# Patient Record
Sex: FEMALE | Race: WHITE | NOT HISPANIC OR LATINO | Employment: FULL TIME | URBAN - METROPOLITAN AREA
[De-identification: names, ages, dates, MRNs, and addresses within clinical notes are randomized per-mention and may not be internally consistent; named-entity substitution may affect disease eponyms.]

---

## 2017-05-10 ENCOUNTER — CONVERSION ENCOUNTER (OUTPATIENT)
Dept: MAMMOGRAPHY | Facility: CLINIC | Age: 47
End: 2017-05-10

## 2017-06-13 ENCOUNTER — CONVERSION ENCOUNTER (OUTPATIENT)
Dept: MAMMOGRAPHY | Facility: CLINIC | Age: 47
End: 2017-06-13

## 2018-08-19 ENCOUNTER — OFFICE VISIT (OUTPATIENT)
Dept: URGENT CARE | Age: 48
End: 2018-08-19
Payer: COMMERCIAL

## 2018-08-19 VITALS
RESPIRATION RATE: 16 BRPM | HEIGHT: 66 IN | SYSTOLIC BLOOD PRESSURE: 139 MMHG | HEART RATE: 100 BPM | DIASTOLIC BLOOD PRESSURE: 79 MMHG | BODY MASS INDEX: 27.97 KG/M2 | WEIGHT: 174 LBS | TEMPERATURE: 97.8 F | OXYGEN SATURATION: 96 %

## 2018-08-19 DIAGNOSIS — R35.0 URINARY FREQUENCY: ICD-10-CM

## 2018-08-19 DIAGNOSIS — R19.7 DIARRHEA, UNSPECIFIED TYPE: Primary | ICD-10-CM

## 2018-08-19 LAB
SL AMB  POCT GLUCOSE, UA: NORMAL
SL AMB LEUKOCYTE ESTERASE,UA: NORMAL
SL AMB POCT BILIRUBIN,UA: NORMAL
SL AMB POCT BLOOD,UA: NORMAL
SL AMB POCT CLARITY,UA: CLEAR
SL AMB POCT COLOR,UA: YELLOW
SL AMB POCT KETONES,UA: NORMAL
SL AMB POCT NITRITE,UA: NORMAL
SL AMB POCT PH,UA: NORMAL
SL AMB POCT SPECIFIC GRAVITY,UA: 1
SL AMB POCT URINE PROTEIN: NORMAL
SL AMB POCT UROBILINOGEN: 0.2

## 2018-08-19 PROCEDURE — 99213 OFFICE O/P EST LOW 20 MIN: CPT | Performed by: NURSE PRACTITIONER

## 2018-08-19 PROCEDURE — 81002 URINALYSIS NONAUTO W/O SCOPE: CPT | Performed by: NURSE PRACTITIONER

## 2018-08-19 PROCEDURE — S9088 SERVICES PROVIDED IN URGENT: HCPCS | Performed by: NURSE PRACTITIONER

## 2018-08-19 PROCEDURE — 87086 URINE CULTURE/COLONY COUNT: CPT | Performed by: NURSE PRACTITIONER

## 2018-08-19 RX ORDER — DEXTROAMPHETAMINE SACCHARATE, AMPHETAMINE ASPARTATE MONOHYDRATE, DEXTROAMPHETAMINE SULFATE AND AMPHETAMINE SULFATE 3.75; 3.75; 3.75; 3.75 MG/1; MG/1; MG/1; MG/1
15 CAPSULE, EXTENDED RELEASE ORAL EVERY MORNING
Status: ON HOLD | COMMUNITY
End: 2019-09-13 | Stop reason: SDUPTHER

## 2018-08-19 RX ORDER — QUETIAPINE FUMARATE 400 MG/1
400 TABLET, FILM COATED ORAL
Status: ON HOLD | COMMUNITY
End: 2019-09-13 | Stop reason: SDUPTHER

## 2018-08-19 NOTE — PROGRESS NOTES
[facility]        NAME: Rozann Schwab is a 56 y o  female  : 1970    MRN: [id number]  DATE: 2018  TIME: 10:46 AM    Assessment and Plan   Diarrhea, unspecified type [R19 7]  1  Diarrhea, unspecified type     2  Urinary frequency  POCT urine dip    Urine culture         Patient Instructions     Increase fluids  Continue to monitor  Establish with PCP and GYN  Follow up with PCP in 3-5 days  Proceed to  ER if symptoms worsen  Chief Complaint     Chief Complaint   Patient presents with    Diarrhea     also Uti symptoms; History of Present Illness       45-year-old female presenting today with c/o intermittent diarrhea / constipation x 6 months with occasional lower abdominal pain / cramping  No f/c  She has changed her diet towards more healthy and more fiber  She reports not drinking a lot of water  She also reports increased urinary frequency, but no other urinary symptoms  She is perimenopausal and states she just got her menses during this office visit  Last menses was approximately 3 months ago  Last gyn appt approximately one year ago  She is looking for a new PCP  Diarrhea          Review of Systems   Review of Systems   Constitutional: Negative  HENT: Negative  Eyes: Negative  Respiratory: Negative  Cardiovascular: Negative  Gastrointestinal: Positive for diarrhea  Genitourinary: Positive for frequency  Negative for decreased urine volume, difficulty urinating, dysuria, flank pain, hematuria and urgency  Musculoskeletal: Negative            Current Medications       Current Outpatient Prescriptions:     amphetamine-dextroamphetamine (ADDERALL XR) 15 MG 24 hr capsule, Take 15 mg by mouth every morning, Disp: , Rfl:     QUEtiapine (SEROquel) 400 MG tablet, Take 400 mg by mouth daily at bedtime, Disp: , Rfl:     Current Allergies     Allergies as of 2018 - Reviewed 2018   Allergen Reaction Noted    Lithium  2016    Sulfa antibiotics Other (See Comments) 02/02/2016            The following portions of the patient's history were reviewed and updated as appropriate: allergies, current medications, past family history, past medical history, past social history, past surgical history and problem list      Past Medical History:   Diagnosis Date    Bipolar 1 disorder, manic, moderate 2/3/2016    Psychiatric disorder        No past surgical history on file  No family history on file  Medications have been verified  Objective   /79   Pulse 100   Temp 97 8 °F (36 6 °C)   Resp 16   Ht 5' 6" (1 676 m)   Wt 78 9 kg (174 lb)   SpO2 96%   BMI 28 08 kg/m²        Physical Exam     Physical Exam   Constitutional: She is oriented to person, place, and time  She appears well-developed and well-nourished  Eyes: Conjunctivae are normal    Cardiovascular: Normal rate, regular rhythm and normal heart sounds  Pulmonary/Chest: Effort normal and breath sounds normal    Abdominal: Soft  Bowel sounds are normal  She exhibits no distension and no mass  There is no tenderness  There is no rebound and no guarding  Neurological: She is alert and oriented to person, place, and time  Skin: Skin is warm and dry  Psychiatric: She has a normal mood and affect  Her behavior is normal  Judgment and thought content normal    Nursing note and vitals reviewed

## 2018-08-19 NOTE — PATIENT INSTRUCTIONS
Increase fluids  Continue to monitor  Establish with PCP and GYN  Irritable Bowel Syndrome   AMBULATORY CARE:   Irritable bowel syndrome (IBS)  is a condition that prevents food from moving through your intestines normally  The food may move through too slowly or too quickly  This causes bloating, increased gas, constipation, or diarrhea  Signs and symptoms of IBS may come and go  Symptoms can occur a few times a week to once a month  IBS can go away for years and suddenly return  Your symptoms may worsen after you eat a big meal or if you do not eat enough healthy foods  Common symptoms include the following:   · Abdominal pain that disappears after you have a bowel movement    · Abdominal cramps that are worse after you eat    · Gas    · Bloated abdomen    · Diarrhea, constipation, or both     · Feeling like you need to have a bowel movement after you just had one    · Mucus in your bowel movement    · Feeling that you have not completely emptied your bowels after a bowel movement  Seek care immediately if:   · You have severe abdominal pain  · Your bowel movements are dark or have blood in them  Contact your healthcare provider if:   · You have a fever  · You have pain in your rectum  · Your abdominal pain does not go away, even after treatment  · You have questions or concerns about your condition or care  Treatment for IBS  may include medicine to decrease diarrhea or soften your bowel movements  You may also need medicine to treat constipation or decrease abdominal pain and muscle spasms  Manage your symptoms:   · Eat a variety of healthy foods  Healthy foods include fruits, vegetables, whole-grain breads, low-fat dairy products, beans, lean meats, and fish  You may need to avoid certain foods to decrease your symptoms  · Drink liquids as directed  Ask how much liquid to drink each day and which liquids are best for you   For most people, good liquids to drink are water, juice, and milk     · Exercise regularly  Ask about the best exercise plan for you  Exercise can decrease your blood pressure and improve your health  · Manage stress  Stress may slow healing and cause illness  Learn new ways to relax, such as deep breathing  · Keep a record  of everything you eat and drink, and your symptoms, for 3 weeks  Bring this record with you to your follow-up visits  Follow up with your healthcare provider as directed:  Write down your questions so you remember to ask them during your visits  © 2017 [employer] [employer] Information is for End User's use only and may not be sold, redistributed or otherwise used for commercial purposes  All illustrations and images included in CareNotes® are the copyrighted property of A D A M , Inc  or Braulio Salvador  The above information is an  only  It is not intended as medical advice for individual conditions or treatments  Talk to your doctor, nurse or pharmacist before following any medical regimen to see if it is safe and effective for you

## 2018-08-20 LAB — BACTERIA UR CULT: NORMAL

## 2018-08-28 ENCOUNTER — APPOINTMENT (OUTPATIENT)
Dept: LAB | Facility: HOSPITAL | Age: 48
End: 2018-08-28
Payer: COMMERCIAL

## 2018-08-28 ENCOUNTER — TRANSCRIBE ORDERS (OUTPATIENT)
Dept: ADMINISTRATIVE | Facility: HOSPITAL | Age: 48
End: 2018-08-28

## 2018-08-28 DIAGNOSIS — Z00.8 HEALTH EXAMINATION IN POPULATION SURVEY: ICD-10-CM

## 2018-08-28 DIAGNOSIS — Z00.8 HEALTH EXAMINATION IN POPULATION SURVEY: Primary | ICD-10-CM

## 2018-08-28 LAB
CHOLEST SERPL-MCNC: 167 MG/DL
EST. AVERAGE GLUCOSE BLD GHB EST-MCNC: 134 MG/DL
HBA1C MFR BLD: 6.3 % (ref 4.2–6.3)
HDLC SERPL-MCNC: 42 MG/DL (ref 40–59)
LDLC SERPL CALC-MCNC: 77 MG/DL
NONHDLC SERPL-MCNC: 125 MG/DL
TRIGL SERPL-MCNC: 242 MG/DL

## 2018-08-28 PROCEDURE — 36415 COLL VENOUS BLD VENIPUNCTURE: CPT | Performed by: PREVENTIVE MEDICINE

## 2018-08-28 PROCEDURE — 83036 HEMOGLOBIN GLYCOSYLATED A1C: CPT | Performed by: PREVENTIVE MEDICINE

## 2018-08-28 PROCEDURE — 80061 LIPID PANEL: CPT

## 2019-08-13 ENCOUNTER — TRANSCRIBE ORDERS (OUTPATIENT)
Dept: LAB | Facility: CLINIC | Age: 49
End: 2019-08-13

## 2019-08-13 ENCOUNTER — APPOINTMENT (OUTPATIENT)
Dept: LAB | Facility: CLINIC | Age: 49
End: 2019-08-13
Payer: COMMERCIAL

## 2019-08-13 DIAGNOSIS — R19.4 CHANGE IN BOWEL HABITS: ICD-10-CM

## 2019-08-13 DIAGNOSIS — R19.4 CHANGE IN BOWEL HABITS: Primary | ICD-10-CM

## 2019-08-13 DIAGNOSIS — Z79.899 ENCOUNTER FOR LONG-TERM (CURRENT) USE OF OTHER MEDICATIONS: ICD-10-CM

## 2019-08-13 DIAGNOSIS — F31.9 BIPOLAR AFFECTIVE DISORDER, REMISSION STATUS UNSPECIFIED (HCC): ICD-10-CM

## 2019-08-13 LAB
25(OH)D3 SERPL-MCNC: 16.9 NG/ML (ref 30–100)
ALBUMIN SERPL BCP-MCNC: 4.2 G/DL (ref 3.5–5)
ALP SERPL-CCNC: 125 U/L (ref 46–116)
ALT SERPL W P-5'-P-CCNC: 113 U/L (ref 12–78)
ANION GAP SERPL CALCULATED.3IONS-SCNC: 8 MMOL/L (ref 4–13)
AST SERPL W P-5'-P-CCNC: 78 U/L (ref 5–45)
BASOPHILS # BLD AUTO: 0.04 THOUSANDS/ΜL (ref 0–0.1)
BASOPHILS NFR BLD AUTO: 1 % (ref 0–1)
BILIRUB SERPL-MCNC: 0.32 MG/DL (ref 0.2–1)
BUN SERPL-MCNC: 15 MG/DL (ref 5–25)
CALCIUM SERPL-MCNC: 9.6 MG/DL (ref 8.3–10.1)
CHLORIDE SERPL-SCNC: 105 MMOL/L (ref 100–108)
CO2 SERPL-SCNC: 28 MMOL/L (ref 21–32)
CREAT SERPL-MCNC: 0.78 MG/DL (ref 0.6–1.3)
CRP SERPL QL: 3.1 MG/L
EOSINOPHIL # BLD AUTO: 0.46 THOUSAND/ΜL (ref 0–0.61)
EOSINOPHIL NFR BLD AUTO: 7 % (ref 0–6)
ERYTHROCYTE [DISTWIDTH] IN BLOOD BY AUTOMATED COUNT: 13.2 % (ref 11.6–15.1)
EST. AVERAGE GLUCOSE BLD GHB EST-MCNC: 117 MG/DL
FERRITIN SERPL-MCNC: 20 NG/ML (ref 8–388)
GFR SERPL CREATININE-BSD FRML MDRD: 91 ML/MIN/1.73SQ M
GLUCOSE SERPL-MCNC: 100 MG/DL (ref 65–140)
HBA1C MFR BLD: 5.7 % (ref 4.2–6.3)
HCT VFR BLD AUTO: 38.8 % (ref 34.8–46.1)
HCYS SERPL-SCNC: 6.9 UMOL/L (ref 3.7–11.2)
HGB BLD-MCNC: 12.6 G/DL (ref 11.5–15.4)
IGA SERPL-MCNC: 182 MG/DL (ref 70–400)
IMM GRANULOCYTES # BLD AUTO: 0.02 THOUSAND/UL (ref 0–0.2)
IMM GRANULOCYTES NFR BLD AUTO: 0 % (ref 0–2)
LYMPHOCYTES # BLD AUTO: 1.53 THOUSANDS/ΜL (ref 0.6–4.47)
LYMPHOCYTES NFR BLD AUTO: 24 % (ref 14–44)
MCH RBC QN AUTO: 26.9 PG (ref 26.8–34.3)
MCHC RBC AUTO-ENTMCNC: 32.5 G/DL (ref 31.4–37.4)
MCV RBC AUTO: 83 FL (ref 82–98)
MONOCYTES # BLD AUTO: 0.39 THOUSAND/ΜL (ref 0.17–1.22)
MONOCYTES NFR BLD AUTO: 6 % (ref 4–12)
NEUTROPHILS # BLD AUTO: 3.85 THOUSANDS/ΜL (ref 1.85–7.62)
NEUTS SEG NFR BLD AUTO: 62 % (ref 43–75)
NRBC BLD AUTO-RTO: 0 /100 WBCS
PLATELET # BLD AUTO: 280 THOUSANDS/UL (ref 149–390)
PMV BLD AUTO: 9.6 FL (ref 8.9–12.7)
POTASSIUM SERPL-SCNC: 4.1 MMOL/L (ref 3.5–5.3)
PROT SERPL-MCNC: 7.7 G/DL (ref 6.4–8.2)
RBC # BLD AUTO: 4.69 MILLION/UL (ref 3.81–5.12)
SODIUM SERPL-SCNC: 141 MMOL/L (ref 136–145)
T3FREE SERPL-MCNC: 2.27 PG/ML (ref 2.3–4.2)
T4 FREE SERPL-MCNC: 0.93 NG/DL (ref 0.76–1.46)
TSH SERPL DL<=0.05 MIU/L-ACNC: 2.39 UIU/ML (ref 0.36–3.74)
WBC # BLD AUTO: 6.29 THOUSAND/UL (ref 4.31–10.16)

## 2019-08-13 PROCEDURE — 80053 COMPREHEN METABOLIC PANEL: CPT

## 2019-08-13 PROCEDURE — 84443 ASSAY THYROID STIM HORMONE: CPT

## 2019-08-13 PROCEDURE — 36415 COLL VENOUS BLD VENIPUNCTURE: CPT

## 2019-08-13 PROCEDURE — 86592 SYPHILIS TEST NON-TREP QUAL: CPT

## 2019-08-13 PROCEDURE — 84403 ASSAY OF TOTAL TESTOSTERONE: CPT

## 2019-08-13 PROCEDURE — 83516 IMMUNOASSAY NONANTIBODY: CPT

## 2019-08-13 PROCEDURE — 86376 MICROSOMAL ANTIBODY EACH: CPT

## 2019-08-13 PROCEDURE — 82784 ASSAY IGA/IGD/IGG/IGM EACH: CPT

## 2019-08-13 PROCEDURE — 82306 VITAMIN D 25 HYDROXY: CPT

## 2019-08-13 PROCEDURE — 86140 C-REACTIVE PROTEIN: CPT

## 2019-08-13 PROCEDURE — 85025 COMPLETE CBC W/AUTO DIFF WBC: CPT

## 2019-08-13 PROCEDURE — 82728 ASSAY OF FERRITIN: CPT

## 2019-08-13 PROCEDURE — 84402 ASSAY OF FREE TESTOSTERONE: CPT

## 2019-08-13 PROCEDURE — 87389 HIV-1 AG W/HIV-1&-2 AB AG IA: CPT

## 2019-08-13 PROCEDURE — 84439 ASSAY OF FREE THYROXINE: CPT

## 2019-08-13 PROCEDURE — 80074 ACUTE HEPATITIS PANEL: CPT

## 2019-08-13 PROCEDURE — 86800 THYROGLOBULIN ANTIBODY: CPT

## 2019-08-13 PROCEDURE — 80307 DRUG TEST PRSMV CHEM ANLYZR: CPT

## 2019-08-13 PROCEDURE — 83036 HEMOGLOBIN GLYCOSYLATED A1C: CPT

## 2019-08-13 PROCEDURE — 83090 ASSAY OF HOMOCYSTEINE: CPT

## 2019-08-13 PROCEDURE — 84481 FREE ASSAY (FT-3): CPT

## 2019-08-14 LAB
HAV IGM SER QL: NORMAL
HBV CORE IGM SER QL: NORMAL
HBV SURFACE AG SER QL: NORMAL
HCV AB SER QL: NORMAL
HIV 1+2 AB+HIV1 P24 AG SERPL QL IA: NORMAL
THYROGLOB AB SERPL-ACNC: <1 IU/ML (ref 0–0.9)
THYROPEROXIDASE AB SERPL-ACNC: 24 IU/ML (ref 0–34)

## 2019-08-15 LAB
RPR SER QL: NORMAL
TESTOST FREE SERPL-MCNC: 1.2 PG/ML (ref 0–4.2)
TESTOST SERPL-MCNC: 5 NG/DL (ref 8–48)
TTG IGA SER-ACNC: <2 U/ML (ref 0–3)
TTG IGG SER-ACNC: <2 U/ML (ref 0–5)

## 2019-08-16 LAB
AMPHETAMINES UR QL SCN: POSITIVE
BARBITURATES UR QL SCN: NEGATIVE NG/ML
BENZODIAZ UR QL: NEGATIVE NG/ML
BZE UR QL: NEGATIVE NG/ML
CANNABINOIDS UR QL SCN: NEGATIVE NG/ML
METHADONE UR QL SCN: NEGATIVE NG/ML
OPIATES UR QL: NEGATIVE NG/ML
PCP UR QL: NEGATIVE NG/ML
PROPOXYPH UR QL SCN: NEGATIVE NG/ML

## 2019-08-19 ENCOUNTER — TRANSCRIBE ORDERS (OUTPATIENT)
Dept: ADMINISTRATIVE | Facility: HOSPITAL | Age: 49
End: 2019-08-19

## 2019-08-19 DIAGNOSIS — R94.5 NONSPECIFIC ABNORMAL RESULTS OF LIVER FUNCTION STUDY: Primary | ICD-10-CM

## 2019-09-13 ENCOUNTER — HOSPITAL ENCOUNTER (INPATIENT)
Facility: HOSPITAL | Age: 49
LOS: 28 days | Discharge: HOME | DRG: 885 | End: 2019-10-11
Attending: PSYCHIATRY & NEUROLOGY | Admitting: PSYCHIATRY & NEUROLOGY
Payer: COMMERCIAL

## 2019-09-13 DIAGNOSIS — F29 PSYCHOSIS, UNSPECIFIED PSYCHOSIS TYPE (HCC): Primary | ICD-10-CM

## 2019-09-13 DIAGNOSIS — T18.9XXA FOREIGN BODY, SWALLOWED: ICD-10-CM

## 2019-09-13 DIAGNOSIS — F31.12 BIPOLAR 1 DISORDER, MANIC, MODERATE (HCC): ICD-10-CM

## 2019-09-13 DIAGNOSIS — K59.00 CONSTIPATION: ICD-10-CM

## 2019-09-13 PROBLEM — F31.2 BIPOLAR I DISORDER, MOST RECENT EPISODE MANIC, SEVERE WITH PSYCHOTIC FEATURES (HCC): Status: ACTIVE | Noted: 2019-09-13

## 2019-09-13 PROCEDURE — 99253 IP/OBS CNSLTJ NEW/EST LOW 45: CPT | Performed by: PHYSICIAN ASSISTANT

## 2019-09-13 PROCEDURE — 99221 1ST HOSP IP/OBS SF/LOW 40: CPT | Performed by: PSYCHIATRY & NEUROLOGY

## 2019-09-13 RX ORDER — DIVALPROEX SODIUM 500 MG/1
500 TABLET, DELAYED RELEASE ORAL 2 TIMES DAILY
Status: DISCONTINUED | OUTPATIENT
Start: 2019-09-13 | End: 2019-09-14

## 2019-09-13 RX ORDER — QUETIAPINE FUMARATE 200 MG/1
200 TABLET, FILM COATED ORAL
Status: DISCONTINUED | OUTPATIENT
Start: 2019-09-13 | End: 2019-09-15

## 2019-09-13 RX ORDER — QUETIAPINE FUMARATE 200 MG/1
200 TABLET, FILM COATED ORAL
Status: DISCONTINUED | OUTPATIENT
Start: 2019-09-13 | End: 2019-09-17

## 2019-09-13 RX ORDER — ACETAMINOPHEN 325 MG/1
650 TABLET ORAL EVERY 6 HOURS PRN
Status: DISCONTINUED | OUTPATIENT
Start: 2019-09-13 | End: 2019-09-17

## 2019-09-13 RX ORDER — LORAZEPAM 1 MG/1
1 TABLET ORAL EVERY 4 HOURS PRN
Status: DISCONTINUED | OUTPATIENT
Start: 2019-09-13 | End: 2019-09-16

## 2019-09-13 RX ORDER — OLANZAPINE 10 MG/1
5 INJECTION, POWDER, LYOPHILIZED, FOR SOLUTION INTRAMUSCULAR
Status: DISCONTINUED | OUTPATIENT
Start: 2019-09-13 | End: 2019-09-17

## 2019-09-13 RX ORDER — TRAZODONE HYDROCHLORIDE 50 MG/1
50 TABLET ORAL
Status: DISCONTINUED | OUTPATIENT
Start: 2019-09-13 | End: 2019-10-11 | Stop reason: HOSPADM

## 2019-09-13 RX ORDER — QUETIAPINE FUMARATE 200 MG/1
400 TABLET, FILM COATED ORAL
Status: DISCONTINUED | OUTPATIENT
Start: 2019-09-13 | End: 2019-09-13

## 2019-09-13 RX ORDER — OLANZAPINE 5 MG/1
5 TABLET ORAL
Status: DISCONTINUED | OUTPATIENT
Start: 2019-09-13 | End: 2019-09-17

## 2019-09-13 RX ORDER — BENZTROPINE MESYLATE 1 MG/ML
1 INJECTION INTRAMUSCULAR; INTRAVENOUS EVERY 6 HOURS PRN
Status: DISCONTINUED | OUTPATIENT
Start: 2019-09-13 | End: 2019-10-11 | Stop reason: HOSPADM

## 2019-09-13 RX ORDER — DEXTROAMPHETAMINE SACCHARATE, AMPHETAMINE ASPARTATE, DEXTROAMPHETAMINE SULFATE AND AMPHETAMINE SULFATE 2.5; 2.5; 2.5; 2.5 MG/1; MG/1; MG/1; MG/1
10 TABLET ORAL
Status: DISCONTINUED | OUTPATIENT
Start: 2019-09-13 | End: 2019-09-14

## 2019-09-13 RX ORDER — QUETIAPINE FUMARATE 200 MG/1
200 TABLET, FILM COATED ORAL
Status: DISCONTINUED | OUTPATIENT
Start: 2019-09-13 | End: 2019-09-13

## 2019-09-13 RX ORDER — BENZTROPINE MESYLATE 1 MG/1
1 TABLET ORAL EVERY 6 HOURS PRN
Status: DISCONTINUED | OUTPATIENT
Start: 2019-09-13 | End: 2019-10-11 | Stop reason: HOSPADM

## 2019-09-13 RX ORDER — QUETIAPINE FUMARATE 200 MG/1
TABLET, FILM COATED ORAL
COMMUNITY
Start: 2019-08-10 | End: 2019-10-11 | Stop reason: HOSPADM

## 2019-09-13 RX ORDER — MAGNESIUM HYDROXIDE/ALUMINUM HYDROXICE/SIMETHICONE 120; 1200; 1200 MG/30ML; MG/30ML; MG/30ML
30 SUSPENSION ORAL EVERY 4 HOURS PRN
Status: DISCONTINUED | OUTPATIENT
Start: 2019-09-13 | End: 2019-10-11 | Stop reason: HOSPADM

## 2019-09-13 RX ORDER — DEXTROAMPHETAMINE SACCHARATE, AMPHETAMINE ASPARTATE, DEXTROAMPHETAMINE SULFATE AND AMPHETAMINE SULFATE 2.5; 2.5; 2.5; 2.5 MG/1; MG/1; MG/1; MG/1
TABLET ORAL
Refills: 0 | COMMUNITY
Start: 2019-08-03 | End: 2019-10-11 | Stop reason: HOSPADM

## 2019-09-13 RX ORDER — DEXTROAMPHETAMINE SACCHARATE, AMPHETAMINE ASPARTATE MONOHYDRATE, DEXTROAMPHETAMINE SULFATE AND AMPHETAMINE SULFATE 2.5; 2.5; 2.5; 2.5 MG/1; MG/1; MG/1; MG/1
CAPSULE, EXTENDED RELEASE ORAL
COMMUNITY
End: 2019-10-11 | Stop reason: HOSPADM

## 2019-09-13 RX ORDER — LORAZEPAM 2 MG/ML
2 INJECTION INTRAMUSCULAR EVERY 4 HOURS PRN
Status: DISCONTINUED | OUTPATIENT
Start: 2019-09-13 | End: 2019-09-16

## 2019-09-13 RX ADMIN — QUETIAPINE FUMARATE 200 MG: 200 TABLET ORAL at 20:21

## 2019-09-13 RX ADMIN — DIVALPROEX SODIUM 500 MG: 500 TABLET, DELAYED RELEASE ORAL at 14:00

## 2019-09-13 RX ADMIN — DIVALPROEX SODIUM 500 MG: 500 TABLET, DELAYED RELEASE ORAL at 20:20

## 2019-09-13 RX ADMIN — LORAZEPAM 1 MG: 1 TABLET ORAL at 17:52

## 2019-09-13 RX ADMIN — OLANZAPINE 5 MG: 5 TABLET, FILM COATED ORAL at 17:52

## 2019-09-13 RX ADMIN — LORAZEPAM 1 MG: 1 TABLET ORAL at 04:22

## 2019-09-13 NOTE — ED NOTES
Insurance Authorization for admission:   Phone call placed to [employer]   Phone number:[phone]     Spoke to [facility]       2 days approved  Level of care: AIP  Review on 9/13  Authorization # [id number]  EVS (Eligibility Verification System) called - [phone]  Automated system indicates: **    Insurance Authorization for Transportation:    Phone call placed to **  Phone number **  Spoke to **     Authorization #: **

## 2019-09-13 NOTE — CMS CERTIFICATION NOTE
TREATMENT PLAN REVIEW - [address] 53 y o  1970 female MRN: [id number]    [facility] [address] [facility] Room / Bed: [place] [address]/[place] [address] Encounter: [id number]          Admit Date/Time:  9/13/2019  1:20 AM    Treatment Team: Attending Provider: Sravanthi Keenan; Patient Care Technician: May Villasenor; Registered Nurse: Socorro Fish, RN; Care Manager: Margarette Khalil, ELI; : Constance Beltre; Medications RN: Anais Guardado, ELI; Occupational Therapy Assistant: DELFINO Joe;  Registered Nurse: Fausto Navarro RN; Resident: Adrienne Earl MD; Patient Care Assistant: Rosa Ruiz    Diagnosis:   Bipolar I disorder, most recent episode manic, severe with psychotic features    Patient Strengths/Assets: average or above intelligence, capable of independent living, good past treatment response, good physical health, good support system    Patient Barriers/Limitations: patient is on an involuntary commitment    Short Term Goals: decrease in psychotic symptoms, sleep improvement, mood stabilization    Long Term Goals: stabilization of mood, resolution of psychotic symptoms, adequate sleep, appropriate interaction with peers    Progress Towards Goals: starting psychiatric medications as prescribed, still has psychotic symptoms    Recommended Treatment: medication management, patient medication education, group therapy, milieu therapy, continued Behavioral Health psychiatric evaluation/assessment process    Treatment Frequency: daily medication monitoring, group and milieu therapy daily, monitoring through interdisciplinary rounds, monitoring through weekly patient care conferences    Expected Discharge Date:  > 2 midnights    Discharge Plan: discharge to home    Treatment Plan Created/Updated By: Adrienne Earl MD

## 2019-09-13 NOTE — PLAN OF CARE
Problem: SELF HARM/SUICIDALITY  Goal: Will have no self-injury during hospital stay  Description  INTERVENTIONS:  - Q 15 MINUTES: Routine safety checks  - Q WAKING SHIFT & PRN: Assess risk to determine if routine checks are adequate to maintain patient safety  - Encourage patient to participate actively in care by formulating a plan to combat response to suicidal ideation, identify supports and resources  Outcome: Progressing     Problem: PSYCHOSIS  Goal: Will report no hallucinations or delusions  Description  Interventions:  - Administer medication as  ordered  - Every waking shifts and PRN assess for the presence of hallucinations and or delusions  - Assist with reality testing to support increasing orientation  - Assess if patient's hallucinations or delusions are encouraging self-harm or harm to others and intervene as appropriate  Outcome: Progressing     Problem: INVOLUNTARY ADMIT  Goal: Will cooperate with staff recommendations and doctor's orders and will demonstrate appropriate behavior  Description  INTERVENTIONS:  - Treat underlying conditions and offer medication as ordered  - Educate regarding involuntary admission procedures and rules  - Utilize positive consistent limit setting strategies to support patient and staff safety  Outcome: Progressing     Problem: Alteration in Thoughts and Perception  Goal: Treatment Goal: Gain control of psychotic behaviors/thinking, reduce/eliminate presenting symptoms and demonstrate improved reality functioning upon discharge  Outcome: Progressing  Goal: Refrain from acting on delusional thinking/internal stimuli  Description  Interventions:  - Monitor patient closely, per order   - Utilize least restrictive measures   - Set reasonable limits, give positive feedback for acceptable   - Administer medications as ordered and monitor of potential side effects  Outcome: Progressing  Goal: Recognize dysfunctional thoughts, communicate reality-based thoughts at the time of discharge  Description  Interventions:  - Provide medication and psycho-education to assist patient in compliance and developing insight into his/her illness   Outcome: Progressing  Goal: Complete daily ADLs, including personal hygiene independently, as able  Description  Interventions:  - Observe, teach, and assist patient with ADLS  - Monitor and promote a balance of rest/activity, with adequate nutrition and elimination   Outcome: Progressing     Problem: Ineffective Coping  Goal: Participates in unit activities  Description  Interventions:  - Provide therapeutic environment   - Provide required programming   - Redirect inappropriate behaviors   Outcome: Progressing

## 2019-09-13 NOTE — PROGRESS NOTES
Pt appears disoriented, reports high level of anxiety  Reports klonopin as needed helps this  During conversation, pt RIS, talking to self, states "oh, that may be the voice of my dead father " Continues RIS  Administered ativan and zyprexa po for symptoms  Pt continues talking to self, stating, "you should meet Elba Steven, he has his own recording studio  They need to keep the music business and psychiatry business separate, these people are slaves, working for nothing " Pt reassured, appreciative  Pt wandering unit, medication only partially effective, continued RIS and reported hearing voices

## 2019-09-13 NOTE — PROGRESS NOTES
89 with a hx  Of Bipolar Disorder, admitted from [facility] for erratic behavior & brought to ED by police, after throwing patio furniture from a 5th floor, after a disagreement with her BF  Pt is cooperative with admission, as had Seroquel  200 mg po in ED, prior to transfer here  Pt  is a [employer] employee & has been here in the past for similar s/s's of psychosis  Pt admits that she has been medication non-compliant for 3-4 days or at least " taken sporadically " There is no overt delusions present, but admits that at times, she has paranoia  Denies current SI/HI, A/V Hallucinations, rating her depression at a 3-4 & anxiety at a 5  She agrees to a safety contract  Her speech is rapid & rambling & she has no recall of events leading up to admission  Thinks that she's here, simply due to paperwork being "messed up " Has allergies to Lithium & Sulfa  Denies any medical problems except for "thyroid nodules," which she reports is "being watched "  Requested & received a sandwich & juice & was able to sleep within an hour after admission assessment, without benefit of extra medication  Will continue to monitor

## 2019-09-13 NOTE — PROGRESS NOTES
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

## 2019-09-13 NOTE — PROGRESS NOTES
Pt removed mattress from bed frame  Pt found in bedroom with a peer staring at bed frame  Pt informed that peers can not be in her bedroom  Pt stated that her bed was vibrating and there are worms and water in the bed frame  Pt stated that she can get 20 marines in here to check this out  Pt told writer that only the marines may touch her bed and to let others know that [facility] staff are not allowed to touch her bed

## 2019-09-13 NOTE — PLAN OF CARE
Problem: PSYCHOSIS  Goal: Will report no hallucinations or delusions  Description  Interventions:  - Administer medication as  ordered  - Every waking shifts and PRN assess for the presence of hallucinations and or delusions  - Assist with reality testing to support increasing orientation  - Assess if patient's hallucinations or delusions are encouraging self-harm or harm to others and intervene as appropriate  Outcome: Progressing     Problem: INVOLUNTARY ADMIT  Goal: Will cooperate with staff recommendations and doctor's orders and will demonstrate appropriate behavior  Description  INTERVENTIONS:  - Treat underlying conditions and offer medication as ordered  - Educate regarding involuntary admission procedures and rules  - Utilize positive consistent limit setting strategies to support patient and staff safety  Outcome: Progressing  Note:   Currently denies A/V hallucinations  No overt delusions expressed   Cooperative with admission assessment, though tangential

## 2019-09-13 NOTE — CONSULTS
Consultation - Dylon Edward 53 y o  female MRN: [id number]    Unit/Bed#: [facility] 252-01 Encounter: [id number]      Assessment/Plan     Assessment:  Bipolar disorder currently Manic    Plan:  Medically cleared for inpatient psychiatric evaluation and treatment      History of Present Illness   HPI: Dylon Edward is a 53y o  year old female who presents with acute xochitl with history of bipolar disorder  She reports history of parathyroid nodules, gallstones, and diverticulosis however there are no currently active issues  She denies recent illness, open wounds, or pain  Inpatient consult for Medical Clearance for [facility] patient  Consult performed by: Luis Alfredo Moreira PA-C  Consult ordered by: Haja Reeves MD          Review of Systems   Constitutional: Negative for activity change, chills, fatigue and fever  HENT: Negative for congestion, drooling, ear pain, postnasal drip, rhinorrhea, sinus pressure, sinus pain, sneezing and sore throat  Eyes: Negative for redness, itching and visual disturbance  Respiratory: Negative for cough, chest tightness, shortness of breath and wheezing  Cardiovascular: Negative for chest pain and palpitations  Gastrointestinal: Negative for abdominal distention, abdominal pain, constipation, diarrhea, nausea and vomiting  Genitourinary: Negative for dysuria, flank pain, frequency, hematuria and urgency  Neurological: Negative for dizziness, syncope and headaches  Psychiatric/Behavioral: Positive for agitation and confusion  Negative for behavioral problems, decreased concentration, dysphoric mood, hallucinations, sleep disturbance and suicidal ideas  The patient is nervous/anxious and is hyperactive  Historical Information   Past Medical History:   Diagnosis Date    Bipolar 1 disorder, manic, moderate ([facility] ) 2/3/2016    Psychiatric disorder     Psychiatric illness     Psychosis ([facility] )     Suicide attempt ([facility] )      History reviewed   No pertinent surgical history  Social History   Social History     Substance and Sexual Activity   Alcohol Use Yes    Frequency: 2-3 times a week    Drinks per session: 5 or 6    Binge frequency: Less than monthly    Comment: [facility] [place] 6-9 drinks per week     Social History     Substance and Sexual Activity   Drug Use Yes    Types: Marijuana    Comment: "once in a while"     Social History     Tobacco Use   Smoking Status Never Smoker   Smokeless Tobacco Never Used     Family History: non-contributory    Meds/Allergies   PTA meds:   Prior to Admission Medications   Prescriptions Last Dose Informant Patient Reported? Taking? QUEtiapine (SEROquel) 200 mg tablet   Yes Yes   amphetamine-dextroamphetamine (ADDERALL XR, 10MG,) 10 MG 24 hr capsule   Yes Yes   amphetamine-dextroamphetamine (ADDERALL) 10 mg tablet   Yes Yes   Sig: TAKE 1 CAPSULE EACH AFTERNOON      Facility-Administered Medications: None     Allergies   Allergen Reactions    Lithium     Sulfa Antibiotics Other (See Comments)     unknown       Objective   Vitals: Blood pressure 125/58, pulse 92, temperature 97 6 °F (36 4 °C), temperature source Tympanic, resp  rate 16, height 5' 6" (1 676 m), weight 75 9 kg (167 lb 6 4 oz), SpO2 98 %, unknown if currently breastfeeding  No intake or output data in the 24 hours ending 09/13/19 1409  Invasive Devices     None                 Physical Exam   Constitutional: She is oriented to person, place, and time  She appears well-developed and well-nourished  No distress  HENT:   Head: Normocephalic and atraumatic  Eyes: Pupils are equal, round, and reactive to light  EOM are normal    Neck: Normal range of motion  Neck supple  Cardiovascular: Normal rate, regular rhythm and normal heart sounds  Exam reveals no gallop and no friction rub  No murmur heard  Pulmonary/Chest: Effort normal  She has no wheezes  She has no rales  Abdominal: Soft  Bowel sounds are normal  She exhibits no mass  There is no tenderness   There is no guarding  Musculoskeletal: Normal range of motion  Neurological: She is alert and oriented to person, place, and time  Skin: Skin is warm and dry  Psychiatric: Her mood appears anxious  Her speech is rapid and/or pressured and tangential  She is hyperactive  She is inattentive  Nursing note and vitals reviewed  Lab Results: I have personally reviewed pertinent reports  Imaging Studies: I have personally reviewed pertinent reports

## 2019-09-13 NOTE — PROGRESS NOTES
New Patient  Status: Pt s a 89 from [facility]  She was brought in by police for throwing furniture off of her 5th floor balcony  Pt has been non-compliant with medications  She did contact her mom in [place] to notify her of the admission  Pt reported occasional etho & thc use  Pt thinks she is here due to paperwork being messed up     Medication: to be reviewed  D/C: TBD     09/13/19 0733   Team Meeting   Meeting Type Daily Rounds   Team Members Present   Team Members Present Physician;Nurse;;Occupational Therapist   Physician Team Member Dr Tristian Pérez / Dr Aguilar Herron / Bettye Gallego Team Member Christine Escalante / Farshad Ascencio Management Team Member [phone] / Sravanthi Gonzalez   OT Team Member Luci   Patient/Family Present   Patient Present No   Patient's Family Present No

## 2019-09-13 NOTE — H&P
Psychiatric Evaluation - [address] [place] [address] 53 y o  female MRN: [id number]  Unit/Bed#: U 252-01 Encounter: [id number]    Assessment/Plan   Active Problems:    Bipolar I disorder, most recent episode manic, severe with psychotic features ([facility] )    Plan:   -Check admission labs  -Collaborate with family for baseline assessment and disposition planning   -Continue patient current outpatient medical regimen:          Seroquel 200 mg qhs (with 200mg qhs prn if unable to sleep)          Adderall 10 mg bid          Depakote 500mg bid (check level in 3 days)    Treatment options and alternatives were reviewed with the patient, who concurs with the above plan  Risks, benefits, and possible side effects of medications were explained to the patient, and she verbalizes understanding       -----------------------------------    Chief Complaint: "I've been under a lot of stress"    History of Present Illness     Radha Bennett is a 53 y o  female with a PMH of Bipolar I Disorder who presents on an involuntarily basis for signs of acute psychosis and xochitl  [facility] after being brought in by the police due to erratic behavior (throwing furniture from the 5th floor) following a fight/rejection with "a radha she's seeing from work " The patient is currently a nurse at [employer]  She was previously admitted here (02/02/16 - 02/23/16)  in 2016 for similar complaints  Since then, she has been following with Dr Brie Hong (psychiatrist) and doing well  Dr Brie Hong reports that the patient has been very high-functioning and stable prior to this incident  On chart review, the patient admitted that she has been "taking her medications sporadically" for the past 3-4 days  On initial assessment, the patient is very disorganized with pressured speech and tangential thought processes with loose associations   She has decreased focus/concentration due to active auditory hallucinations, to which she yells at "stop " The patient denies that the voices are commanding her to hurt herself or others  She denies any suicidal ideations/intent/plan  On chart review, the patient had noted paranoia with statements such as, "I'm being watched" and  "The bed is vibrating and there's worms and water in the bed frame, I can get 20 marines here to check it out "     The patient reports that she is , lives alone in her home, and has great support from her daughter Anatoliy Abraham) and her boyfriend Norma Rebolledo)  She reports a great rapport with her co-workers, and was visited by some of them since she has been in the hospital  The patient admits to recent stressors at work  She reports that she was recently switched to day shift after being on nights for a very long time and admits she is struggling to keep up  She states she is unable to complete the required amount of work in 40 hours, and so, ends up working 60 hour weeks  She reports some guilt regarding her divorce and also work; she states she got a raise and because she works by the hour, makes more money that her boss who is on salary  She states that her boss is a great/hard-working lady, and has a daughter in college, so she "feels really bad about that " She admits that she got upset because she asked the radha she is seeing from work to  her, and he said no  The patient is consenting for safety on the unit  Medical Review Of Systems:  Complete review of systems is negative except as noted above      Psychiatric Review Of Systems:  Problems with sleep: yes, decreased  Appetite changes: yes, decreased  Weight changes: no  Low energy/anergy: yes  Low interest/pleasure/anhedonia: no  Somatic symptoms: no  Anxiety/panic: no  Courtney: yes  Guilt/hopeless: yes  Self injurious behavior/risky behavior: no    Historical Information     Psychiatric History:   Psychiatric medication trial: Lithium (Allergic), Seroquel, Depakote, Lamictal, Prolixin, Adderall  Inpatient hospitalizations: [place] ([facility] on 02/02/16 - 02/23/16  Suicide attempts: OD on Seroquel 2 weeks prior to admission in 2016  Violent behavior: recent and past agitation  Outpatient treatment: Dr Lacy Redding and his NP Janak Duncan    Substance Abuse History:  Social History     Tobacco Use    Smoking status: Never Smoker    Smokeless tobacco: Never Used   Substance Use Topics    Alcohol use: Yes     Frequency: 2-3 times a week     Drinks per session: 5 or 6     Binge frequency: Less than monthly     Comment: States 6-9 drinks per week    Drug use: Yes     Types: Marijuana     Comment: "once in a while"      I have assessed this patient for substance use within the past 12 months  I spent time with Deanne Syed in counseling and education on risk of substance abuse  I assessed motivation and encouraged her for treatment as appropriate  Family Psychiatric History:   Brother - Bipolar disorder    Social History:  Education: college graduate  Learning Disabilities: denies  Marital history:   Living arrangement: lives in home alone  Occupational History: Psychiatric Unit Nurse at [employer]: gets along well with co-workers and good relationship with children    Other Pertinent History: None      Traumatic History:   Abuse: denies  Other Traumatic Events: denies    Past Medical History:   Past Medical History:   Diagnosis Date    Bipolar 1 disorder, manic, moderate ([facility] ) 2/3/2016    Psychiatric disorder     Psychiatric illness     Psychosis ([facility] )     Suicide attempt ([facility] )         -----------------------------------  Objective    Temp:  [97 1 °F (36 2 °C)-98 2 °F (36 8 °C)] 98 2 °F (36 8 °C)  HR:  [] 86  Resp:  [16] 16  BP: (119-139)/(57-60) 139/60    Mental Status Evaluation:  Appearance:  alert, casually dressed, appears stated age and appropriate grooming and hygiene   Behavior:  cooperative, pleasant, sitting comfortably in chair, good eye contact, no abnormal movements and normal gait and balance   Speech:  increased rate, pressured, normal volume and incoherent   Mood:  anxious   Affect:  mood-congruent and anxious   Thought Process:  disorganized, illogical, incoherent, tangential, loose associations, increased rate of thoughts, flight of ideas   Thought Content: paranoid ideation   Perceptual disturbances: auditory hallucinations (pt denies voices commanding her to hurt herself or others) and no visual hallucinations   Risk Potential: No active or passive suicidal or homicidal ideation, Low potential for aggression   Cognition: disoriented, memory impaired due to manic/psychotic episode, appears to be of average intelligence, impaired abstract reasoning and attention span appeared shorter than expected for age   Insight:  Limited   Judgment: Limited     Meds/Allergies   Allergies   Allergen Reactions    Lithium     Sulfa Antibiotics Other (See Comments)     unknown     all current active meds have been reviewed    Behavioral Health Medications: all current active meds have been reviewed  Changes as above  Laboratory results:  I have personally reviewed all pertinent laboratory/tests results  No results found for this or any previous visit (from the past 48 hour(s))           -----------------------------------    Risks / Benefits of Treatment:     Risks, benefits, and possible side effects of medications explained to patient  The patient verbalizes understanding and agreement for treatment  Counseling / Coordination of Care:     Patient's presentation on admission and proposed treatment plan were discussed with the treatment team   Diagnosis, medication changes and treatment plan were reviewed with the patient  Recent stressors were discussed with the patient  Events leading to admission were reviewed with the patient  Importance of medication and treatment compliance was reviewed with the patient    WE discussed with patient plan for alcohol detoxification protocol and gradual taper of medications to prevent withdrawal symptoms  Inpatient Psychiatric Certification:     Certification: Based upon physical, mental and social evaluations, I certify that inpatient psychiatric services are medically necessary for this patient for a duration of > 2 midnights for the treatment of Bipolar I disorder, most recent episode manic, severe with psychotic features  This note has been constructed using a voice recognition system  There may be translation, syntax, or grammatical errors  If you have any questions, please contact the dictating provider

## 2019-09-13 NOTE — CONSULTS
Telepsychiatry Telephone Admission Note    I was consulted by phone to review the case of this 54yo woman who was medically cleared and admitted under [facility] for psychosis and xochitl in context of medication nonadherence  PMH significant for thyroid nodules  +reported cannabis and alcohol use, no reported serious withdrawal reactions  Allergy to lithium, sulfa  AVSS, reassuring admission labs  UDS+THC  BAL nil  Pregnancy test negative  Plan:   --Admit to psychiatry under involuntary status  --Safety precautions  --Routine admission orders placed  --Hold psychiatric medications; inpatient team to decide on standing psychopharmacology pending reevaluation

## 2019-09-13 NOTE — PROGRESS NOTES
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

## 2019-09-13 NOTE — CASE MANAGEMENT
Pt met with CM to review & sign ROIs for [facility] Bucky(daughter) & [facility] Misael(outpatient)  PT is a 52 y/o female who was not able to say how or why she was on the hospital   Pt said that she is , but "practically single since it has been 15 years"  Pt has one daughter, who is 24 y/o  Pt said that her father is "reincarnated" & her mom is supportive but very busy & use to run a hospital   Pt said that she lives alone  Pt said that her manager Zeeshan Chery visited & Arleen Macario was taking care of her two cats  Pt said that she has a sister Mimi Beasley, a brother, another rsister Cathy Branch who is the oldest & Shruthi Score a half sister  Pt said that Cathy Branch raised the siblings because her mom was busy  When asked about a family history of mental illness, Pt said that she thinks her dad had Asperger's and/or narcissistic personality, & depression  Pt was responding to internal stimuli & very distracted  At one time she excused herself & said that listing the names of her family members started a whole of arguing "over here"(Pt motioning to the right of her)  Pt asked CM if she heard the arguing & CM said no  Pt expressed she was concerned that she was the only one that could hear it but later said that it was a good sign she was able to admit she was hearing voices  Pt continued to respond & mentioned that Manish Teixeira said something  CM asked if that as one of the voices she identified & Pt laughed & said no it wasn't that serious  Pt talked about her good friend, Anjana Woo, who was diagnosed with cancer about a year ago  She said that he has stage IV sarcoma of his leg  Pt said they hang out & watch movies & he is a good radha  Pt said that she sees Dr Herminia Sadler at [facility]  Pt denied having a PCP, stating that her insurance changed  Pt reported increase in ETOH use lately, since finding out about Vipul's cancer  Pt said that she has 1-2 beers, 2-3 times per week    Pt reported that she smokes [place] cigarettes, socially, when she is drinking  Pt reported that she has her associates degree from [employer]  Pt said she didn't want to talk about work & that they knew she was on the hospital & CM did not need to call them  Pt reported being tired & did not wish to talk more

## 2019-09-13 NOTE — PROGRESS NOTES
Did awaken approx  0415 to void & observed nude in her room  Somewhat more disorganized when awakened & somewhat more difficult to redirect  Wanting to call family at just after 0400 & becoming agitated when told she'd have to wait until AM  Ativan 1 mg  Po prn given at 0430, with mild effect obtained  Has not resumed sleep as of yet, but not as restless presently  Insisted on placing contacts in eyes bilaterally  Resting at present in bed  Will continue to monitor

## 2019-09-13 NOTE — TREATMENT PLAN
Nursing will meet with you at least 2x's/day & PRN to assess current thoughts & presence of SI/HI or A/V hallucinations  We will teach you about your illness & medications & assist you in the development of alternate coping skills

## 2019-09-14 PROCEDURE — 99231 SBSQ HOSP IP/OBS SF/LOW 25: CPT | Performed by: PSYCHIATRY & NEUROLOGY

## 2019-09-14 RX ORDER — DIVALPROEX SODIUM 500 MG/1
500 TABLET, EXTENDED RELEASE ORAL
Status: DISCONTINUED | OUTPATIENT
Start: 2019-09-14 | End: 2019-09-18

## 2019-09-14 RX ADMIN — QUETIAPINE FUMARATE 200 MG: 200 TABLET ORAL at 21:41

## 2019-09-14 RX ADMIN — DIVALPROEX SODIUM 500 MG: 500 TABLET, EXTENDED RELEASE ORAL at 21:41

## 2019-09-14 RX ADMIN — QUETIAPINE FUMARATE 200 MG: 200 TABLET ORAL at 23:01

## 2019-09-14 RX ADMIN — LORAZEPAM 1 MG: 1 TABLET ORAL at 12:42

## 2019-09-14 RX ADMIN — QUETIAPINE FUMARATE 200 MG: 200 TABLET ORAL at 01:50

## 2019-09-14 NOTE — PROGRESS NOTES
Daily rounds  Reasons for admission reviewed  Pt a 302  Manic, disorganized  Talking about calling the [address] [place] [address] [employer] to look at her bed yesterday  Improved sleep last night  Per shift report, pt agitated at night time, pushing on fire doors and required security presence

## 2019-09-14 NOTE — PROGRESS NOTES
Med note: Pt requested medication for anxiety at 1242  PRN ativan-1mg provided Mel Dempsey anxiety - 25)  Pt reports decreased anxiety  Medication effective for use

## 2019-09-14 NOTE — PROGRESS NOTES
Progress Note - Maxime Johnson 53 y o  female MRN: @MRN   Unit/Bed#: [facility] Standard 252-01 Encounter: [id number]    Per nursing report and sign out, patient is 37, fight with s/o and psychotic and disorganized  Disrobing  Refused adderall  Did sleep  Agitation prior  Urbano Griffithoy reports today that she had multiple life stresssor all at once (daughter graduating college, moving to [place] with her BF working with [employer]    Not sure how much was all in her head)  She slept better last night, depakote really worsk well  She feels she neds medications, but may in future decrease depakote  Working on creating a homeless/safe shelter  Still having AH of people's voices she recognizes  And this makes it "hard for me to pray"  Mind is flooded  Given her daughters phone number, as she does not have her phone    Michelle Earlenatasha stopped me later in hallway, stated she wanted to reduce depakote  HS only, and no dose increase to compensate for AM d/c  She feels "In a cloud" on too much, and only wants 500mg       Energy: lower today  Sleep: improved  Appetite: normal  Medication side effects: No   ROS: no complaints    Mental Status Evaluation:    Appearance:  age appropriate   Behavior:  pleasant, cooperative, with good eye contact   Speech:  Normal volume, regular rate and rhythm, some staggered speech   Mood:  dysphoric, anxious   Affect:  mood congruent, constricted       Thought Process:  goal directed, logical, disorganized   Associations: intact associations   Thought Content:  negative thinking and cognitive distortions, paranoid ideation   Perceptual Disturbances: auditory hallucinations   Risk Potential: Suicidal ideation - None  Homicidal ideation - None  Potential for aggression - No   Sensorium:  A&Ox3   Cognition:  grossly intact   Consciousness:  Alert & Awake   Memory:  recent and remote memory grossly intact   Attention: attention span and concentration appear shorter than expected for age           Insight: good   Judgment: good   Muscle Strength  Muscle Tone normal  normal   Gait/Station: normal gait/station with good balance   Motor Activity: no abnormal movements       Vital signs in last 24 hours:    Temp:  [97 6 °F (36 4 °C)-99 °F (37 2 °C)] 97 6 °F (36 4 °C)  HR:  [81-97] 97  Resp:  [16-18] 18  BP: (113-139)/(57-61) 113/61    Laboratory results:  I have personally reviewed all pertinent laboratory/tests results  Assessment/Plan   Active Problems:    Bipolar I disorder, most recent episode manic, severe with psychotic features ([facility] [address] )      Maria Fernanda Bentley is slowly improving    Recommended Treatment:     Planned medication and treatment changes: All current active medications have been reviewed  Encourage group therapy, milieu therapy and occupational therapy  [address] checks as unit standard unless ordered or noted otherwise      Current Facility-Administered Medications:  acetaminophen 650 mg Oral Q6H PRN Lashae Ramos MD   aluminum-magnesium hydroxide-simethicone 30 mL Oral Q4H PRN Lashae Ramos MD   amphetamine-dextroamphetamine 10 mg Oral BID (AM & Afternoon) Jeffery Moeller MD   benztropine 1 mg Intramuscular Q6H PRN Lashae Ramos MD   benztropine 1 mg Oral Q6H PRN Lashae Ramos MD   divalproex sodium 500 mg Oral BID Jeffery Moeller MD   LORazepam 2 mg Intramuscular Q4H PRN Lashae Ramos MD   LORazepam 1 mg Oral Q4H PRN Lashae Ramos MD   magnesium hydroxide 30 mL Oral Daily PRN Lashae Ramos MD   OLANZapine 5 mg Intramuscular Q3H PRN Lashae Ramos MD   OLANZapine 5 mg Oral Q3H PRN Lashae Ramos MD   QUEtiapine 200 mg Oral HS Jeffery Moeller MD   QUEtiapine 200 mg Oral HS PRN Jeffery Moeller MD   traZODone 50 mg Oral HS PRN Lashae Ramos MD       1) Stop Adderall (collaborative discussion, she notes it had been getting increased for concentration issues, she agrees it should be D/C'd for now)  2) Reduce depakote to ER 500mg HS as noted in HPI   Continue treatment otherwise  3) Continue to support patient and engage them in the programs available as feasible and appropriate  Continue case management support and therapy  Continue discharge planning  Risks / Benefits of Treatment:    Risks, benefits, and possible side effects of medications explained to patient and patient verbalizes understanding and agreement for treatment  Counseling / Coordination of Care:    Patient's progress reviewed with nursing staff  Medication changes reviewed with nursing staff  Medications, treatment progress and treatment plan reviewed with patient        Socorro Guerrero III, DO  9/14/2019  8:24 AM

## 2019-09-14 NOTE — PROGRESS NOTES
Pt   Given  Serequel  200 mg  Po  at 0150  Additional  Medication effective  Pt   Appears  Asleep  At present

## 2019-09-14 NOTE — PROGRESS NOTES
Patient out of her room pacing the halls rambling about a baby and dinner table not being set up  This writer assess patient for auditory and visual hallucinations  Patient denies visual however appears to be responding to auditory  Patient was given PRN seroquel @0150  She went back to her room and was laying in bed for 10 minutes then she was back out of her room walking the halls saying there is a fire drill  She is forcefully pushing on the fire doors "why isn't the alarm going off  What the fuck is going on " She continued to curse as she walked the halls making statements about being locked up in a different room  "The chairs need to be moved out of the way we need to clear the halls "  This writer and staff (security present) redirected her back to her room  She voiced "and don't shine that light in here every 15 minutes "  She closed her door and this writer heard her get into bed and state "no it's not ok, it's time to go to bed "  This writer stood by her room allowing her to fall asleep  Her door was opened and she was observed laying in bed appearing to be asleep  Will continue to monitor and support

## 2019-09-14 NOTE — PROGRESS NOTES
Pt appeared drowsy this morning during conversation  Pt reported she slept well but said she had dreams that were tormenting her  Pt visible in milieu throughout morning  Attending groups  Has not been observed in bed throughout morning  Pt stated "I can be louie sometimes" when discussing symptoms/mood  Pt denies SI/HI  Denies paranoia/hallucinations  Dr Christian Matias made aware that pt refused adderall and depakote this AM  Pt said she does not want AM dose of depakote because it will make her tired  Pt back and forth with whether she will take scheduled depakote in evening  Initially said she would take 6 PM dose, then said she would rather have at bedtime, then decided she doesn't feel she should take depakote at all and will speak with Dr Christian Matias about this  Pt feels that seroquel is helping her

## 2019-09-14 NOTE — PROGRESS NOTES
At 1752, pt received Ativan 1 mg PO for moderate anxiety and Zyprexa 5 mg PO for severe agitation  PRN briefly, partially effective  At 2020, pt continues to respond to internal stimuli, states she is being tortured, states she is engaged in spiritual warfare, appears distraught  Pt received scheduled Depakote and HS Seroquel 200 mg PO, appears calmer later in the shift and appears to be sleeping at 2245  At 2205, pt turned on the alarm in her bathroom and was found in bed stating that there was something going on with the fire alarm and we need to check it  Pt was reassured and appeared to fall asleep a few seconds later

## 2019-09-14 NOTE — PROGRESS NOTES
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

## 2019-09-14 NOTE — PROGRESS NOTES
Pt disorganized and tangential, but pleasant/polite  Visible in the milieu, social with select peers  Attends and participates in groups  Refused all medications today, but was given ativan PRN for anxiety  States she will take HS dose of seroquel, but unsure if she will take depakote  Denies SI/HI/AVH

## 2019-09-15 RX ORDER — QUETIAPINE FUMARATE 300 MG/1
300 TABLET, FILM COATED ORAL
Status: DISCONTINUED | OUTPATIENT
Start: 2019-09-15 | End: 2019-09-16

## 2019-09-15 RX ADMIN — LORAZEPAM 1 MG: 1 TABLET ORAL at 17:43

## 2019-09-15 RX ADMIN — LORAZEPAM 1 MG: 1 TABLET ORAL at 05:59

## 2019-09-15 RX ADMIN — OLANZAPINE 5 MG: 5 TABLET, FILM COATED ORAL at 19:42

## 2019-09-15 RX ADMIN — QUETIAPINE FUMARATE 300 MG: 300 TABLET ORAL at 21:56

## 2019-09-15 RX ADMIN — DIVALPROEX SODIUM 500 MG: 500 TABLET, EXTENDED RELEASE ORAL at 21:56

## 2019-09-15 NOTE — PROGRESS NOTES
Had Seroquel 200 mg po prn/sleep at 2301  Slept at intervals throughout the night-needed reassurance at times, & c/o "loud noise" in her room, though denies voices  Redirectable  Awakened at approx 0500 & had ativan 1 mg po prn with good effect at 0559  Was able to sit in dayroom & watch TV for a while, as encouraged

## 2019-09-15 NOTE — PROGRESS NOTES
Pt appears drowsy this morning  Denies any difficulty sleeping last night despite report from previous shift  Pt reports having some racing thoughts last night  Pt said the thoughts were about her daughter and other stressors  C/o dry mouth this morning  Pt denies SI/HI  Pt reports feeling foggy and said she does not remember saying there were recording devices in her room  Pt did not make any delusional statements to writer  No paranoia  Pt calm at this time  Denies any questions/concerns presently

## 2019-09-15 NOTE — PROGRESS NOTES
Daily rounds  Per shift report, pt was compliant with depakote and seroquel last night  Pt slept about 2 1/2 hours broken up  Pt wandering into other patient rooms, disrobing, making paranoid statements, disorganized  Received prn seroquel overnight

## 2019-09-15 NOTE — PROGRESS NOTES
Pt pleasant and calm, scant in conversation  Disorganized thought process  Appears drowsy but states that she does not feel tired  Denies SI/HI/AVH  No delusions or delusional statements  Pt is visible in the milieu and social with peers  Attended and participated in [employer] and Life Skills groups

## 2019-09-15 NOTE — PROGRESS NOTES
At [place] RAISSA Miranda and KEVINT alerted by a peer that Pt had disrobed and was in another peer's room  Writer and MHT escorted Pt back to her room and had her re-dress  Pt was delusional stating that the building was going to blow up  Pt assured that she was safe and the building was in no danger  Pt provided PRN ativan at 1745

## 2019-09-15 NOTE — PROGRESS NOTES
Pt came out of room naked with sheet wrapped around her, disorganized,confused, mumbling ,medicated with Seroquel and assisted to bed

## 2019-09-15 NOTE — PROGRESS NOTES
Progress Note - Maxime Johnson 53 y o  female MRN: @MRN   Unit/Bed#: [facility] 252-01 Encounter: [id number]    Per nursing report and sign out, patient was parnoid, wandering last night, disrobing  Up last night with racing thoughts, devices in bed  Denies this AM    Jay Vazquez reports today that she woke last night ~2am, AH, talking out loud to self  Very disorganized, AH "I am not sure if it is voices or if I am no talking to God"    Some sadness, does have anxiousness  "My family played a joke on me to say my father is alive, but he  2 years ago"    [de-identified] about how she feels maybe she needs to be off her medication to figure this out  I had a lavinia and compassionate dialogue with her  She was then agreeable to my recommendations      Energy: ok, 6/10  Sleep: insomnia  Appetite: normal  Medication side effects: No   ROS: no complaints    Mental Status Evaluation:    Appearance:  dressed casually   Behavior:  Pleasant, but had difficult time getting her to agree to medications due to impaired insight at the moment   Speech:  Normal volume, regular rate and rhythm   Mood:  depressed and anxious   Affect:  dysphoric, constricted       Thought Process:  disorganized, illogical   Associations: intact associations   Thought Content:  negative thinking and cognitive distortions, paranoid ideation   Perceptual Disturbances: auditory hallucinations   Risk Potential: Suicidal ideation - None  Homicidal ideation - None  Potential for aggression - No   Sensorium:  oriented to person and place   Cognition:  grossly intact   Consciousness:  Alert & Awake   Memory:  recent and remote memory grossly intact   Attention: attention span and concentration appear shorter than expected for age           Insight:  impaired due to psychosis   Judgment: impaired due to psychosis   Muscle Strength  Muscle Tone normal  normal   Gait/Station: normal gait/station with good balance   Motor Activity: no abnormal movements Vital signs in last 24 hours:    Temp:  [97 °F (36 1 °C)-97 6 °F (36 4 °C)] 97 °F (36 1 °C)  HR:  [91-97] 91  Resp:  [16-18] 16  BP: (111-113)/(61-65) 111/65    Laboratory results:  I have personally reviewed all pertinent laboratory/tests results  Assessment/Plan   Active Problems:    Bipolar I disorder, most recent episode manic, severe with psychotic features ([address] )      Jose Wiseman is worse today I feel  She was adamant about not increasing depakote, was trying not to be on any medication  I advised we need to increase seroquel, and she was finally agreeable  Recommended Treatment:     Planned medication and treatment changes: All current active medications have been reviewed  Encourage group therapy, milieu therapy and occupational therapy  [facility] checks as unit standard unless ordered or noted otherwise      Current Facility-Administered Medications:  acetaminophen 650 mg Oral Q6H PRN Tammy Ortiz, MD   aluminum-magnesium hydroxide-simethicone 30 mL Oral Q4H PRN Tammy Ortiz, MD   benztropine 1 mg Intramuscular Q6H PRN Tammy Ortiz, MD   benztropine 1 mg Oral Q6H PRN Lubnaene Fam, MD   divalproex sodium 500 mg Oral HS Jarod Meiers III, DO   LORazepam 2 mg Intramuscular Q4H PRN Tammy Ortiz, MD   LORazepam 1 mg Oral Q4H PRN Tammy Ortiz, MD   magnesium hydroxide 30 mL Oral Daily PRN Tammy Ortiz, MD   OLANZapine 5 mg Intramuscular Q3H PRN Tammy Ortiz, MD   OLANZapine 5 mg Oral Q3H PRN Tammy Ortiz, MD   QUEtiapine 200 mg Oral HS Jef Richards MD   QUEtiapine 200 mg Oral HS PRN Jef Richards MD   traZODone 50 mg Oral HS PRN Tammy Ortiz MD       1) increase seroquel to 300mg HS, continue otherwise  2) Continue to support patient and engage them in the programs available as feasible and appropriate  Continue case management support and therapy  Continue discharge planning  3) labs pending tomorrow  4) Consider starting Vit D      Risks / Benefits of Treatment:    Risks, benefits, and possible side effects of medications explained to patient and patient verbalizes understanding and agreement for treatment  Counseling / Coordination of Care:    Medication changes reviewed with nursing staff  Medications, treatment progress and treatment plan reviewed with patient        Diana Carpenter III, DO  9/15/2019  8:02 AM

## 2019-09-15 NOTE — PROGRESS NOTES
Pt slept for short intervals throughout the night  Would wake confused and needing reorientation to where she was  Pt states she believes she is having company over and needing to wait for them on the lower level of the building  Reminded pt she was in the hospital in which pt states "Oh yea, that's right! Go take a nap, you're just dreaming this all up  Go to sleep!" pt then returned to room and slept for about one hour  Woke back up and approached a staff member stating "Do you hear voices? No? Ok good " Then returned to room  Mostly pleasant overnight however slept overall around 3 hours of interrupted sleep

## 2019-09-16 PROCEDURE — 99232 SBSQ HOSP IP/OBS MODERATE 35: CPT | Performed by: PSYCHIATRY & NEUROLOGY

## 2019-09-16 RX ORDER — QUETIAPINE FUMARATE 200 MG/1
400 TABLET, FILM COATED ORAL
Status: DISCONTINUED | OUTPATIENT
Start: 2019-09-16 | End: 2019-09-17

## 2019-09-16 RX ORDER — LORAZEPAM 1 MG/1
1 TABLET ORAL EVERY 8 HOURS PRN
Status: DISCONTINUED | OUTPATIENT
Start: 2019-09-16 | End: 2019-10-11 | Stop reason: HOSPADM

## 2019-09-16 RX ORDER — LORAZEPAM 2 MG/ML
2 INJECTION INTRAMUSCULAR EVERY 8 HOURS PRN
Status: DISCONTINUED | OUTPATIENT
Start: 2019-09-16 | End: 2019-10-11 | Stop reason: HOSPADM

## 2019-09-16 RX ORDER — HYDROXYZINE 50 MG/1
50 TABLET, FILM COATED ORAL EVERY 8 HOURS PRN
Status: DISCONTINUED | OUTPATIENT
Start: 2019-09-16 | End: 2019-09-16

## 2019-09-16 RX ORDER — HYDROXYZINE 50 MG/1
50 TABLET, FILM COATED ORAL EVERY 8 HOURS PRN
Status: DISCONTINUED | OUTPATIENT
Start: 2019-09-16 | End: 2019-10-11 | Stop reason: HOSPADM

## 2019-09-16 RX ADMIN — DIVALPROEX SODIUM 500 MG: 500 TABLET, EXTENDED RELEASE ORAL at 22:05

## 2019-09-16 RX ADMIN — MAGNESIUM HYDROXIDE 30 ML: 400 SUSPENSION ORAL at 09:39

## 2019-09-16 RX ADMIN — OLANZAPINE 5 MG: 5 TABLET, FILM COATED ORAL at 04:48

## 2019-09-16 RX ADMIN — QUETIAPINE FUMARATE 200 MG: 200 TABLET ORAL at 19:00

## 2019-09-16 RX ADMIN — QUETIAPINE FUMARATE 400 MG: 200 TABLET ORAL at 22:05

## 2019-09-16 RX ADMIN — HYDROXYZINE HYDROCHLORIDE 50 MG: 50 TABLET, FILM COATED ORAL at 19:00

## 2019-09-16 RX ADMIN — LORAZEPAM 1 MG: 1 TABLET ORAL at 16:15

## 2019-09-16 NOTE — PROGRESS NOTES
Pt sleeping at times throughout the night however does wake frequently  Pt walking into other peers rooms to try and use their bathrooms  Was redirected back to own room/bathroom several times overnight  Pt also attempting to walk pérez with gown only half on  Encouraged pt to change into own clothing which she was agreeable to  Remains calm and pleasant with staff and cooperative at this time

## 2019-09-16 NOTE — PROGRESS NOTES
Pt received Ativan 1 mg PO at 1743 for moderate anxiety, Crocker score = 24, PRN minimally effective  Pt was found naked in another pt's room and needed to be redirected to her own room to get dressed  Pt appeared panicked, gathered up a pile of belongings in her arms and went to leave her room, pt stated that the building was going to blow up and there was no time to get dressed, pt was reassured and repeatedly encouraged to get dressed and eventually did so  Pt was offered PRN Zyprexa and declined at this time but took PRN Ativan with encouragement, pt stated she would try to calm herself down  Pt briefly appeared calmer after an hour but at 1935 walked to the day room naked and needed to be redirected back to her room to get dressed again  Pt was anxious, agitated, and tearful at this time  Pt received Zyprexa 5 mg PO at 1942 for severe agitation  Pt remained in her room for most of the rest of the shift, was heard conversing alone in her room responding to internal stimuli at 2030  Pt appeared calmer while receiving scheduled medications at 2156, reports anxiety regarding an internal investigation and was reassured, reports that she heard that the danger is clear outside at this time

## 2019-09-16 NOTE — PROGRESS NOTES
Status: Pt is labile & disrobing & running down the halls  Pt slept 2 hours overnight  Pt refused AM labs    Medication: Seroquel increased on Sun / PRN Ativan on Sat & x2 on Sunday, PRN Zyprexa on Sun & this morning at [address]   D/C: file 303 today     09/16/19 0743   Team Meeting   Meeting Type Daily Rounds   Team Members Present   Team Members Present Physician;Nurse;;Occupational Therapist   Physician Team Member Dr Kamilla Khan / Dr Machelle Spicer / Anatoly Doranr Team Member Faye Milton / Natacha Jerome Management Team Member Ian Samuel / Kade Brown   OT Team Member Luci   Patient/Family Present   Patient Present No   Patient's Family Present No

## 2019-09-16 NOTE — CASE MANAGEMENT
CM met with Pt to review rights of 303 petition which was being filed & provide her a copy of the rights  CM contact [facility] @ [phone] & spoke with Ayleen Holley who granted a continuance for the hearing to be held later than the 302 expiration time of 0648  CM contacted [facility] scheduling line @ [phone] to review & above & request 303 hearing for tomorrow  CM contacted Pt's daughter, Rolanda Sandoval, @ [phone] & she said that she was texting with Pt on Tuesday, to make plans for dinner on Sunday  She said that she seemed fine, however, used a different term of endearment for her, which seemed a little odd  She said that on Wednesday, Pt had contacted her to tell her not to stay late at work, because she had been there too long, & that was the last she had heard from her  Pt's boyfriend, Ana Arias, said that Pt had seemed fine  He had asked her to do dinner on Tuesday, but she had said she was too tired, but he had seen her the week before & didn't think anything more about it  OLIVE said that Pt had mentioned Ana Arias having cancer, & Urszula confirmed that was correct  He is on home hospice at this time & Pt had been helping care for him, which has been a stressor  Urszula asked if a test was done to see if Pt had taken benadryl, as she had reported to someone earlier in the week that she had, & there was concern it could have caused an interaction with her medications  OLIVE agreed to follow-up with the treatment team regarding this  CM reviewed a 303 hearing was scheduled for tomorrow & agreed to call & provide an update regarding the outcome  Urszula said that she lives in [address] [place] [address] to visit with Pt on Saturday

## 2019-09-16 NOTE — PLAN OF CARE
Problem: PSYCHOSIS  Goal: Will report no hallucinations or delusions  Description  Interventions:  - Administer medication as  ordered  - Every waking shifts and PRN assess for the presence of hallucinations and or delusions  - Assist with reality testing to support increasing orientation  - Assess if patient's hallucinations or delusions are encouraging self-harm or harm to others and intervene as appropriate  Outcome: Progressing     Problem: Alteration in Thoughts and Perception  Goal: Treatment Goal: Gain control of psychotic behaviors/thinking, reduce/eliminate presenting symptoms and demonstrate improved reality functioning upon discharge  Outcome: Progressing     Problem: Alteration in Thoughts and Perception  Goal: Refrain from acting on delusional thinking/internal stimuli  Description  Interventions:  - Monitor patient closely, per order   - Utilize least restrictive measures   - Set reasonable limits, give positive feedback for acceptable   - Administer medications as ordered and monitor of potential side effects  Outcome: Progressing

## 2019-09-16 NOTE — PROGRESS NOTES
Pt   Given  Zyprexa  5 mg  Po   For  Agitation/sleep  At 0448  Med Non-effective  Pt  Remains  OOB  Walking  The halls

## 2019-09-16 NOTE — PROGRESS NOTES
Pt in dayroom with peers, reading the paper  Agreeable to obtain lab work however once MHT began to attempt pt became agitated stating "I don't need blood work! I only take seroquel and that doesn't need levels!" This writer attempted to explain what lab work was ordered and why in which pt states "No! That's a lie! I don't take these medications you are claiming I take and I refuse all blood work! Tell the Doctor that I'm an ass if you want, I don't care but I refuse!" pt then walked out of room and worked on deep breathing to help calm self  Pt currently calm and pleasant with staff  Observed responding to internal stimuli stating "Its just all the background noise and people trying to talk to me " pt denies SI/HI  No agitation noted at this time

## 2019-09-16 NOTE — PROGRESS NOTES
PT was observed taking off the wall sings for the laundry room  She was walking naked in hallways and her room  PT was redirected PT appeared confused, and several times was redirected to dress herself

## 2019-09-16 NOTE — TREATMENT PLAN
TREATMENT PLAN REVIEW - [address] 53 y o  1970 female MRN: [id number]    [address] [place] [address] Room / Bed: [facility] 252/[facility] 405-46 Encounter: [id number]        Admit Date/Time:  9/13/2019  1:20 AM    Treatment Team: Attending Provider: Zoraida Byers; Patient Care Technician: Wali Pinto; Consulting Physician: Ney Abdalla MD; : Tucker Pugh; Resident: Lynne Ward MD; Patient Care Assistant: Jannet Bravo; Patient Care Technician: Anam Madsen; Care Manager: Maurilio Forbes, ELI; Registered Nurse: Bobbi Meier RN; Patient Care Technician: Drew Montes De Oca;  Occupational Therapy Assistant: Katheryn Owens, [address] [place] [address]; Patient Care Assistant: Barth Cooks    Diagnosis: Active Problems:    Bipolar I disorder, most recent episode manic, severe with psychotic features [facility])    Patient Strengths/Assets: cooperative, good past treatment response, motivation for treatment/growth, patient is on a voluntary commitment      Patient Barriers/Limitations: limited insight    Short Term Goals: decrease in depressive symptoms, decrease in anxiety symptoms, decrease in suicidal thoughts    Long Term Goals: improvement in depression, improvement in anxiety, stabilization of mood, resolution of psychotic symptoms, acceptance of need for psychiatric medications, acceptance of need for psychiatric treatment, acceptance of need for psychiatric follow up after discharge    Progress Towards Goals: starting psychiatric medications as prescribed    Recommended Treatment: medication management, patient medication education, group therapy, milieu therapy, continued Behavioral Health psychiatric evaluation/assessment process     Treatment Frequency: daily medication monitoring, group and milieu therapy daily, monitoring through interdisciplinary rounds, monitoring through weekly patient care conferences    Expected Discharge Date: 6 days - 9/22/2019    Discharge Plan: referral for outpatient medication management with a psychiatrist, referral for outpatient psychotherapy    Treatment Plan Created/Updated By: Dinana Gillette

## 2019-09-16 NOTE — PROGRESS NOTES
Pt appeared more calm and went to dayroom to watch television  Shortly after heard yelling from pt and was pulling at fire extinguisher door  Staff redirected pt and then pt went to sign on wall and attempted to pull this off the wall  This writer approached pt in which pt states "We've been friends a long time so dont start with me! My  is downstairs and you wont let him in!" Pt pacing halls and yelling  With a lot of encouragement pt agreeable to go to room and speak with this writer  Pt states she enjoys singing and asked for lyrics to songs she sings in [employer]  This was provided to pt and pt began singing quietly in room  Pt reports she is actively hearing voices of "a lot of people making insults to me and my  " pt offered sound machine which she was accepting of  Currently in room with sound machine  Disheveled and wearing short around waist  Refusing to wear pants "This is the new way of dressing " pt reports she needs to be dressed appropriately before leaving room

## 2019-09-16 NOTE — PROGRESS NOTES
Remains disorganized, frequent episodes of disrobing, needs frequent redirection, and supervision,, continues  To to talk to self

## 2019-09-16 NOTE — PROGRESS NOTES
Progress Note - [address] [facility] 53 y o  female MRN: [id number]  Unit/Bed#: [facility] 252-01 Encounter: [id number]    Assessment/Plan   Active Problems:    Bipolar I disorder, most recent episode manic, severe with psychotic features ([address] [address] )    Recommended Treatment:   · Continue with group therapy, milieu therapy and occupational therapy  · Continue current medical regimen, except:             -Increase Seroquel to 400mg qHS (discussed with patien'ts out-pt psychiatrist, Dr Judi Cruz)  · Get Depakote level tomorrow (may consider increasing Depakote tomm; should be 10 mg/kg to be therpeutic)  · Pending 303    Risks, benefits and possible side effects of Medications: Risks, benefits, and possible side effects of medications have been explained to the patient, who verbalizes understanding  Progress Toward Goals: slow improvement    ------------------------------------------------------------    Subjective: Mica Parson has been compliant with medications without acute side effects  She reports poor sleep and admits  "I was very disoriented; I woke up screaming because I wasn't sure where I was and I thought Erin needed help, so I ran naked to her, then when I realized I was in the hospital, I got scared and ran in the opposite direction " She states her mood today is " very happy" because "she just learned that a lot of her family will be moving closer" and "just figured out she's going to  [place], if he agrees " The patient is very disorganized and paranoid; she asks "Do you think I'm going to die soon?" and states "I think I'm here under an experiment " She continues to have decreased concentration/focus due to auditory hallucinations, she states the voices "gives her recommendations for the future, but can sometimes be scary " She denies that they command her to hurt herself or others  The patient denies any homicidal or suicidal ideations, intent or plan    Patient is consenting for safety on the unit     Psychiatric Review of Systems:  Behavior over the last 24 hours: unchanged  Sleep: frequent awakenings  Appetite: good  Medication side effects: none  ROS: Complete review of systems is negative except as noted above      Vital signs in last 24 hours:  Temp:  [97 1 °F (36 2 °C)-97 5 °F (36 4 °C)] 97 1 °F (36 2 °C)  HR:  [86-94] 86  Resp:  [18] 18  BP: (128-149)/(61-65) 128/61    Mental Status Evaluation:  Appearance:  casually dressed, appears stated age, smiling and but looks worried at time, trying to focus, but attention is elsewhere   Behavior:  pleasant, sitting comfortably in chair, fair eye contact, no abnormal movements and normal gait and balance   Speech:  spontaneous, clear, normal rate, normal volume and coherent   Mood:  "very happy"   Affect:  mood-congruent and anxious   Thought Process:  disorganized, illogical, tangential, loose associations   Thought Content: delusions (about marriage to [place]), paranoid ideation   Perceptual disturbances: auditory hallucinations (persisting) and no visual hallucinations   Risk Potential: No active or passive suicidal or homicidal ideation, Low potential for aggression   Cognition: oriented to person, place, time, and situation, memory grossly intact, appears to be of average intelligence, impaired abstract reasoning and poor concentration due to auditory hallucinations   Insight:  Poor   Judgment: Poor       Current Medications:    Current Facility-Administered Medications:  acetaminophen 650 mg Oral Q6H PRN Michael Goodman MD   aluminum-magnesium hydroxide-simethicone 30 mL Oral Q4H PRN Michael Goodman MD   benztropine 1 mg Intramuscular Q6H PRN Michael Goodman MD   benztropine 1 mg Oral Q6H PRN Michael Goodman MD   divalproex sodium 500 mg Oral HS Hamp Khan III, DO   LORazepam 2 mg Intramuscular Q4H PRN Michael Goodman MD   LORazepam 1 mg Oral Q4H PRN Michael Goodman MD   magnesium hydroxide 30 mL Oral Daily PRN Michael Goodman MD   OLANZapine 5 mg Intramuscular Q3H PRN Tammy Ortiz MD   OLANZapine 5 mg Oral Q3H PRN Tammy Ortiz MD   QUEtiapine 200 mg Oral HS PRN Jef Richards MD   QUEtiapine 400 mg Oral HS Lynne Ward MD   traZODone 50 mg Oral HS PRN Tammy Ortiz MD       Behavioral Health Medications: all current active meds have been reviewed  Changes as above  Laboratory results:  I have personally reviewed all pertinent laboratory/tests results  No results found for this or any previous visit (from the past 48 hour(s))  This note has been constructed using a voice recognition system  There may be translation, syntax, or grammatical errors  If you have any questions, please contact the dictating provider

## 2019-09-16 NOTE — PROGRESS NOTES
Pt needing frequent redirection due to disrobing and walking into peers rooms  Did disrobe and walk into dayroom as well  Pt initially refused PRN medications however after speaking with staff for some time was agreeable  Pt walking with this writer after receiving medications  States "I'm not having such a great day today, huh?" pt initially was laughing however then appears sad stating "I dont know why I am doing this " Pt does report feeling very tired  Encouraged to try and rest  Pt agreeable however did need reminder of where her room was  Returned to room and took a nap for about an hour  Pt woke and still appeared tired however more appropriate at this time

## 2019-09-17 RX ORDER — OLANZAPINE 10 MG/1
10 INJECTION, POWDER, LYOPHILIZED, FOR SOLUTION INTRAMUSCULAR EVERY 8 HOURS PRN
Status: DISCONTINUED | OUTPATIENT
Start: 2019-09-17 | End: 2019-10-11 | Stop reason: HOSPADM

## 2019-09-17 RX ORDER — HALOPERIDOL 5 MG
5 TABLET ORAL EVERY 6 HOURS PRN
Status: DISCONTINUED | OUTPATIENT
Start: 2019-09-17 | End: 2019-10-11 | Stop reason: HOSPADM

## 2019-09-17 RX ORDER — ACETAMINOPHEN 325 MG/1
325 TABLET ORAL EVERY 6 HOURS PRN
Status: DISCONTINUED | OUTPATIENT
Start: 2019-09-17 | End: 2019-10-11 | Stop reason: HOSPADM

## 2019-09-17 RX ORDER — HALOPERIDOL 5 MG/ML
5 INJECTION INTRAMUSCULAR EVERY 6 HOURS PRN
Status: DISCONTINUED | OUTPATIENT
Start: 2019-09-17 | End: 2019-10-11 | Stop reason: HOSPADM

## 2019-09-17 RX ORDER — IBUPROFEN 600 MG/1
600 TABLET ORAL EVERY 6 HOURS PRN
Status: DISCONTINUED | OUTPATIENT
Start: 2019-09-17 | End: 2019-10-11 | Stop reason: HOSPADM

## 2019-09-17 RX ORDER — RISPERIDONE 1 MG/1
1 TABLET, ORALLY DISINTEGRATING ORAL
Status: DISCONTINUED | OUTPATIENT
Start: 2019-09-17 | End: 2019-10-11 | Stop reason: HOSPADM

## 2019-09-17 RX ORDER — OLANZAPINE 10 MG/1
10 TABLET ORAL EVERY 8 HOURS PRN
Status: DISCONTINUED | OUTPATIENT
Start: 2019-09-17 | End: 2019-10-11 | Stop reason: HOSPADM

## 2019-09-17 RX ORDER — ACETAMINOPHEN 325 MG/1
650 TABLET ORAL EVERY 6 HOURS PRN
Status: DISCONTINUED | OUTPATIENT
Start: 2019-09-17 | End: 2019-10-11 | Stop reason: HOSPADM

## 2019-09-17 RX ADMIN — HALOPERIDOL 5 MG: 5 TABLET ORAL at 19:09

## 2019-09-17 RX ADMIN — LORAZEPAM 1 MG: 1 TABLET ORAL at 19:09

## 2019-09-17 RX ADMIN — OLANZAPINE 5 MG: 10 INJECTION, POWDER, LYOPHILIZED, FOR SOLUTION INTRAMUSCULAR at 14:07

## 2019-09-17 RX ADMIN — DIVALPROEX SODIUM 500 MG: 500 TABLET, EXTENDED RELEASE ORAL at 21:00

## 2019-09-17 RX ADMIN — QUETIAPINE FUMARATE 500 MG: 200 TABLET ORAL at 21:00

## 2019-09-17 NOTE — PROGRESS NOTES
Pt remains disorganized and paranoid  Poor boundaries with male peers at times, requiring redirection  Disinterested in speaking with writer today  Attempted to pull cover off of fire alarm and was redirected by RN

## 2019-09-17 NOTE — PROGRESS NOTES
Pharmacy Medication Education Group     Patient came to group and initially sat still and quiet  When asked her name patient would not respond  Patient all of a sudden became extremely distraught and asked if ingesting a silver ring could cause death  Pt  Was reassured that a regular silver ring would pass through bowels or if needed be scanned and found and that patient would not die  Patient continued to be agitated and pleaded for this writer "can you look it up? Can you look up if I will die from eating the silver ring" pt  Was redirected and left group  Patient then came back into group room and sat on top of chair (on the rim) in a dangerous position and after verbal de-escalation had no response required two technicians to place patient down into chair  Patient then started to loudly make comments that did not make sense saying "they are keeping me here because of the silver ring because they do not have anyone else to fu*k "     Patient was behaviorally and emotionally INAPPROPRIATE for this 60 minute period of group       Daniela Cristobal, PharmD, BCPP, Clinical Pharmacist - Psychiatry

## 2019-09-17 NOTE — PROGRESS NOTES
Pt remains disorganized, wandering into peer's rooms, running in hallway, snuggling with peer during pharmacy education group  PA notified of pt's continued behaviors  Pt placed on continual observation while awake

## 2019-09-17 NOTE — PROGRESS NOTES
Progress Note - 707 [facility] 53 y o  female MRN: [id number]  Unit/Bed#: Mike Brewer 252-01 Encounter: [id number]    Assessment/Plan   Active Problems:    Bipolar I disorder, most recent episode manic, severe with psychotic features ([facility] [address] )    Recommended Treatment:   Continue with group therapy, milieu therapy and occupational therapy  Continue current medical regimen except; increase Seroquel to 500mg qHS  Valproic Acid level 73 9, continue to monitor  Risks, benefits and possible side effects of Medications: Risks, benefits, and possible side effects of medications have been explained to the patient, who verbalizes understanding  Progress Toward Goals: slow improvement    ------------------------------------------------------------    Subjective: Sarah Colby has been compliant with medications without acute side effects  It is difficult to get a subjective update on the patient as she is very disorganized and paranoid  She does admit to bazaar thoughts and reports "the bed is a recording device" and proceeds to remove the sheets and mattress from the bed stating "look there's a microphone and tape recorder and worms " She reports continued auditory hallucinations and states "they tell me to do this and do that, some good and some bad " She states she had poor sleep last night and feels "more depressed today because the man of her dreams is outside to pick her up but she's stuck in here "  Patient is consenting for safety on the unit  Psychiatric Review of Systems:  Behavior over the last 24 hours: unchanged  Sleep: frequent awakenings  Appetite: good  Medication side effects: none  ROS: Complete review of systems is negative except as noted above      Vital signs in last 24 hours:  Temp:  [97 7 °F (36 5 °C)] 97 7 °F (36 5 °C)  HR:  [100-106] 100  Resp:  [16] 16  BP: (114-119)/(54-57) 114/54    Mental Status Evaluation:  Appearance:  alert, appears stated age and disheveled   Behavior: cooperative, sitting comfortably in chair, fair eye contact, no abnormal movements and normal gait and balance   Speech:  clear, normal rate, normal volume and coherent   Mood:  "distressed"   Affect:  mood-congruent and anxious   Thought Process:  disorganized, illogical, perseverative, loose associations   Thought Content: paranoid ideation, ruminating thoughts   Perceptual disturbances: auditory hallucinations (constant) and no visual hallucinations   Risk Potential: No active or passive suicidal or homicidal ideation, Low potential for aggression   Cognition: disoriented, appears to be of average intelligence, impaired abstract reasoning and attention span appeared shorter than expected for age   Insight:  Poor   Judgment: Poor       Current Medications:    Current Facility-Administered Medications:  acetaminophen 650 mg Oral Q6H PRN Radha Ryan MD   aluminum-magnesium hydroxide-simethicone 30 mL Oral Q4H PRN Radha Ryan MD   benztropine 1 mg Intramuscular Q6H PRN Radha Ryan MD   benztropine 1 mg Oral Q6H PRN Radha Ryan MD   divalproex sodium 500 mg Oral HS Caleb Flood III, DO   hydrOXYzine HCL 50 mg Oral Q8H PRN [facility]   LORazepam 2 mg Intramuscular Q8H PRN [facility]   LORazepam 1 mg Oral Q8H PRN [facility]   magnesium hydroxide 30 mL Oral Daily PRN Radha Ryan MD   OLANZapine 5 mg Intramuscular Q3H PRN Radha Ryan MD   OLANZapine 5 mg Oral Q3H PRN Radha Ryan MD   QUEtiapine 200 mg Oral HS PRN Zuleyma Mcnair MD   QUEtiapine 400 mg Oral HS Edmond Birmingham MD   traZODone 50 mg Oral HS PRN Radha Ryan MD       Behavioral Health Medications: all current active meds have been reviewed  Changes as above  Laboratory results:  I have personally reviewed all pertinent laboratory/tests results  No results found for this or any previous visit (from the past 48 hour(s))  This note has been constructed using a voice recognition system   There may be translation, syntax, or grammatical errors  If you have any questions, please contact the dictating provider

## 2019-09-17 NOTE — PROGRESS NOTES
Pt remains on continual observation  At start of shift, pt disrobing in bedroom  Very disorganized and labile  Pt told writer "I have to pick who I want to fuck to get out of here " Pt then laughing hysterically  Shortly after, pt running in hallway  When asked where she was headed, pt responded "Bette Mcnulty fucked me yesterday " Pt sexually preoccupied, poor boundaries, requires constant redirection at this time

## 2019-09-17 NOTE — PROGRESS NOTES
Pt very restless, frequently coming out of her room overnight  Disorganized but easy to redirect  Did attempt to enter different room    Appeared to have slept 3-3 5 hours total

## 2019-09-17 NOTE — PROGRESS NOTES
Pt appeared distraught and RIS, confused, wandering and talking to self  Ativan and zpyrexa po offered, pt declined zyprexa, concerned about side effect of weight gain  Accepted ativan po, only partially effective for symptoms  Pt appeared confused, wandering, making bizarre movements with peer, tearful  Observed having conversation to self in room  Offered atarax and seroquel prn, appeared effective in calming pt, resting in room

## 2019-09-17 NOTE — PROGRESS NOTES
Pt required redirect from entering other patient rooms  Pt concerned for peer being anxious and instructing staff to offer him PRNs  Pt disrobed x2 and needed redirect back to room  Tearful and disorganized at HS  Accepted scheduled Depakote and Seroquel  Assisted to bed and provided new linens  Pt was observed up and walking in pérez again  No complaints

## 2019-09-17 NOTE — PROGRESS NOTES
Med note:  Pt agitated/irritable and pulled fire alarm  PRN Zyprexa-5mg administered in the left deltoid at 1407  Will continue to monitor

## 2019-09-17 NOTE — PROGRESS NOTES
Pt pulled fire alarm at entrance to unit  Pt very disorganized in her explanation as to why she did this  Pt demanding to leave the unit at this time  Pt stated that she needs a new job, she is a slave to this unit, and will come back to work tomorrow  Pt irritable and told staff she is not negotiating  Pt stated she is not walking away from door  Eventually pt was able to return to room to talk with staff  Pt paranoia, asking if staff plan to kill her  Pt began disrobing in bedroom  Wandering aimlessly around room  Pt given zyprexa IM for agitation/paranoia  Pt currently laying in bedroom  Physician made aware

## 2019-09-17 NOTE — PROGRESS NOTES
Treatment team mtg:  Discussed diagnosis and medications  Goal for mood stabilization, improved sleep and anxiety    MD spoke with OP psychiatrist   303 hearing 9/17/19

## 2019-09-17 NOTE — CASE MANAGEMENT
OLIVE spoke with Constance Valenzuela of [facility] [address], [place]  who reported that even though it was a [address], [facility] was not able to provide a continuance and/or extension of the 302 from 6:48 AM to 10:00 AM for the hearing  The attending physician & resident met with Pt who agreed to continue treatment & a 201 was signed  OLIVE reviewed this policy with Jose Ramon Amaya of [address] [place] [address] of [facility] [address]  CM contacted Pt's daughter, Regulo Moon, @ [phone] to provide an update regarding Pt's commitment status  OLIVE inquired about Ronny Dardiallo, as Pt often talks about him & seems to identify one of the voices she hears as him  Urszula confirmed he was an ex-boyfriend & not necessarily a nice radha; she said she personally does not like him  OLIVE reviewed titration of Pt's medications & Urszula reports she still plans to visit on Saturday

## 2019-09-17 NOTE — PROGRESS NOTES
Pt in hallway loudly pulling at lock to fire extinguisher on wall, attempting to break off of wall  Staff intervened and pt gave disorganized response about having an emergency, said she swallowed a ring, then began talking about her dad and was unclear as to when she swallowed a ring  Unable to explain how a fire extinguisher would help with this and walked away from [facility]  Physician made aware

## 2019-09-17 NOTE — PROGRESS NOTES
Pt cooperative with 1:1 monitoring, became increasingly anxious and reports hearing voices  Accepted haldol and ativan po  Pt lingering by unit door and attempted to run out behind staff  Pt stated, "That was my only chance! How about we burn this place down, no one will ever know it existed  It isn't fair, everyone saw everything on the news, they just brought me here and are experimenting on me  I know someone and he can take me with him and then bring me back to show he's honest  Plus, he can operate on me and take all the poison out " Pt states she feels less frightened by AV due to having 1:1 staff around her  Focused on male peers  Compliant with scheduled medications

## 2019-09-17 NOTE — PROGRESS NOTES
Pt sitting on floor at start of shift  Attempted to speak with pt and pt asked writer to leave her alone  Pt refused AM labs

## 2019-09-18 LAB
ALBUMIN SERPL BCP-MCNC: 3.8 G/DL (ref 3.5–5)
ALP SERPL-CCNC: 74 U/L (ref 46–116)
ALT SERPL W P-5'-P-CCNC: 19 U/L (ref 12–78)
ANION GAP SERPL CALCULATED.3IONS-SCNC: 12 MMOL/L (ref 4–13)
AST SERPL W P-5'-P-CCNC: 23 U/L (ref 5–45)
BILIRUB SERPL-MCNC: 0.4 MG/DL (ref 0.2–1)
BUN SERPL-MCNC: 11 MG/DL (ref 5–25)
CALCIUM SERPL-MCNC: 9.1 MG/DL (ref 8.3–10.1)
CHLORIDE SERPL-SCNC: 105 MMOL/L (ref 100–108)
CO2 SERPL-SCNC: 24 MMOL/L (ref 21–32)
CREAT SERPL-MCNC: 0.66 MG/DL (ref 0.6–1.3)
GFR SERPL CREATININE-BSD FRML MDRD: 106 ML/MIN/1.73SQ M
GLUCOSE P FAST SERPL-MCNC: 95 MG/DL (ref 65–99)
GLUCOSE SERPL-MCNC: 95 MG/DL (ref 65–140)
POTASSIUM SERPL-SCNC: 4.3 MMOL/L (ref 3.5–5.3)
PROT SERPL-MCNC: 7.4 G/DL (ref 6.4–8.2)
SODIUM SERPL-SCNC: 141 MMOL/L (ref 136–145)
VALPROATE SERPL-MCNC: 42 UG/ML (ref 50–100)

## 2019-09-18 PROCEDURE — 80053 COMPREHEN METABOLIC PANEL: CPT | Performed by: PSYCHIATRY & NEUROLOGY

## 2019-09-18 PROCEDURE — 80164 ASSAY DIPROPYLACETIC ACD TOT: CPT | Performed by: PSYCHIATRY & NEUROLOGY

## 2019-09-18 RX ORDER — QUETIAPINE FUMARATE 300 MG/1
600 TABLET, FILM COATED ORAL
Status: DISCONTINUED | OUTPATIENT
Start: 2019-09-18 | End: 2019-09-19

## 2019-09-18 RX ADMIN — QUETIAPINE FUMARATE 600 MG: 300 TABLET ORAL at 21:28

## 2019-09-18 RX ADMIN — DIVALPROEX SODIUM 750 MG: 500 TABLET, FILM COATED, EXTENDED RELEASE ORAL at 17:13

## 2019-09-18 NOTE — PROGRESS NOTES
Status: Pt is a 201 now  Yesterday she was saying she needed an ambulance, that she swallowed her ring  In the afternoon she wanted d/c & pulled the fire alarm  Pt was disrobing  PRNs used, on 1:1 while awake  Hugo Reddy asleep for a little bit; slept maybe a total of 3 5 hours  Appears to be almost sleep walking  She did shower this morning, & continues to refuse labs     Medication: Seroquel will continued to be increased / PRN Haldol, Ativan, Zyprexa IM,   D/C: TBD     09/18/19 0731   Team Meeting   Meeting Type Daily Rounds   Team Members Present   Team Members Present Physician;Nurse;;Occupational Therapist   Physician Team Member Dr Karson Hilton / Dr Courtney Pires / Jose L Levine Team Member [facility] Management Team Member Mikal Shetty / Nisha Campbell   OT Team Member Luci   Patient/Family Present   Patient Present No   Patient's Family Present No

## 2019-09-18 NOTE — PROGRESS NOTES
Patient up throughout the night walking out of her room into the pérez walking at times stating she needs an ambulance she swallowed a ring and a golf ball  She was redirected back to her room each time where she laid down in bed and within minutes was audibly sleeping  She received approximately 3 5 hours of sleep throughout the night  She currently is sleeping  Will continue to monitor

## 2019-09-18 NOTE — PROGRESS NOTES
Progress Note - 707 [facility] 53 y o  female MRN: [id number]  Unit/Bed#: Keyshawn Ansari 252-01 Encounter: [id number]    Assessment/Plan   Active Problems:    Bipolar I disorder, most recent episode manic, severe with psychotic features ([facility] [address] )    Recommended Treatment:   Continue with group therapy, milieu therapy and occupational therapy  Increase Seroquel to 600 mg at bedtime  Increase Depakote to 750 mg after dinner (not at bedtime)    Risks, benefits and possible side effects of Medications: Risks, benefits, and possible side effects of medications have been explained to the patient, who verbalizes understanding  Progress Toward Goals: slow improvement    ------------------------------------------------------------    Subjective: Aicha Prescott has been compliant with medications without acute side effects  She was very agitated/confused yesterday evening; she pulled the fire alarm and was disrobing  She was placed on a 1:1, which continues today  She continues to reports poor sleep with frequent awakenings due to various delusions  She states "I felt that my daughter took my out of here last night " She reports good appetite, but continues to report decreased energy and concentration  She continues to report auditory hallucinations of "tormenting voices " She still denies that the voices are commanding her to hurt herself or others  The patient continues to report paranoia about her room being bugged with microphones  She is sexually preoccupied and continues to have intrusive thoughts about sexual encounters between her and various co-workers and "finding her true love " The patient denies any suicidal or homicidal ideations/intent/plan  Patient is consenting for safety on the unit      Psychiatric Review of Systems:  Behavior over the last 24 hours: unchanged  Sleep: frequent awakenings  Appetite: good  Medication side effects: none  ROS: Complete review of systems is negative except as noted above     Vital signs in last 24 hours:  Temp:  [96 6 °F (35 9 °C)-97 °F (36 1 °C)] 96 6 °F (35 9 °C)  HR:  [91-92] 92  Resp:  [17] 17  BP: (122-132)/(62-68) 122/62    Mental Status Evaluation:  Appearance:  alert, casually dressed, appears stated age and disheveled   Behavior:  cooperative, pleasant, laying in bed, fair eye contact, suspicious and normal gait and balance   Speech:  normal rate, normal volume and coherent   Mood:  "worried"   Affect:  mood-congruent and pre-occupied, distressed   Thought Process:  disorganized, perseverative, tangential, loose associations   Thought Content: delusions of daughter taking her from the unit, paranoid ideation, obsessive thoughts, intrusive thoughts   Perceptual disturbances: auditory hallucinations (denies commands to hurt herself or others) and no visual hallucinations   Risk Potential: No active or passive suicidal or homicidal ideation   Cognition: disoriented, appears to be of average intelligence, impaired abstract reasoning and attention span appeared shorter than expected for age   Insight:  Poor   Judgment: Poor       Current Medications:    Current Facility-Administered Medications:  acetaminophen 325 mg Oral Q6H PRN Theo Ivy, PA-C   acetaminophen 650 mg Oral Q6H PRN Theo Ivy, CARA   aluminum-magnesium hydroxide-simethicone 30 mL Oral Q4H PRN Bhaskar Lew MD   benztropine 1 mg Intramuscular Q6H PRN Bhaskar Lew MD   benztropine 1 mg Oral Q6H PRN Bhaskar Lew MD   divalproex sodium 750 mg Oral After Dinner Ignacio Forrester   haloperidol 5 mg Oral Q6H PRN Theo Ivy, PA-C   haloperidol lactate 5 mg Intramuscular Q6H PRN Theo Ivy, PA-RAISSA   hydrOXYzine HCL 50 mg Oral Q8H PRN Ignacio Forrester   ibuprofen 600 mg Oral Q6H PRN Theo Ivy, PA-C   LORazepam 2 mg Intramuscular Q8H PRN Ignacio Forrester   LORazepam 1 mg Oral Q8H PRN Ignacio Forrester   magnesium hydroxide 30 mL Oral Daily PRN Bhaskar Lew MD   OLANZapine 10 mg Intramuscular Q8H PRN Lucinda Kline PA-C   OLANZapine 10 mg Oral Q8H PRN Lucinda Kline PA-C   QUEtiapine 600 mg Oral HS Claire Boo MD   risperiDONE 1 mg Oral Q3H PRN Lucinda Kline PA-C   traZODone 50 mg Oral HS PRN Lee Rocha MD       Behavioral Health Medications: all current active meds have been reviewed  Changes as above  Laboratory results:  I have personally reviewed all pertinent laboratory/tests results  Recent Results (from the past 48 hour(s))   Comprehensive metabolic panel    Collection Time: 09/18/19  8:05 AM   Result Value Ref Range    Sodium 141 136 - 145 mmol/L    Potassium 4 3 3 5 - 5 3 mmol/L    Chloride 105 100 - 108 mmol/L    CO2 24 21 - 32 mmol/L    ANION GAP 12 4 - 13 mmol/L    BUN 11 5 - 25 mg/dL    Creatinine 0 66 0 60 - 1 30 mg/dL    Glucose 95 65 - 140 mg/dL    Glucose, Fasting 95 65 - 99 mg/dL    Calcium 9 1 8 3 - 10 1 mg/dL    AST 23 5 - 45 U/L    ALT 19 12 - 78 U/L    Alkaline Phosphatase 74 46 - 116 U/L    Total Protein 7 4 6 4 - 8 2 g/dL    Albumin 3 8 3 5 - 5 0 g/dL    Total Bilirubin 0 40 0 20 - 1 00 mg/dL    eGFR 106 ml/min/1 73sq m   Valproic acid level, total    Collection Time: 09/18/19  8:05 AM   Result Value Ref Range    Valproic Acid, Total 42 (L) 50 - 100 ug/mL        This note has been constructed using a voice recognition system  There may be translation, syntax, or grammatical errors  If you have any questions, please contact the dictating provider

## 2019-09-18 NOTE — PROGRESS NOTES
Pt remains on 1:1 while awake  Reports swallowing a golf ball last night during group  States she can feel in it her LUQ  No mass felt upon palpation  Pt is delayed ins response and disorganized in content  Wouldn't answer if having SI/HI either time writer asked but would answer that she is feeling better than when she first came in  Said she would want to take a nap then get up and walked down the hallway

## 2019-09-19 LAB
BASOPHILS # BLD AUTO: 0.01 THOUSANDS/ΜL (ref 0–0.1)
BASOPHILS NFR BLD AUTO: 0 % (ref 0–1)
EOSINOPHIL # BLD AUTO: 0.16 THOUSAND/ΜL (ref 0–0.61)
EOSINOPHIL NFR BLD AUTO: 3 % (ref 0–6)
ERYTHROCYTE [DISTWIDTH] IN BLOOD BY AUTOMATED COUNT: 12.9 % (ref 11.6–15.1)
HCT VFR BLD AUTO: 37.3 % (ref 34.8–46.1)
HGB BLD-MCNC: 11.7 G/DL (ref 11.5–15.4)
IMM GRANULOCYTES # BLD AUTO: 0.01 THOUSAND/UL (ref 0–0.2)
IMM GRANULOCYTES NFR BLD AUTO: 0 % (ref 0–2)
LYMPHOCYTES # BLD AUTO: 1.38 THOUSANDS/ΜL (ref 0.6–4.47)
LYMPHOCYTES NFR BLD AUTO: 27 % (ref 14–44)
MCH RBC QN AUTO: 26.5 PG (ref 26.8–34.3)
MCHC RBC AUTO-ENTMCNC: 31.4 G/DL (ref 31.4–37.4)
MCV RBC AUTO: 84 FL (ref 82–98)
MONOCYTES # BLD AUTO: 0.31 THOUSAND/ΜL (ref 0.17–1.22)
MONOCYTES NFR BLD AUTO: 6 % (ref 4–12)
NEUTROPHILS # BLD AUTO: 3.22 THOUSANDS/ΜL (ref 1.85–7.62)
NEUTS SEG NFR BLD AUTO: 64 % (ref 43–75)
NRBC BLD AUTO-RTO: 0 /100 WBCS
PLATELET # BLD AUTO: 199 THOUSANDS/UL (ref 149–390)
PMV BLD AUTO: 9.4 FL (ref 8.9–12.7)
RBC # BLD AUTO: 4.42 MILLION/UL (ref 3.81–5.12)
WBC # BLD AUTO: 5.09 THOUSAND/UL (ref 4.31–10.16)

## 2019-09-19 PROCEDURE — 85025 COMPLETE CBC W/AUTO DIFF WBC: CPT | Performed by: PSYCHIATRY & NEUROLOGY

## 2019-09-19 RX ADMIN — LORAZEPAM 1 MG: 1 TABLET ORAL at 19:22

## 2019-09-19 RX ADMIN — DIVALPROEX SODIUM 750 MG: 500 TABLET, FILM COATED, EXTENDED RELEASE ORAL at 17:17

## 2019-09-19 RX ADMIN — QUETIAPINE FUMARATE 700 MG: 300 TABLET ORAL at 21:27

## 2019-09-19 RX ADMIN — LORAZEPAM 1 MG: 1 TABLET ORAL at 09:35

## 2019-09-19 NOTE — PROGRESS NOTES
Progress Note - 707 [facility] 53 y o  female MRN: [id number]  Unit/Bed#: Diaz Mitchell 252-01 Encounter: [id number]    Assessment/Plan   Active Problems:    Bipolar I disorder, most recent episode manic, severe with psychotic features ([facility] [address] )    Recommended Treatment:   Continue with group therapy, milieu therapy and occupational therapy  Continue current medical regimen, except --> Increase Seroquel to 700 mg qhs  Risks, benefits and possible side effects of Medications: Risks, benefits, and possible side effects of medications have been explained to the patient, who verbalizes understanding  Progress Toward Goals: slow improvement    ------------------------------------------------------------    Subjective: Miguel Anaya has been compliant with medications without acute side effects  She continues to be on a 1:1 and remains confused, disorganized and internally preoccupied with paranoid delusions  She reports poor sleep with frequent awakenings, with decreased energy and concentration  She continues to report auditory hallucinations; today she states they are telling her about "the man of her dreams " The patient denies any suicidal or homicidal ideations/intent/plan  Patient is consenting for safety on the unit  Psychiatric Review of Systems:  Behavior over the last 24 hours: unchanged  Sleep: insomnia and frequent awakenings  Appetite: good  Medication side effects: none  ROS: Complete review of systems is negative except as noted above      Vital signs in last 24 hours:  Temp:  [96 9 °F (36 1 °C)-97 5 °F (36 4 °C)] 96 9 °F (36 1 °C)  HR:  [88-97] 97  Resp:  [16-20] 20  BP: (119-125)/(59-86) 119/59    Mental Status Evaluation:  Appearance:  alert, casually dressed and appears stated age   Behavior:  cooperative, sitting comfortably in chair, good eye contact, restless and fidgety and normal gait and balance   Speech:  spontaneous, clear, normal rate and normal volume   Mood:  "worried"   Affect: mood-congruent and anxious   Thought Process:  disorganized, illogical, incoherent, perseverative, loose associations   Thought Content: delusions about "man of her dreams being in the unit", paranoid ideation, obsessive thoughts, intrusive thoughts   Perceptual disturbances: auditory hallucinations (persisting) and no visual hallucinations   Risk Potential: No active or passive suicidal or homicidal ideation   Cognition: disoriented, appears to be of average intelligence, cognitive deficit, impaired abstract reasoning and attention span appeared shorter than expected for age   Insight:  Poor   Judgment: Poor       Current Medications:    Current Facility-Administered Medications:  acetaminophen 325 mg Oral Q6H PRN [facility], PA-C   acetaminophen 650 mg Oral Q6H PRN [facility], PA-C   aluminum-magnesium hydroxide-simethicone 30 mL Oral Q4H PRN Kathryn Hazel MD   benztropine 1 mg Intramuscular Q6H PRN Kathryn Hazel MD   benztropine 1 mg Oral Q6H PRN Kathryn Hazel MD   divalproex sodium 750 mg Oral After Dinner [facility]   haloperidol 5 mg Oral Q6H PRN [facility], PA-C   haloperidol lactate 5 mg Intramuscular Q6H PRN [facility], PA-C   hydrOXYzine HCL 50 mg Oral Q8H PRN [facility]   ibuprofen 600 mg Oral Q6H PRN [facility], PA-C   LORazepam 2 mg Intramuscular Q8H PRN [facility]   LORazepam 1 mg Oral Q8H PRN [facility]   magnesium hydroxide 30 mL Oral Daily PRN Kathryn Hazel MD   OLANZapine 10 mg Intramuscular Q8H PRN [facility], PA-C   OLANZapine 10 mg Oral Q8H PRN [facility], PA-C   QUEtiapine 700 mg Oral HS [facility]   risperiDONE 1 mg Oral Q3H PRN [facility], PA-C   traZODone 50 mg Oral HS PRN Kathryn Hazel MD       Behavioral Health Medications: all current active meds have been reviewed  Changes as above  Laboratory results:  I have personally reviewed all pertinent laboratory/tests results    Recent Results (from the past 50 hour(s))   Comprehensive metabolic panel    Collection Time: 09/18/19  8:05 AM   Result Value Ref Range    Sodium 141 136 - 145 mmol/L    Potassium 4 3 3 5 - 5 3 mmol/L    Chloride 105 100 - 108 mmol/L    CO2 24 21 - 32 mmol/L    ANION GAP 12 4 - 13 mmol/L    BUN 11 5 - 25 mg/dL    Creatinine 0 66 0 60 - 1 30 mg/dL    Glucose 95 65 - 140 mg/dL    Glucose, Fasting 95 65 - 99 mg/dL    Calcium 9 1 8 3 - 10 1 mg/dL    AST 23 5 - 45 U/L    ALT 19 12 - 78 U/L    Alkaline Phosphatase 74 46 - 116 U/L    Total Protein 7 4 6 4 - 8 2 g/dL    Albumin 3 8 3 5 - 5 0 g/dL    Total Bilirubin 0 40 0 20 - 1 00 mg/dL    eGFR 106 ml/min/1 73sq m   Valproic acid level, total    Collection Time: 09/18/19  8:05 AM   Result Value Ref Range    Valproic Acid, Total 42 (L) 50 - 100 ug/mL   CBC and differential    Collection Time: 09/19/19  9:46 AM   Result Value Ref Range    WBC 5 09 4 31 - 10 16 Thousand/uL    RBC 4 42 3 81 - 5 12 Million/uL    Hemoglobin 11 7 11 5 - 15 4 g/dL    Hematocrit 37 3 34 8 - 46 1 %    MCV 84 82 - 98 fL    MCH 26 5 (L) 26 8 - 34 3 pg    MCHC 31 4 31 4 - 37 4 g/dL    RDW 12 9 11 6 - 15 1 %    MPV 9 4 8 9 - 12 7 fL    Platelets 494 877 - 707 Thousands/uL    nRBC 0 /100 WBCs    Neutrophils Relative 64 43 - 75 %    Immat GRANS % 0 0 - 2 %    Lymphocytes Relative 27 14 - 44 %    Monocytes Relative 6 4 - 12 %    Eosinophils Relative 3 0 - 6 %    Basophils Relative 0 0 - 1 %    Neutrophils Absolute 3 22 1 85 - 7 62 Thousands/µL    Immature Grans Absolute 0 01 0 00 - 0 20 Thousand/uL    Lymphocytes Absolute 1 38 0 60 - 4 47 Thousands/µL    Monocytes Absolute 0 31 0 17 - 1 22 Thousand/µL    Eosinophils Absolute 0 16 0 00 - 0 61 Thousand/µL    Basophils Absolute 0 01 0 00 - 0 10 Thousands/µL        This note has been constructed using a voice recognition system  There may be translation, syntax, or grammatical errors  If you have any questions, please contact the dictating provider

## 2019-09-19 NOTE — CASE MANAGEMENT
CM met with Pt who was observed RIS  CM again asked Pt to sign an DENNIS for her mom, however, she declined  She did ask that CM write down the phone number, which CM provided to her  CM contacted Pt's daughter, Donna Fagan, @ [phone] & left a message to provide an update on Pt  OLIVE also reviewed that it had been suggested by nursing staff that Donna Fagan call on Saturday to see how Pt is doing, before coming to visit  CM also reviewed that Pt's mother, Debi Breen, had called requesting to know how Pt is, however, Pt will not sign an DENNIS for CM to be able to call her back  CM provided the nurses station number for her to call on Saturday

## 2019-09-19 NOTE — PROGRESS NOTES
Pt wandering unit, confused, tearful, states she is looking for two husbands  Unable to redirect  Pt accepted ativan for symptoms  Wandering in and out of dayroom, intrusive towards male peer, redirected for attempting to put arm around the peer  RIS during group,  holding hands over ears and talking to self  Holding up middle fingers, but not directed towards anyone in room  Labile, angry, then tearful  Not oriented to situation  Monitored closely by staff

## 2019-09-19 NOTE — CASE MANAGEMENT
CM received a voicemail from Pt's mom, Jenynfer Negrete, who said that she wanted an update on how Pt was doing  CM met with Pt to inquire if she would want to sign an DENNIS, but she declined  CM suggested they call her mom together, since it was reported that Pt had called her mom upon admission  Pt agreed, however, after CM dialed the number, Pt grabbed the  & slammed it down, stating, "I'm not telling her this"  Pt left the office & went to her room  She declined to speak to CM further at the time

## 2019-09-19 NOTE — PROGRESS NOTES
Male peer informed writer that someone was in his room  Pt found standing in his bathroom with light off and door closed  Said she was trying to find out if he wanted to be with her  Continues to focus on this person asking if she can kiss him on the head just once  Needs frequent visualization and redirection

## 2019-09-19 NOTE — PROGRESS NOTES
Pt overall slept for about 2 5-3 hours tonight  Needing redirection/reorientation frequently tonight  Running down halls and attempting to walk into other peers rooms  Pt calling this Drea Anger and believing she is currently working on this unit  Pt states she believes her brother is picking her up overnight and frequently asking if he is here yet  Loud on the unit and needing verbal de escalation  Mostly pleasant with staff  Remains on 1:1 while awake

## 2019-09-19 NOTE — PROGRESS NOTES
Pt frequently walking that halls  Appears calm with irritably during conversation  Says she is doing "good " Denies NAZIA  Has delusions of her father sending men to  her but she can spot them right away and says no to marrying them  Says her father never approves of the people she wants to   Pt ended conversation to attend group

## 2019-09-19 NOTE — PROGRESS NOTES
Pt showered and exited shower wrapped in a towel  Needed assistance/guidance with getting dressed but appeared to have a clearer thought process with decreased delusions  Does not appear to be RIS  Pleasant and cooperative

## 2019-09-19 NOTE — PROGRESS NOTES
Pt attempted to hug a male peer and needed redirection for this  Pt then ran down pérez and attempted to enter males room however 1:1 staff redirected pt away from room  Pt laughing  States she feels "great even thought I havent slept much " Laughing and stating she is "Kanika with the good hair " Observed responding to uinternal stimuli which pt states are of her Mother and daughter having conversations with her

## 2019-09-20 RX ADMIN — DIVALPROEX SODIUM 750 MG: 500 TABLET, FILM COATED, EXTENDED RELEASE ORAL at 17:37

## 2019-09-20 RX ADMIN — HALOPERIDOL 5 MG: 5 TABLET ORAL at 09:57

## 2019-09-20 RX ADMIN — HYDROXYZINE HYDROCHLORIDE 50 MG: 50 TABLET, FILM COATED ORAL at 13:02

## 2019-09-20 RX ADMIN — QUETIAPINE FUMARATE 800 MG: 200 TABLET ORAL at 21:21

## 2019-09-20 RX ADMIN — RISPERIDONE 1 MG: 1 TABLET, ORALLY DISINTEGRATING ORAL at 12:51

## 2019-09-20 RX ADMIN — BENZTROPINE MESYLATE 1 MG: 1 TABLET ORAL at 09:57

## 2019-09-20 NOTE — PROGRESS NOTES
09/20/19 0737   Team Meeting   Meeting Type Daily Rounds   Team Members Present   Team Members Present Physician;Nurse;;Occupational Therapist   Physician Team Member Dr Shaw Butler Team Member NUPUR [facility] Management Team Member Latasha Gibbons   OT Team Member Kathryn   Patient/Family Present   Patient Present No   Patient's Family Present No     Very labile  Sexually preoccupied  Found in a pt's bathroom in the dark  She was wanting to propose  Pt is very focused on several peers  Pt may need to be put on 1-1  No dc date at this time  Pt's Seroquel will be increased to maximum dose at 800 mg tonight  Pt does not have any medications during the day  Pt is not on mouth checks for fear that she is not taking her medication

## 2019-09-20 NOTE — PROGRESS NOTES
Pt is disorganized, tangential in conversation  Pt remains on 1:1 observation while awake  Pt is cooperative and no inappropriate behaviors thus far in the shift  Pt is meal and medication compliant  Denies SI/HI but is actively RIS

## 2019-09-20 NOTE — PROGRESS NOTES
Able to talk about her job at [employer], and her interest in starting a nurses union   Remains scattered in thinking less intrusive, still needs monitoring and supervision
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
## 2019-09-20 NOTE — PROGRESS NOTES
Pt overall slept until around 0430  Since pt has been awake however she has needed frequent redirection due to attempting to walk into male peers room multiple times  Pt remains focused on select male peer currently and attempting to speak to him several times even though peer has asked her not to at this time  Pt responding to internal stimuli  States she is speaking with Liam Resendiz about her daughter  Declined PRN medications  Tearful at times

## 2019-09-20 NOTE — PLAN OF CARE
Problem: SELF HARM/SUICIDALITY  Goal: Will have no self-injury during hospital stay  Description  INTERVENTIONS:  - Q 15 MINUTES: Routine safety checks  - Q WAKING SHIFT & PRN: Assess risk to determine if routine checks are adequate to maintain patient safety  - Encourage patient to participate actively in care by formulating a plan to combat response to suicidal ideation, identify supports and resources  Outcome: Progressing     Problem: Alteration in Thoughts and Perception  Goal: Complete daily ADLs, including personal hygiene independently, as able  Description  Interventions:  - Observe, teach, and assist patient with ADLS  - Monitor and promote a balance of rest/activity, with adequate nutrition and elimination   Outcome: Progressing     Problem: Ineffective Coping  Goal: Participates in unit activities  Description  Interventions:  - Provide therapeutic environment   - Provide required programming   - Redirect inappropriate behaviors   Outcome: Progressing     Problem: PSYCHOSIS  Goal: Will report no hallucinations or delusions  Description  Interventions:  - Administer medication as  ordered  - Every waking shifts and PRN assess for the presence of hallucinations and or delusions  - Assist with reality testing to support increasing orientation  - Assess if patient's hallucinations or delusions are encouraging self-harm or harm to others and intervene as appropriate  Outcome: Not Progressing     Problem: INVOLUNTARY ADMIT  Goal: Will cooperate with staff recommendations and doctor's orders and will demonstrate appropriate behavior  Description  INTERVENTIONS:  - Treat underlying conditions and offer medication as ordered  - Educate regarding involuntary admission procedures and rules  - Utilize positive consistent limit setting strategies to support patient and staff safety  Outcome: Not Progressing     Problem: Alteration in Thoughts and Perception  Goal: Treatment Goal: Gain control of psychotic behaviors/thinking, reduce/eliminate presenting symptoms and demonstrate improved reality functioning upon discharge  Outcome: Not Progressing  Goal: Refrain from acting on delusional thinking/internal stimuli  Description  Interventions:  - Monitor patient closely, per order   - Utilize least restrictive measures   - Set reasonable limits, give positive feedback for acceptable   - Administer medications as ordered and monitor of potential side effects  Outcome: Not Progressing  Goal: Recognize dysfunctional thoughts, communicate reality-based thoughts at the time of discharge  Description  Interventions:  - Provide medication and psycho-education to assist patient in compliance and developing insight into his/her illness   Outcome: Not Progressing

## 2019-09-20 NOTE — PROGRESS NOTES
Pt remains disorganized, actively responding to internali stimuli  Pt is on 1:1 close observation while awake  Required PRN Risperdal d/t increased agitation, attempting to touch other patients  Unsure if medication was fully absorbed as pt proceeded to rinse mouth in sink  Pt then given PRN Atarax d/t restless  Repeatedly laying down and getting back OOB  Mild effectiveness noted  Pt appears to have slept for a few minutes and is back OOB  Currently sitting in day room

## 2019-09-20 NOTE — PROGRESS NOTES
Medication note: pt place back on 1:1 observation for cont'd psychosis and inappropriate behavior on unit  Pt given prn haldol and cogentin at 0957, pt hallucinating and talking to people that are not present, tangential, paranoid  Pt reluctant to take prn but does admit thoughts are racing and disorganized, took prn with encouragement  Moderately effective

## 2019-09-21 PROCEDURE — 99231 SBSQ HOSP IP/OBS SF/LOW 25: CPT | Performed by: PSYCHIATRY & NEUROLOGY

## 2019-09-21 RX ADMIN — QUETIAPINE FUMARATE 800 MG: 200 TABLET ORAL at 21:11

## 2019-09-21 RX ADMIN — HYDROXYZINE HYDROCHLORIDE 50 MG: 50 TABLET, FILM COATED ORAL at 22:05

## 2019-09-21 RX ADMIN — DIVALPROEX SODIUM 750 MG: 500 TABLET, FILM COATED, EXTENDED RELEASE ORAL at 17:21

## 2019-09-21 NOTE — PROGRESS NOTES
Pt is observed wandering unit, continues on 1:1 observation  Pt is disorganized, tangential in speech  Pt is actively RIS, stating things such as "No he can't come in"  Pt reluctant to take depakote stating it made her had scary dreams  Pt was compliant with encouragement

## 2019-09-21 NOTE — PROGRESS NOTES
Daily Rounds:    Pt continues on 1:1 while awake  Disorganized and tangential   Improved sleep overnight  Seroquel increased last night  BP low this am, improved on recheck  Prefer use of antipsychotics over benzos

## 2019-09-21 NOTE — PLAN OF CARE
Problem: SELF HARM/SUICIDALITY  Goal: Will have no self-injury during hospital stay  Description  INTERVENTIONS:  - Q 15 MINUTES: Routine safety checks  - Q WAKING SHIFT & PRN: Assess risk to determine if routine checks are adequate to maintain patient safety  - Encourage patient to participate actively in care by formulating a plan to combat response to suicidal ideation, identify supports and resources  Outcome: Progressing     Problem: PSYCHOSIS  Goal: Will report no hallucinations or delusions  Description  Interventions:  - Administer medication as  ordered  - Every waking shifts and PRN assess for the presence of hallucinations and or delusions  - Assist with reality testing to support increasing orientation  - Assess if patient's hallucinations or delusions are encouraging self-harm or harm to others and intervene as appropriate  Outcome: Progressing     Problem: INVOLUNTARY ADMIT  Goal: Will cooperate with staff recommendations and doctor's orders and will demonstrate appropriate behavior  Description  INTERVENTIONS:  - Treat underlying conditions and offer medication as ordered  - Educate regarding involuntary admission procedures and rules  - Utilize positive consistent limit setting strategies to support patient and staff safety  Outcome: Progressing     Problem: Alteration in Thoughts and Perception  Goal: Treatment Goal: Gain control of psychotic behaviors/thinking, reduce/eliminate presenting symptoms and demonstrate improved reality functioning upon discharge  Outcome: Progressing  Goal: Refrain from acting on delusional thinking/internal stimuli  Description  Interventions:  - Monitor patient closely, per order   - Utilize least restrictive measures   - Set reasonable limits, give positive feedback for acceptable   - Administer medications as ordered and monitor of potential side effects  Outcome: Progressing  Goal: Recognize dysfunctional thoughts, communicate reality-based thoughts at the time of discharge  Description  Interventions:  - Provide medication and psycho-education to assist patient in compliance and developing insight into his/her illness   Outcome: Progressing  Goal: Complete daily ADLs, including personal hygiene independently, as able  Description  Interventions:  - Observe, teach, and assist patient with ADLS  - Monitor and promote a balance of rest/activity, with adequate nutrition and elimination   Outcome: Progressing

## 2019-09-21 NOTE — PROGRESS NOTES
Belongings brought in by daughter during visit    Belongings at bedside:  Bra  2 shirts  2 pants  Sweater  Lotion  Deodorant  Glasses case  Toothpaste  Slippers  Brush  4 socks  4 underwear    Locker:  Pants  hoodie  Sandals  6 underwear  Wallet: cell phone, [place] license, $[phone], 9 cards

## 2019-09-21 NOTE — PROGRESS NOTES
Pt remains on 1:1 observation while awake  Staff reports good sleep overnight  Pt wandering unit this morning  Tangential and disorganized during interaction
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
## 2019-09-22 RX ADMIN — QUETIAPINE FUMARATE 800 MG: 200 TABLET ORAL at 20:48

## 2019-09-22 RX ADMIN — DIVALPROEX SODIUM 750 MG: 500 TABLET, FILM COATED, EXTENDED RELEASE ORAL at 17:20

## 2019-09-22 RX ADMIN — MAGNESIUM HYDROXIDE 30 ML: 400 SUSPENSION ORAL at 17:22

## 2019-09-22 NOTE — PROGRESS NOTES
Pt continues with 1:1 observation while awake  Pt refused multiple attempts by staff to obtain labs this morning  Pt states her labs are in Epic and we don't need to re-draw  Pt acknowledges taking depakote, states "Yes but I don't need it"  Disorganized and tangential in conversation

## 2019-09-22 NOTE — PROGRESS NOTES
Progress Note - [facility] 51 y o  female MRN: [id number]  Unit/Bed#: Yajaira Escobar 252-01 Encounter: [id number]    The patient was seen for continuing care and reviewed with treatment team     Mental Status Evaluation:  Appearance:  Adequate hygiene and grooming   Behavior:  Superficial   Mood:  anxious   Affect: appropriate   Speech: Normal rate and Normal volume   Thought Process: Tangential   Thought Content:  Delusions of persecution and Obsessions, focused on one male peer being her partner   Perceptual Disturbances: Internally preoccupied   Risk Potential: No suicidal or homicidal ideation   Attention/Concentration attention span appeared shorter than expected for age   Orientation:   AAOx3   Gait/Station: normal gait/station   Motor Activity: No abnormal movement noted     Progress Toward Goals:  Called a peer sexy yesterday and needed redirection, per RN report she slept but declined her labs this AM "it is too soon to check them"  Had visit with daughter and her boyfriend and she believes he was a former patient of hers that she told to protect her daughter  Wants to talk to her nurse manager with Dr Yung Marroquin tomorrow about leaving  Assessment/Plan    Bipolar 1 disorder, manic, moderate (HCC)     Recommended Treatment: Continue with pharmacotherapy, group therapy, milieu therapy and occupational therapy    The patient will be maintained on the following medications:    Current Facility-Administered Medications:  acetaminophen 325 mg Oral Q6H PRN Lan Payan PA-C   acetaminophen 650 mg Oral Q6H PRN Lan Payan PA-C   aluminum-magnesium hydroxide-simethicone 30 mL Oral Q4H PRN Azam Francis MD   benztropine 1 mg Intramuscular Q6H PRN Azam Francis MD   benztropine 1 mg Oral Q6H PRN Azam Francis MD   divalproex sodium 750 mg Oral After Dinner Ignacio Forrester   haloperidol 5 mg Oral Q6H PRN Lan Payan PA-C   haloperidol lactate 5 mg Intramuscular Q6H PRN Lan Payan PA-C   hydrOXYzine HCL 50 mg Oral Q8H PRN Ignacio Forrester   ibuprofen 600 mg Oral Q6H PRN [facility] [place], PA-RAISSA   LORazepam 2 mg Intramuscular Q8H PRN Ignacio Forrester   LORazepam 1 mg Oral Q8H PRN Ignacio Forrester   magnesium hydroxide 30 mL Oral Daily PRN Georgette Stone MD   OLANZapine 10 mg Intramuscular Q8H PRN [facility] [place], [facility]   OLANZapine 10 mg Oral Q8H PRN [facility] [place], CARA   QUEtiapine 800 mg Oral HS Ernestina Roe MD   risperiDONE 1 mg Oral Q3H PRN [facility] [place], PA-[facility]   traZODone 50 mg Oral HS PRN Georgette Stone MD       Risks, benefits and possible side effects of Medications:   Risks, benefits, and possible side effects of medications explained to patient and patient verbalizes understanding

## 2019-09-22 NOTE — PROGRESS NOTES
Pt continues on 1:1 observation while awake  Requires redirection as pt is touching peers and standing too close to others  Pt is currently refusing evening dose of Depakote  Pt states she doesn't need it  Then agreeable to take it "after the sun goes down"  When reminded of labs needed in the morning, pt states "Just look in Epic" and walks away

## 2019-09-22 NOTE — PROGRESS NOTES
Pt remains a 1:1 observation while awake  Pt has been asleep since prior shift  Appears to have slept without difficulty, no wakefulness

## 2019-09-22 NOTE — PROGRESS NOTES
Daily Rounds:    Pt received Atarax last evening  Slept well overnight  Refusing labs this morning with multiple attempts  Continues on 1:1 while awake    Disorganized, rambling, tangential

## 2019-09-22 NOTE — PROGRESS NOTES
Pt  Requested   And  Received  Atarax 50 mg  Po  For anxiety/sleep  At 2205  Med  Effective  Pt  In  Bed  And sleeping  At  Present

## 2019-09-23 ENCOUNTER — APPOINTMENT (INPATIENT)
Dept: RADIOLOGY | Facility: HOSPITAL | Age: 49
DRG: 885 | End: 2019-09-23
Payer: COMMERCIAL

## 2019-09-23 LAB
ALBUMIN SERPL BCP-MCNC: 3.5 G/DL (ref 3.5–5)
ALP SERPL-CCNC: 77 U/L (ref 46–116)
ALT SERPL W P-5'-P-CCNC: 18 U/L (ref 12–78)
ANION GAP SERPL CALCULATED.3IONS-SCNC: 10 MMOL/L (ref 4–13)
AST SERPL W P-5'-P-CCNC: 16 U/L (ref 5–45)
BASOPHILS # BLD AUTO: 0.03 THOUSANDS/ΜL (ref 0–0.1)
BASOPHILS NFR BLD AUTO: 1 % (ref 0–1)
BILIRUB SERPL-MCNC: 0.3 MG/DL (ref 0.2–1)
BUN SERPL-MCNC: 11 MG/DL (ref 5–25)
CALCIUM SERPL-MCNC: 8.7 MG/DL (ref 8.3–10.1)
CHLORIDE SERPL-SCNC: 102 MMOL/L (ref 100–108)
CO2 SERPL-SCNC: 24 MMOL/L (ref 21–32)
CREAT SERPL-MCNC: 0.8 MG/DL (ref 0.6–1.3)
EOSINOPHIL # BLD AUTO: 0.21 THOUSAND/ΜL (ref 0–0.61)
EOSINOPHIL NFR BLD AUTO: 4 % (ref 0–6)
ERYTHROCYTE [DISTWIDTH] IN BLOOD BY AUTOMATED COUNT: 13 % (ref 11.6–15.1)
GFR SERPL CREATININE-BSD FRML MDRD: 87 ML/MIN/1.73SQ M
GLUCOSE P FAST SERPL-MCNC: 184 MG/DL (ref 65–99)
GLUCOSE SERPL-MCNC: 184 MG/DL (ref 65–140)
HCT VFR BLD AUTO: 37.4 % (ref 34.8–46.1)
HGB BLD-MCNC: 11.7 G/DL (ref 11.5–15.4)
IMM GRANULOCYTES # BLD AUTO: 0.02 THOUSAND/UL (ref 0–0.2)
IMM GRANULOCYTES NFR BLD AUTO: 0 % (ref 0–2)
LYMPHOCYTES # BLD AUTO: 1.26 THOUSANDS/ΜL (ref 0.6–4.47)
LYMPHOCYTES NFR BLD AUTO: 24 % (ref 14–44)
MCH RBC QN AUTO: 26.7 PG (ref 26.8–34.3)
MCHC RBC AUTO-ENTMCNC: 31.3 G/DL (ref 31.4–37.4)
MCV RBC AUTO: 85 FL (ref 82–98)
MONOCYTES # BLD AUTO: 0.33 THOUSAND/ΜL (ref 0.17–1.22)
MONOCYTES NFR BLD AUTO: 6 % (ref 4–12)
NEUTROPHILS # BLD AUTO: 3.43 THOUSANDS/ΜL (ref 1.85–7.62)
NEUTS SEG NFR BLD AUTO: 65 % (ref 43–75)
NRBC BLD AUTO-RTO: 0 /100 WBCS
PLATELET # BLD AUTO: 172 THOUSANDS/UL (ref 149–390)
PMV BLD AUTO: 9.6 FL (ref 8.9–12.7)
POTASSIUM SERPL-SCNC: 4.2 MMOL/L (ref 3.5–5.3)
PROT SERPL-MCNC: 7.2 G/DL (ref 6.4–8.2)
RBC # BLD AUTO: 4.39 MILLION/UL (ref 3.81–5.12)
SODIUM SERPL-SCNC: 136 MMOL/L (ref 136–145)
VALPROATE SERPL-MCNC: 80 UG/ML (ref 50–100)
WBC # BLD AUTO: 5.28 THOUSAND/UL (ref 4.31–10.16)

## 2019-09-23 PROCEDURE — 74018 RADEX ABDOMEN 1 VIEW: CPT

## 2019-09-23 PROCEDURE — 85025 COMPLETE CBC W/AUTO DIFF WBC: CPT | Performed by: PSYCHIATRY & NEUROLOGY

## 2019-09-23 PROCEDURE — 80053 COMPREHEN METABOLIC PANEL: CPT | Performed by: PSYCHIATRY & NEUROLOGY

## 2019-09-23 PROCEDURE — 80164 ASSAY DIPROPYLACETIC ACD TOT: CPT | Performed by: PSYCHIATRY & NEUROLOGY

## 2019-09-23 PROCEDURE — 99252 IP/OBS CONSLTJ NEW/EST SF 35: CPT | Performed by: INTERNAL MEDICINE

## 2019-09-23 RX ORDER — POLYETHYLENE GLYCOL 3350 17 G/17G
17 POWDER, FOR SOLUTION ORAL ONCE
Status: COMPLETED | OUTPATIENT
Start: 2019-09-23 | End: 2019-09-23

## 2019-09-23 RX ADMIN — MAGNESIUM HYDROXIDE 30 ML: 400 SUSPENSION ORAL at 09:01

## 2019-09-23 RX ADMIN — QUETIAPINE FUMARATE 800 MG: 200 TABLET ORAL at 21:31

## 2019-09-23 RX ADMIN — OLANZAPINE 10 MG: 10 INJECTION, POWDER, LYOPHILIZED, FOR SOLUTION INTRAMUSCULAR at 04:00

## 2019-09-23 RX ADMIN — POLYETHYLENE GLYCOL 3350 17 G: 17 POWDER, FOR SOLUTION ORAL at 18:11

## 2019-09-23 RX ADMIN — DIVALPROEX SODIUM 750 MG: 500 TABLET, FILM COATED, EXTENDED RELEASE ORAL at 18:08

## 2019-09-23 NOTE — PROGRESS NOTES
Patient awake shutting her door closed  Rounding MHT opened her door and reminded patient that her door must remain opened  The patient is concerned feeling as though she had swallowed a ring and she cannot get it out  She is requesting an x-ray  This writer offered her haldol and cogentin  Patient refused stating she did not need it  Patient is currently a one to one while awake  Will continue to monitor and support

## 2019-09-23 NOTE — CONSULTS
Consultation - GI   Gaviota Hodgson 51 y o  female MRN: [id number]  Unit/Bed#: Yajaira Esc[facility] 252-01 Encounter: [id number]    Consults  ASSESSMENT  1  Foreign body ingestion-no intention for self injury ring swallowed about 6 days ago located in the right colon-should have no trouble passing through the GI tract  2  Bipolar disorder  3  Abnormal liver function studies history of normal at this point  4  Mild elevation of blood sugar    PLAN  1  NO INTERVENTION FOR FOREIGN BODY  SHOULD PASS ON ITS OWN  COULD GIVE A DOSE OF MIRALAX TO HELP FACILITATE I DO NOT NECESSARILY BELIEVE SHE NEEDS AN X-RAY FOR FOLLOW-UP BUT COULD CONSIDER IN ABOUT 1 WEEK  2  AS PER BEHAVIORAL HEALTH TEAM  3  FOLLOW UP WITH PRIMARY CARE DOCTOR         No chief complaint on file  CHIEF COMPLAINT ASKED TO SEE PATIENT ABOUT A SWALLOWED OBJECT  SHE SWALLOWED A RING 6 DAYS AGO SO THAT PATIENTS WHERE SHE WAS WORKING WOULD NOT NOTE SHE WAS   Physician Requesting Consult: Elvira SPRING Gaviota Hodgson is a 51y o  year old female who presents with findings of ingested foreign body on abdominal x-ray  The patient purposely swallowed her ring about 6 days ago  She did this so 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 not 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 was   Patient has some vague abdominal discomfort at times  She was not trying to hurt herself  She has never swallowed foreign objects in the past   An abdominal film demonstrates the findings of a ring in the area of the ascending colon  Rest to further evaluate    Patient is not not had a bowel movement today    Historical Information   Past Medical History:   Diagnosis Date    Bipolar 1 disorder, manic, moderate ([facility] [address] ) 2/3/2016    Psychiatric disorder     Psychiatric illness     Psychosis ([facility] [address] )     Suicide attempt [facility])      Past Surgical History:   Procedure Laterality Date    TONSILLECTOMY       Social History   Social History     Substance and Sexual Activity   Alcohol Use Yes    Frequency: 2-3 times a week    Drinks per session: 5 or 6    Binge frequency: Less than monthly    Comment: States 6-9 drinks per week     Social History     Substance and Sexual Activity   Drug Use Yes    Types: Marijuana    Comment: "once in a while"     Social History     Tobacco Use   Smoking Status Never Smoker   Smokeless Tobacco Never Used     Family History   Problem Relation Age of Onset    Depression Father     Lung cancer Father        Meds/Allergies   Current Facility-Administered Medications   Medication Dose Route Frequency    acetaminophen (TYLENOL) tablet 325 mg  325 mg Oral Q6H PRN    acetaminophen (TYLENOL) tablet 650 mg  650 mg Oral Q6H PRN    aluminum-magnesium hydroxide-simethicone (MYLANTA) 200-200-20 mg/5 mL oral suspension 30 mL  30 mL Oral Q4H PRN    benztropine (COGENTIN) injection 1 mg  1 mg Intramuscular Q6H PRN    benztropine (COGENTIN) tablet 1 mg  1 mg Oral Q6H PRN    divalproex sodium (DEPAKOTE ER) 24 hr tablet 750 mg  750 mg Oral After Dinner    haloperidol (HALDOL) tablet 5 mg  5 mg Oral Q6H PRN    haloperidol lactate (HALDOL) injection 5 mg  5 mg Intramuscular Q6H PRN    hydrOXYzine HCL (ATARAX) tablet 50 mg  50 mg Oral Q8H PRN    ibuprofen (MOTRIN) tablet 600 mg  600 mg Oral Q6H PRN    LORazepam (ATIVAN) 2 mg/mL injection 2 mg  2 mg Intramuscular Q8H PRN    LORazepam (ATIVAN) tablet 1 mg  1 mg Oral Q8H PRN    magnesium hydroxide (MILK OF MAGNESIA) 400 mg/5 mL oral suspension 30 mL  30 mL Oral Daily PRN    OLANZapine (ZyPREXA) IM injection 10 mg  10 mg Intramuscular Q8H PRN    OLANZapine (ZyPREXA) tablet 10 mg  10 mg Oral Q8H PRN    QUEtiapine (SEROquel) tablet 800 mg  800 mg Oral HS    risperiDONE (RisperDAL M-TABS) dispersible tablet 1 mg  1 mg Oral Q3H PRN    traZODone (DESYREL) tablet 50 mg  50 mg Oral HS PRN     Medications Prior to Admission   Medication    amphetamine-dextroamphetamine (ADDERALL XR, 10MG,) 10 MG 24 hr capsule    amphetamine-dextroamphetamine (ADDERALL) 10 mg tablet    QUEtiapine (SEROquel) 200 mg tablet       Allergies   Allergen Reactions    Lithium     Sulfa Antibiotics Other (See Comments)     unknown       PHYSICALEXAM  Blood pressure 113/56, pulse 97, temperature (!) 97 2 °F (36 2 °C), temperature source Tympanic, resp  rate 12, height 5' 6" (1 676 m), weight 77 4 kg (170 lb 10 2 oz), SpO2 97 %, unknown if currently breastfeeding  Body mass index is 27 54 kg/m²  General Appearance: NAD, cooperative, alert  Eyes: Anicteric, conjunctiva pink  ENT:  Normocephalic, atraumatic, normal mucosa  Neck:  Supple, symmetrical, trachea midline  Resp:  Clear to auscultation bilaterally; no rales, rhonchi or wheezing; respirations unlabored   CV:  S1 S2, Regular rate and rhythm; no murmur, rub, or gallop  GI:  Soft, non-tender, non-distended; normal bowel sounds; no masses, no organomegaly   Rectal: Deferred  Musculoskeletal: No cyanosis, clubbing or edema  Normal ROM  Skin:  No jaundice, rashes, or lesions   Heme/Lymph: No palpable cervical lymphadenopathy  Psych: Normal affect, good eye contact  Neuro: No gross deficits, AAOx3    Lab Results   Component Value Date    CALCIUM 8 7 09/23/2019    K 4 2 09/23/2019    CO2 24 09/23/2019     09/23/2019    BUN 11 09/23/2019    CREATININE 0 80 09/23/2019     Lab Results   Component Value Date    WBC 5 28 09/23/2019    HGB 11 7 09/23/2019    HCT 37 4 09/23/2019    MCV 85 09/23/2019     09/23/2019     Lab Results   Component Value Date    ALT 18 09/23/2019    AST 16 09/23/2019    ALKPHOS 77 09/23/2019     No results found for: AMYLASE  No results found for: LIPASE  Lab Results   Component Value Date    IRON 50 02/16/2016    TIBC 326 02/16/2016    FERRITIN 20 08/13/2019     No results found for: INR    Imaging Studies: I have personally reviewed pertinent reports  EKG, Pathology, and Other Studies: I have personally reviewed pertinent reports        REVIEW OF SYSTEMS:    CONSTITUTIONAL: Denies any fever, chills, rigors, and weight loss  HEENT: No earache or tinnitus  Denies hearing loss or visual disturbances  CARDIOVASCULAR: No chest pain or palpitations  RESPIRATORY: Denies any cough, hemoptysis, shortness of breath or dyspnea on exertion  GASTROINTESTINAL: As noted in the History of Present Illness  GENITOURINARY: No problems with urination  Denies any hematuria or dysuria  NEUROLOGIC: No dizziness or vertigo, denies headaches  MUSCULOSKELETAL: Denies any muscle or joint pain  SKIN:  Positive for rash  ENDOCRINE: Denies excessive thirst  Denies intolerance to heat or cold  PSYCHOSOCIAL: Denies depression or anxiety  Denies any recent memory loss

## 2019-09-23 NOTE — NUTRITION
09/23/19 0850   Assessment   Timepoint Initial   Labs   List Completed Labs   (9/23/19 Glucose 184 9/18/19 Glucose 95   8/13/19 HgbAIC 5 7 meds: MOM, Depakote, Seroquel, Haldol)   Feeding Route   PO Independent   Adequacy of Intake   Nutrition Modality PO  (Regular)   Intake Meals %  (Brk: Cream of Wheat, Banana and Hard Boiled Egg)   Estimated Calorie Intake %   Estimated Protein Intake  %   Estimated Fluid Intake %   Estimated calorie intake compared to estimated need Pt likely meeting her estimated needs  Nutrition Prognosis   Nutrition Concerns   (per chart review: bipolar, manic  1:1)   Nutrition Considerations   (diet ed: encouraged higher protein intake and limiting simple sugars )   PES Statement   Problem Clinical   Biochemical (2) Impaired nutrient utilization NC-2 1  (Glucose )   Related to Other (comment)  (? previous DM diet hx)   As evidenced by: Abnormal lab (specify)  (elevated Glucose level)   Patient Nutrition Goals   Goal glucose in range   Goal Status initiated   Timeframe to complete goal by d/c   Recommendations/Interventions   Summary Pt a difficult histortian as she hops between topics  Reports Ensure has only sugar in but denies she is getting or drinking it on floors  Reports that it hard fro her to chew fresh fruit, offered pt applesauce and pt reports that is how the staff poison her  Discused with RN, recommend daily BG levels and new HGBAIC levels  IF repeat levels are high can consider DM diet restrictions  Malnutrition/BMI Present No   Interventions Diet: continued as ordered   Nutrition Recommendations Continue diet as ordered; Other (specify)  (Repeat BG levels at lunch and dinner, check HGBAIC level   IF repeat labs high can add DM diet restrictions and monitor need for DM meds as appropriate)   Nutrition Complexity Risk   Nutrition complexity level Moderate risk   Nutrition review: 09/26/19  (HGBAIC and BG levels  )   Follow up date 09/30/19

## 2019-09-23 NOTE — PROGRESS NOTES
Pt remains on continual observation  Awake at start of shift  Pt rambling and disorganized in conversation  Pt made aware of scheduled labs  Pt stated "I'm not doing that  I told you that yesterday " When asked a reason for refusal, pt stated "because I'm constipated " Pt complains of nightmares overnight and difficulty sleeping  Appears drowsy this morning  Pt rambling about things she did over the weekend  Pt stated that she cleaned the kitchen and watched some football  When discussing medications, pt stated "I never want to take haldol and cogentin " Reviewed that these are not her scheduled medications  Pt requested a list of her medications and this was given to her  Pt dismissive of writer at that point

## 2019-09-23 NOTE — PROGRESS NOTES
Status: Pt remains focused on her ring; staff has not been able to locate  Pt is on a 1:1 while awake  Last evening she tried to run into room 253(male room) topless  Pt was yelling at staff, demanding an x-ray  Pt kissed Kvng April & Yanick's hands & needed redirection  CM inquired if family had visited & it was unclear if she came or not  Medication: Pt refused lab for depakote level check this morning  Attending will speak with her regarding need for labs    D/C: TBD     09/23/19 2578   Team Meeting   Meeting Type Daily Rounds   Team Members Present   Team Members Present Physician;;Nurse;Occupational Therapist   Physician Team Member Dr Cesilia Zavala / Katheryn Avalos Team Member NUPUR [facility] Team Member Melvi Velez / Jorge Culp   OT Team Member Luci   Patient/Family Present   Patient Present No   Patient's Family Present No

## 2019-09-23 NOTE — PROGRESS NOTES
Pt visible in milieu  Social with peers  Pt happy that she is no longer on continual observation  Pt spoke about swallowing ring last week and asking about results of xray  Pt c/o constipation  Denies any other GI complaints  Pt said she will drink prune juice at dinner  Pt said she used to wear the ring at work so that men would not flirt with her  Pt said here in the hospital she kept switching the ring back and forth between her hands and then it was just easier to swallow it  Pt then began talking about dating a co-worker  Pt denies SI/HI  Observed talking to self in hallway  Pt has shirt tied around her waist  Pt disorganized but somewhat improved this evening

## 2019-09-23 NOTE — PROGRESS NOTES
Pt has remained with poor sleep overnight  Pt had been asleep and then woke and Pt attempted to walk into Male room across [facility]  Staff intervened, 1:1 observation remains while awake  Pt offered PRN antipsychotic medications, Pt requested IM Zyprexa  Pt verbally refusing previously  Offered po Haldol/Cogentin and again verbalized desire to have IM Zyprexa  Med RN prepared injections and administered to pt  Pt is labile, now making jokes with writer, but remains delusional, "Are we going on strike?" Verbalized thinking pt's daughter was falling off a building  1:1 staff is present  Will continue to monitor  0510 - Pt has been asleep for 45min since receiving IM Zyprexa 10mg at 0400  Medication effective for increased agitation

## 2019-09-23 NOTE — NUTRITION
09/23/19 0850   Assessment   Timepoint Initial   Labs   List Completed Labs   (9/23/19 Glucose 184 9/18/19 Glucose 95   8/13/19 HgbAIC 5 7 meds: MOM, Depakote, Seroquel, Haldol)   Feeding Route   PO Independent   Adequacy of Intake   Nutrition Modality PO  (Regular)   Intake Meals %  (Brk: Cream of Wheat, Banana and Hard Boiled Egg)   Estimated Calorie Intake %   Estimated Protein Intake  %   Estimated Fluid Intake %   Estimated calorie intake compared to estimated need Pt likely meeting her estimated needs  Nutrition Prognosis   Nutrition Concerns   (per chart review: bipolar, manic  1:1)   Nutrition Considerations   (diet ed: encouraged higher protein intake and limiting simple sugars )   PES Statement   Problem Clinical   Biochemical (2) Impaired nutrient utilization NC-2 1  (Glucose )   Related to Other (comment)  (? previous DM diet hx)   As evidenced by: Abnormal lab (specify)  (elevated Glucose level)   Patient Nutrition Goals   Goal glucose in range   Goal Status initiated   Timeframe to complete goal by d/c   Recommendations/Interventions   Summary Pt a poor historian as she hops between topics  Reports she has been on metformin before, stopped 1 year ago because levels were normal  Admits had appt with [facility] and PCP but never rescheduled  Reports Ensure has only sugar in but denies she is getting or drinking it on floors  Reports that it hard for her to chew fresh fruit, but could with a knife to peel skin but she can't have that here  I offered pt applesauce and pt reports that is how the staff poison her  Offered cut up fruit and additional protein portion if pt desires  Discused with RN, recommend daily BG levels and new HGBAIC levels  IF repeat levels are high can consider DM diet restrictions  Malnutrition/BMI Present No   Interventions Diet: continued as ordered   Nutrition Recommendations Continue diet as ordered; Other (specify)  (Repeat BG levels at lunch and dinner, check HGBAIC level   IF repeat labs high can add DM diet restrictions and monitor need for DM meds as appropriate)   Nutrition Complexity Risk   Nutrition complexity level Moderate risk   Nutrition review: 09/26/19  (HGBAIC and BG levels  )   Follow up date 09/30/19

## 2019-09-23 NOTE — PROGRESS NOTES
Pt remains on 1:1 observation while awake  Pt was still awake at start of shift, did fall asleep for approximately 1 hr and woke, staff readily available for 1:1 placement  Pt up to use bathroom, attempted to induce vomiting, remains delusional, focused on a ring being swallowed  Pt redirected by MHT, back to bed and appears to fall asleep but for short intervals  Pt becomes increasingly irritable, swearing at staff  Pt has disrobed and refuses to put clothing on, does cover with sheet while in bed  When offered PRN meds pt  Asking, "are they to make me vomit  ?" This writer did check computer records since admission and checked belongings in locker, pt does not have a ring documented from time of Admission and no jewelry in locker  Pt is requesting to have x-rays completed at this time  Will pass along to physician in morning, pt irritable but accepting of this

## 2019-09-23 NOTE — PROGRESS NOTES
Progress Note - 707 [facility] 51 y o  female MRN: [id number]  Unit/Bed#: -01 Encounter: [id number]    Assessment/Plan   Active Problems:    Bipolar I disorder, most recent episode manic, severe with psychotic features ([facility] [address] )      Subjective:    Beto Onofre is a 53 y o  female with a PMH of Bipolar I Disorder who presents on 36 with signs of acute psychosis and xochitl  Patient seen at bedside  Still mildly disorganized but improvement noted  Rambling and disorganized per nurse this morning  Decreased pressured speech and less tangential  Noticeable improvement in mood symptoms but still expressing delusional thinking  Notes indicate pt continues to experience poor sleep and was seen wondering into a male pts room last night topless  Pt states this was because "I wanted to take a shower " Indicates her appetite is normal  Says that she dreamt "her daughter was jumping off the [place] " Says she received Zyprexa last night which was helpful for the nightmares and anxiety, reports no side effects  Denies auditory hallucinations this morning and is noticeably less paranoid  Denies SI/HI, intent, or plan  Pt was made aware of labs and initially was resistant with nursing staff, was agreeable to having them drawn when asked again by medical team  She is currently on 750 mg of Depakote  Told medical team that she "swallowed a silver ring" which she indicates is now giving her nausea and pain in her stomach  Also indicates that she "would like a DNA test" but was informed that this was not something that could be done while on the unit   Pt voiced understanding        Current Medications:    Current Facility-Administered Medications:  acetaminophen 325 mg Oral Q6H PRN Dinah Zuniga PA-C   acetaminophen 650 mg Oral Q6H PRN Dinah Zuniga PA-C   aluminum-magnesium hydroxide-simethicone 30 mL Oral Q4H PRN Xiomara Peraza MD   benztropine 1 mg Intramuscular Q6H PRN Xiomara Peraza MD benztropine 1 mg Oral Q6H PRN Ludwig Damian MD   divalproex sodium 750 mg Oral After Dinner Ignaciorogerio Forrester   haloperidol 5 mg Oral Q6H PRN Nicolas Ruiz, CARA   haloperidol lactate 5 mg Intramuscular Q6H PRN Nicolas Ruiz, CARA   hydrOXYzine HCL 50 mg Oral Q8H PRN Ignacio Forrester   ibuprofen 600 mg Oral Q6H PRN Nicolas Ruiz PA-C   LORazepam 2 mg Intramuscular Q8H PRN Ignacio Forrester   LORazepam 1 mg Oral Q8H PRN Ignacio Forrester   magnesium hydroxide 30 mL Oral Daily PRN Ludwig Damian MD   OLANZapine 10 mg Intramuscular Q8H PRN Nicolas Ruiz, CARA   OLANZapine 10 mg Oral Q8H PRN Nicolas Ruiz, CARA   QUEtiapine 800 mg Oral HS Ashley Juárez MD   risperiDONE 1 mg Oral Q3H PRN Nicolas Ruiz, CARA   traZODone 50 mg Oral HS PRN Ludwig Damian MD       Behavioral Health Medications: all current active meds have been reviewed  Vital signs in last 24 hours:  Temp:  [96 8 °F (36 °C)-98 5 °F (36 9 °C)] 96 8 °F (36 °C)  HR:  [] 91  Resp:  [16-17] 17  BP: (120-139)/() 120/59    Laboratory results:  I have personally reviewed all pertinent laboratory/tests results      Psychiatric Review of Systems:  Behavior over the last 24 hours:  Improving slowly  Sleep: insomnia  Appetite: normal  Medication side effects: No  ROS: nausea, constipation and stomach pain    Mental Status Evaluation:  Appearance:  older than stated age and overweight   Behavior:  psychomotor agitation   Speech:  rapid but redirectable   Mood:  anxious   Affect:  constricted, labile, mood-congruent and redirectable   Language naming objects and repeating phrases   Thought Process:  disorganized, perserverative and tangential   Thought Content:  possible delusions of swallowing ring   Perceptual Disturbances: None expressed at this time   Risk Potential: Denies SI/HI   Sensorium:  person, place, time/date and situation   Cognition:  grossly intact   Consciousness:  alert and awake    Attention: attention span appeared shorter than expected for age   Insight:  limited   Judgment: limited   Intellect    Gait/Station: Did not assess   Motor Activity: no abnormal movements     Memory: Short and long term memory: grossly intact     Progress Toward Goals: Understands need for blood work and is agreeable to having it collected to progress with medication management and future discharge planning    Recommended Treatment:   1) Obtain blood work to determine Depakote level and adjust medications accordingly if appropriate  2) Abdominal Xray ordered to r/o foreign body ingestion    Continue with group therapy, milieu therapy and occupational therapy  Continue following current medications:   Current Facility-Administered Medications:  acetaminophen 325 mg Oral Q6H PRN Cesario Caballero PA-C   acetaminophen 650 mg Oral Q6H PRN Cesario Caballero PA-C   aluminum-magnesium hydroxide-simethicone 30 mL Oral Q4H PRN Dick Parikh MD   benztropine 1 mg Intramuscular Q6H PRN Dick Parikh MD   benztropine 1 mg Oral Q6H PRN Dick Parikh MD   divalproex sodium 750 mg Oral After Dinner Ignaciorogerio Forrester   haloperidol 5 mg Oral Q6H PRN Cesario Caballero PA-C   haloperidol lactate 5 mg Intramuscular Q6H PRN Cesario Caballero PA-C   hydrOXYzine HCL 50 mg Oral Q8H PRN [facility]   ibuprofen 600 mg Oral Q6H PRN Cesario Caballero PA-C   LORazepam 2 mg Intramuscular Q8H PRN [facility]   LORazepam 1 mg Oral Q8H PRN [facility]   magnesium hydroxide 30 mL Oral Daily PRN Dick Parikh MD   OLANZapine 10 mg Intramuscular Q8H PRN Cesario Caballero PA-C   OLANZapine 10 mg Oral Q8H PRN Cesario Caballero PA-C   QUEtiapine 800 mg Oral HS Chasity Lara MD   risperiDONE 1 mg Oral Q3H PRN Cesario Caballero PA-C   traZODone 50 mg Oral HS PRN Dick Parikh MD       Risks, benefits and possible side effects of Medications:   Risks, benefits, and possible side effects of medications explained to patient and patient verbalizes understanding  This note has been constructed using a voice recognition system  There may be translation, syntax,  or grammatical errors  If you have any questions, please contact the dictating provider

## 2019-09-24 RX ORDER — OLANZAPINE 5 MG/1
5 TABLET, ORALLY DISINTEGRATING ORAL DAILY
Status: DISCONTINUED | OUTPATIENT
Start: 2019-09-24 | End: 2019-09-27

## 2019-09-24 RX ADMIN — OLANZAPINE 5 MG: 5 TABLET, ORALLY DISINTEGRATING ORAL at 12:34

## 2019-09-24 RX ADMIN — QUETIAPINE FUMARATE 800 MG: 200 TABLET ORAL at 21:03

## 2019-09-24 RX ADMIN — DIVALPROEX SODIUM 750 MG: 500 TABLET, FILM COATED, EXTENDED RELEASE ORAL at 18:38

## 2019-09-24 RX ADMIN — LORAZEPAM 1 MG: 1 TABLET ORAL at 20:37

## 2019-09-24 NOTE — PLAN OF CARE
Problem: SELF HARM/SUICIDALITY  Goal: Will have no self-injury during hospital stay  Description  INTERVENTIONS:  - Q 15 MINUTES: Routine safety checks  - Q WAKING SHIFT & PRN: Assess risk to determine if routine checks are adequate to maintain patient safety  - Encourage patient to participate actively in care by formulating a plan to combat response to suicidal ideation, identify supports and resources  Outcome: Progressing     Problem: PSYCHOSIS  Goal: Will report no hallucinations or delusions  Description  Interventions:  - Administer medication as  ordered  - Every waking shifts and PRN assess for the presence of hallucinations and or delusions  - Assist with reality testing to support increasing orientation  - Assess if patient's hallucinations or delusions are encouraging self-harm or harm to others and intervene as appropriate  Outcome: Progressing     Problem: Alteration in Thoughts and Perception  Goal: Treatment Goal: Gain control of psychotic behaviors/thinking, reduce/eliminate presenting symptoms and demonstrate improved reality functioning upon discharge  Outcome: Progressing

## 2019-09-24 NOTE — CASE MANAGEMENT
CM met with Pt who was in her room  Pt had all of her clothing on her bed & folded  Pt talking about the racist people & warfare going on  Pt reported feeling somewhat better & said that she slept overnight  Pt asking if CM knew Merrill Henderson, as CM was tall & should know him  Pt stated she does not know how long she will be here for, but knows that it will be a period of time  CM asked Pt about the voices she had initially reported she was hearing  Pt said that they were becoming more like thoughts than actually voices, which she thought was a good sign  CM asked if Pt had called her mother, & she said not yet, but that she should; she declined to sign an DENNIS & declined CM's assistance with calling  CM contacted Pt's daughter, Asim Kilpatrick, @ [phone], but the call went straight to voicemail  CM left a message requesting a return call to provide & update

## 2019-09-24 NOTE — PROGRESS NOTES
Pt sleeping well overnight however woke up during an overhead page on the unit  Pt ran out of room completely undressed and attempted to go across p[address]rez towards male peers room  Pt was redirected and returned to room to put clothes on  Pt currently remains awake  Sitting in dayroom currently  Did need some redirection due to sitting close to male peer, redirectable at this time
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
## 2019-09-24 NOTE — CASE MANAGEMENT
OLIVE received a return call from Pt's daughter, Diamond Pal, who said that Pt just seemed very confused when she visited her over the weekend  OLIVE reviewed that Pt had signed an DENNIS for her significant other, Yue Nettles, however, had not provided a phone number  Urszula provided number [phone]  She said that Yue Nettles had mentioned he visited Pt & CM asked if he had provided any feedback  She said that he is extra positive about the situation  CM asked if Pt is able to return to her apartment & Diamond Pal said that when she spoke with the management company, they had not said that she couldn't  Urszula expressed concern as to how she will pay for Pt's rent, as she doesn't have access to her accounts  CM reviewed that perhaps as Pt starts to improve they would be able to talk to her about paying her bills & assist her with contacting her bank and/or landlord  OLIVE asked if Pt had paid her September rent & Diamond Iglesiashortencia said that she thought Pt had, however, a friend who was in the apartment said she thought she saw a bill that she hadn't paid it  OLIVE reviewed results of an x-ray done yesterday that showed Pt had swallowed a ring she was wearing at admission  Urszula said that Pt had not mentioned this to her at all when she has spoken with her  CM asked if Pt's sister was helping her & she said that she wasn't & she was beginning to struggle in handling everything for Pt  She said that she did start the STD paperwork for Pt & was hopeful it would start in a few days  Usrzula said that Pt got a bill for her ambulance ride to the hospital, it was $600 & she needs to submit it to [facility]  OLIVE provided the insurance company phone number & address from the back of Pt's insurance card  CM agreed to provide on-going updates

## 2019-09-24 NOTE — PROGRESS NOTES
Status: Pt is off the 1:1   X-ray showed she did swallow her ring; it should pass & they recommended a follow-up scan only if needed  Pt's boyfriend visited, & she did very well during the visit  She ran out of her room naked this morning, but was re-directable    Still needs to be watched especially with male peers  Medication: no changes / PRN Zyprexa  D/C: TBD - maybe next week, slow improvement     09/24/19 1691   Team Meeting   Meeting Type Daily Rounds   Team Members Present   Team Members Present Physician;Nurse;;Occupational Therapist   Physician Team Member Dr Jett Long / Dr iY Guthrie / Opal Cole Team Member NUPUR [facility] Management Team Member Per Douglas / Sarai Gambino   OT Team Member Luci   Patient/Family Present   Patient Present No   Patient's Family Present No
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
## 2019-09-25 RX ADMIN — OLANZAPINE 5 MG: 5 TABLET, ORALLY DISINTEGRATING ORAL at 09:49

## 2019-09-25 RX ADMIN — LORAZEPAM 1 MG: 1 TABLET ORAL at 19:15

## 2019-09-25 RX ADMIN — HALOPERIDOL 5 MG: 5 TABLET ORAL at 17:34

## 2019-09-25 RX ADMIN — MAGNESIUM HYDROXIDE 30 ML: 400 SUSPENSION ORAL at 17:34

## 2019-09-25 RX ADMIN — QUETIAPINE FUMARATE 800 MG: 200 TABLET ORAL at 21:32

## 2019-09-25 RX ADMIN — DIVALPROEX SODIUM 750 MG: 500 TABLET, FILM COATED, EXTENDED RELEASE ORAL at 17:04
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
## 2019-09-25 NOTE — PROGRESS NOTES
Pt   Given  Ativan  `1 mg  Po  For severe  Anxiety  At 2037  Scale  26  Med  Effective  Pt    asleep
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
## 2019-09-25 NOTE — PROGRESS NOTES
Pt continues on 1:1 for impulsive behavior  Pt said that she had a clear head at the time  At times, she hears voices and sees things  RIS earlier in room  Pleasant at this time  Continue to assess  Denied SI, HI and AVH

## 2019-09-25 NOTE — PROGRESS NOTES
Medication note: pt very resistant to taking am zyprexa  Did then agree to take, but went directly to her room and RN followed, observing pt spitting pill into sink  "I really don't want to take it"  Dr Forrester aware, met with pt shortly after, and pt then did agree, med given as ordered and pt complied

## 2019-09-25 NOTE — PROGRESS NOTES
Patient woke up several times throughout the night stepping out into the hallway with no pants on  When asked to put them on she told charge nurse to 'leave me the fuck alone" as she tried pushing the door closed, into the charge nurse  Patient remains 1:1 while awake  Will continue to monitor and support
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
## 2019-09-25 NOTE — PROGRESS NOTES
Progress Note - [address] [facility] 51 y o  female MRN: [id number]  Unit/Bed#: [facility] 252-01 Encounter: [id number]    Assessment/Plan   Active Problems:    Bipolar I disorder, most recent episode manic, severe with psychotic features ([address] )    Subjective: Patient is compliant with medications but today she refused to take olanzapine and spit the morning dose  I saw patient with nursing staff  Patient is disorganized and tangential during evaluation  Patient is focused on only taking Seroquel 200 mg and Depakote 500 mg  Patient educated extensively that her manic and psychotic symptoms are not stable and no dosage adjustment will be needed  Patient also educated that olanzapine is added for faster improvement in manic symptom and once her xochitl is stable will consider dose reduction or discontinuation of olanzapine  Patient reports understanding  She was placed back on one-to-one for pulling a fire alarm and attempting to disrobe again yesterday  I had a phone discussion with Dr Lilliam De La Rosa:  He agreed with the planning and we discussed the option of considering Thorazine in place of Zyprexa in the evening time if patient is in agreement  According to Dr Lilliam De La Rosa patient takes long time in terms of months for improvement      Current Medications:    Current Facility-Administered Medications:  acetaminophen 325 mg Oral Q6H PRN Cesario Caballero PA-C   acetaminophen 650 mg Oral Q6H PRN Cesario Caballero PA-C   aluminum-magnesium hydroxide-simethicone 30 mL Oral Q4H PRN Dick Parikh MD   benztropine 1 mg Intramuscular Q6H PRN Dick Parikh MD   benztropine 1 mg Oral Q6H PRN Dick Parikh MD   divalproex sodium 750 mg Oral After Dinner Ignacio Forrester   haloperidol 5 mg Oral Q6H PRN Cesario Caballero PA-C   haloperidol lactate 5 mg Intramuscular Q6H PRN Cesario Caballero PA-C   hydrOXYzine HCL 50 mg Oral Q8H PRN Ignacio Forrester   ibuprofen 600 mg Oral Q6H PRN Cesario Caballero PA-C   LORazepam 2 mg Intramuscular Q8H PRN Ignacio Forrester   LORazepam 1 mg Oral Q8H PRN Ignacio Forrester   magnesium hydroxide 30 mL Oral Daily PRN Ashley Teixeira MD   OLANZapine 5 mg Oral Daily Ignacio Forrester   OLANZapine 10 mg Intramuscular Q8H PRN Anurag Alanis PA-C   OLANZapine 10 mg Oral Q8H PRN Anurag Alanis PA-C   QUEtiapine 800 mg Oral HS Brian Ragland MD   risperiDONE 1 mg Oral Q3H PRN Anurag Alanis PA-C   traZODone 50 mg Oral HS PRN Ashley Teixeira MD       Behavioral Health Medications: all current active meds have been reviewed  Vital signs in last 24 hours:  Temp:  [97 1 °F (36 2 °C)-97 5 °F (36 4 °C)] 97 1 °F (36 2 °C)  HR:  [90-99] 99  Resp:  [16] 16  BP: (118-135)/(59-70) 118/70    Laboratory results:    I have personally reviewed all pertinent laboratory/tests results    Labs in last 72 hours:   Recent Labs     09/23/19  0942   WBC 5 28   RBC 4 39   HGB 11 7   HCT 37 4      RDW 13 0   NEUTROABS 3 43   SODIUM 136   K 4 2      CO2 24   BUN 11   CREATININE 0 80   GLUC 184*   GLUF 184*   CALCIUM 8 7   AST 16   ALT 18   ALKPHOS 77   TP 7 2   ALB 3 5   TBILI 0 30   VALPROICTOT 80     Admission Labs:   Admission on 09/13/2019   Component Date Value    WBC 09/19/2019 5 09     RBC 09/19/2019 4 42     Hemoglobin 09/19/2019 11 7     Hematocrit 09/19/2019 37 3     MCV 09/19/2019 84     MCH 09/19/2019 26 5*    MCHC 09/19/2019 31 4     RDW 09/19/2019 12 9     MPV 09/19/2019 9 4     Platelets 89/39/5716 199     nRBC 09/19/2019 0     Neutrophils Relative 09/19/2019 64     Immat GRANS % 09/19/2019 0     Lymphocytes Relative 09/19/2019 27     Monocytes Relative 09/19/2019 6     Eosinophils Relative 09/19/2019 3     Basophils Relative 09/19/2019 0     Neutrophils Absolute 09/19/2019 3 22     Immature Grans Absolute 09/19/2019 0 01     Lymphocytes Absolute 09/19/2019 1 38     Monocytes Absolute 09/19/2019 0 31     Eosinophils Absolute 09/19/2019 0 16     Basophils Absolute 09/19/2019 0 01     Sodium 09/18/2019 141     Potassium 09/18/2019 4 3     Chloride 09/18/2019 105     CO2 09/18/2019 24     ANION GAP 09/18/2019 12     BUN 09/18/2019 11     Creatinine 09/18/2019 0 66     Glucose 09/18/2019 95     Glucose, Fasting 09/18/2019 95     Calcium 09/18/2019 9 1     AST 09/18/2019 23     ALT 09/18/2019 19     Alkaline Phosphatase 09/18/2019 74     Total Protein 09/18/2019 7 4     Albumin 09/18/2019 3 8     Total Bilirubin 09/18/2019 0 40     eGFR 09/18/2019 106     Valproic Acid, Total 09/18/2019 42*    WBC 09/23/2019 5 28     RBC 09/23/2019 4 39     Hemoglobin 09/23/2019 11 7     Hematocrit 09/23/2019 37 4     MCV 09/23/2019 85     MCH 09/23/2019 26 7*    MCHC 09/23/2019 31 3*    RDW 09/23/2019 13 0     MPV 09/23/2019 9 6     Platelets 93/06/4653 172     nRBC 09/23/2019 0     Neutrophils Relative 09/23/2019 65     Immat GRANS % 09/23/2019 0     Lymphocytes Relative 09/23/2019 24     Monocytes Relative 09/23/2019 6     Eosinophils Relative 09/23/2019 4     Basophils Relative 09/23/2019 1     Neutrophils Absolute 09/23/2019 3 43     Immature Grans Absolute 09/23/2019 0 02     Lymphocytes Absolute 09/23/2019 1 26     Monocytes Absolute 09/23/2019 0 33     Eosinophils Absolute 09/23/2019 0 21     Basophils Absolute 09/23/2019 0 03     Sodium 09/23/2019 136     Potassium 09/23/2019 4 2     Chloride 09/23/2019 102     CO2 09/23/2019 24     ANION GAP 09/23/2019 10     BUN 09/23/2019 11     Creatinine 09/23/2019 0 80     Glucose 09/23/2019 184*    Glucose, Fasting 09/23/2019 184*    Calcium 09/23/2019 8 7     AST 09/23/2019 16     ALT 09/23/2019 18     Alkaline Phosphatase 09/23/2019 77     Total Protein 09/23/2019 7 2     Albumin 09/23/2019 3 5     Total Bilirubin 09/23/2019 0 30     eGFR 09/23/2019 87     Valproic Acid, Total 09/23/2019 80        Psychiatric Review of Systems:  Behavior over the last 24 hours:  unchanged  Sleep: normal  Appetite: normal  Medication side effects: No  ROS: no complaints    Mental Status Evaluation:  Appearance:  casually dressed   Behavior:  guarded   Speech:  loud   Mood:  anxious   Affect:  increased in range   Language rapid   Thought Process:  circumstantial and disorganized   Thought Content:  delusions  grandiose and persecutory   Perceptual Disturbances: internally preoccupied   Risk Potential: Suicidal Ideations without plan, Homicidal Ideations none and Potential for Aggression No   Sensorium:  person and place   Cognition:  grossly intact   Consciousness:  awake    Attention: attention span appeared shorter than expected for age   Insight:  limited   Judgment: limited   Intellect limited   Gait/Station: normal gait/station   Motor Activity: no abnormal movements     Memory: Short and long term memory  fair     Progress Toward Goals: slow progress    Recommended Treatment:   Consider switching olanzapine to thorazine after dinner for psychosis management  Continue with group therapy, milieu therapy and occupational therapy      Continue following current medications:   Current Facility-Administered Medications:  acetaminophen 325 mg Oral Q6H PRN Cesario Caballero PA-C   acetaminophen 650 mg Oral Q6H PRN Cesario Caballero PA-C   aluminum-magnesium hydroxide-simethicone 30 mL Oral Q4H PRN Dick Parikh MD   benztropine 1 mg Intramuscular Q6H PRN Dick Parikh MD   benztropine 1 mg Oral Q6H PRN Dick Parikh MD   divalproex sodium 750 mg Oral After Dinner Igancio Forrester   haloperidol 5 mg Oral Q6H PRN Cesario Caballero PA-C   haloperidol lactate 5 mg Intramuscular Q6H PRN Cesario Caballero PA-C   hydrOXYzine HCL 50 mg Oral Q8H PRN Ignacio Forrester   ibuprofen 600 mg Oral Q6H PRN Cesario Caballero PA-C   LORazepam 2 mg Intramuscular Q8H PRN Ignacio Forrester   LORazepam 1 mg Oral Q8H PRN Ignacio Forrester   magnesium hydroxide 30 mL Oral Daily PRN Dick Parikh MD   OLANZapine 5 mg Oral Daily [facility] OLANZapine 10 mg Intramuscular Q8H PRN Ruthanna Linker, PA-C   OLANZapine 10 mg Oral Q8H PRN Ruthanna Linker, PA-C   QUEtiapine 800 mg Oral HS Mere Celis MD   risperiDONE 1 mg Oral Q3H PRN Ruthanna Linker, PA-C   traZODone 50 mg Oral HS PRN Vickey Coffey MD       Risks, benefits and possible side effects of Medications:   Risks, benefits, and possible side effects of medications explained to patient and patient verbalizes understanding  Risks of medications in pregnancy explained if female patient  Patient verbalizes understanding and agrees to notify her doctor if she becomes pregnant  This note has been constructed using a voice recognition system  There may be translation, syntax,  or grammatical errors  If you have any questions, please contact the dictating provider

## 2019-09-25 NOTE — PROGRESS NOTES
Pt pulled fire alarm at 2030  Pt escorted to her room  Writer called [employer] to get order for continual observation at 2043  Awaiting call back from [employer] at this time

## 2019-09-26 PROCEDURE — 99231 SBSQ HOSP IP/OBS SF/LOW 25: CPT | Performed by: PSYCHIATRY & NEUROLOGY

## 2019-09-26 RX ADMIN — QUETIAPINE FUMARATE 800 MG: 200 TABLET ORAL at 21:45

## 2019-09-26 RX ADMIN — OLANZAPINE 5 MG: 5 TABLET, ORALLY DISINTEGRATING ORAL at 09:47

## 2019-09-26 RX ADMIN — DIVALPROEX SODIUM 750 MG: 500 TABLET, FILM COATED, EXTENDED RELEASE ORAL at 17:33

## 2019-09-26 NOTE — PROGRESS NOTES
Progress Note - [address] [facility] 51 y o  female MRN: [id number]  Unit/Bed#: [facility] 252-01 Encounter: [id number]    Assessment/Plan   Active Problems:    Bipolar I disorder, most recent episode manic, severe with psychotic features ([facility] [address] )      Subjective:  Patient was seen at bedside  She is still talking about spirits entering and leaving her room via the doors and windows at night  States that people take her children away and make her work    States that people walked in to the behavioral unit at [address] [place] [address] with guns    When asked why she was not previously taking her Zyprexa she says none of these patients in here seem sick to me  We are not at a [employer]    Further reports that she is going to earn her PhD in Psychiatry by learning from a person she states is her brother and friend but also her cousin    Expresses that she is still having auditory and visual hallucinations but that the auditory hallucinations improved when she uses her ear plugs  Patient received Ativan 1 mg last night with reported good effect at 9:00 p m  Was given Haldol 5 mg for agitation  When patient attended groups  Nursing notes indicate that she was paranoid with staff and talking to herself  Patient was cheeking and spitting Zyprexa into the sink yesterday but appears to of taking her Zyprexa this morning  Dr Amee Smith had recommended she be switched to Thorazine  Patient did not want to remove her contact lenses but staff was able to accomplish this today  Indicates that she is having dry mouth and hoarseness  Patient slept well last night and indicates she is also eating well  She is still fixated on patient across the room from her and this interviewer has significant concerns about her interactions with this individual   Will keep 1:1 at this time as there does not appear to be any improvement in her condition at this time      Current Medications:    Current Facility-Administered Medications:  acetaminophen 325 mg Oral Q6H PRN Margarita Meza, PA-C   acetaminophen 650 mg Oral Q6H PRN Margarita Meza, CARA   aluminum-magnesium hydroxide-simethicone 30 mL Oral Q4H PRN Michael Goodman MD   benztropine 1 mg Intramuscular Q6H PRN Michael Goodman MD   benztropine 1 mg Oral Q6H PRN Michael Goodman MD   divalproex sodium 750 mg Oral After Dinner [facility]   haloperidol 5 mg Oral Q6H PRN Margarita Meza, PA-RAISSA   haloperidol lactate 5 mg Intramuscular Q6H PRN Margarita Meza, PA-C   hydrOXYzine HCL 50 mg Oral Q8H PRN [facility]   ibuprofen 600 mg Oral Q6H PRN Margarita Meza PA-C   LORazepam 2 mg Intramuscular Q8H PRN [facility]   LORazepam 1 mg Oral Q8H PRN [facility]   magnesium hydroxide 30 mL Oral Daily PRN Michael Goodman MD   OLANZapine 5 mg Oral Daily [facility]   OLANZapine 10 mg Intramuscular Q8H PRN Margarita Meza, PA-C   OLANZapine 10 mg Oral Q8H PRN Margarita Meza, PA-C   QUEtiapine 800 mg Oral HS Argenis Linares MD   risperiDONE 1 mg Oral Q3H PRN Margarita Meza, CARA   traZODone 50 mg Oral HS PRN Michael Goodman MD       Behavioral Health Medications: all current active meds have been reviewed  Vital signs in last 24 hours:  Temp:  [97 2 °F (36 2 °C)] 97 2 °F (36 2 °C)  HR:  [98] 98  Resp:  [17] 17  BP: (128)/(92) 128/92    Laboratory results:  I have personally reviewed all pertinent laboratory/tests results  Psychiatric Review of Systems:  Behavior over the last 24 hours:  unchanged  Sleep:  Improved  Appetite: normal  Medication side effects:  Possibly  ROS: Hoarseness and dry mouth    Mental Status Evaluation:  Appearance:  disheveled, older than stated age and overweight   Behavior:  guarded and psychomotor agitation   Speech:  Rapid but redirectable   Mood:  I do not need to be here     Affect:  inappropriate, labile, mood-incongruent and redirectable   Language naming objects and repeating phrases   Thought Process:  disorganized, illogical, perserverative, tangential and Derailment   Thought Content:  delusions  grandiose and persecutory   Perceptual Disturbances: Auditory hallucinations without commands and Visual hallucinations   Risk Potential: No suicidal or homicidal ideations at this time  But is responding to internal stimuli  Sensorium:  person, place, time/date and situation   Cognition:  impaired due to Courtney   Consciousness:  alert and awake    Attention: attention span and concentration were age appropriate   Insight:  limited   Judgment: limited   Intellect    Gait/Station: normal gait/station   Motor Activity: no abnormal movements     Memory: Short and long term memory:  Grossly intact     Progress Toward Goals:  Patient does not seem to be improving at this time and still requires observation at this time due to concerns of her wanting to pull fire alarm and concerns for another patient's well-being, privacy, and therapeutic environment  Dr Puneet Mayberry at [facility] recommended starting patient on Thorazine  Patient seems to adamantly not want to take Zyprexa but did take her dose this morning  Will discuss further psychiatric medication adjustment tomorrow if she does not take medications again  Recommended Treatment:     Continue with group therapy, milieu therapy and occupational therapy      Continue following current medications:   Current Facility-Administered Medications:  acetaminophen 325 mg Oral Q6H PRN Brianna Mcdermott PA-C   acetaminophen 650 mg Oral Q6H PRN Brianna Mcdermott PA-C   aluminum-magnesium hydroxide-simethicone 30 mL Oral Q4H PRN Thalia Browne MD   benztropine 1 mg Intramuscular Q6H PRN Thalia Browne MD   benztropine 1 mg Oral Q6H PRN Thalia Browne MD   divalproex sodium 750 mg Oral After Dinner Ignacio Forrester   haloperidol 5 mg Oral Q6H PRN Brianna Mcdermott PA-C   haloperidol lactate 5 mg Intramuscular Q6H PRN Brianna Mcdermott PA-C   hydrOXYzine HCL 50 mg Oral Q8H PRN Deepa Kemp ibuprofen 600 mg Oral Q6H PRN Cesario Caballero PA-C   LORazepam 2 mg Intramuscular Q8H PRN Ignacio Forrester   LORazepam 1 mg Oral Q8H PRN Ignacio Forrester   magnesium hydroxide 30 mL Oral Daily PRN Dick Parikh MD   OLANZapine 5 mg Oral Daily Ignacio Forrester   OLANZapine 10 mg Intramuscular Q8H PRN Cesario Caballero PA-C   OLANZapine 10 mg Oral Q8H PRN Cesario Caballero PA-C   QUEtiapine 800 mg Oral HS Chasity Lara MD   risperiDONE 1 mg Oral Q3H PRN Cesario Caballero PA-C   traZODone 50 mg Oral HS PRN Dick Parikh MD       Risks, benefits and possible side effects of Medications:   Risks, benefits, and possible side effects of medications explained to patient and patient verbalizes understanding  This note has been constructed using a voice recognition system  There may be translation, syntax,  or grammatical errors  If you have any questions, please contact the dictating provider

## 2019-09-26 NOTE — PROGRESS NOTES
Pt on one to one observation  Showered this am   Denies SI and hallucinations  Reported feeling confused and having racing thoughts  Stated pulling fire alarm previous evening because a male patient was upset with staff  Discussed with patient the importance to speak with staff if upset and not to pull fire alarm  Pt also expressed she was not happy with her daughter planning to get

## 2019-09-26 NOTE — PROGRESS NOTES
Status: Pt is on a 1:1 while awake  Pt is paranoid, that staff is talking about her  Pt frequently talking to herself  She tried to cheek Zyperexa yesterday morning; she spit it in the sink  Pt slept overnight & is still currently sleeping; please do not wake her  Very irritable when asked to take her contacts out; she was putting her glasses on & saying she couldn't see bc she still had her contacts in     Medication: PRN haldol, ativan,   D/C: TBD     09/26/19 9895   Team Meeting   Meeting Type Daily Rounds   Team Members Present   Team Members Present Physician;Nurse;;Occupational Therapist   Physician Team Member Aleyda Madrigal   Nursing Team Member NUPUR [facility] Management Team Member Jerome Orellana / Lulu Holt   OT Team Member Luci   Patient/Family Present   Patient Present No   Patient's Family Present No

## 2019-09-26 NOTE — PROGRESS NOTES
Pt maintained on continual observation for safety  Pt restless and wandering  Pt labile, irritable at times, then observed laughing with peers  Pt talking to self frequently  Pt admits she is having AH of her daughter talking to her  Pt disorganized and rambling  Increased irritable earlier today but less irritable as day went on  Pt attended groups throughout day with disorganized participation  Pt restless and frequently left groups  In and out of patient lounge this evening  Playing games with peers at times

## 2019-09-26 NOTE — PROGRESS NOTES
Was given Haldol 5 mg po prn moderate agitation (Agitated  Behavior Scale =30) along with MOM 30 ml's as states, "The ring is still there/constipation  Mild effect only within the hr  By 7680, was throwing items in her room; cursing & blasting radio  Difficult to redirect  Ativan 1 mg po prn Severe anxiety (Crocker=20) given at at 1915, with good effect, as evidenced by ability to attend group, without being intrusive & displays calmer affect  Will continue to monitor

## 2019-09-26 NOTE — PROGRESS NOTES
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

## 2019-09-27 PROCEDURE — 99231 SBSQ HOSP IP/OBS SF/LOW 25: CPT | Performed by: PSYCHIATRY & NEUROLOGY

## 2019-09-27 RX ORDER — OLANZAPINE 5 MG/1
5 TABLET, ORALLY DISINTEGRATING ORAL
Status: DISCONTINUED | OUTPATIENT
Start: 2019-09-27 | End: 2019-09-28

## 2019-09-27 RX ADMIN — HYDROXYZINE HYDROCHLORIDE 50 MG: 50 TABLET, FILM COATED ORAL at 16:33

## 2019-09-27 RX ADMIN — OLANZAPINE 5 MG: 5 TABLET, ORALLY DISINTEGRATING ORAL at 17:32

## 2019-09-27 RX ADMIN — DIVALPROEX SODIUM 750 MG: 500 TABLET, FILM COATED, EXTENDED RELEASE ORAL at 17:32

## 2019-09-27 RX ADMIN — MAGNESIUM HYDROXIDE 30 ML: 400 SUSPENSION ORAL at 17:34

## 2019-09-27 RX ADMIN — OLANZAPINE 5 MG: 5 TABLET, ORALLY DISINTEGRATING ORAL at 09:31

## 2019-09-27 RX ADMIN — QUETIAPINE FUMARATE 800 MG: 200 TABLET ORAL at 21:02

## 2019-09-27 NOTE — PROGRESS NOTES
Progress Note - [facility] 51 y o  female MRN: [id number]  Unit/Bed#: U 252-01 Encounter: [id number]    Assessment/Plan   Active Problems:    Bipolar I disorder, most recent episode manic, severe with psychotic features ([address] )      Subjective:  Patient seen at bedside  Patient noticeably less disorganized, labile, and tangental this morning  Currently denying auditory and visual hallucinations  Still expressing some delusional thinking of study in cults but far fewer delusions endorsed  Notes since yesterday indicate that the patient was disorganized last night, mumbling, and irritable at times  Patient seems to be waxing and waning in terms of psychosis  Seems to be very linear in her thought process during this interview with gained insight into her condition  She is now agreeable to being on Zyprexa as she was not supportive of the idea of switching to Thorazine  States that she believes her condition has improved since taking the Zyprexa  She is now off her 1:1 after voicing understanding about allowing other patients on the unit to maintain her privacy and not pulling the fire alarm  States that she is eating and sleeping well with only minor interruptions  She has not been experiencing any side effects to her medication other than mild drowsiness  Indicates that she is still having some racing thoughts but believes her xochitl is improving  She does not appear to be responding to internal stimuli at this time  When asked about the spirits she was dealing with last night she states that this might have just been my imagination        Current Medications:    Current Facility-Administered Medications:  acetaminophen 325 mg Oral Q6H PRN Dinah CARA Zuniga   acetaminophen 650 mg Oral Q6H PRN Dinah CARA Zuniga   aluminum-magnesium hydroxide-simethicone 30 mL Oral Q4H PRN Xiomara Peraza MD   benztropine 1 mg Intramuscular Q6H PRN Xiomara Peraza MD   benztropine 1 mg Oral Q6H PRN Jonathan MD Mary   divalproex sodium 750 mg Oral After Dinner [facility]   haloperidol 5 mg Oral Q6H PRN Chano Block, PA-C   haloperidol lactate 5 mg Intramuscular Q6H PRN Chano Block, PA-C   hydrOXYzine HCL 50 mg Oral Q8H PRN [facility]   ibuprofen 600 mg Oral Q6H PRN Chano Block, PA-C   LORazepam 2 mg Intramuscular Q8H PRN [facility]   LORazepam 1 mg Oral Q8H PRN [facility]   magnesium hydroxide 30 mL Oral Daily PRN Jonathan MD Mary   OLANZapine 5 mg Oral After Dinner [facility]   OLANZapine 10 mg Intramuscular Q8H PRN Chano Block, PA-C   OLANZapine 10 mg Oral Q8H PRN Chano Block, PA-C   QUEtiapine 800 mg Oral HS Mireya Houston MD   risperiDONE 1 mg Oral Q3H PRN Chano Block, PA-RAISSA   traZODone 50 mg Oral HS PRN Jonathan MD Mary       Behavioral Health Medications: all current active meds have been reviewed  Vital signs in last 24 hours:  Temp:  [97 9 °F (36 6 °C)-98 5 °F (36 9 °C)] 97 9 °F (36 6 °C)  HR:  [] 92  Resp:  [17] 17  BP: (106-125)/(78-84) 106/84    Laboratory results:  I have personally reviewed all pertinent laboratory/tests results  Psychiatric Review of Systems:  Behavior over the last 24 hours:  improved  Sleep:  Improved  Appetite: normal  Medication side effects: Yes, mild drowsiness  ROS: no complaints    Mental Status Evaluation:  Appearance:  disheveled, older than stated age and overweight   Behavior:  psychomotor agitation and Cooperative   Speech:  normal pitch and normal volume   Mood:  Better     Affect:  mood-congruent and redirectable   Language naming objects and repeating phrases   Thought Process:  disorganized and illogical   Thought Content:  delusions  grandiose   Perceptual Disturbances: Waxing and waning AVH   Risk Potential: No suicidal or homicidal ideations at this time   Sensorium:  person, place, time/date and situation   Cognition:  impaired due to Courtney  Though improved today  Consciousness:  alert and awake    Attention: attention span and concentration were age appropriate   Insight:  fair   Judgment: fair   Intellect    Gait/Station: normal gait/station   Motor Activity: no abnormal movements     Memory: Short and long term memory:  Grossly intact     Progress Toward Goals:  Patient appears to be making significant improvements  Her xochitl seems to be appreciably reduced today along with her psychosis which appear to be correlated  She has a greater number of coherent and linear thoughts then disorganized and tangential thoughts  She is agreeable to medication treatments at this time and endorses greater insight into her condition  Her symptoms have been waxing and waning with slow improvement  Participating groups and appears to be social while on unit  Will continue to monitor with plan to discharge next week as appropriate  Recommended Treatment:     Continue with group therapy, milieu therapy and occupational therapy      Continue following current medications:   Current Facility-Administered Medications:  acetaminophen 325 mg Oral Q6H PRN Margarita Meza PA-C   acetaminophen 650 mg Oral Q6H PRN Margarita Meza PA-C   aluminum-magnesium hydroxide-simethicone 30 mL Oral Q4H PRN Michael Goodman MD   benztropine 1 mg Intramuscular Q6H PRN Michael Goodman MD   benztropine 1 mg Oral Q6H PRN Michael Goodman MD   divalproex sodium 750 mg Oral After Dinner Ignacio Forrester   haloperidol 5 mg Oral Q6H PRN Margarita Meza PA-C   haloperidol lactate 5 mg Intramuscular Q6H PRN Margarita Meza PA-C   hydrOXYzine HCL 50 mg Oral Q8H PRN Ignacio Forrester   ibuprofen 600 mg Oral Q6H PRN Margarita Meza PA-C   LORazepam 2 mg Intramuscular Q8H PRN Ignacio Forrester   LORazepam 1 mg Oral Q8H PRN Ignacio Forrester   magnesium hydroxide 30 mL Oral Daily PRN Michael Goodman MD   OLANZapine 5 mg Oral After Dinner Ignacio Forrester   OLANZapine 10 mg Intramuscular Q8H PRN Margarita Meza PA-C OLANZapine 10 mg Oral Q8H PRN Rosi Staley PAGabbyC   QUEtiapine 800 mg Oral HS Funmi Cosby MD   risperiDONE 1 mg Oral Q3H PRN Rosi Staley PA-C   traZODone 50 mg Oral HS PRN Vesna Murray MD       Risks, benefits and possible side effects of Medications:   Risks, benefits, and possible side effects of medications explained to patient and patient verbalizes understanding  This note has been constructed using a voice recognition system  There may be translation, syntax,  or grammatical errors  If you have any questions, please contact the dictating provider

## 2019-09-27 NOTE — PROGRESS NOTES
Pt remains on continual observation  Pt in patient lounge at start of shift  Disheveled appearance  Mumbled speech  Pt states she is ready to be off 1:1 observation  Discussed behavior expectations  Pt stated that she will not touch peers and will follow the rules  Spoke with pt about seriousness of pulling fire alarm  Pt also states she will not do this  Pt does not appear to be RIS this AM  Less disorganized in conversation at this time

## 2019-09-27 NOTE — PROGRESS NOTES
Pt less tangential during conversation  Reports feeling anxious and is trying to use coping skills  Pt requested and received PRN Atarax  Currently attending dinner  Pt is social with peers, still disorganized but improved  Will continue to monitor and support

## 2019-09-27 NOTE — PROGRESS NOTES
Status: Pt remains on a 1:1 while awake  Pleasant in evening  Pt reporting A/H of her daughter, & RIS  Pt slept through the evening, up around 4:30 AM, but was able to be redirected back to sleep  She was able to verbalize this morning what she needs to do to get off the 1:1  Will d/c 1:1 today    Medication: increased Zyprexa today vs  Thorazine(recommended by Dr Lilliam De La Rosa)  D/C: TBD     09/27/19 0729   Team Meeting   Meeting Type Daily Rounds   Team Members Present   Team Members Present Physician;Nurse;;Occupational Therapist   Physician Team Member Dr Bel Garza / Dr Juanjo Estes / Dayanna Healy Team Member NUPUR [facility] Management Team Member Maia Miller / Amado Harley   OT Team Member Luci   Patient/Family Present   Patient Present No   Patient's Family Present No

## 2019-09-27 NOTE — PLAN OF CARE
Problem: SELF HARM/SUICIDALITY  Goal: Will have no self-injury during hospital stay  Description  INTERVENTIONS:  - Q 15 MINUTES: Routine safety checks  - Q WAKING SHIFT & PRN: Assess risk to determine if routine checks are adequate to maintain patient safety  - Encourage patient to participate actively in care by formulating a plan to combat response to suicidal ideation, identify supports and resources  Outcome: Progressing     Problem: PSYCHOSIS  Goal: Will report no hallucinations or delusions  Description  Interventions:  - Administer medication as  ordered  - Every waking shifts and PRN assess for the presence of hallucinations and or delusions  - Assist with reality testing to support increasing orientation  - Assess if patient's hallucinations or delusions are encouraging self-harm or harm to others and intervene as appropriate  Outcome: Progressing     Problem: INVOLUNTARY ADMIT  Goal: Will cooperate with staff recommendations and doctor's orders and will demonstrate appropriate behavior  Description  INTERVENTIONS:  - Treat underlying conditions and offer medication as ordered  - Educate regarding involuntary admission procedures and rules  - Utilize positive consistent limit setting strategies to support patient and staff safety  Outcome: Progressing     Problem: Alteration in Thoughts and Perception  Goal: Treatment Goal: Gain control of psychotic behaviors/thinking, reduce/eliminate presenting symptoms and demonstrate improved reality functioning upon discharge  Outcome: Progressing  Goal: Refrain from acting on delusional thinking/internal stimuli  Description  Interventions:  - Monitor patient closely, per order   - Utilize least restrictive measures   - Set reasonable limits, give positive feedback for acceptable   - Administer medications as ordered and monitor of potential side effects  Outcome: Progressing  Goal: Recognize dysfunctional thoughts, communicate reality-based thoughts at the time of discharge  Description  Interventions:  - Provide medication and psycho-education to assist patient in compliance and developing insight into his/her illness   Outcome: Progressing  Goal: Complete daily ADLs, including personal hygiene independently, as able  Description  Interventions:  - Observe, teach, and assist patient with ADLS  - Monitor and promote a balance of rest/activity, with adequate nutrition and elimination   Outcome: Progressing     Problem: Ineffective Coping  Goal: Participates in unit activities  Description  Interventions:  - Provide therapeutic environment   - Provide required programming   - Redirect inappropriate behaviors   Outcome: Progressing     Problem: Nutrition/Hydration-ADULT  Goal: Nutrient/Hydration intake appropriate for improving, restoring or maintaining nutritional needs  Description  Monitor and assess patient's nutrition/hydration status for malnutrition  Collaborate with interdisciplinary team and initiate plan and interventions as ordered  Monitor patient's weight and dietary intake as ordered or per policy  Utilize nutrition screening tool and intervene as necessary  Determine patient's food preferences and provide high-protein, high-caloric foods as appropriate       INTERVENTIONS:  - Monitor oral intake, urinary output, labs, and treatment plans  - Assess nutrition and hydration status and recommend course of action  - Evaluate amount of meals eaten  - Assist patient with eating if necessary   - Allow adequate time for meals  - Recommend/ encourage appropriate diets, oral nutritional supplements, and vitamin/mineral supplements  - Order, calculate, and assess calorie counts as needed  - Recommend, monitor, and adjust tube feedings and TPN/PPN based on assessed needs  - Assess need for intravenous fluids  - Provide specific nutrition/hydration education as appropriate  - Include patient/family/caregiver in decisions related to nutrition  Outcome: Progressing

## 2019-09-27 NOTE — CASE MANAGEMENT
Pt approached CM requesting to make 3-way-calls with her  CM agreed & Pt asked to call her mom first   Pt initially tried to dial the phone, but the number she was dialing was incorrect, & she still was not able to dial correctly with CM reading the number to her  Pt was finally able to get through to her mom & asked how she was doing  Pt informing her mom she is still locked up & that she is concerned about Eulogio  Pt then stating she is hearing Urszula(her daughter) yelling help  Pt asking if her mom thought her father was really dead, or if he was here kidnapping [place]  Pt's mom assured her that her dad was in [address] was fine  Pt continued to say she was hearing [place] yell & she tld her mom goodbye & left the room to go call Urszula from the patient phones

## 2019-09-27 NOTE — PROGRESS NOTES
Patient slept throughout the night waking up early this morning needing 1:1  She was able to return to sleep within ten minutes  Will continue to monitor  Patient awake observed coming out of her room wearing only a T-shirt    She was redirected back to her room and is currently with staff 1:1

## 2019-09-28 RX ORDER — OLANZAPINE 10 MG/1
10 TABLET, ORALLY DISINTEGRATING ORAL
Status: DISCONTINUED | OUTPATIENT
Start: 2019-09-28 | End: 2019-09-29

## 2019-09-28 RX ADMIN — DIVALPROEX SODIUM 750 MG: 500 TABLET, FILM COATED, EXTENDED RELEASE ORAL at 17:36

## 2019-09-28 RX ADMIN — QUETIAPINE FUMARATE 800 MG: 200 TABLET ORAL at 21:15

## 2019-09-28 RX ADMIN — MAGNESIUM HYDROXIDE 30 ML: 400 SUSPENSION ORAL at 13:02

## 2019-09-28 RX ADMIN — OLANZAPINE 10 MG: 10 TABLET, ORALLY DISINTEGRATING ORAL at 17:37

## 2019-09-28 NOTE — PROGRESS NOTES
Pt pleasant and polite this AM  Reports sleeping well last night  Pt says she is feeling better today  Pt less disorganized  Pt denies any concerns at this time

## 2019-09-28 NOTE — PROGRESS NOTES
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

## 2019-09-28 NOTE — PROGRESS NOTES
Patient was observed at the beginning of night shift going into other patients room and laying in the empty bed  She was redirected back to her room  She continued to go in and out of her room walking the pérez  She did sleep from midnight until 0515 then went into the dayroom for short periods of time only to return to her room  She came out at one point and asked if a fire alarm had gone off and was concerned that we were not evacuating  This writer reoriented her to where she was and she responded appropriately  Will continue to monitor and give support

## 2019-09-28 NOTE — PROGRESS NOTES
Med note: Pt requested and received PRN milk-of-mag for constipation  Pt will notify staff of medication's effectiveness

## 2019-09-28 NOTE — PROGRESS NOTES
Daily rounds  Continual observation d/c'd during the day yesterday  Pt did well during the day/evening  Less disorganized  Pt received prn for anxiety in the evening  Per shift report, pt wandering at night, wandered into male peer's bedroom  Plan to increase zyprexa today

## 2019-09-28 NOTE — PROGRESS NOTES
Progress Note - [facility] 51 y o  female MRN: [id number]  Unit/Bed#: [facility] 252-01 Encounter: [id number]    Assessment/Plan   Active Problems:    Bipolar I disorder, most recent episode manic, severe with psychotic features ([address] )    Subjective:  Patient is compliant with medication with no acute side effects  She is compliant with addition of Zyprexa with no acute side effects reported  Patient is not showing any behavior of aggression or agitation but is showing slow improvement in disorganized thought process  Patient talked in length about her grandiose delusions of being with of patient on the unit  She also talked about her plan of making a shelter for people in [facility] and changing [facility]  Patient with poor insight regarding the statements and smiled when pointed  Limit setting was done for entire evaluation in terms of not acting on these impulses and how this can impact her functioning  Patient is showing slow improvement in insight and is currently consenting for safety on the unit      Current Medications:    Current Facility-Administered Medications:  acetaminophen 325 mg Oral Q6H PRN Brandin Levo, PA-C   acetaminophen 650 mg Oral Q6H PRN Brandin Levo, PA-C   aluminum-magnesium hydroxide-simethicone 30 mL Oral Q4H PRN Radha Ryan MD   benztropine 1 mg Intramuscular Q6H PRN Radha Ryan MD   benztropine 1 mg Oral Q6H PRN Radha Ryan MD   divalproex sodium 750 mg Oral After Dinner Ignacio Forrester   haloperidol 5 mg Oral Q6H PRN Brandin Levo, PA-C   haloperidol lactate 5 mg Intramuscular Q6H PRN Brandin Levo, PA-C   hydrOXYzine HCL 50 mg Oral Q8H PRN Ignacio Forrester   ibuprofen 600 mg Oral Q6H PRN Brandin Levo, PA-C   LORazepam 2 mg Intramuscular Q8H PRN Ignacio Forrester   LORazepam 1 mg Oral Q8H PRN Ignacio Forrester   magnesium hydroxide 30 mL Oral Daily PRN Radha Ryan MD   OLANZapine 10 mg Oral After Oddis Malady Ignacio Forrester   OLANZapine 10 mg Intramuscular Q8H PRN Margarita Meza PA-C   OLANZapine 10 mg Oral Q8H PRN Margarita Meza PA-C   QUEtiapine 800 mg Oral HS Argenis Linares MD   risperiDONE 1 mg Oral Q3H PRN Margarita Meza PA-C   traZODone 50 mg Oral HS PRN Michael Goodman MD       Behavioral Health Medications: all current active meds have been reviewed  Vital signs in last 24 hours:  Temp:  [96 1 °F (35 6 °C)-98 °F (36 7 °C)] 96 1 °F (35 6 °C)  HR:  [92-96] 92  Resp:  [16-20] 20  BP: (109-115)/(44-91) 115/91    Laboratory results:    I have personally reviewed all pertinent laboratory/tests results  Labs in last 72 hours: No results for input(s): WBC, RBC, HGB, HCT, PLT, RDW, NEUTROABS, SODIUM, K, CL, CO2, BUN, CREATININE, GLUCOSE, GLUC, GLUF, CALCIUM, AST, ALT, ALKPHOS, TP, ALB, TBILI, CHOLESTEROL, HDL, TRIG, LDLCALC, VALPROICTOT, CARBAMAZEPIN, LITHIUM, AMMONIA, [id number], FREET4, T3FREE, PREGTESTUR, PREGSERUM, HCG, HCGQUANT, RPR in the last 72 hours      Invalid input(s):  RBC  Admission Labs:   Admission on 09/13/2019   Component Date Value    WBC 09/19/2019 5 09     RBC 09/19/2019 4 42     Hemoglobin 09/19/2019 11 7     Hematocrit 09/19/2019 37 3     MCV 09/19/2019 84     MCH 09/19/2019 26 5*    MCHC 09/19/2019 31 4     RDW 09/19/2019 12 9     MPV 09/19/2019 9 4     Platelets 75/02/5729 199     nRBC 09/19/2019 0     Neutrophils Relative 09/19/2019 64     Immat GRANS % 09/19/2019 0     Lymphocytes Relative 09/19/2019 27     Monocytes Relative 09/19/2019 6     Eosinophils Relative 09/19/2019 3     Basophils Relative 09/19/2019 0     Neutrophils Absolute 09/19/2019 3 22     Immature Grans Absolute 09/19/2019 0 01     Lymphocytes Absolute 09/19/2019 1 38     Monocytes Absolute 09/19/2019 0 31     Eosinophils Absolute 09/19/2019 0 16     Basophils Absolute 09/19/2019 0 01     Sodium 09/18/2019 141     Potassium 09/18/2019 4 3     Chloride 09/18/2019 105     CO2 09/18/2019 24     ANION GAP 09/18/2019 12     BUN 09/18/2019 11     Creatinine 09/18/2019 0 66     Glucose 09/18/2019 95     Glucose, Fasting 09/18/2019 95     Calcium 09/18/2019 9 1     AST 09/18/2019 23     ALT 09/18/2019 19     Alkaline Phosphatase 09/18/2019 74     Total Protein 09/18/2019 7 4     Albumin 09/18/2019 3 8     Total Bilirubin 09/18/2019 0 40     eGFR 09/18/2019 106     Valproic Acid, Total 09/18/2019 42*    WBC 09/23/2019 5 28     RBC 09/23/2019 4 39     Hemoglobin 09/23/2019 11 7     Hematocrit 09/23/2019 37 4     MCV 09/23/2019 85     MCH 09/23/2019 26 7*    MCHC 09/23/2019 31 3*    RDW 09/23/2019 13 0     MPV 09/23/2019 9 6     Platelets 97/93/9783 172     nRBC 09/23/2019 0     Neutrophils Relative 09/23/2019 65     Immat GRANS % 09/23/2019 0     Lymphocytes Relative 09/23/2019 24     Monocytes Relative 09/23/2019 6     Eosinophils Relative 09/23/2019 4     Basophils Relative 09/23/2019 1     Neutrophils Absolute 09/23/2019 3 43     Immature Grans Absolute 09/23/2019 0 02     Lymphocytes Absolute 09/23/2019 1 26     Monocytes Absolute 09/23/2019 0 33     Eosinophils Absolute 09/23/2019 0 21     Basophils Absolute 09/23/2019 0 03     Sodium 09/23/2019 136     Potassium 09/23/2019 4 2     Chloride 09/23/2019 102     CO2 09/23/2019 24     ANION GAP 09/23/2019 10     BUN 09/23/2019 11     Creatinine 09/23/2019 0 80     Glucose 09/23/2019 184*    Glucose, Fasting 09/23/2019 184*    Calcium 09/23/2019 8 7     AST 09/23/2019 16     ALT 09/23/2019 18     Alkaline Phosphatase 09/23/2019 77     Total Protein 09/23/2019 7 2     Albumin 09/23/2019 3 5     Total Bilirubin 09/23/2019 0 30     eGFR 09/23/2019 87     Valproic Acid, Total 09/23/2019 80        Psychiatric Review of Systems:  Behavior over the last 24 hours:  Slow improvement  Sleep: normal  Appetite: normal  Medication side effects: No  ROS: no complaints    Mental Status Evaluation:  Appearance: casually dressed   Behavior:  guarded   Speech:  loud   Mood:  anxious   Affect:  labile   Language naming objects   Thought Process:  circumstantial and disorganized   Thought Content:  delusions  grandiose and persecutory   Perceptual Disturbances: internally proeccupied   Risk Potential: Suicidal Ideations without plan, Homicidal Ideations none and Potential for Aggression No   Sensorium:  person, place and time/date   Cognition:  grossly intact   Consciousness:  awake    Attention: attention span appeared shorter than expected for age   Insight:  limited   Judgment: limited   Intellect fair   Gait/Station: normal gait/station   Motor Activity: no abnormal movements     Memory: Short and long term memory  fair     Progress Toward Goals: slow progress    Recommended Treatment:   Increase side is to 10 mg daily after dinner for psychosis and mood stabilization  Continue Depakote  mg daily after dinner and Seroquel 800 mg at HS for psychosis and mood stabilization  Continue with group therapy, milieu therapy and occupational therapy      Continue following current medications:   Current Facility-Administered Medications:  acetaminophen 325 mg Oral Q6H PRN [place], CARA   acetaminophen 650 mg Oral Q6H PRN [place], CARA   aluminum-magnesium hydroxide-simethicone 30 mL Oral Q4H PRN Melinda Palafox MD   benztropine 1 mg Intramuscular Q6H PRN Melinda Palafox MD   benztropine 1 mg Oral Q6H PRN Melinda Palafox MD   divalproex sodium 750 mg Oral After Dinner Ignacio Forrester   haloperidol 5 mg Oral Q6H PRN [place], CARA   haloperidol lactate 5 mg Intramuscular Q6H PRN Edi Ny, CARA   hydrOXYzine HCL 50 mg Oral Q8H PRN Ignacio Forrester   ibuprofen 600 mg Oral Q6H PRN Edi Ny, CARA   LORazepam 2 mg Intramuscular Q8H PRN Ignacio Forrester   LORazepam 1 mg Oral Q8H PRN Ignacio Forrester   magnesium hydroxide 30 mL Oral Daily PRN Melinda Palafox MD   OLANZapine 10 mg Oral After Liliana Yadav Ignacio Forrester   OLANZapine 10 mg Intramuscular Q8H PRN Lisa Porter PA-C   OLANZapine 10 mg Oral Q8H PRN Lisa Porter PA-C   QUEtiapine 800 mg Oral HS Eliazar Fishman MD   risperiDONE 1 mg Oral Q3H PRN Lisa Porter PA-C   traZODone 50 mg Oral HS PRN Josette Pallas, MD       Risks, benefits and possible side effects of Medications:   Risks, benefits, and possible side effects of medications explained to patient and patient verbalizes understanding  Risks of medications in pregnancy explained if female patient  Patient verbalizes understanding and agrees to notify her doctor if she becomes pregnant  This note has been constructed using a voice recognition system  There may be translation, syntax,  or grammatical errors  If you have any questions, please contact the dictating provider

## 2019-09-28 NOTE — PROGRESS NOTES
Continual observation while awake (entered a male peer's room, disrobed and made inappropriate sexual comment)  Pt pleasant, disorganized and tangential   Visible in the milieu throughout the day, social with peers/staff  Attends and participates in groups  Denies SI/HI/AVH  Medication compliant

## 2019-09-29 RX ADMIN — QUETIAPINE FUMARATE 800 MG: 200 TABLET ORAL at 21:08

## 2019-09-29 RX ADMIN — DIVALPROEX SODIUM 750 MG: 500 TABLET, FILM COATED, EXTENDED RELEASE ORAL at 18:48

## 2019-09-29 RX ADMIN — OLANZAPINE 15 MG: 10 TABLET, ORALLY DISINTEGRATING ORAL at 18:49

## 2019-09-29 NOTE — PROGRESS NOTES
Progress Note - [facility] 51 y o  female MRN: [id number]  Unit/Bed#: U 252-01 Encounter: [id number]    Assessment/Plan   Active Problems:    Bipolar I disorder, most recent episode manic, severe with psychotic features ([facility] [address] )    Subjective:  Patient is compliant with medication but is showing persistence of disorganized behavior in terms of grandiose delusions, sexual preoccupation and episode after masturbating with behavior of verbal irritability to word staff member  Patient was placed on 1-1 pack yesterday for attempting to have sexually inappropriate behavior with the specific male peer  Patient continues to believe that this male patient is her significant other and they are meant to be together  Patient will not understand the reasoning and consequences of her behavior  Patient continues to repeat that people do not known the whole story and they blamed this on xochitl  Patient remained focused on coming off of one-to-one but again was educated on criteria for considering discontinuation of one-to-one  Patient reports understanding on importance of maintaining good behavior before any planning will be considered      Current Medications:    Current Facility-Administered Medications:  acetaminophen 325 mg Oral Q6H PRN Dinah CARA Zuniga   acetaminophen 650 mg Oral Q6H PRN Dinah CARA Zuniga   aluminum-magnesium hydroxide-simethicone 30 mL Oral Q4H PRN Xiomara Peraza MD   benztropine 1 mg Intramuscular Q6H PRN Xiomara Peraza MD   benztropine 1 mg Oral Q6H PRN Xiomara Peraza MD   divalproex sodium 750 mg Oral After Dinner Ignacio Forrester   haloperidol 5 mg Oral Q6H PRN Dinah CARA Zuniga   haloperidol lactate 5 mg Intramuscular Q6H PRN Dinah CARA Zuniga   hydrOXYzine HCL 50 mg Oral Q8H PRN Ignacio Forrester   ibuprofen 600 mg Oral Q6H PRN Dinah CARA Zuniga   LORazepam 2 mg Intramuscular Q8H PRN Ignacio Forrester   LORazepam 1 mg Oral Q8H PRN Elizabeth Pink magnesium hydroxide 30 mL Oral Daily PRN Lashae Ramos MD   OLANZapine 15 mg Oral After Dinner Ignacio Forrester   OLANZapine 10 mg Intramuscular Q8H PRN Roxy Nolan PA-C   OLANZapine 10 mg Oral Q8H PRN Roxy Nolan PA-C   QUEtiapine 800 mg Oral HS Anne-Marie Rose MD   risperiDONE 1 mg Oral Q3H PRN Roxy Nolan PA-C   traZODone 50 mg Oral HS PRN Lashae Ramos MD       Behavioral Health Medications: all current active meds have been reviewed  Vital signs in last 24 hours:  Temp:  [97 2 °F (36 2 °C)] 97 2 °F (36 2 °C)  HR:  [102] 102  Resp:  [18] 18  BP: (120)/(65) 120/65    Laboratory results:    I have personally reviewed all pertinent laboratory/tests results  Labs in last 72 hours: No results for input(s): WBC, RBC, HGB, HCT, PLT, RDW, NEUTROABS, SODIUM, K, CL, CO2, BUN, CREATININE, GLUCOSE, GLUC, GLUF, CALCIUM, AST, ALT, ALKPHOS, TP, ALB, TBILI, CHOLESTEROL, HDL, TRIG, LDLCALC, VALPROICTOT, CARBAMAZEPIN, LITHIUM, AMMONIA, [id number], FREET4, T3FREE, PREGTESTUR, PREGSERUM, HCG, HCGQUANT, RPR in the last 72 hours      Invalid input(s):  RBC  Admission Labs:   Admission on 09/13/2019   Component Date Value    WBC 09/19/2019 5 09     RBC 09/19/2019 4 42     Hemoglobin 09/19/2019 11 7     Hematocrit 09/19/2019 37 3     MCV 09/19/2019 84     MCH 09/19/2019 26 5*    MCHC 09/19/2019 31 4     RDW 09/19/2019 12 9     MPV 09/19/2019 9 4     Platelets 16/75/8049 199     nRBC 09/19/2019 0     Neutrophils Relative 09/19/2019 64     Immat GRANS % 09/19/2019 0     Lymphocytes Relative 09/19/2019 27     Monocytes Relative 09/19/2019 6     Eosinophils Relative 09/19/2019 3     Basophils Relative 09/19/2019 0     Neutrophils Absolute 09/19/2019 3 22     Immature Grans Absolute 09/19/2019 0 01     Lymphocytes Absolute 09/19/2019 1 38     Monocytes Absolute 09/19/2019 0 31     Eosinophils Absolute 09/19/2019 0 16     Basophils Absolute 09/19/2019 0 01     Sodium 09/18/2019 141     Potassium 09/18/2019 4 3     Chloride 09/18/2019 105     CO2 09/18/2019 24     ANION GAP 09/18/2019 12     BUN 09/18/2019 11     Creatinine 09/18/2019 0 66     Glucose 09/18/2019 95     Glucose, Fasting 09/18/2019 95     Calcium 09/18/2019 9 1     AST 09/18/2019 23     ALT 09/18/2019 19     Alkaline Phosphatase 09/18/2019 74     Total Protein 09/18/2019 7 4     Albumin 09/18/2019 3 8     Total Bilirubin 09/18/2019 0 40     eGFR 09/18/2019 106     Valproic Acid, Total 09/18/2019 42*    WBC 09/23/2019 5 28     RBC 09/23/2019 4 39     Hemoglobin 09/23/2019 11 7     Hematocrit 09/23/2019 37 4     MCV 09/23/2019 85     MCH 09/23/2019 26 7*    MCHC 09/23/2019 31 3*    RDW 09/23/2019 13 0     MPV 09/23/2019 9 6     Platelets 88/49/9045 172     nRBC 09/23/2019 0     Neutrophils Relative 09/23/2019 65     Immat GRANS % 09/23/2019 0     Lymphocytes Relative 09/23/2019 24     Monocytes Relative 09/23/2019 6     Eosinophils Relative 09/23/2019 4     Basophils Relative 09/23/2019 1     Neutrophils Absolute 09/23/2019 3 43     Immature Grans Absolute 09/23/2019 0 02     Lymphocytes Absolute 09/23/2019 1 26     Monocytes Absolute 09/23/2019 0 33     Eosinophils Absolute 09/23/2019 0 21     Basophils Absolute 09/23/2019 0 03     Sodium 09/23/2019 136     Potassium 09/23/2019 4 2     Chloride 09/23/2019 102     CO2 09/23/2019 24     ANION GAP 09/23/2019 10     BUN 09/23/2019 11     Creatinine 09/23/2019 0 80     Glucose 09/23/2019 184*    Glucose, Fasting 09/23/2019 184*    Calcium 09/23/2019 8 7     AST 09/23/2019 16     ALT 09/23/2019 18     Alkaline Phosphatase 09/23/2019 77     Total Protein 09/23/2019 7 2     Albumin 09/23/2019 3 5     Total Bilirubin 09/23/2019 0 30     eGFR 09/23/2019 87     Valproic Acid, Total 09/23/2019 80        Psychiatric Review of Systems:  Behavior over the last 24 hours:  unchanged  Sleep: slow improvement  Appetite: normal  Medication side effects: No  ROS: no complaints    Mental Status Evaluation:  Appearance:  casually dressed   Behavior:  guarded   Speech:  loud   Mood:  anxious   Affect:  labile   Language rapid   Thought Process:  circumstantial and disorganized   Thought Content:  delusions  grandiose and persecutory   Perceptual Disturbances: preoccupied   Risk Potential: Suicidal Ideations none, Homicidal Ideations none and Potential for Aggression No   Sensorium:  person, place and time/date   Cognition:  grossly intact   Consciousness:  awake    Attention: attention span appeared shorter than expected for age   Insight:  limited   Judgment: limited   Intellect fair   Gait/Station: normal gait/station   Motor Activity: no abnormal movements     Memory: Short and long term memory  fair     Progress Toward Goals: slow progress    Recommended Treatment:   Increase olanzapine to 15 mg at bedtime for psychosis and mood stabilization  Continue Seroquel 800 mg at bedtime for psychosis and mood stabilization  Continue Depakote  mg daily after dinner for mood stabilization  Continue one-to-one for sexually inappropriate behaviors toward specific male peers on the unit  Place patient on 10 minutes mouth checks after each medication administration  Continue with group therapy, milieu therapy and occupational therapy      Continue following current medications:   Current Facility-Administered Medications:  acetaminophen 325 mg Oral Q6H PRN Manuel Palacios PA-C   acetaminophen 650 mg Oral Q6H PRN Manuel Palacios PA-C   aluminum-magnesium hydroxide-simethicone 30 mL Oral Q4H PRN Cheli Villalba MD   benztropine 1 mg Intramuscular Q6H PRN Cheli Villalba MD   benztropine 1 mg Oral Q6H PRN Cheli Villalba MD   divalproex sodium 750 mg Oral After Dinner Ignacio Forrester   haloperidol 5 mg Oral Q6H PRN Manuel Palacios PA-C   haloperidol lactate 5 mg Intramuscular Q6H PRN Manuel Palacios PA-C   hydrOXYzine HCL 50 mg Oral Q8H PRN Mani Pal ibuprofen 600 mg Oral Q6H PRN Cloretta Lety, PA-C   LORazepam 2 mg Intramuscular Q8H PRN Ignacio Forrester   LORazepam 1 mg Oral Q8H PRN Ignacio Forrester   magnesium hydroxide 30 mL Oral Daily PRN Diana Silva MD   OLANZapine 15 mg Oral After Dinner Ignacio Forrester   OLANZapine 10 mg Intramuscular Q8H PRN Cloretta Lety, PA-C   OLANZapine 10 mg Oral Q8H PRN Cloretta Lety, PA-C   QUEtiapine 800 mg Oral HS Dimas Hernandez MD   risperiDONE 1 mg Oral Q3H PRN Cloretta Lety, PA-C   traZODone 50 mg Oral HS PRN Diana Silva MD       Risks, benefits and possible side effects of Medications:   Risks, benefits, and possible side effects of medications explained to patient and patient verbalizes understanding  Risks of medications in pregnancy explained if female patient  Patient verbalizes understanding and agrees to notify her doctor if she becomes pregnant  This note has been constructed using a voice recognition system  There may be translation, syntax,  or grammatical errors  If you have any questions, please contact the dictating provider

## 2019-09-29 NOTE — PROGRESS NOTES
Pt disorganized, rambling, tangential  Difficult to follow  Reports poor sleep last night, says she does not feel safe sleeping in hospital  Pt remains sexually preoccupied, focused on one particular male peer  Discussed behavior expectations  Pt remains on continual observation while awake

## 2019-09-29 NOTE — PROGRESS NOTES
Daily rounds  Per shift report, pt did not sleep well  Awake first half of night, then slept for short increments  Appeared restless  Pt sexually preoccupied  Pt masturbating while on continual observation in evening  Pt continued to attempt to go into male peer's room  Disrobed overnight  Remains on continual observation while awake

## 2019-09-29 NOTE — PLAN OF CARE
Problem: SELF HARM/SUICIDALITY  Goal: Will have no self-injury during hospital stay  Description  INTERVENTIONS:  - Q 15 MINUTES: Routine safety checks  - Q WAKING SHIFT & PRN: Assess risk to determine if routine checks are adequate to maintain patient safety  - Encourage patient to participate actively in care by formulating a plan to combat response to suicidal ideation, identify supports and resources  Outcome: Progressing     Problem: PSYCHOSIS  Goal: Will report no hallucinations or delusions  Description  Interventions:  - Administer medication as  ordered  - Every waking shifts and PRN assess for the presence of hallucinations and or delusions  - Assist with reality testing to support increasing orientation  - Assess if patient's hallucinations or delusions are encouraging self-harm or harm to others and intervene as appropriate  Outcome: Progressing     Problem: Alteration in Thoughts and Perception  Goal: Complete daily ADLs, including personal hygiene independently, as able  Description  Interventions:  - Observe, teach, and assist patient with ADLS  - Monitor and promote a balance of rest/activity, with adequate nutrition and elimination   Outcome: Progressing     Problem: Ineffective Coping  Goal: Participates in unit activities  Description  Interventions:  - Provide therapeutic environment   - Provide required programming   - Redirect inappropriate behaviors   Outcome: Progressing     Problem: Nutrition/Hydration-ADULT  Goal: Nutrient/Hydration intake appropriate for improving, restoring or maintaining nutritional needs  Description  Monitor and assess patient's nutrition/hydration status for malnutrition  Collaborate with interdisciplinary team and initiate plan and interventions as ordered  Monitor patient's weight and dietary intake as ordered or per policy  Utilize nutrition screening tool and intervene as necessary   Determine patient's food preferences and provide high-protein, high-caloric foods as appropriate       INTERVENTIONS:  - Monitor oral intake, urinary output, labs, and treatment plans  - Assess nutrition and hydration status and recommend course of action  - Evaluate amount of meals eaten  - Assist patient with eating if necessary   - Allow adequate time for meals  - Recommend/ encourage appropriate diets, oral nutritional supplements, and vitamin/mineral supplements  - Order, calculate, and assess calorie counts as needed  - Recommend, monitor, and adjust tube feedings and TPN/PPN based on assessed needs  - Assess need for intravenous fluids  - Provide specific nutrition/hydration education as appropriate  - Include patient/family/caregiver in decisions related to nutrition  Outcome: Progressing     Problem: Alteration in Thoughts and Perception  Goal: Treatment Goal: Gain control of psychotic behaviors/thinking, reduce/eliminate presenting symptoms and demonstrate improved reality functioning upon discharge  Outcome: Not Progressing  Goal: Refrain from acting on delusional thinking/internal stimuli  Description  Interventions:  - Monitor patient closely, per order   - Utilize least restrictive measures   - Set reasonable limits, give positive feedback for acceptable   - Administer medications as ordered and monitor of potential side effects  Outcome: Not Progressing  Goal: Recognize dysfunctional thoughts, communicate reality-based thoughts at the time of discharge  Description  Interventions:  - Provide medication and psycho-education to assist patient in compliance and developing insight into his/her illness   Outcome: Not Progressing

## 2019-09-29 NOTE — PROGRESS NOTES
Pt awake first half of the night, did disrobe at one point however redirectable  Pt attempting to walk into male peers room multiple times and at one point when pt was redirected away from the male peers room, pt stopped at fire pull station and subtly moved hand towards pull station box  Pt however stopped when noticing additional staff walking towards her  Returned to room  Irritable edge at times regarding having 1:1 remain with her  Remained on 1:1 while awake  Pt did eventually fall asleep around 0200 however has been restless and waking multiple times since 0200

## 2019-09-29 NOTE — PROGRESS NOTES
Pt approached nurse's station at 1800 to report a BM that "is round and hard like bear sh*t" and then asks if "a stool sample is needed to check for pin worms"  Writer advised Pt that no such sample is needed at this time and she could flush the toilet

## 2019-09-29 NOTE — NUTRITION
09/29/19 1942   PES Statement   Problem Continue previous diagnosis   Biochemical (2) Impaired nutrient utilization NC-2 1   Related to Other (comment)  (Previous DM2 diet hx  Evidence of pre-diabetes: A1C 6 3, random glucose 184)   As evidenced by: Abnormal lab (specify)   Patient Nutrition Goals   Goal glucose in range   Goal Status not met;extended   Timeframe to complete goal by d/c   Recommendations/Interventions   Summary Pt requested aid in regulating BS  Pt requested CCD diet to better meet estimated needs  Pt also requesting dental soft diet d/t poor dentition  Pt states it is difficult to eat some fresh fruit/vegetables     Malnutrition/BMI Present No   Interventions Diet: order adjustment   Nutrition Complexity Risk   Nutrition complexity level Moderate risk   Nutrition review: 10/10/19   Follow up date 10/06/19

## 2019-09-29 NOTE — PLAN OF CARE
Problem: SELF HARM/SUICIDALITY  Goal: Will have no self-injury during hospital stay  Description  INTERVENTIONS:  - Q 15 MINUTES: Routine safety checks  - Q WAKING SHIFT & PRN: Assess risk to determine if routine checks are adequate to maintain patient safety  - Encourage patient to participate actively in care by formulating a plan to combat response to suicidal ideation, identify supports and resources  Outcome: Progressing     Problem: PSYCHOSIS  Goal: Will report no hallucinations or delusions  Description  Interventions:  - Administer medication as  ordered  - Every waking shifts and PRN assess for the presence of hallucinations and or delusions  - Assist with reality testing to support increasing orientation  - Assess if patient's hallucinations or delusions are encouraging self-harm or harm to others and intervene as appropriate  Outcome: Progressing     Problem: Alteration in Thoughts and Perception  Goal: Treatment Goal: Gain control of psychotic behaviors/thinking, reduce/eliminate presenting symptoms and demonstrate improved reality functioning upon discharge  Outcome: Progressing  Goal: Refrain from acting on delusional thinking/internal stimuli  Description  Interventions:  - Monitor patient closely, per order   - Utilize least restrictive measures   - Set reasonable limits, give positive feedback for acceptable   - Administer medications as ordered and monitor of potential side effects  Outcome: Progressing  Goal: Recognize dysfunctional thoughts, communicate reality-based thoughts at the time of discharge  Description  Interventions:  - Provide medication and psycho-education to assist patient in compliance and developing insight into his/her illness   Outcome: Progressing  Goal: Complete daily ADLs, including personal hygiene independently, as able  Description  Interventions:  - Observe, teach, and assist patient with ADLS  - Monitor and promote a balance of rest/activity, with adequate nutrition and elimination   Outcome: Progressing     Problem: Ineffective Coping  Goal: Participates in unit activities  Description  Interventions:  - Provide therapeutic environment   - Provide required programming   - Redirect inappropriate behaviors   Outcome: Progressing     Problem: Nutrition/Hydration-ADULT  Goal: Nutrient/Hydration intake appropriate for improving, restoring or maintaining nutritional needs  Description  Monitor and assess patient's nutrition/hydration status for malnutrition  Collaborate with interdisciplinary team and initiate plan and interventions as ordered  Monitor patient's weight and dietary intake as ordered or per policy  Utilize nutrition screening tool and intervene as necessary  Determine patient's food preferences and provide high-protein, high-caloric foods as appropriate       INTERVENTIONS:  - Monitor oral intake, urinary output, labs, and treatment plans  - Assess nutrition and hydration status and recommend course of action  - Evaluate amount of meals eaten  - Assist patient with eating if necessary   - Allow adequate time for meals  - Recommend/ encourage appropriate diets, oral nutritional supplements, and vitamin/mineral supplements  - Order, calculate, and assess calorie counts as needed  - Recommend, monitor, and adjust tube feedings and TPN/PPN based on assessed needs  - Assess need for intravenous fluids  - Provide specific nutrition/hydration education as appropriate  - Include patient/family/caregiver in decisions related to nutrition  Outcome: Progressing     Problem: INVOLUNTARY ADMIT  Goal: Will cooperate with staff recommendations and doctor's orders and will demonstrate appropriate behavior  Description  INTERVENTIONS:  - Treat underlying conditions and offer medication as ordered  - Educate regarding involuntary admission procedures and rules  - Utilize positive consistent limit setting strategies to support patient and staff safety  Outcome: Completed

## 2019-09-29 NOTE — PROGRESS NOTES
Disorganized and tangential, irritable edge  Sexually preoccupied  Remains 1:1 continual observation while awake  Poor sleep last night, no napping today  C/o constipation, Milk-of-Mag given previous two days with no effect  Advised Pt to try warm prune juice, Pt receptive and did drink prune juice  Denies SI/HI/AVH  Pt does not appear to be RIS  Medication compliant

## 2019-09-30 RX ADMIN — MAGNESIUM HYDROXIDE 30 ML: 400 SUSPENSION ORAL at 17:48

## 2019-09-30 RX ADMIN — OLANZAPINE 15 MG: 10 TABLET, ORALLY DISINTEGRATING ORAL at 17:20

## 2019-09-30 RX ADMIN — DIVALPROEX SODIUM 750 MG: 500 TABLET, FILM COATED, EXTENDED RELEASE ORAL at 17:20

## 2019-09-30 RX ADMIN — QUETIAPINE FUMARATE 800 MG: 200 TABLET ORAL at 21:21

## 2019-09-30 NOTE — PROGRESS NOTES
Pt mood is improved, however, she remains disorganized and delusional  She states she knows male peer on the unit from [facility], stating she met him at a music store  She was mildly tangential in speech, stating "Silvio Grijalva is an actor who wants to be a musician "  Stating she was worried about her family, her mom is a racist, worried about the man she may   "Had a bad relationship with her dad " She denies S/H/I or AVH  Her behavior has been appropriate thus far  Will continue to monitor, provide support and encouragement
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
## 2019-09-30 NOTE — PROGRESS NOTES
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
## 2019-09-30 NOTE — PROGRESS NOTES
Pt awake for brief period overnight  Remained calm and cooperative  Able to return to sleep soon after  Remains on 1:1 while awake

## 2019-09-30 NOTE — PLAN OF CARE
Problem: SELF HARM/SUICIDALITY  Goal: Will have no self-injury during hospital stay  Description  INTERVENTIONS:  - Q 15 MINUTES: Routine safety checks  - Q WAKING SHIFT & PRN: Assess risk to determine if routine checks are adequate to maintain patient safety  - Encourage patient to participate actively in care by formulating a plan to combat response to suicidal ideation, identify supports and resources  9/30/2019 1646 by Jefry Alonso RN  Outcome: Progressing  9/30/2019 1645 by eJfry Alonso RN  Outcome: Progressing  9/30/2019 1643 by Jefry Alonso RN  Outcome: Progressing     Problem: PSYCHOSIS  Goal: Will report no hallucinations or delusions  Description  Interventions:  - Administer medication as  ordered  - Every waking shifts and PRN assess for the presence of hallucinations and or delusions  - Assist with reality testing to support increasing orientation  - Assess if patient's hallucinations or delusions are encouraging self-harm or harm to others and intervene as appropriate  9/30/2019 1646 by Jefry Alonso RN  Outcome: Progressing  9/30/2019 [address]  by Jefry Alonso RN  Outcome: Progressing  9/30/2019 1643 by Jefry Alonso RN  Outcome: Progressing     Problem: Alteration in Thoughts and Perception  Goal: Treatment Goal: Gain control of psychotic behaviors/thinking, reduce/eliminate presenting symptoms and demonstrate improved reality functioning upon discharge  9/30/2019 1646 by Jefry Alonso RN  Outcome: Progressing  9/30/2019 1645 by Jefry Alonso RN  Outcome: Progressing  9/30/2019 1643 by Jefry Alonso RN  Outcome: Progressing  Goal: Refrain from acting on delusional thinking/internal stimuli  Description  Interventions:  - Monitor patient closely, per order   - Utilize least restrictive measures   - Set reasonable limits, give positive feedback for acceptable   - Administer medications as ordered and monitor of potential side effects  9/30/2019 1646 by Jefry Alonso RN  Outcome: Progressing  9/30/2019 1645 by Jose Guadalupe Lewis RN  Outcome: Progressing  9/30/2019 1643 by Jose Guadalupe Lewis RN  Outcome: Progressing  Goal: Recognize dysfunctional thoughts, communicate reality-based thoughts at the time of discharge  Description  Interventions:  - Provide medication and psycho-education to assist patient in compliance and developing insight into his/her illness   9/30/2019 1646 by Jose Guadalupe Lewis RN  Outcome: Progressing  9/30/2019 1645 by Jose Guadalupe Lewis RN  Outcome: Progressing  9/30/2019 1643 by Jose Guadalupe Lewis RN  Outcome: Progressing  Goal: Complete daily ADLs, including personal hygiene independently, as able  Description  Interventions:  - Observe, teach, and assist patient with ADLS  - Monitor and promote a balance of rest/activity, with adequate nutrition and elimination   9/30/2019 1646 by Jose Guadalupe Lewis RN  Outcome: Progressing  9/30/2019 1645 by Jose Guadalupe Lewis RN  Outcome: Progressing  9/30/2019 1643 by Jose Guadalupe Lewis RN  Outcome: Progressing     Problem: Ineffective Coping  Goal: Participates in unit activities  Description  Interventions:  - Provide therapeutic environment   - Provide required programming   - Redirect inappropriate behaviors   9/30/2019 1646 by Jose Guadalupe Lewis RN  Outcome: Progressing  9/30/2019 02 [id number] [phone] 04 by Jose Guadalupe Lewis RN  Outcome: Progressing  9/30/2019 1643 by Jose Guadalupe Lewis RN  Outcome: Progressing     Problem: DISCHARGE PLANNING  Goal: Discharge to home or other facility with appropriate resources  Description  INTERVENTIONS:  - Identify barriers to discharge w/patient and caregiver  - Arrange for needed discharge resources and transportation as appropriate  - Identify discharge learning needs (meds, wound care, etc )  - Arrange for interpretive services to assist at discharge as needed  - Refer to Case Management Department for coordinating discharge planning if the patient needs post-hospital services based on physician/advanced practitioner order or complex needs related to functional status, cognitive ability, or social support system  Outcome: Progressing     Problem: Nutrition/Hydration-ADULT  Goal: Nutrient/Hydration intake appropriate for improving, restoring or maintaining nutritional needs  Description  Monitor and assess patient's nutrition/hydration status for malnutrition  Collaborate with interdisciplinary team and initiate plan and interventions as ordered  Monitor patient's weight and dietary intake as ordered or per policy  Utilize nutrition screening tool and intervene as necessary  Determine patient's food preferences and provide high-protein, high-caloric foods as appropriate       INTERVENTIONS:  - Monitor oral intake, urinary output, labs, and treatment plans  - Assess nutrition and hydration status and recommend course of action  - Evaluate amount of meals eaten  - Assist patient with eating if necessary   - Allow adequate time for meals  - Recommend/ encourage appropriate diets, oral nutritional supplements, and vitamin/mineral supplements  - Order, calculate, and assess calorie counts as needed  - Recommend, monitor, and adjust tube feedings and TPN/PPN based on assessed needs  - Assess need for intravenous fluids  - Provide specific nutrition/hydration education as appropriate  - Include patient/family/caregiver in decisions related to nutrition  9/30/2019 1646 by Angelina Perkins RN  Outcome: Progressing  9/30/2019 1645 by Angelina Perkins RN  Outcome: Progressing

## 2019-10-01 LAB
ALBUMIN SERPL BCP-MCNC: 3.3 G/DL (ref 3.5–5)
ALP SERPL-CCNC: 72 U/L (ref 46–116)
ALT SERPL W P-5'-P-CCNC: 15 U/L (ref 12–78)
AMMONIA PLAS-SCNC: 139 UMOL/L (ref 11–35)
ANION GAP SERPL CALCULATED.3IONS-SCNC: 8 MMOL/L (ref 4–13)
AST SERPL W P-5'-P-CCNC: 11 U/L (ref 5–45)
BASOPHILS # BLD AUTO: 0.03 THOUSANDS/ΜL (ref 0–0.1)
BASOPHILS NFR BLD AUTO: 1 % (ref 0–1)
BILIRUB SERPL-MCNC: 0.2 MG/DL (ref 0.2–1)
BUN SERPL-MCNC: 9 MG/DL (ref 5–25)
CALCIUM SERPL-MCNC: 8.8 MG/DL (ref 8.3–10.1)
CHLORIDE SERPL-SCNC: 104 MMOL/L (ref 100–108)
CK SERPL-CCNC: 27 U/L (ref 26–192)
CO2 SERPL-SCNC: 27 MMOL/L (ref 21–32)
CREAT SERPL-MCNC: 0.59 MG/DL (ref 0.6–1.3)
EOSINOPHIL # BLD AUTO: 0.22 THOUSAND/ΜL (ref 0–0.61)
EOSINOPHIL NFR BLD AUTO: 4 % (ref 0–6)
ERYTHROCYTE [DISTWIDTH] IN BLOOD BY AUTOMATED COUNT: 13.2 % (ref 11.6–15.1)
GFR SERPL CREATININE-BSD FRML MDRD: 109 ML/MIN/1.73SQ M
GLUCOSE SERPL-MCNC: 86 MG/DL (ref 65–140)
HCT VFR BLD AUTO: 37.1 % (ref 34.8–46.1)
HGB BLD-MCNC: 11.8 G/DL (ref 11.5–15.4)
IMM GRANULOCYTES # BLD AUTO: 0.07 THOUSAND/UL (ref 0–0.2)
IMM GRANULOCYTES NFR BLD AUTO: 1 % (ref 0–2)
LYMPHOCYTES # BLD AUTO: 1.35 THOUSANDS/ΜL (ref 0.6–4.47)
LYMPHOCYTES NFR BLD AUTO: 22 % (ref 14–44)
MCH RBC QN AUTO: 26.8 PG (ref 26.8–34.3)
MCHC RBC AUTO-ENTMCNC: 31.8 G/DL (ref 31.4–37.4)
MCV RBC AUTO: 84 FL (ref 82–98)
MONOCYTES # BLD AUTO: 0.59 THOUSAND/ΜL (ref 0.17–1.22)
MONOCYTES NFR BLD AUTO: 10 % (ref 4–12)
NEUTROPHILS # BLD AUTO: 3.83 THOUSANDS/ΜL (ref 1.85–7.62)
NEUTS SEG NFR BLD AUTO: 62 % (ref 43–75)
NRBC BLD AUTO-RTO: 0 /100 WBCS
PLATELET # BLD AUTO: 173 THOUSANDS/UL (ref 149–390)
PMV BLD AUTO: 9.7 FL (ref 8.9–12.7)
POTASSIUM SERPL-SCNC: 4 MMOL/L (ref 3.5–5.3)
PROT SERPL-MCNC: 7 G/DL (ref 6.4–8.2)
RBC # BLD AUTO: 4.41 MILLION/UL (ref 3.81–5.12)
SODIUM SERPL-SCNC: 139 MMOL/L (ref 136–145)
WBC # BLD AUTO: 6.09 THOUSAND/UL (ref 4.31–10.16)

## 2019-10-01 PROCEDURE — 82550 ASSAY OF CK (CPK): CPT | Performed by: PSYCHIATRY & NEUROLOGY

## 2019-10-01 PROCEDURE — 82140 ASSAY OF AMMONIA: CPT | Performed by: PSYCHIATRY & NEUROLOGY

## 2019-10-01 PROCEDURE — 85025 COMPLETE CBC W/AUTO DIFF WBC: CPT | Performed by: PSYCHIATRY & NEUROLOGY

## 2019-10-01 PROCEDURE — 80053 COMPREHEN METABOLIC PANEL: CPT | Performed by: PSYCHIATRY & NEUROLOGY

## 2019-10-01 RX ORDER — DIVALPROEX SODIUM 500 MG/1
500 TABLET, EXTENDED RELEASE ORAL
Status: DISCONTINUED | OUTPATIENT
Start: 2019-10-01 | End: 2019-10-11 | Stop reason: HOSPADM

## 2019-10-01 RX ORDER — LACTULOSE 20 G/30ML
20 SOLUTION ORAL 3 TIMES DAILY
Status: DISCONTINUED | OUTPATIENT
Start: 2019-10-01 | End: 2019-10-03

## 2019-10-01 RX ADMIN — LACTULOSE 20 G: 10 SOLUTION ORAL at 22:04

## 2019-10-01 RX ADMIN — OLANZAPINE 15 MG: 10 TABLET, ORALLY DISINTEGRATING ORAL at 18:29

## 2019-10-01 RX ADMIN — QUETIAPINE FUMARATE 800 MG: 200 TABLET ORAL at 22:05

## 2019-10-01 RX ADMIN — DIVALPROEX SODIUM 500 MG: 500 TABLET, FILM COATED, EXTENDED RELEASE ORAL at 18:29

## 2019-10-01 RX ADMIN — LORAZEPAM 1 MG: 1 TABLET ORAL at 02:22

## 2019-10-01 RX ADMIN — LACTULOSE 20 G: 10 SOLUTION ORAL at 15:49

## 2019-10-01 NOTE — PROGRESS NOTES
Patient has been waking up and coming out into the milieu only wrapped in a sheet  She was redirected several times back to her room where she appeared to fall asleep immediately  This last time patient walked to med room and pulled the plastic fire alarm cover away from the wall  No alarm went off  She was again redirected back to her room and reminded of proper dressing before coming out into the pérez  Patient requesting something to help with sleep and anxiety  Patient received ativan 1 mg po  Will continue to monitor and support

## 2019-10-01 NOTE — PLAN OF CARE
Problem: SELF HARM/SUICIDALITY  Goal: Will have no self-injury during hospital stay  Description  INTERVENTIONS:  - Q 15 MINUTES: Routine safety checks  - Q WAKING SHIFT & PRN: Assess risk to determine if routine checks are adequate to maintain patient safety  - Encourage patient to participate actively in care by formulating a plan to combat response to suicidal ideation, identify supports and resources  Outcome: Progressing     Problem: PSYCHOSIS  Goal: Will report no hallucinations or delusions  Description  Interventions:  - Administer medication as  ordered  - Every waking shifts and PRN assess for the presence of hallucinations and or delusions  - Assist with reality testing to support increasing orientation  - Assess if patient's hallucinations or delusions are encouraging self-harm or harm to others and intervene as appropriate  Outcome: Progressing     Problem: Alteration in Thoughts and Perception  Goal: Treatment Goal: Gain control of psychotic behaviors/thinking, reduce/eliminate presenting symptoms and demonstrate improved reality functioning upon discharge  Outcome: Progressing  Goal: Refrain from acting on delusional thinking/internal stimuli  Description  Interventions:  - Monitor patient closely, per order   - Utilize least restrictive measures   - Set reasonable limits, give positive feedback for acceptable   - Administer medications as ordered and monitor of potential side effects  Outcome: Progressing  Goal: Recognize dysfunctional thoughts, communicate reality-based thoughts at the time of discharge  Description  Interventions:  - Provide medication and psycho-education to assist patient in compliance and developing insight into his/her illness   Outcome: Progressing  Goal: Complete daily ADLs, including personal hygiene independently, as able  Description  Interventions:  - Observe, teach, and assist patient with ADLS  - Monitor and promote a balance of rest/activity, with adequate nutrition and elimination   Outcome: Progressing     Problem: Ineffective Coping  Goal: Participates in unit activities  Description  Interventions:  - Provide therapeutic environment   - Provide required programming   - Redirect inappropriate behaviors   Outcome: Progressing     Problem: DISCHARGE PLANNING  Goal: Discharge to home or other facility with appropriate resources  Description  INTERVENTIONS:  - Identify barriers to discharge w/patient and caregiver  - Arrange for needed discharge resources and transportation as appropriate  - Identify discharge learning needs (meds, wound care, etc )  - Arrange for interpretive services to assist at discharge as needed  - Refer to Case Management Department for coordinating discharge planning if the patient needs post-hospital services based on physician/advanced practitioner order or complex needs related to functional status, cognitive ability, or social support system  Outcome: Progressing     Problem: Nutrition/Hydration-ADULT  Goal: Nutrient/Hydration intake appropriate for improving, restoring or maintaining nutritional needs  Description  Monitor and assess patient's nutrition/hydration status for malnutrition  Collaborate with interdisciplinary team and initiate plan and interventions as ordered  Monitor patient's weight and dietary intake as ordered or per policy  Utilize nutrition screening tool and intervene as necessary  Determine patient's food preferences and provide high-protein, high-caloric foods as appropriate       INTERVENTIONS:  - Monitor oral intake, urinary output, labs, and treatment plans  - Assess nutrition and hydration status and recommend course of action  - Evaluate amount of meals eaten  - Assist patient with eating if necessary   - Allow adequate time for meals  - Recommend/ encourage appropriate diets, oral nutritional supplements, and vitamin/mineral supplements  - Order, calculate, and assess calorie counts as needed  - Recommend, monitor, and adjust tube feedings and TPN/PPN based on assessed needs  - Assess need for intravenous fluids  - Provide specific nutrition/hydration education as appropriate  - Include patient/family/caregiver in decisions related to nutrition  Outcome: Progressing

## 2019-10-01 NOTE — PROGRESS NOTES
Pharmacy Medication Education Group     Patient came to group and compared to previous group periods was much improved  In previous medication education group patient was hysterically crying about the thought of dying because oh having silver inside of her stomach  She previously needed two nursing techs to sit patient down as she was dangerously sitting on top of the chair  In today's group patient was able to sit calmly and actually participate  Patient stated that "I have bipolar xochitl, it is mixed xochitl and depakote stabilizes me and makes me feel better but I stop taking this and my doctor is aware " Also, "is depakote addictive because when I stop it I get worse and it does not work as well the second time around"     Patient was counseled on the necessity of continuing medications even after she feels better and it was explained that depakote is like an antibiotic in that it is not additive but if you stop therapy and start it again it might not work as well  Patient was appreciative of this counseling and then walked out of group       Donna Musa, PharmD, BCPP, Clinical Pharmacist - Psychiatry

## 2019-10-01 NOTE — PROGRESS NOTES
Pt focused on discharge today  Pt says she spoke with physician and hopes to leave today or tomorrow  Discussed behaviors overnight and pt said she was having nightmares  Reviewed behavior expectations and concerns about pt disrobing  Pt denies SI/HI  Reports feeling paranoid when staff are doing q7 checks  Denies hallucinations  Pt very disheveled and appears drowsy  Admits to poor sleep

## 2019-10-01 NOTE — PROGRESS NOTES
Status: Pt was doing okay during the day, but began decline in the evening  She was disrobing & running out of her room  She tied bedsheets around her body like a toga, & was pulling at the fire alarm  Pt was up & down throughout the night, sleeping approximately 4 hours total of broken sleep  Pt focused on discharge today or tomorrow      Medication: PRN - ativan  D/C: TBD     10/01/19 0746   Team Meeting   Meeting Type Daily Rounds   Team Members Present   Team Members Present Physician;Nurse;;Occupational Therapist   Physician Team Member Dr Purnima Luther / Dr Yuri Linares / Lanie Davis Team Member [facility] Management Team Member Kenneth Lara / Seth Klein   OT Team Member Luci   Patient/Family Present   Patient Present No   Patient's Family Present No

## 2019-10-01 NOTE — PROGRESS NOTES
Patient had difficulty with sleep throughout the night  She received ativan 1 mg po which help with anxiety  Patient remained in her room working on [employer] puzzles then out in milieu (dressed) was redirected back to her room and was observed sleeping at 0400 and currently remains asleep
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
## 2019-10-01 NOTE — PROGRESS NOTES
Pt appears to be agitated today going in and out of her bedroom and slamming room and bathroom door  Pt threw her a pair of her underwear at another pt standing outside her room door and said "Here give this to akiko" and then slammed the room door

## 2019-10-02 RX ADMIN — OLANZAPINE 15 MG: 10 TABLET, ORALLY DISINTEGRATING ORAL at 17:49

## 2019-10-02 RX ADMIN — DIVALPROEX SODIUM 500 MG: 500 TABLET, FILM COATED, EXTENDED RELEASE ORAL at 17:49

## 2019-10-02 RX ADMIN — LACTULOSE 20 G: 10 SOLUTION ORAL at 09:09

## 2019-10-02 RX ADMIN — QUETIAPINE FUMARATE 800 MG: 200 TABLET ORAL at 22:11

## 2019-10-02 RX ADMIN — LACTULOSE 20 G: 10 SOLUTION ORAL at 17:49

## 2019-10-02 RX ADMIN — LACTULOSE 20 G: 10 SOLUTION ORAL at 22:11

## 2019-10-02 NOTE — PLAN OF CARE
Problem: SELF HARM/SUICIDALITY  Goal: Will have no self-injury during hospital stay  Description  INTERVENTIONS:  - Q 15 MINUTES: Routine safety checks  - Q WAKING SHIFT & PRN: Assess risk to determine if routine checks are adequate to maintain patient safety  - Encourage patient to participate actively in care by formulating a plan to combat response to suicidal ideation, identify supports and resources  Outcome: Progressing     Problem: PSYCHOSIS  Goal: Will report no hallucinations or delusions  Description  Interventions:  - Administer medication as  ordered  - Every waking shifts and PRN assess for the presence of hallucinations and or delusions  - Assist with reality testing to support increasing orientation  - Assess if patient's hallucinations or delusions are encouraging self-harm or harm to others and intervene as appropriate  Outcome: Progressing     Problem: Alteration in Thoughts and Perception  Goal: Treatment Goal: Gain control of psychotic behaviors/thinking, reduce/eliminate presenting symptoms and demonstrate improved reality functioning upon discharge  Outcome: Progressing  Goal: Refrain from acting on delusional thinking/internal stimuli  Description  Interventions:  - Monitor patient closely, per order   - Utilize least restrictive measures   - Set reasonable limits, give positive feedback for acceptable   - Administer medications as ordered and monitor of potential side effects  Outcome: Progressing  Goal: Recognize dysfunctional thoughts, communicate reality-based thoughts at the time of discharge  Description  Interventions:  - Provide medication and psycho-education to assist patient in compliance and developing insight into his/her illness   Outcome: Progressing  Goal: Complete daily ADLs, including personal hygiene independently, as able  Description  Interventions:  - Observe, teach, and assist patient with ADLS  - Monitor and promote a balance of rest/activity, with adequate nutrition and elimination   Outcome: Progressing     Problem: Ineffective Coping  Goal: Participates in unit activities  Description  Interventions:  - Provide therapeutic environment   - Provide required programming   - Redirect inappropriate behaviors   Outcome: Progressing     Problem: Nutrition/Hydration-ADULT  Goal: Nutrient/Hydration intake appropriate for improving, restoring or maintaining nutritional needs  Description  Monitor and assess patient's nutrition/hydration status for malnutrition  Collaborate with interdisciplinary team and initiate plan and interventions as ordered  Monitor patient's weight and dietary intake as ordered or per policy  Utilize nutrition screening tool and intervene as necessary  Determine patient's food preferences and provide high-protein, high-caloric foods as appropriate       INTERVENTIONS:  - Monitor oral intake, urinary output, labs, and treatment plans  - Assess nutrition and hydration status and recommend course of action  - Evaluate amount of meals eaten  - Assist patient with eating if necessary   - Allow adequate time for meals  - Recommend/ encourage appropriate diets, oral nutritional supplements, and vitamin/mineral supplements  - Order, calculate, and assess calorie counts as needed  - Recommend, monitor, and adjust tube feedings and TPN/PPN based on assessed needs  - Assess need for intravenous fluids  - Provide specific nutrition/hydration education as appropriate  - Include patient/family/caregiver in decisions related to nutrition  Outcome: Progressing

## 2019-10-02 NOTE — PROGRESS NOTES
Pt is polite and pleasant, less disorganized, but, anxious with pressured speech, and mildly tangential   She states anxiety is a 4 and depression is a 2  She denies S/H/I or AVH  She states she had bad dreams last night  She shared she doesn't like Zyprexa because she had a significant weight gain in the past   She is anxious to be D/C to pay the rent and is obsessed that she might have Cat scratch fever  She shared she was scratched multiple times by her pet and scratches have healed  She reports good result from Lactulose and states she feels dehydrated; states she plans to increase fluid intake  Behavior has been appropriate  Will continue to monitor, provide support and encouragement

## 2019-10-02 NOTE — CASE MANAGEMENT
CM met with Pt, who stated she would like to discharge today or tomorrow  Pt said that her thoughts are clearer now & she needs to get home & get on with her life  Pt stating she will return to her apartment in [place], [place] knows that she will have some messes to clean up  Pt stating she knows she left clothing on the floor & plates with food on them in the sink  Pt said there will most likely be fruit flies, but she can open a window & get them out  CM asked Pt about A/H & Pt said that her dad  2 years ago, & she often hears his voices, but does not think it is really a hallucination  CM asked Pt about her paranoia that something was happening to her daughter, [place]  Pt said that she feels it's more of a mother's intuition, & that she will just learn to live with it  Pt also talking about a project she has been 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, to help women "get out"  Pt stating her daughter knows people that are good at makeup  She said the large apartment building across from [facility] as cameras & only 3 entrances which she need a keycard to access  Pt said that it is a big project & she is going to focus on caring for herself for now  CM & Pt discussed aftercare & 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 [facility]  CM asked about Pt seeing a therapy & she said that there is someone she can see there & she was agreeable  CM asked Pt if she had interest in PHP & she wasn't sure  She does not wish to attend [facility], but said that [facility] offers many groups & she already plans to attend the grief groups they have on   Pt said that she needs CM to schedule her a follow-up with GI as she needs an abdominal  ultrasound done & an EKG  CM contacted [facility] @ [phone] & spoke with Nurse Dragan Cunningham, who said that CM had to fax a medication list before they could schedule any appointments  CM faxed medication list & labs    Dragan Cunningham called back & was able to schedule Pt with Efren Barroso on 10/9 at 12:45 PM   CM asked about Pt seeking a therapist & was told that since she is currently seeing anyone regularly, they would put a note in for Consuelo Joyce to discuss with Pt & then refer her to a therapist in the practice  CM asked about groups and/or PHP, & Yue Boyd said that when Pt comes in on 10/9, they will do a full assessment & make referrals as necessary  CM contacted Pt's daughter, Benedicto Paris, @ [phone], to provide an update on Pt's status/improvement  Urszula said that the family does not feel that Pt is well enough yet for discharge  She said just this morning, Pt had called her sister, stating things that aren't true; this are things Pt will ofte bring up & talk about when she is sick  Urszula said that Pt initially sounded like she was doing fine, but then digressed back to these topics  Benedicto Plummer said that just as recently as yesterday, Pt would call her back-to-back times, in a panic that something was wrong  Benedicto Paris said that she knows that her mom loves her, however, when Pt is not in the hospital, she is not concerned about her  Benedicto Paris said that she does not want to sound harsh, however, Pt never raised her, & her custody has always been with her father  CM agreed to pass this information along to the treatment team & provide an update tomorrow  Urszula said that if Pt is in the hospital through the weekend, she would try to visit

## 2019-10-02 NOTE — DISCHARGE INSTR - OTHER ORDERS
The PEER LINE is a toll-free telephone number for people in [facility] who are seeking a listening ear for additional support in their recovery from mental illness  The PEER LINE is peer-run and peer-friendly  You can call the Peer Line 24 hours a day  Phone: 1-987-QH-PEERS /(8-931.450.8188)     Emergency [facility] Emergency Services: [phone]  [address]  [address] [facility] , [place], [address] [place] [address]     Text CONNECT to 949112 from anywhere in the [place], anytime, about any type of crisis  A live, trained Crisis Counselor receives the text and lets you know that they are here to listen  The volunteer Crisis Counselor will help you move from a hot moment to a cool moment  Warm Line: [phone], [phone], [phone]  If it is not quite a crisis, but you want to talk to someone, 24 hours/day, 7 days/week:  Someone to listen; someone who cares  The [facility] Mental [facility]ness ([facility]) offers various education & support groups for you & your family  For more information visit their website at   http://www G-CON/     Dial 2-1-1 to get connected/get help  Free, confidential information & referral available 24/7: Aging Services, Child & Youth Services, Counseling, Education/Training, Food/Shelter/Clothing, Health Services, Parenting, Substance Abuse, Support Groups, Volunteer Opportunities, & much more    Phone: 2-1-1 or 546-099-4662, Web: LILIYA [email or web address] RAYA, Email: [email or web address]
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
## 2019-10-03 LAB — AMMONIA PLAS-SCNC: 52 UMOL/L (ref 11–35)

## 2019-10-03 PROCEDURE — 82140 ASSAY OF AMMONIA: CPT | Performed by: PSYCHIATRY & NEUROLOGY

## 2019-10-03 RX ORDER — LACTULOSE 20 G/30ML
20 SOLUTION ORAL 2 TIMES DAILY
Status: DISCONTINUED | OUTPATIENT
Start: 2019-10-03 | End: 2019-10-04

## 2019-10-03 RX ADMIN — QUETIAPINE FUMARATE 800 MG: 200 TABLET ORAL at 22:03

## 2019-10-03 RX ADMIN — DIVALPROEX SODIUM 500 MG: 500 TABLET, FILM COATED, EXTENDED RELEASE ORAL at 18:05

## 2019-10-03 RX ADMIN — LACTULOSE 20 G: 10 SOLUTION ORAL at 18:05

## 2019-10-03 RX ADMIN — OLANZAPINE 15 MG: 10 TABLET, ORALLY DISINTEGRATING ORAL at 18:05

## 2019-10-03 RX ADMIN — LACTULOSE 20 G: 10 SOLUTION ORAL at 09:30

## 2019-10-03 NOTE — PROGRESS NOTES
Per report from previous shift: Denies SI, mild depression, ready for discharge  Unhappy with zyprexa due to weight gain in past   Believes she has cat scratch fever  Improved sleep    Ammonia improved  CM: family says not better, appointment next Wednesday at 1200

## 2019-10-03 NOTE — PROGRESS NOTES
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

## 2019-10-03 NOTE — PROGRESS NOTES
Progress Note - 707 [facility] 51 y o  female MRN: [id number]  Unit/Bed#: U 252-01 Encounter: [id number]    Assessment/Plan   Active Problems:    Bipolar I disorder, most recent episode manic, severe with psychotic features ([address] )      Subjective:  Patient spoken with privately and lunch room  She is much more reality oriented today with linear thoughts and no tangential/circumstantial thought patterns  Patient was organizing condiments for storage in refrigerator  Smiled and joked with the interviewer, very pleasant affect and cooperative  Says that she slept well and only had 1 nightmare about choking since she ate just prior to sleeping  States that she does feel little drowsy and occasionally dizzy upon awakening after taking her medications but does not endorse any additional side effects  States that she is no longer seeing or hearing spirits are demons and that she is only still struggling with controlling some of her negative thoughts  She is still focused and goal oriented about discharge  Inquired if her daughter would be able to pick her up this Friday  Patient was informed that she will be kept on the unit into her early next week to continue monitoring her ammonia level and recheck her medication levels  Patient voiced understanding  Patient is excited about discharge and states that she does want to go see a therapist at [facility] when she is done and perhaps a grief counselor since she believes the pending death of her close friend is a major trigger for her mood/thought issues recently  Patient's lactulose improved to 52 umol/L with TID lactulose  Lactulose was moved to BID for 2 day 10/03/2019 and will be discontinued tomorrow  Ammonia level will be checked over the weekend  Depakote level will also be checked tomorrow 10/04/2019    If laboratory work is satisfactory and patient maintains stable affect and thought pattern, treatment team will continue with plan to discharge early next week  Current Medications:    Current Facility-Administered Medications:  acetaminophen 325 mg Oral Q6H PRN Clarance Sauger, PA-C   acetaminophen 650 mg Oral Q6H PRN Clarance Sauger, PA-C   aluminum-magnesium hydroxide-simethicone 30 mL Oral Q4H PRN Oletha Gowers, MD   benztropine 1 mg Intramuscular Q6H PRN Oletha Gowers, MD   benztropine 1 mg Oral Q6H PRN Oletha Gowers, MD   divalproex sodium 500 mg Oral After Dinner [facility]   haloperidol 5 mg Oral Q6H PRN Clarance Sauger, PA-C   haloperidol lactate 5 mg Intramuscular Q6H PRN Clarance Sauger, PA-C   hydrOXYzine HCL 50 mg Oral Q8H PRN [facility]   ibuprofen 600 mg Oral Q6H PRN Clarance Sauger, CARA   lactulose 20 g Oral BID [facility]   LORazepam 2 mg Intramuscular Q8H PRN [facility]   LORazepam 1 mg Oral Q8H PRN [facility]   magnesium hydroxide 30 mL Oral Daily PRN Oletha Gowers, MD   OLANZapine 15 mg Oral After Dinner [facility]   OLANZapine 10 mg Intramuscular Q8H PRN Clarance Sauger, PA-RAISSA   OLANZapine 10 mg Oral Q8H PRN Clarance Sauger, PA-RAISSA   QUEtiapine 800 mg Oral HS Bonnie Drew MD   risperiDONE 1 mg Oral Q3H PRN Clarance Sauger, CARA   traZODone 50 mg Oral HS PRN Oletha Gowers, MD       Behavioral Health Medications: all current active meds have been reviewed  Vital signs in last 24 hours:  Temp:  [97 9 °F (36 6 °C)-98 1 °F (36 7 °C)] 98 1 °F (36 7 °C)  HR:  [] 100  Resp:  [16-18] 18  BP: (130-143)/(57-60) 143/60    Laboratory results:  I have personally reviewed all pertinent laboratory/tests results      Psychiatric Review of Systems:  Behavior over the last 24 hours:  improved  Sleep: normal  Appetite: normal  Medication side effects: Yes mild dizziness/drowsiness  ROS: no complaints    Mental Status Evaluation:  Appearance:  casually dressed, older than stated age and overweight   Behavior:  normal   Speech:  normal pitch and normal volume   Mood:  I am excited to leave Affect:  mood-congruent and Jovial   Language naming objects and repeating phrases   Thought Process:  goal directed and logical   Thought Content:  Difficulty controlling negative thoughts   Perceptual Disturbances: None   Risk Potential: Suicidal Ideations none, Homicidal Ideations none and Potential for Aggression No   Sensorium:  person, place, time/date and situation   Cognition:  grossly intact   Consciousness:  alert and awake    Attention: attention span appeared shorter than expected for age   Insight:  fair   Judgment: fair   Intellect    Gait/Station: normal gait/station   Motor Activity: no abnormal movements     Memory: Short and long term memory:  Improved     Progress Toward Goals: Patient's lactulose improved to 52 umol/L with TID lactulose  Lactulose was moved to BID for 2 day 10/03/2019 and will be discontinued tomorrow  Ammonia level will be checked over the weekend  Depakote level will also be checked tomorrow 10/04/2019  If laboratory work is satisfactory and patient maintains stable affect and thought pattern, treatment team will continue with plan to discharge early next week  Patient states that she will be following up with a therapist and perhaps a grief counselor at [facility] upon discharge  Recommended Treatment:     Continue with group therapy, milieu therapy and occupational therapy      Continue following current medications:   Current Facility-Administered Medications:  acetaminophen 325 mg Oral Q6H PRN Jeffrey Crump PA-C   acetaminophen 650 mg Oral Q6H PRN Jeffrey Crump PA-C   aluminum-magnesium hydroxide-simethicone 30 mL Oral Q4H PRN Georgette Stone MD   benztropine 1 mg Intramuscular Q6H PRN Georgette Stone MD   benztropine 1 mg Oral Q6H PRN Georgette Stone MD   divalproex sodium 500 mg Oral After Dinner Ignacio Forrester   haloperidol 5 mg Oral Q6H PRN Jeffrey Crump PA-C   haloperidol lactate 5 mg Intramuscular Q6H PRN Jeffrey Crump PA-C   hydrOXYzine HCL 50 mg Oral Q8H PRN Ignacio Forrester   ibuprofen 600 mg Oral Q6H PRN Brianna Mcdermott PA-C   lactulose 20 g Oral BID Ignaciorogerio Forrester   LORazepam 2 mg Intramuscular Q8H PRN Ignaciorogerio Forrester   LORazepam 1 mg Oral Q8H PRN Ignacio Forrester   magnesium hydroxide 30 mL Oral Daily PRN Thalia Browne MD   OLANZapine 15 mg Oral After Dinner Ignacio Forrester   OLANZapine 10 mg Intramuscular Q8H PRN Briannadany Mcdermott, PA-C   OLANZapine 10 mg Oral Q8H PRN Brianna Mcdermott, CARA   QUEtiapine 800 mg Oral HS Elsa Moffett MD   risperiDONE 1 mg Oral Q3H PRN Brianna Mcdermott, CARA   traZODone 50 mg Oral HS PRN Thalia Browne MD       Risks, benefits and possible side effects of Medications:   Risks, benefits, and possible side effects of medications explained to patient and patient verbalizes understanding  This note has been constructed using a voice recognition system  There may be translation, syntax,  or grammatical errors  If you have any questions, please contact the dictating provider

## 2019-10-03 NOTE — PROGRESS NOTES
Pt observed by another staff member changing shirt in bedroom with door wide open  Pt instructed to change clothing with her door closed  Pt reports feeling "weird" upon awakening, then described feeling dizzy and lightheaded  Vitals wnl  Pt has water at bedside and was instructed to increase fluids  Pt less disorganized  Was mostly appropriate throughout conversation  Pt hopes for discharge today or tomorrow  Pt said "I can follow up with my doctor for the GI issues " Pt denies having any loose stools but says she has been having regular BMs  Pt reports improved sleep last night  Denies all symptoms when asked  No questions/concerns

## 2019-10-04 LAB
ALBUMIN SERPL BCP-MCNC: 3.4 G/DL (ref 3.5–5)
ALP SERPL-CCNC: 84 U/L (ref 46–116)
ALT SERPL W P-5'-P-CCNC: 22 U/L (ref 12–78)
AMMONIA PLAS-SCNC: 49 UMOL/L (ref 11–35)
ANION GAP SERPL CALCULATED.3IONS-SCNC: 13 MMOL/L (ref 4–13)
AST SERPL W P-5'-P-CCNC: 17 U/L (ref 5–45)
BILIRUB SERPL-MCNC: 0.2 MG/DL (ref 0.2–1)
BUN SERPL-MCNC: 11 MG/DL (ref 5–25)
CALCIUM SERPL-MCNC: 8.8 MG/DL (ref 8.3–10.1)
CHLORIDE SERPL-SCNC: 103 MMOL/L (ref 100–108)
CO2 SERPL-SCNC: 22 MMOL/L (ref 21–32)
CREAT SERPL-MCNC: 0.61 MG/DL (ref 0.6–1.3)
GFR SERPL CREATININE-BSD FRML MDRD: 108 ML/MIN/1.73SQ M
GLUCOSE SERPL-MCNC: 111 MG/DL (ref 65–140)
POTASSIUM SERPL-SCNC: 4.2 MMOL/L (ref 3.5–5.3)
PROT SERPL-MCNC: 7.2 G/DL (ref 6.4–8.2)
SODIUM SERPL-SCNC: 138 MMOL/L (ref 136–145)
VALPROATE SERPL-MCNC: 46 UG/ML (ref 50–100)

## 2019-10-04 PROCEDURE — 80164 ASSAY DIPROPYLACETIC ACD TOT: CPT | Performed by: PSYCHIATRY & NEUROLOGY

## 2019-10-04 PROCEDURE — 82140 ASSAY OF AMMONIA: CPT | Performed by: PSYCHIATRY & NEUROLOGY

## 2019-10-04 PROCEDURE — 80053 COMPREHEN METABOLIC PANEL: CPT | Performed by: PSYCHIATRY & NEUROLOGY

## 2019-10-04 RX ORDER — LACTULOSE 20 G/30ML
20 SOLUTION ORAL 2 TIMES DAILY
Status: COMPLETED | OUTPATIENT
Start: 2019-10-04 | End: 2019-10-05

## 2019-10-04 RX ADMIN — LACTULOSE 20 G: 10 SOLUTION ORAL at 17:21

## 2019-10-04 RX ADMIN — LACTULOSE 20 G: 10 SOLUTION ORAL at 09:10

## 2019-10-04 RX ADMIN — OLANZAPINE 15 MG: 10 TABLET, ORALLY DISINTEGRATING ORAL at 17:21

## 2019-10-04 RX ADMIN — DIVALPROEX SODIUM 500 MG: 500 TABLET, FILM COATED, EXTENDED RELEASE ORAL at 17:21

## 2019-10-04 RX ADMIN — QUETIAPINE FUMARATE 800 MG: 200 TABLET ORAL at 21:36

## 2019-10-04 NOTE — PROGRESS NOTES
Per report from previous shift: less disorganized, ammonia trending down, lightheaded, dizzy days denied evenings, woke overnight couple times trying to leave room with blanket around her but was redirected  MD: ammonia level Saturday, dc early next week if continues to improve

## 2019-10-04 NOTE — PROGRESS NOTES
Progress Note - 707 [facility] 51 y o  female MRN: [id number]  Unit/Bed#: [facility] 252-01 Encounter: [id number]    Assessment/Plan   Active Problems:    Bipolar I disorder, most recent episode manic, severe with psychotic features ([address] )      Subjective:  Patient seen in day room and at bedside  She continues to display significant improvement  She is logical and linear in thought without delusional thinking or psychotic symptoms at this time  Patient denies SI/HI/AVH  Notes from last night indicate that the patient was writing in her notebook and endorsed that she wished to open a woman shelter  Patient was disappointed that she was unable to leave yesterday but understands that she needs to continue being monitored since she is just now getting better  Displaying very good insight into condition and situation  Continues to state that she does not particularly enjoy sleeping year  Continues to express concern with Sy praxis and potential for weight gain but understands she will not need this medication shortly if she continues with improvements in her thought process/content  Patient reports that she slept well and is eating well  Labs were drawn which showed patient's valproic acid level is still slightly outside of therapeutic range at 46 ug/mL and ammonia level is still mildly elevated at 49 umol/L  Patient will continue with lactulose today which will then be discontinued  Continue with current medication regimen and continue to monitor over the weekend with planned discharge early next week Tuesday/Wednesday after re-evaluation on Monday      Current Medications:    Current Facility-Administered Medications:  acetaminophen 325 mg Oral Q6H PRN Melia Oviedo PA-C   acetaminophen 650 mg Oral Q6H PRN Melia Oviedo PA-C   aluminum-magnesium hydroxide-simethicone 30 mL Oral Q4H PRN Armando Vieira MD   benztropine 1 mg Intramuscular Q6H PRN Armando Vieira MD   benztropine 1 mg Oral Q6H PRN Xiomara Peraza MD   divalproex sodium 500 mg Oral After Dinner [facility]   haloperidol 5 mg Oral Q6H PRN Dinah See, PA-C   haloperidol lactate 5 mg Intramuscular Q6H PRN Dinah See, PA-C   hydrOXYzine HCL 50 mg Oral Q8H PRN [facility]   ibuprofen 600 mg Oral Q6H PRN Dinah See, PA-C   lactulose 20 g Oral BID [facility]   LORazepam 2 mg Intramuscular Q8H PRN [facility]   LORazepam 1 mg Oral Q8H PRN [facility]   magnesium hydroxide 30 mL Oral Daily PRN Xiomara Peraza MD   OLANZapine 15 mg Oral After Dinner [facility]   OLANZapine 10 mg Intramuscular Q8H PRN Dinah See, PA-C   OLANZapine 10 mg Oral Q8H PRN Dinah See, PA-C   QUEtiapine 800 mg Oral HS Darron Vázquez MD   risperiDONE 1 mg Oral Q3H PRN Dinah See, PA-C   traZODone 50 mg Oral HS PRN Xiomara Peraza MD       Behavioral Health Medications: all current active meds have been reviewed  Vital signs in last 24 hours:  Temp:  [97 °F (36 1 °C)-98 5 °F (36 9 °C)] 97 °F (36 1 °C)  HR:  [95-99] 99  Resp:  [16-18] 16  BP: (114-123)/(55-58) 114/55    Laboratory results:  I have personally reviewed all pertinent laboratory/tests results  Psychiatric Review of Systems:  Behavior over the last 24 hours:  improved  Sleep: normal  Appetite: normal  Medication side effects: No  ROS: no complaints    Mental Status Evaluation:  Appearance:  casually dressed, older than stated age and overweight   Behavior:  normal   Speech:  normal pitch and normal volume   Mood:  Bummed that I still have to be here     Affect:  mood-congruent   Language naming objects and repeating phrases   Thought Process:  goal directed and logical   Thought Content:  normal   Perceptual Disturbances: None   Risk Potential: Suicidal Ideations none, Homicidal Ideations none and Potential for Aggression No   Sensorium:  person, place, time/date and situation   Cognition:  grossly intact   Consciousness:  alert and awake Attention: attention span appeared shorter than expected for age   Insight:  fair   Judgment: fair   Intellect    Gait/Station: normal gait/station   Motor Activity: no abnormal movements     Memory: Short and long term memory:  Grossly intact     Progress Toward Goals:  Patient continues to show improvement with both mood/affect and psychotic features  Labs were drawn which showed patient's valproic acid level is still slightly outside of therapeutic range at 46 ug/mL and ammonia level is still mildly elevated at 49 umol/L  Patient will continue with lactulose today which will then be discontinued  Continue with current medication regimen of 500 mg Depakote daily after dinner, Lantus pain 15 mg daily after dinner, and quetiapine 800 mg QHS  Continue to monitor over the weekend with planned discharge early next week Tuesday/Wednesday after re-evaluation on Monday  Recommended Treatment:     Continue with group therapy, milieu therapy and occupational therapy      Continue following current medications:   Current Facility-Administered Medications:  acetaminophen 325 mg Oral Q6H PRN Ruthanna Linker, PA-C   acetaminophen 650 mg Oral Q6H PRN Ruthanna Linker, PA-C   aluminum-magnesium hydroxide-simethicone 30 mL Oral Q4H PRN Vickey Coffey MD   benztropine 1 mg Intramuscular Q6H PRN Vickey Coffey MD   benztropine 1 mg Oral Q6H PRN Vickey Coffey MD   divalproex sodium 500 mg Oral After Dinner Ignacio Forrester   haloperidol 5 mg Oral Q6H PRN Ruthanna Linker, PA-C   haloperidol lactate 5 mg Intramuscular Q6H PRN Ruthanna Linker, PA-C   hydrOXYzine HCL 50 mg Oral Q8H PRN Ignacio Forrester   ibuprofen 600 mg Oral Q6H PRN Ruthanna Linker, PA-C   lactulose 20 g Oral BID [facility]   LORazepam 2 mg Intramuscular Q8H PRN Ignacio [facility]   LORazepam 1 mg Oral Q8H PRN Ignacio [facility]   magnesium hydroxide 30 mL Oral Daily PRN Vickey Coffey MD   OLANZapine 15 mg Oral After Dinner Ignaciorogerio Forrester   OLANZapine 10 mg Intramuscular Q8H PRN Chano Block, PA-C   OLANZapine 10 mg Oral Q8H PRN Chano Block, PA-C   QUEtiapine 800 mg Oral HS Mireya Houston MD   risperiDONE 1 mg Oral Q3H PRN Chano Block, PA-C   traZODone 50 mg Oral HS PRN Jonathan Hernandez MD       Risks, benefits and possible side effects of Medications:   Risks, benefits, and possible side effects of medications explained to patient and patient verbalizes understanding  This note has been constructed using a voice recognition system  There may be translation, syntax,  or grammatical errors  If you have any questions, please contact the dictating provider

## 2019-10-04 NOTE — PROGRESS NOTES
Pt appropriate in conversation today  Reports sleeping well, no nightmares  Pt showered and attended to ADLs  Pt told writer she had her blood work completed and is waiting to see the results of her depakote and NH3 levels  Pt reports feeling a little lightheaded, told writer "I have water that I'm drinking and I will stand up slowly " Pt denies all symptoms and is hopeful to discuss discharge planning today

## 2019-10-04 NOTE — CASE MANAGEMENT
CM met with Pt who reported she was doing better  CM & Pt discussed the need to continue to monitor & her labs & she verbalized understanding  CM reviewed Pt is scheduled [facility] for a full [facility], after which, she will be scheduled with a therapist & appropriate groups  Pt in agreement with this plan  CM reviewed the possibility of Pt being transported directly from [facility] to this appointment & Pt said she would be able to get transportation home, & worse case scenario, she could use Uber/Lyft to get back to her apartment  Pt had no questions for CM & said, "I'll be polite, I promise"  Pt went to join her peers for lunch

## 2019-10-05 PROCEDURE — 99231 SBSQ HOSP IP/OBS SF/LOW 25: CPT | Performed by: STUDENT IN AN ORGANIZED HEALTH CARE EDUCATION/TRAINING PROGRAM

## 2019-10-05 RX ORDER — FLUPHENAZINE HYDROCHLORIDE 5 MG/1
5 TABLET ORAL DAILY
Status: DISCONTINUED | OUTPATIENT
Start: 2019-10-06 | End: 2019-10-07

## 2019-10-05 RX ORDER — FLUPHENAZINE HYDROCHLORIDE 5 MG/1
5 TABLET ORAL
Status: DISCONTINUED | OUTPATIENT
Start: 2019-10-06 | End: 2019-10-05

## 2019-10-05 RX ORDER — FLUPHENAZINE HYDROCHLORIDE 5 MG/1
5 TABLET ORAL
Status: DISCONTINUED | OUTPATIENT
Start: 2019-10-05 | End: 2019-10-05

## 2019-10-05 RX ADMIN — LACTULOSE 20 G: 10 SOLUTION ORAL at 09:18

## 2019-10-05 RX ADMIN — OLANZAPINE 15 MG: 10 TABLET, ORALLY DISINTEGRATING ORAL at 17:01

## 2019-10-05 RX ADMIN — DIVALPROEX SODIUM 500 MG: 500 TABLET, FILM COATED, EXTENDED RELEASE ORAL at 17:01

## 2019-10-05 RX ADMIN — MAGNESIUM HYDROXIDE 30 ML: 400 SUSPENSION ORAL at 17:07

## 2019-10-05 RX ADMIN — QUETIAPINE FUMARATE 800 MG: 200 TABLET ORAL at 21:04

## 2019-10-05 NOTE — PROGRESS NOTES
Pt is pleasant, laughing and joking around with staff  Less disorganized in thought and able to hold an appropriate conversation  Visible in the milieu, social with peers  Attends groups  Pt is hopeful for for discharge next week  Medication complaint  No questions or concerns

## 2019-10-05 NOTE — PROGRESS NOTES
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

## 2019-10-05 NOTE — PROGRESS NOTES
AM Rounds:    Ammonia level trending downward  Depakote level 46 yesterday  Pleasant  Slightly disorganized

## 2019-10-05 NOTE — PROGRESS NOTES
Progress Note - 707 [facility] 51 y o  female MRN: [id number]  Unit/Bed#: [facility] 338-16 Encounter: [id number]    Subjective:    Per nursing, patient is pleasant and polite during interaction, visible on unit, attending groups, wants to open a women's shelter, tangential at times  Per patient, patient reports that she gained a lot of weight on Zyprexa  She reports that she is swallowing the Zydis tablets instead of allowing them to dissolve to prevent herself from gaining weight  Patient reports her mood has been "a bit manic," reports that she has trouble sleeping at times  Patient reports that she has been working on her thesis, trying to get a Ph D in psychology  She reports that she has been eating okay, too much at times  Patient denies any passive or active suicidal or homicidal ideation, intent, or plan  Patient reports that she has an exacerbation of her symptoms when she is stressed  Patient denies any racing thoughts  She denies any passive or active suicidal ideation, intent, or plan  Patient reports that she doesn't want to take Zyprexa anymore, reports gaining a lot of weight the last time she was on the medication  Patient claims that she hasn't really been taking it anyway, reports "that I swallow it instead of letting it dissolve on my tongue so that my stomach acid will break it down "  Acknowledged that medication is meant to be dissolved on tongue  Patient is emphatic that the Zyprexa be stopped  Given that patient had previously been discharged on a combination of Seroquel and Prolixin, will switch the Zyprexa to Prolixin 5 mg at this time      Behavior over the last 24 hours:  improved  Medication side effects: No  ROS: Dry mouth    Objective:    Temp:  [96 8 °F (36 °C)-98 2 °F (36 8 °C)] 98 2 °F (36 8 °C)  HR:  [] 94  Resp:  [16-17] 17  BP: (127-139)/(59-82) 127/59    Mental Status Evaluation:  Appearance:  sitting comfortably in chair, dressed in casual clothing, adequate hygiene and grooming, cooperative with interview   Behavior:  No tics, tremors, or behaviors observed   Speech:  Normal rate, rhythm, and volume   Mood:  "A bit manic"   Affect:  Appears euphoric, labile   Thought Process: Tangential   Associations tangential associations   Thought Content:  No passive or active suicidal or homicidal ideation, intent, or plan  Perceptual Disturbances: Denies any auditory or visual hallucinations   Sensorium:  Oriented to person, place, time, and situation   Memory:  recent and remote memory grossly intact   Consciousness:  alert and awake   Attention: distractible   Insight:  poor   Judgment: fair   Gait/Station: normal gait/station   Motor Activity: no abnormal movements       Labs: I have personally reviewed all pertinent laboratory/tests results  Labs in last 72 hours:   Recent Labs     10/04/19  0735   SODIUM 138   K 4 2      CO2 22   BUN 11   CREATININE 0 61   GLUC 111   CALCIUM 8 8   AST 17   ALT 22   ALKPHOS 84   TP 7 2   ALB 3 4*   TBILI 0 20   VALPROICTOT 46*   AMMONIA 49*       Progress Toward Goals: Progressing    Recommended Treatment: Continue with group therapy, milieu therapy and occupational therapy  Risks, benefits and possible side effects of Medications:   Risks, benefits, and possible side effects of medications explained to patient and patient verbalizes understanding  Medications: all current active meds have been reviewed      Current Facility-Administered Medications:  acetaminophen 325 mg Oral Q6H PRN Gera Chapman PA-C   acetaminophen 650 mg Oral Q6H PRN Gera Chapman PA-C   aluminum-magnesium hydroxide-simethicone 30 mL Oral Q4H PRN Ana Suarez MD   benztropine 1 mg Intramuscular Q6H PRN Ana Suarez MD   benztropine 1 mg Oral Q6H PRN Ana Suarez MD   divalproex sodium 500 mg Oral After Dinner Ignacio Forrester   haloperidol 5 mg Oral Q6H PRN Gera Chapman PA-C   haloperidol lactate 5 mg Intramuscular Q6H PRN Nicolas Ruiz PA-C   hydrOXYzine HCL 50 mg Oral Q8H PRN Ignacio Forrester   ibuprofen 600 mg Oral Q6H PRN Nicolas Ruiz PA-C   LORazepam 2 mg Intramuscular Q8H PRN Ignacio Forrester   LORazepam 1 mg Oral Q8H PRN Ignacio Forrester   magnesium hydroxide 30 mL Oral Daily PRN Ludwig Damian MD   OLANZapine 15 mg Oral After Dinner Ignacio Forrester   OLANZapine 10 mg Intramuscular Q8H PRN Nicolas Ruiz PA-C   OLANZapine 10 mg Oral Q8H PRN Nicolas Ruiz PA-C   QUEtiapine 800 mg Oral HS Ashley Juárez MD   risperiDONE 1 mg Oral Q3H PRN Nicolas Ruiz PA-C   traZODone 50 mg Oral HS PRN Ludwig Damian MD           Assessment/Plan   Active Problems:    Bipolar I disorder, most recent episode manic, severe with psychotic features ([facility] [address] )    52 y/o Female with bipolar I d/o with psychotic feature- continues to have tangential thought process, grandiose delusions, labile and euphoric mood, claims that she is non-compliant with Zyprexa Zydis tablets and requests to discontinue at this time  Plan:  -Given concerns about compliance on Zyprexa and patient's reported history of significant weight gain on medication, patient requests that medication be discontinued at this time  -Given good response at previous hospitalization to Prolixin, will start Prolixin tablets 5 mg at this time  Patient understands that changing mood stabilizer may affect discharge planning process  -Will check Ammonia levels tomorrow

## 2019-10-06 LAB — AMMONIA PLAS-SCNC: 33 UMOL/L (ref 11–35)

## 2019-10-06 PROCEDURE — 82140 ASSAY OF AMMONIA: CPT | Performed by: PSYCHIATRY & NEUROLOGY

## 2019-10-06 RX ORDER — DOCUSATE SODIUM 100 MG/1
100 CAPSULE, LIQUID FILLED ORAL 2 TIMES DAILY
Status: DISCONTINUED | OUTPATIENT
Start: 2019-10-06 | End: 2019-10-11 | Stop reason: HOSPADM

## 2019-10-06 RX ADMIN — MAGNESIUM HYDROXIDE 30 ML: 400 SUSPENSION ORAL at 09:37

## 2019-10-06 RX ADMIN — DOCUSATE SODIUM 100 MG: 100 CAPSULE, LIQUID FILLED ORAL at 17:22

## 2019-10-06 RX ADMIN — DOCUSATE SODIUM 100 MG: 100 CAPSULE, LIQUID FILLED ORAL at 12:37

## 2019-10-06 RX ADMIN — FLUPHENAZINE HYDROCHLORIDE 5 MG: 5 TABLET, FILM COATED ORAL at 09:36

## 2019-10-06 RX ADMIN — DIVALPROEX SODIUM 500 MG: 500 TABLET, FILM COATED, EXTENDED RELEASE ORAL at 17:22

## 2019-10-06 RX ADMIN — QUETIAPINE FUMARATE 800 MG: 200 TABLET ORAL at 21:55

## 2019-10-06 NOTE — PROGRESS NOTES
Progress Note - 707 [facility] 51 y o  female MRN: [id number]  Unit/Bed#: Taiwo Brown 140-62 Encounter: [id number]    Subjective:    Per nursing, patient woke once overnight, otherwise slept well  Per patient, patient reports that things have been going well  Patient reports that she took the Prolixin this morning, so far has been tolerating okay  Patient reports that she slept okay last night, reports having some nightmares about organizing a family camping trip  Patient reports that she is  currently, having thoughts about changing her name back to her maiden name  She reports her appetite has been okay, reports her energy is fine  Patient denies any passive or active suicidal ideation, intent, or plan  She denies any auditory or visual hallucinations  She reports having some achiness  She reports feeling that she is being held down at times in the morning, "chao between God and the devil "    Behavior over the last 24 hours:  improved  Medication side effects: No  ROS: constipation    Objective:    Temp:  [97 7 °F (36 5 °C)-98 2 °F (36 8 °C)] 97 7 °F (36 5 °C)  HR:  [] 106  Resp:  [16-17] 16  BP: (116-127)/(56-59) 116/56    Mental Status Evaluation:  Appearance:  sitting comfortably in chair, dressed in casual clothing, cooperative with interview, fairly well related   Behavior:  No tics, tremors, or behaviors observed   Speech:  Normal rate, rhythm, and volume   Mood:  "good"   Affect:  Appears generally euthymic, less labile today, mood-congruent   Thought Process: Tangential at times   Associations tangential associations   Thought Content:  No passive or active suicidal or homicidal ideation, intent, or plan     Perceptual Disturbances: Denies any auditory or visual hallucinations   Sensorium:  Oriented to person, place, time, and situation   Memory:  recent and remote memory grossly intact   Consciousness:  alert and awake   Attention: attention span and concentration were age appropriate   Insight:  fair   Judgment: fair   Gait/Station: normal gait/station   Motor Activity: no abnormal movements       Labs: I have personally reviewed all pertinent laboratory/tests results  Labs in last 72 hours:   Recent Labs     10/04/19  0735 10/06/19  0720   SODIUM 138  --    K 4 2  --      --    CO2 22  --    BUN 11  --    CREATININE 0 61  --    GLUC 111  --    CALCIUM 8 8  --    AST 17  --    ALT 22  --    ALKPHOS 84  --    TP 7 2  --    ALB 3 4*  --    TBILI 0 20  --    VALPROICTOT 46*  --    AMMONIA 49* 33   Ammonia- within normal limits    Progress Toward Goals: Progressing    Recommended Treatment: Continue with group therapy, milieu therapy and occupational therapy  Risks, benefits and possible side effects of Medications:   Risks, benefits, and possible side effects of medications explained to patient and patient verbalizes understanding  Medications: all current active meds have been reviewed      Current Facility-Administered Medications:  acetaminophen 325 mg Oral Q6H PRN Garrett Gambino PA-C   acetaminophen 650 mg Oral Q6H PRN Garrett Gambino PA-C   aluminum-magnesium hydroxide-simethicone 30 mL Oral Q4H PRN Radha Foster MD   benztropine 1 mg Intramuscular Q6H PRN Radha Foster MD   benztropine 1 mg Oral Q6H PRN Radha Foster MD   divalproex sodium 500 mg Oral After Dinner Evaristo Bad   fluPHENAZine 5 mg Oral Daily Pilar Deleon MD   haloperidol 5 mg Oral Q6H PRN Garrett Gambino PA-C   haloperidol lactate 5 mg Intramuscular Q6H PRN Garrett Gambino PA-C   hydrOXYzine HCL 50 mg Oral Q8H PRN Ignacio Forrester   ibuprofen 600 mg Oral Q6H PRN Garrett Gambino PA-C   LORazepam 2 mg Intramuscular Q8H PRN Ignacio Forrester   LORazepam 1 mg Oral Q8H PRN Ignacio Forrester   magnesium hydroxide 30 mL Oral Daily PRMARIELA Foster MD   OLANZapine 10 mg Intramuscular Q8H PRN Garrett Gambino PA-C   OLANZapine 10 mg Oral Q8H PRN Garrett Gambino PA-C QUEtiapine 800 mg Oral HS Charles Kenyon MD   risperiDONE 1 mg Oral Q3H PRN Nadir Jacobsen PA-C   traZODone 50 mg Oral HS PRN Timmy Parisi MD       Assessment/Plan   Active Problems:    Bipolar I disorder, most recent episode manic, severe with psychotic features [facility])    52 y/o Female with bipolar I d/o with psychotic features- continues to have improved mood stability, remains a bit tangential, hyper-Church at times, better insight into her behaviors, has some constipation  Plan:  -Continue med regimen   -Lactulose was discontinued  Ammonia within normal limits

## 2019-10-06 NOTE — PROGRESS NOTES
Pt pleasant and polite during interaction  Visible on unit  Says she enjoys the groups  Tangential at times  Reports feeling "happy " Denies having any problems or concerns at this time

## 2019-10-06 NOTE — PROGRESS NOTES
Pt sitting in Day Room, coloring and watching tv  Calm, cooperative  Reports positive day, playing Monopoly with peers today  Denies SI, HI, AVH  Tangential at times  Stating to writer, "I think I need to start on Adderall " No questions concerns at this time

## 2019-10-06 NOTE — PROGRESS NOTES
Pt awake once overnight for water  Appears to have slept without difficulty  Pt did start on floor and then returned to bed

## 2019-10-06 NOTE — PROGRESS NOTES
Daily Rounds:    Ammonia level WNL, will d/c Lactulose  Start Colace d/t pt c/o constipation  Pt changed from Zyprexa to Prolixin yesterday per request   Monitor for changes

## 2019-10-07 RX ADMIN — DOCUSATE SODIUM 100 MG: 100 CAPSULE, LIQUID FILLED ORAL at 12:29

## 2019-10-07 RX ADMIN — DIVALPROEX SODIUM 500 MG: 500 TABLET, FILM COATED, EXTENDED RELEASE ORAL at 18:32

## 2019-10-07 RX ADMIN — QUETIAPINE FUMARATE 800 MG: 200 TABLET ORAL at 21:34

## 2019-10-07 RX ADMIN — OLANZAPINE 15 MG: 10 TABLET, FILM COATED ORAL at 18:33

## 2019-10-07 RX ADMIN — DOCUSATE SODIUM 100 MG: 100 CAPSULE, LIQUID FILLED ORAL at 18:32

## 2019-10-07 NOTE — PROGRESS NOTES
Pt reports she is excited about discharge tomorrow  Pt talking about going to [place] to see her family, then discussing her budget and how she needs to reevaluate things because she will be working less hours  Pt jumping from topic to topic, then talking about needing to organize her home  Pt then smiled and said she is excited about discharge but seemed overwhelmed about everything she needs to do  Pt said that she will do her best not to overwhelm herself  Pt denies all symptoms  Feels she is ready for discharge tomorrow  Writer asked pt if she has contacted anyone from work and pt said she has talked to her manager and one of the nurses on her unit but did not elaborate

## 2019-10-07 NOTE — SOCIAL WORK
Worker received phone call from patient's daughter [facility] regarding concerns  She reported to worker that she visited her mother over the weekend and spoke with her last evening on the phone  Patient's daughter reported that while speaking with the patient she was still reporting ongoing delusions about the [address]  She reports that the patient was speaking normal but reporting conspiracy theories  Patient's daughter expressed a concern with the patient being discharged  Worker informed her that it would be relayed to the psychiatrist  Worker will follow-up

## 2019-10-07 NOTE — PROGRESS NOTES
Pleasant and cooperative during interaction  Social with peers and attending groups  Expressed readiness for discharge  Denies SI/HI  Compliant with meals and medications

## 2019-10-07 NOTE — PROGRESS NOTES
Received call from Rosa Cotton who said she is a NP who works with Dr Clara Costello reports that multiple staff members from the hospital as well as herself have been receiving bizarre phone calls from [place] as recently as today  She said that Kodi moon has been expressing various delusional thoughts, talking about love affairs, and believes she is here on the unit as a staff member  Freddy Costello said that she has concerns with planned discharge tomorrow  Dr Jessa Cole made aware

## 2019-10-07 NOTE — PLAN OF CARE
Problem: SELF HARM/SUICIDALITY  Goal: Will have no self-injury during hospital stay  Description  INTERVENTIONS:  - Q 15 MINUTES: Routine safety checks  - Q WAKING SHIFT & PRN: Assess risk to determine if routine checks are adequate to maintain patient safety  - Encourage patient to participate actively in care by formulating a plan to combat response to suicidal ideation, identify supports and resources  Outcome: Progressing     Problem: PSYCHOSIS  Goal: Will report no hallucinations or delusions  Description  Interventions:  - Administer medication as  ordered  - Every waking shifts and PRN assess for the presence of hallucinations and or delusions  - Assist with reality testing to support increasing orientation  - Assess if patient's hallucinations or delusions are encouraging self-harm or harm to others and intervene as appropriate  Outcome: Progressing     Problem: Ineffective Coping  Goal: Participates in unit activities  Description  Interventions:  - Provide therapeutic environment   - Provide required programming   - Redirect inappropriate behaviors   Outcome: Progressing

## 2019-10-07 NOTE — PROGRESS NOTES
Per report from previous shift:  Denies all, listens to radio, ammonia decreased stopped lactulose, boardgames, social, less disorganized, changed to prolixin per patient request

## 2019-10-07 NOTE — PROGRESS NOTES
Progress Note - 707 [facility] 51 y o  female MRN: [id number]  Unit/Bed#: Rhoda Tran 858-98 Encounter: [id number]    Assessment/Plan   Active Problems:    Bipolar I disorder, most recent episode manic, severe with psychotic features ([facility] [address] )      Subjective:  Patient seen at bedside  It is from last night indicated that patient was feeling more stressed  She was switched to Prolixin 5 mg this weekend after continued concerns about weight gain  Some delusional thinking also reported over the weekend  Patient's ammonia level has returned to normal at 33 umol/L and lactulose was discontinued  Patient's daughter had also expressed concerns that patient was continuing to endorse delusions about the [place] on the phone  Upon interview the patient states that she is not delusional but rather was just talking about possible changes she would like to see in the world  Patient's thoughts are linear and logical   Accurately describes the differences between generic and name-brand sublingual Zyprexa to this interviewer  Indicates that she has been staying active and sleeping much better  Eating well  It was explained to patient that a change from Zyprexa at this time would not be a good decision as it would likely require more monitoring since it is a major medications which  Still indicates that she would like to avoid the Zyprexa but is agreeable to continuing until she is able to see her outpatient provider and voices understanding of not switching medications prior to discharge  Indicates that she has an appointment Wednesday with her outpatient provider and this would be a more appropriate time to make medication changes  She reports that she plans to take 1 month off of work to make sure her situation is stabilized prior to returning  Endorses appreciation for her time here in states that she will be able to use her coping skills in the future and maintain her medication regimen  States that she is excited about pending discharge and 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   docusate sodium 100 mg Oral BID Landon Alaniz MD   haloperidol 5 mg Oral Q6H PRN Dinah See, PA-C   haloperidol lactate 5 mg Intramuscular Q6H PRN Dinah See, PA-C   hydrOXYzine HCL 50 mg Oral Q8H PRN [facility]   ibuprofen 600 mg Oral Q6H PRN Dinah See, PA-C   LORazepam 2 mg Intramuscular Q8H PRN [facility]   LORazepam 1 mg Oral Q8H PRN [facility]   magnesium hydroxide 30 mL Oral Daily PRN Xiomara Peraza MD   OLANZapine 10 mg Intramuscular Q8H PRN Dinah See, PA-C   OLANZapine 10 mg Oral Q8H PRN Dinah See, PA-C   OLANZapine 15 mg Oral After Dinner [facility]   QUEtiapine 800 mg Oral HS Darron Vázquez MD   risperiDONE 1 mg Oral Q3H PRN Dinah See, PA-C   traZODone 50 mg Oral HS PRN Xiomara Peraza MD       Behavioral Health Medications: all current active meds have been reviewed  Vital signs in last 24 hours:  Temp:  [97 6 °F (36 4 °C)-97 7 °F (36 5 °C)] 97 7 °F (36 5 °C)  HR:  [89-97] 97  Resp:  [16-18] 18  BP: (115-140)/(62-68) 115/68    Laboratory results:  I have personally reviewed all pertinent laboratory/tests results      Psychiatric Review of Systems:  Behavior over the last 24 hours:  improved  Sleep: normal  Appetite: normal  Medication side effects: No  ROS: no complaints    Mental Status Evaluation:  Appearance:  older than stated age and overweight   Behavior:  Calm and cooperative   Speech:  normal pitch and normal volume   Mood:  I am good today     Affect:  mood-congruent and redirectable   Language naming objects and repeating phrases   Thought Process:  logical and Linear   Thought Content:  normal   Perceptual Disturbances: None   Risk Potential: Suicidal Ideations none, Homicidal Ideations none and Potential for Aggression No   Sensorium:  person, place, time/date and situation   Cognition:  grossly intact   Consciousness:  alert and awake    Attention: attention span and concentration were age appropriate   Insight:  fair   Judgment: fair   Intellect    Gait/Station: normal gait/station   Motor Activity: no abnormal movements     Memory: Short and long term memory:  Grossly intact     Progress Toward Goals:  5 mg Prolixin will be discontinued and patient will be restarted on 15 mg Zyprexa after dinner  Continue with 500 mg Depakote after dinner and quetiapine 800 mg QHS  Plan for discharge tomorrow as long as condition stays stable  Recommended Treatment:     Continue with group therapy, milieu therapy and occupational therapy      Continue following current medications:   Current Facility-Administered Medications:  acetaminophen 325 mg Oral Q6H PRN Alvaro Martines PA-C   acetaminophen 650 mg Oral Q6H PRN Alvaro Martines PA-C   aluminum-magnesium hydroxide-simethicone 30 mL Oral Q4H PRN Kathyrn Hazel MD   benztropine 1 mg Intramuscular Q6H PRN Kathryn Hazel MD   benztropine 1 mg Oral Q6H PRN Kathryn Hazel MD   divalproex sodium 500 mg Oral After Dinner Ignacio Forrester   docusate sodium 100 mg Oral BID Twin Segura MD   haloperidol 5 mg Oral Q6H PRN Alvaro Martines PA-C   haloperidol lactate 5 mg Intramuscular Q6H PRN Alvaro Martines PA-C   hydrOXYzine HCL 50 mg Oral Q8H PRN Ignacio Forrester   ibuprofen 600 mg Oral Q6H PRN Dinah Zuniga, PA-C   LORazepam 2 mg Intramuscular Q8H PRN Ignacio Forrester   LORazepam 1 mg Oral Q8H PRN Ignacio Forrester   magnesium hydroxide 30 mL Oral Daily PRN Xiomara Peraza MD   OLANZapine 10 mg Intramuscular Q8H PRN Dinah Zuniga, PA-RAISSA   OLANZapine 10 mg Oral Q8H PRN Dinah Zuniga, PA-RAISSA   OLANZapine 15 mg Oral After Dinner Ignacio Forrester   QUEtiapine 800 mg Oral HS Darron Vázquez MD   risperiDONE 1 mg Oral Q3H PRN SCOTT MaldonadoC   traZODone 50 mg Oral HS PRN Xiomara Peraza MD       Risks, benefits and possible side effects of Medications:   Risks, benefits, and possible side effects of medications explained to patient and patient verbalizes understanding  This note has been constructed using a voice recognition system  There may be translation, syntax,  or grammatical errors  If you have any questions, please contact the dictating provider

## 2019-10-07 NOTE — PROGRESS NOTES
Pt received phone call from a friend, Michael Woo told Pt he would be available to pick Pt up at discharge between 10:00-11:00 on 10/8/19  Anjana Woo did verify that he would be available with this writer

## 2019-10-07 NOTE — SOCIAL WORK
Worker received phone call from patient's daughter informing worker that patient will need to call her friend Clara Hawley once she leaves  Patient's friend will meet patient at her apartment to provide her with the key  Patient's daughter did request if patient could charge her cellphone prior to discharge, worker informed her that it will be passed along

## 2019-10-08 RX ADMIN — RISPERIDONE 1 MG: 1 TABLET, ORALLY DISINTEGRATING ORAL at 12:14

## 2019-10-08 RX ADMIN — QUETIAPINE FUMARATE 800 MG: 200 TABLET ORAL at 22:00

## 2019-10-08 RX ADMIN — LORAZEPAM 1 MG: 1 TABLET ORAL at 12:15

## 2019-10-08 RX ADMIN — DOCUSATE SODIUM 100 MG: 100 CAPSULE, LIQUID FILLED ORAL at 17:22

## 2019-10-08 RX ADMIN — DIVALPROEX SODIUM 500 MG: 500 TABLET, FILM COATED, EXTENDED RELEASE ORAL at 17:22

## 2019-10-08 RX ADMIN — OLANZAPINE 15 MG: 10 TABLET, FILM COATED ORAL at 17:22

## 2019-10-08 RX ADMIN — DOCUSATE SODIUM 100 MG: 100 CAPSULE, LIQUID FILLED ORAL at 09:24

## 2019-10-08 NOTE — PLAN OF CARE
Discharge was cancelled for today due to patient calling co-workers and making bizarre statements  Appointment at [facility] was cancelled and will be rescheduled once a discharge date has been determined

## 2019-10-08 NOTE — PROGRESS NOTES
Per report from previous shift: daughter and Josafat Dunlap called and expressed concern about discharge, patient still making calls and expressing bizarre comments, geovanny to pick patient up between 10-11am, palak  MD: NOEMI cancelled

## 2019-10-08 NOTE — PROGRESS NOTES
Pleasant during interaction  Inquiring about discharge  Denies SI/HI  Conversation is calm and focused  Reports decreased paranoia but does notice paranoia when she is in large groups  C/o dry mouth, suggested fluids, sugarless gum/candy  Restful sleep

## 2019-10-08 NOTE — PROGRESS NOTES
PRN Ativan and Haldol given for severe anxiety and agitation with Crocker score of 29 and agitation of 28 at 1215  Upon follow up, pt is calm with no signs of agitation

## 2019-10-08 NOTE — PROGRESS NOTES
Progress Note - [address] [facility] 51 y o  female MRN: [id number]  Unit/Bed#: JOSE GUADALUPE Marshall 488-01 Encounter: [id number]    Assessment/Plan   Active Problems:    Bipolar I disorder, most recent episode manic, severe with psychotic features ([address] )      Subjective:  Patient seen at bedside and in psychiatrist office  Patient is noticeably more disorganized today  States she is working on a book of [employer] stories " Further states that she is worried about charges for throwing gitars off her balcony    Admits to calling boss and fellow co-worker  States that she called co-worker Kamilla Johnson to check on other co-worker in a coal because she believed Philomena Borrego was being cheated on by her   Also said she called her boss to inquire if Africa Everette might be interested in dating Tiara Dang, an employee that works at [employer] because they are great match because there the perfect height    Patient is very tangental and skips between several lines of thought  Displaying noticeably increased rate of speech  Does states that she is eating, sleeping, and attending groups without issue  Tells this interviewer that she feels like she may be hypomanic again    States that she has a dry mouth but has not appreciated any other side effects to medications  When patient was told her discharge was rescheduled for later this week she became rather irritable stating life is too short for this bullshit    Patient was redirectable and did agree to comply with treatment and return to previous medication regimen  Patient later demanded use of her cellphone because she wished to [address] numbers and lead a group    Patient was encouraged to keep talking to friends and having support persons visit during visiting hours if she would like  It would appear that changed to Prolixin over the weekend has caused a noticeable destabilization of patient's mental state    Will continue on previous regimen of Zyprexa 15 mg after dinner, Seroquel 800 mg QHS, and Depakote 500 mg after dinner until disorganized thought patterns resolve  Current Medications:    Current Facility-Administered Medications:  acetaminophen 325 mg Oral Q6H PRN Margarita Meza PA-C   acetaminophen 650 mg Oral Q6H PRN Margarita Meza, CARA   aluminum-magnesium hydroxide-simethicone 30 mL Oral Q4H PRN Michael Goodman MD   benztropine 1 mg Intramuscular Q6H PRN Michael Goodman MD   benztropine 1 mg Oral Q6H PRN Michael Goodman MD   divalproex sodium 500 mg Oral After Dinner Ignaciorogerio Forrester   docusate sodium 100 mg Oral BID Margaret Mendoza MD   haloperidol 5 mg Oral Q6H PRN Margarita Meza PA-C   haloperidol lactate 5 mg Intramuscular Q6H PRN Margarita Meza, CARA   hydrOXYzine HCL 50 mg Oral Q8H PRN [facility]   ibuprofen 600 mg Oral Q6H PRN Margarita Meza PA-C   LORazepam 2 mg Intramuscular Q8H PRN [facility]   LORazepam 1 mg Oral Q8H PRN [facility]   magnesium hydroxide 30 mL Oral Daily PRN Michael Goodman MD   OLANZapine 10 mg Intramuscular Q8H PRN Margarita Meza, CARA   OLANZapine 10 mg Oral Q8H PRN Margarita Meza PA-C   OLANZapine 15 mg Oral After Dinner [facility]   QUEtiapine 800 mg Oral HS Argenis Linares MD   risperiDONE 1 mg Oral Q3H PRN Margarita Meza PA-C   traZODone 50 mg Oral HS PRN Michael Goodman MD       Behavioral Health Medications: all current active meds have been reviewed  Vital signs in last 24 hours:  Temp:  [97 5 °F (36 4 °C)-97 8 °F (36 6 °C)] 97 5 °F (36 4 °C)  HR:  [98-99] 98  Resp:  [17-18] 18  BP: (132-146)/(59-69) 132/69    Laboratory results:  I have personally reviewed all pertinent laboratory/tests results      Psychiatric Review of Systems:  Behavior over the last 24 hours:  regressed  Sleep: normal  Appetite: normal  Medication side effects: Yes dry mouth  ROS: no complaints    Mental Status Evaluation:  Appearance:  older than stated age and overweight   Behavior:  Irritable but redirectable   Speech:  pressured, profane and tangential   Mood:  Starting to come down     Affect:  constricted, increased in intensity, labile, mood-incongruent and redirectable   Language naming objects and repeating phrases   Thought Process:  disorganized, flight of ideas, perserverative and tangential   Thought Content:  delusions  grandiose and obsessive/rumination   Perceptual Disturbances: None   Risk Potential: Suicidal Ideations none, Homicidal Ideations none and Potential for Aggression No   Sensorium:  person, place, time/date and situation   Cognition:  impaired due to Bipolar disorder   Consciousness:  alert and awake    Attention: attention span appeared shorter than expected for age   Insight:  fair   Judgment: fair   Intellect Within normal limits   Gait/Station: normal gait/station   Motor Activity: no abnormal movements     Memory: Short and long term memory:  Grossly intact     Progress Toward Goals: It would appear that changed to Prolixin over the weekend has caused a noticeable destabilization of patient's mental state  Will continue on previous regimen of Zyprexa 15 mg after dinner, Seroquel 800 mg QHS, and Depakote 500 mg after dinner until disorganized thought patterns resolve  Patient is discouraged in irritable about change in discharge plans but was redirectable and eventually agreed to continuing treatment as recommended  Recommended Treatment:     Continue with group therapy, milieu therapy and occupational therapy      Continue following current medications:   Current Facility-Administered Medications:  acetaminophen 325 mg Oral Q6H PRN Dinah Zuniga PA-C   acetaminophen 650 mg Oral Q6H PRN Dinah Zuniga PA-C   aluminum-magnesium hydroxide-simethicone 30 mL Oral Q4H PRN Xiomara Peraza MD   benztropine 1 mg Intramuscular Q6H PRN Xiomara Peraza MD   benztropine 1 mg Oral Q6H PRN Xiomara Peraza MD   divalproex sodium 500 mg Oral After Dinner Ignacio Forrester   docusate sodium 100 mg Oral BID Landon Alaniz MD   haloperidol 5 mg Oral Q6H PRN Margarita Meza, CARA   haloperidol lactate 5 mg Intramuscular Q6H PRN Margarita Meza, PA-C   hydrOXYzine HCL 50 mg Oral Q8H PRN [facility]   ibuprofen 600 mg Oral Q6H PRN Margarita Meza, PA-RAISSA   LORazepam 2 mg Intramuscular Q8H PRN [facility]   LORazepam 1 mg Oral Q8H PRN [facility]   magnesium hydroxide 30 mL Oral Daily PRN Michael Goodman MD   OLANZapine 10 mg Intramuscular Q8H PRN Margarita Meza, PA-C   OLANZapine 10 mg Oral Q8H PRN Margarita Meza, PA-C   OLANZapine 15 mg Oral After Dinner Ignaciorogerio Forrester   QUEtiapine 800 mg Oral HS Argenis Linares MD   risperiDONE 1 mg Oral Q3H PRN Margarita Meza, CARA   traZODone 50 mg Oral HS PRN Michael Goodman MD       Risks, benefits and possible side effects of Medications:   Risks, benefits, and possible side effects of medications explained to patient and patient verbalizes understanding  This note has been constructed using a voice recognition system  There may be translation, syntax,  or grammatical errors  If you have any questions, please contact the dictating provider

## 2019-10-08 NOTE — PROGRESS NOTES
Pt on the phone several times throughout the evening  Overheard pt discussing needing to get money and looking into her 401K   Reports when she leaves she does not intend to continue relationship with Chantal Cherry "I wont completely leave him because he's ill " Pt then began discussing discharging to her apartment  States she knows the staff there and believes they will let her into the apartment "I dont think the door will have to be broken down or anything  I dont think it will get that far because the staff know me and will give me the key " pt states she plans on signing a lease when discharged  Remains pleasant and calm this evening  Remains focused on wanting to be discharged

## 2019-10-08 NOTE — SOCIAL WORK
Worker called [employer] to cancel patient's appointment for 10/9 at 12:45pm with Michelle Gonzáles

## 2019-10-08 NOTE — SOCIAL WORK
Worker received phone call from patient's daughter Eusebio regarding discharge  Worker notified her that patient's discharge was cancelled for today due to concerns from patient's outpatient provider  Patient's daughter was also in agreement for patient to stay in the hospital for additional time  Patient's daughter did ask about PHP program upon discharge as she feels it would be beneficial for patient  Worker reported that it will be discussed with patient

## 2019-10-08 NOTE — PROGRESS NOTES
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

## 2019-10-09 PROCEDURE — 99231 SBSQ HOSP IP/OBS SF/LOW 25: CPT | Performed by: PSYCHIATRY & NEUROLOGY

## 2019-10-09 RX ORDER — OLANZAPINE 10 MG/1
20 TABLET ORAL
Status: DISCONTINUED | OUTPATIENT
Start: 2019-10-09 | End: 2019-10-11 | Stop reason: HOSPADM

## 2019-10-09 RX ADMIN — DOCUSATE SODIUM 100 MG: 100 CAPSULE, LIQUID FILLED ORAL at 11:19

## 2019-10-09 RX ADMIN — DOCUSATE SODIUM 100 MG: 100 CAPSULE, LIQUID FILLED ORAL at 17:26

## 2019-10-09 RX ADMIN — DIVALPROEX SODIUM 500 MG: 500 TABLET, FILM COATED, EXTENDED RELEASE ORAL at 17:26

## 2019-10-09 RX ADMIN — QUETIAPINE FUMARATE 800 MG: 200 TABLET ORAL at 21:32

## 2019-10-09 RX ADMIN — OLANZAPINE 20 MG: 10 TABLET, FILM COATED ORAL at 17:26

## 2019-10-09 NOTE — PROGRESS NOTES
Progress Note - [address] [facility] 51 y o  female MRN: [id number]  Unit/Bed#: Aayush Valenzuela 002-19 Encounter: [id number]    Assessment/Plan   Active Problems:    Bipolar I disorder, most recent episode manic, severe with psychotic features ([facility] [address] )      Subjective:  Patient seen at bedside  Patient once again seems very linear and logical in thought today  Calm and cooperative  Patient does not display any delusional thinking or hallucinations during interview  Denies SI/HI/AVH  Patient gives evidence of good insight by stating that she does realize she has been delusional over the past few days  States that she realizes she does not work for [employer] and is in fact a nurse at [employer]  States that she was angry yesterday when she learned she was not able to be discharged but came to terms with it and realized it was necessary  Patient took a shower last evening and has good hygiene  States that she had good dreams last night and very good sleep  Endorses that she is eating well and attending groups  Denies any side effects at this time aside from a slightly dry mouth  Patient did require PRN Ativan and Haldol for agitation last night but displays no signs of this this morning  Patient continues to wax and wane with cognition and thought process  Will increase Zyprexa to 20 mg QD after diner  Will continue to monitor and re-evaluate for discharge plans accordingly      Current Medications:    Current Facility-Administered Medications:  acetaminophen 325 mg Oral Q6H PRN Melia Oviedo PA-C   acetaminophen 650 mg Oral Q6H PRN Melia Oviedo PA-C   aluminum-magnesium hydroxide-simethicone 30 mL Oral Q4H PRN Armando Vieira MD   benztropine 1 mg Intramuscular Q6H PRN Armando Vieira MD   benztropine 1 mg Oral Q6H PRN Armando Vieira MD   divalproex sodium 500 mg Oral After Dinner Ignacio Forrester   docusate sodium 100 mg Oral BID Briseyda Dillon MD   haloperidol 5 mg Oral Q6H PRN Roosevelt Ruvalcaba CARA Lott   haloperidol lactate 5 mg Intramuscular Q6H PRN Cesario Caballero PA-C   hydrOXYzine HCL 50 mg Oral Q8H PRN Ignacio Forrester   ibuprofen 600 mg Oral Q6H PRN Cesario Caballero PA-C   LORazepam 2 mg Intramuscular Q8H PRN Ignacio Forrester   LORazepam 1 mg Oral Q8H PRN Ignacio Forrester   magnesium hydroxide 30 mL Oral Daily PRN Dick Parikh MD   OLANZapine 10 mg Intramuscular Q8H PRN Cesario Caballero PA-C   OLANZapine 10 mg Oral Q8H PRN Cesario Caballero PA-C   OLANZapine 15 mg Oral After Dinner Ignacio Forerster   QUEtiapine 800 mg Oral HS Chasity Lara MD   risperiDONE 1 mg Oral Q3H PRN Cesario Caballero PA-C   traZODone 50 mg Oral HS PRN Dick Parikh MD       Behavioral Health Medications: all current active meds have been reviewed  Vital signs in last 24 hours:  Temp:  [98 °F (36 7 °C)] 98 °F (36 7 °C)  HR:  [103-106] 106  Resp:  [16-18] 16  BP: (100-126)/(64-65) 126/65    Laboratory results:  I have personally reviewed all pertinent laboratory/tests results  Psychiatric Review of Systems:  Behavior over the last 24 hours:  improved  Sleep: normal  Appetite: normal  Medication side effects: Yes, slightly dry mouth  ROS: no complaints    Mental Status Evaluation:  Appearance:  older than stated age, overweight and Good hygiene   Behavior:  Calm and cooperative   Speech:  normal pitch and normal volume   Mood:  Better     Affect:  mood-congruent and redirectable   Language naming objects and repeating phrases   Thought Process:  logical and Linear   Thought Content:  normal   Perceptual Disturbances: None   Risk Potential: Suicidal Ideations none, Homicidal Ideations none and Potential for Aggression No   Sensorium:  person, place, time/date and situation   Cognition:  grossly intact   Consciousness:  alert and awake    Attention: attention span and concentration were age appropriate   Insight:  fair   Judgment: fair   Intellect    Gait/Station: normal gait/station   Motor Activity: no abnormal movements     Memory: Short and long term memory:  Grossly intact     Progress Toward Goals:  Patient continues to wax and wane with cognition and thought process  She is showing marked improvement this morning  Will increase Zyprexa to 20 mg QD after diner in hopes of stabilizing patient at current level of functioning  Will continue to monitor and re-evaluate for discharge plans accordingly  Continue additional medication regimen of Depakote 500 mg after dinner and Seroquel 800 mg QHS  Recommended Treatment:     Continue with group therapy, milieu therapy and occupational therapy      Continue following current medications:   Current Facility-Administered Medications:  acetaminophen 325 mg Oral Q6H PRN Lowes Smart, PA-C   acetaminophen 650 mg Oral Q6H PRN Manuel Smart, PA-C   aluminum-magnesium hydroxide-simethicone 30 mL Oral Q4H PRN Cheli Villalba MD   benztropine 1 mg Intramuscular Q6H PRN Cheli Villalba MD   benztropine 1 mg Oral Q6H PRN Cheli Villalba MD   divalproex sodium 500 mg Oral After Dinner [facility]   docusate sodium 100 mg Oral BID Bard London MD   haloperidol 5 mg Oral Q6H PRN Lowes Smart, PA-C   haloperidol lactate 5 mg Intramuscular Q6H PRN Lowes Smart, PA-C   hydrOXYzine HCL 50 mg Oral Q8H PRN [facility]   ibuprofen 600 mg Oral Q6H PRN Lowes Smart, PA-C   LORazepam 2 mg Intramuscular Q8H PRN [facility]   LORazepam 1 mg Oral Q8H PRN [facility]   magnesium hydroxide 30 mL Oral Daily PRN Cheli Villalba MD   OLANZapine 10 mg Intramuscular Q8H PRN Manuel Smart, PA-C   OLANZapine 10 mg Oral Q8H PRN Manuel Smart, PA-C   OLANZapine 15 mg Oral After Dinner [facility]   QUEtiapine 800 mg Oral HS Denisha Albarran MD   risperiDONE 1 mg Oral Q3H PRN Lowes Smart, CARA   traZODone 50 mg Oral HS PRN Cheli Villalba MD       Risks, benefits and possible side effects of Medications:   Risks, benefits, and possible side effects of medications explained to patient and patient verbalizes understanding  This note has been constructed using a voice recognition system  There may be translation, syntax,  or grammatical errors  If you have any questions, please contact the dictating provider

## 2019-10-09 NOTE — PROGRESS NOTES
Pleasant, cooperative  Visible in the milieu throughout the shift  Attends groups  Showered  Medication compliant  No delusional statements

## 2019-10-09 NOTE — PROGRESS NOTES
Per report from previous shift: agitated and upset when not discharged yesterday received PRN ativan, risperdal, appropriate last evening  MD: increase zyprexa

## 2019-10-09 NOTE — PROGRESS NOTES
During med pass and medication monitor, pt able to express feelings about not being D/C today  States, "the Dr Dave Mathis I was talking about political stuff that I dont understand, so he kept me " Pt shows an understanding of physicians choice to keep pt a little longer  Pt reports being upset and angry earlier, but smiling and shows improvement in mood  Compliant with medications

## 2019-10-10 RX ADMIN — OLANZAPINE 20 MG: 10 TABLET, FILM COATED ORAL at 18:50

## 2019-10-10 RX ADMIN — DOCUSATE SODIUM 100 MG: 100 CAPSULE, LIQUID FILLED ORAL at 09:54

## 2019-10-10 RX ADMIN — QUETIAPINE FUMARATE 800 MG: 200 TABLET ORAL at 21:57

## 2019-10-10 RX ADMIN — DIVALPROEX SODIUM 500 MG: 500 TABLET, FILM COATED, EXTENDED RELEASE ORAL at 18:50

## 2019-10-10 RX ADMIN — DOCUSATE SODIUM 100 MG: 100 CAPSULE, LIQUID FILLED ORAL at 18:50

## 2019-10-10 NOTE — PROGRESS NOTES
Progress Note - [address] [facility] 51 y o  female MRN: [id number]  Unit/Bed#: Cipriano Betancur 971-29 Encounter: [id number]    Assessment/Plan   Active Problems:    Bipolar I disorder, most recent episode manic, severe with psychotic features ([facility] [address] )    Rebecca Vernon was interviewed this morning in the day room  She reports feeling well and happy today, smiling while coloring and showing off her drawings  She spoke last evening with her cousin Chase Evangelina, a psychologist, and received support from him, which she states makes her feel glad and cared for  She denies symptoms of depression  She states she feels like she may be "hypomanic" right now, but wonders if it may be due to her feeling "so happy" to be getting discharged soon  She reports that her concentration and energy have been at a normal level  She has been sleeping full nights without medication augmentation  No racing thoughts  +increase in appetite  +hyperverbal with pressured, tangential speech  No current grandiosity  Mood is labile and upbeat, but not euphoric  Denies SI/HI/AVH/paranoia  Describes intrusive thoughts about what her daughter or father would say in a situation or how they would react to something she's thinking about, but states she is only conjuring a memory, not hearing voices  Rebecca Vernon has received no PRN medication in the last 24h       Behavior over the last 24 hours:  stable  Sleep: normal  Appetite: increased  Medication side effects: Yes: dry mouth, minor sedation for approximately 1 hour after taking Depakote  ROS: sedation and constipation    Mental Status Evaluation:  Appearance:  casually dressed and older than stated age   Behavior:  normal   Speech:  normal volume and tangential   Mood:  constricted and labile "happy"   Affect:  labile, mood-congruent and redirectable   Thought Process:  goal directed, logical and tangential   Thought Content:  normal and fixated on daughter and friend/boyfriend Alvarez Mckeon   Perceptual Disturbances: None Risk Potential: Potential for Aggression No   Sensorium:  person, place, time/date and situation   Cognition:  grossly intact   Consciousness:  alert and awake    Attention: attention span and concentration were age appropriate   Insight:  good   Judgment: fair   Gait/Station: normal balance   Motor Activity: no abnormal movements     Progress Toward Goals: Remaining compliant with medication; understands why discharge was delayed this week; motivated and committed to being well  Recommended Treatment: Continue with group therapy, milieu therapy and occupational therapy  Risks, benefits and possible side effects of Medications:   Risks, benefits, and possible side effects of medications explained to patient and patient verbalizes understanding  Medications:    All current active meds have been reviewed, continue current psychiatric medications and current meds:   Current Facility-Administered Medications   Medication Dose Route Frequency    acetaminophen (TYLENOL) tablet 325 mg  325 mg Oral Q6H PRN    acetaminophen (TYLENOL) tablet 650 mg  650 mg Oral Q6H PRN    aluminum-magnesium hydroxide-simethicone (MYLANTA) 200-200-20 mg/5 mL oral suspension 30 mL  30 mL Oral Q4H PRN    benztropine (COGENTIN) injection 1 mg  1 mg Intramuscular Q6H PRN    benztropine (COGENTIN) tablet 1 mg  1 mg Oral Q6H PRN    divalproex sodium (DEPAKOTE ER) 24 hr tablet 500 mg  500 mg Oral After Dinner    docusate sodium (COLACE) capsule 100 mg  100 mg Oral BID    haloperidol (HALDOL) tablet 5 mg  5 mg Oral Q6H PRN    haloperidol lactate (HALDOL) injection 5 mg  5 mg Intramuscular Q6H PRN    hydrOXYzine HCL (ATARAX) tablet 50 mg  50 mg Oral Q8H PRN    ibuprofen (MOTRIN) tablet 600 mg  600 mg Oral Q6H PRN    LORazepam (ATIVAN) 2 mg/mL injection 2 mg  2 mg Intramuscular Q8H PRN    LORazepam (ATIVAN) tablet 1 mg  1 mg Oral Q8H PRN    magnesium hydroxide (MILK OF MAGNESIA) 400 mg/5 mL oral suspension 30 mL  30 mL Oral Daily PRN    OLANZapine (ZyPREXA) IM injection 10 mg  10 mg Intramuscular Q8H PRN    OLANZapine (ZyPREXA) tablet 10 mg  10 mg Oral Q8H PRN    OLANZapine (ZyPREXA) tablet 20 mg  20 mg Oral After Dinner    QUEtiapine (SEROquel) tablet 800 mg  800 mg Oral HS    risperiDONE (RisperDAL M-TABS) dispersible tablet 1 mg  1 mg Oral Q3H PRN    traZODone (DESYREL) tablet 50 mg  50 mg Oral HS PRN     Labs: No labs in last 24h    Counseling / Coordination of Care  Total floor / unit time spent today 30 minutes  Greater than 50% of total time was spent with the patient and / or family counseling and / or coordination of care   A description of the counseling / coordination of care: Counseled on importance of medication adherence and OP follow-up; continuing to seek support from family members

## 2019-10-10 NOTE — PLAN OF CARE
Problem: SELF HARM/SUICIDALITY  Goal: Will have no self-injury during hospital stay  Description  INTERVENTIONS:  - Q 15 MINUTES: Routine safety checks  - Q WAKING SHIFT & PRN: Assess risk to determine if routine checks are adequate to maintain patient safety  - Encourage patient to participate actively in care by formulating a plan to combat response to suicidal ideation, identify supports and resources  Outcome: Progressing     Problem: PSYCHOSIS  Goal: Will report no hallucinations or delusions  Description  Interventions:  - Administer medication as  ordered  - Every waking shifts and PRN assess for the presence of hallucinations and or delusions  - Assist with reality testing to support increasing orientation  - Assess if patient's hallucinations or delusions are encouraging self-harm or harm to others and intervene as appropriate  Outcome: Progressing     Problem: Alteration in Thoughts and Perception  Goal: Treatment Goal: Gain control of psychotic behaviors/thinking, reduce/eliminate presenting symptoms and demonstrate improved reality functioning upon discharge  Outcome: Progressing  Goal: Refrain from acting on delusional thinking/internal stimuli  Description  Interventions:  - Monitor patient closely, per order   - Utilize least restrictive measures   - Set reasonable limits, give positive feedback for acceptable   - Administer medications as ordered and monitor of potential side effects  Outcome: Progressing  Goal: Recognize dysfunctional thoughts, communicate reality-based thoughts at the time of discharge  Description  Interventions:  - Provide medication and psycho-education to assist patient in compliance and developing insight into his/her illness   Outcome: Progressing  Goal: Complete daily ADLs, including personal hygiene independently, as able  Description  Interventions:  - Observe, teach, and assist patient with ADLS  - Monitor and promote a balance of rest/activity, with adequate nutrition and elimination   Outcome: Progressing     Problem: Ineffective Coping  Goal: Participates in unit activities  Description  Interventions:  - Provide therapeutic environment   - Provide required programming   - Redirect inappropriate behaviors   Outcome: Progressing     Problem: DISCHARGE PLANNING  Goal: Discharge to home or other facility with appropriate resources  Description  INTERVENTIONS:  - Identify barriers to discharge w/patient and caregiver  - Arrange for needed discharge resources and transportation as appropriate  - Identify discharge learning needs (meds, wound care, etc )  - Arrange for interpretive services to assist at discharge as needed  - Refer to Case Management Department for coordinating discharge planning if the patient needs post-hospital services based on physician/advanced practitioner order or complex needs related to functional status, cognitive ability, or social support system  Outcome: Progressing     Problem: Nutrition/Hydration-ADULT  Goal: Nutrient/Hydration intake appropriate for improving, restoring or maintaining nutritional needs  Description  Monitor and assess patient's nutrition/hydration status for malnutrition  Collaborate with interdisciplinary team and initiate plan and interventions as ordered  Monitor patient's weight and dietary intake as ordered or per policy  Utilize nutrition screening tool and intervene as necessary  Determine patient's food preferences and provide high-protein, high-caloric foods as appropriate       INTERVENTIONS:  - Monitor oral intake, urinary output, labs, and treatment plans  - Assess nutrition and hydration status and recommend course of action  - Evaluate amount of meals eaten  - Assist patient with eating if necessary   - Allow adequate time for meals  - Recommend/ encourage appropriate diets, oral nutritional supplements, and vitamin/mineral supplements  - Order, calculate, and assess calorie counts as needed  - Recommend, monitor, and adjust tube feedings and TPN/PPN based on assessed needs  - Assess need for intravenous fluids  - Provide specific nutrition/hydration education as appropriate  - Include patient/family/caregiver in decisions related to nutrition  Outcome: Progressing     Problem: Alteration in Thoughts and Perception  Goal: Treatment Goal: Gain control of psychotic behaviors/thinking, reduce/eliminate presenting symptoms and demonstrate improved reality functioning upon discharge  Outcome: Progressing

## 2019-10-10 NOTE — PLAN OF CARE
Problem: Alteration in Thoughts and Perception  Goal: Treatment Goal: Gain control of psychotic behaviors/thinking, reduce/eliminate presenting symptoms and demonstrate improved reality functioning upon discharge  Outcome: Progressing     Problem: Alteration in Thoughts and Perception  Goal: Refrain from acting on delusional thinking/internal stimuli  Description  Interventions:  - Monitor patient closely, per order   - Utilize least restrictive measures   - Set reasonable limits, give positive feedback for acceptable   - Administer medications as ordered and monitor of potential side effects  Outcome: Progressing

## 2019-10-10 NOTE — CASE MANAGEMENT
CM notified by attending physician that he was moving forward with discharging Pt tomorrow  CM met with Pt who verbalized she was ready for d/c tomorrow  Pt will need transportation home  CM & Pt talked about PHP, but Pt said that at this time, she does not think she needs that level of care  Pt said that she plans to do outpatient at [facility], & is looking forward to their grief group on Fridays  Pt had no questions about her discharge  CM contacted [facility] @ [phone] to schedule Pt's follow-up appointment  CM was not they could not schedule an appointment, until they received the d/c paperwork  CM reviewed she would need Pt's appointment prior to her discharge  CM reviewed that she had previous scheduled Pt an appointment for 10/9, by faxing the medication list   CM was told to send a new medication list, & someone would call her back; CM faxed med list to [phone]  CM electronically sent [employer] request for tomorrow  CM contacted Pt's daughter, Jaiden Molina, @ [phone] to review d/c plans for tomorrow  CM reviewed that she had made a transportation request & saw that Pt does have keys in her locker  Urszula said that Pt has a key to get into her apartment building, but does not have a key to her apartment  She said that she will have to ask the  to let her in when she gets home  Urszula asked what time Pt will d/c & CM reviewed that transportation is typically between 10:30 & 12:30, however, hopefully she will have a more narrow time frame later today & she can provide that to Jaiden Seaman asked about PHP, & OLIVE said that Pt declined the referral at this time  Jaiden Molina was concerned about follow-up & CM reviewed she was waiting to hear back from [facility] with appointment information, but agreed to call Jaiden Molina back once she had a date/time

## 2019-10-10 NOTE — PROGRESS NOTES
Patient walked with writer through the halls during interaction, she was pleasant and cooperative  Denies SI/HI/AVH or paranoia  Reports decrease in racing thoughts, feels "they are fast but able to block out the bad thoughts and replace them with the good ones "  Pt says she slept well last night and feels the medications are helping  Only concern: pt admitted to giving her personal information to other pt's on the unit, she was reminded to keep boundaries and not to give out personal information  Compliant with medications and treatment at this time

## 2019-10-10 NOTE — PROGRESS NOTES
Patient was overheard on the phone saying " If Bal Yeung shows up to the door asking if I want to  him that I say yes"

## 2019-10-10 NOTE — PROGRESS NOTES
Status: Pt made some bizarre comments, but was trying to remain appropriate & keep it together  She did attend groups & was able to sleep overnight     Medication: Zyprexa was increased to 20mg after dinner  D/C: Friday versus next week     10/10/19 5061   Team Meeting   Meeting Type Daily Rounds   Team Members Present   Team Members Present Physician;Nurse;;Occupational Therapist   Physician Team Member Dr Janny Corea / Ana Linares Team Member Jean Vargas / Giovany Forrester Management Team Member Matt Reece / Tonny Germain   OT Team Member Luci   Patient/Family Present   Patient Present No   Patient's Family Present No

## 2019-10-10 NOTE — DISCHARGE SUMMARY
Discharge Summary - [address] [place] [address] 51 y o  female MRN: [id number]  Unit/Bed#: [facility] [place] 078-95 Encounter: [id number]     Admission Date:   Admission Orders (From admission, onward)     Ordered        09/13/19 0245  Admit Patient to 12 On license of [facility] Unit (use in Admission Navigator for ED to [address] [place] [address])  Once                         Discharge Date: 10/11/2019    Attending Psychiatrist: Billy Cortez    Reason for Admission/HPI:   History of Present Illness     Patient is a 51 y o  female presents with Signs of acute psychosis and xochitl  Patient was admitted to psychiatric unit on a involuntarily 302 commitment basis  Patient has Past Psychiatric History of Bipolar I Disorder and presented to [facility] with signs of acute psychosis and xochitl  The patient was sent to [address] [place] [address] psychiatric unit after being brought in by the police due to erratic behavior (throwing furniture from the 5th floor) following a fight/rejection with "a radha she's seeing from work " The patient is currently a nurse at [employer]  She was previously admitted here (02/02/16 - 02/23/16)  in 2016 for similar complaints  Since then, she has been following with Dr Amee Smith (psychiatrist) and doing well  Dr Amee Smith reports that the patient has been very high-functioning and stable prior to this incident  On chart review, the patient admitted that she has been "taking her medications sporadically" for the past 3-4 days      On initial assessment, the patient is very disorganized with pressured speech and tangential thought processes with loose associations  She has decreased focus/concentration due to active auditory hallucinations, to which she yells "stop " The patient denies that the voices are commanding her to hurt herself or others  She denies any suicidal ideations/intent/plan   On chart review, the patient had noted paranoia with statements such as, "I'm being watched" and "The bed is vibrating and there's worms and water in the bed frame, I can get 21 marines here to check it out "      The patient reports that she is , lives alone in her home, and has great support from her daughter Jatinder Saldana) and her boyfriend Miguelina Stein)  She reports a great rapport with her co-workers, and was visited by some of them since she has been in the hospital  The patient admits to recent stressors at work  She reports that she was recently switched to day shift after being on nights for a very long time and admits she is struggling to keep up  She states she is unable to complete the required amount of work in 40 hours, and so, ends up working 60 hour weeks  She reports some guilt regarding her divorce and also work; she states she got a raise and because she works by the hour, makes more money than her boss who is on salary  She states that her boss is a great/hard-working lady, and has a daughter in college, so she "feels really bad about that " She admits that she got upset because she asked the radha she is seeing from work to  her, and he said no  Primary complaints include:  I have been under a lot of stress    Onset of symptoms was abrupt starting 3 days ago with rapidly worsening course since that time  Psychosocial Stressors: health, marital, occupational and social     Hospital Course:   Patients was admitted to [facility] psychiatric unit on 9/13/2019  Her outpatient psychiatric medication regemin of Seroquel 200 mg QHS, Seroquel 200 mg QHS PRN for sleep, Adderall 10 mg BID, and Depakote 500 mg BID was continued  Depakote level was ordered to be drawn three days later  Pt was disrobing and refused adderall  Was talking about life stressors of "daughter graduating college, moving to [place] with her BF working with [employer] " Very disorganized but indicated that she realized she needs medications and that her Depakote works really well  Perseverated on creating a homeless/safe shelter  Endorsed still having auditory hallucinations of people's voices she recognizes which makes it "hard for me to pray"  States that her mind is flooded with thoughts  Wanted to decrease her Depakote to QHS only because she feels "In a cloud" when she is on too much, and only wants 500mg  Pt's Adderall was discontinued and decreased Depakote to  mg QHS 2019  Pt contracted for safe on the unit and denied any SI or HI     9/15/2019 pt continued with disorganized and racing thoughts  Paranoid, wandering the halls at night, and disrobing with denial in the morning of these actions  Said she woke up at 0200 today experiencing auditory hallucinations and talking to herself  Stated "I am not sure if it is voices or if I am no talking to God " Endorses that her family "played a joke on me to say my father is alive, but he  2 years ago " Initially thought she needed to be off her medications to gain control of sadness and anxiety but later was agreeable to treatment recommendations when discussed  Seroquel was increased to 300 mg QHS  2019 pt continued to be compliant with medications without acute side effects  Endorsed poor sleep and stated  "I was very disoriented; I woke up screaming because I wasn't sure where I was and I thought Willis Hammed needed help, so I ran naked to her, then when I realized I was in the hospital, I got scared and ran in the opposite direction " Still having active delusions of marrying a man named Gifty Sandoval  Still very disorganized and paranoid, asking  "Do you think I'm going to die soon?" and states "I think I'm here under an experiment " She continues to have decreased concentration/focus due to auditory hallucinations, she states the voices "give her recommendations for the future, but can sometimes be scary " She denied that they command her to hurt herself or others and had no intent to do either   Seroquel was increased to 400 mg QHS after discussion with pt's outpatient psychiatrist Dr Catherine Powell  On 9/17/2019 pt was still being compliant with medications  Continued displaying disorganization and paranoia  Admitted to estela thoughts and reports "the bed is a recording device" and proceeds to remove the sheets and mattress from the bed stating "look there's a microphone and tape recorder and worms " She reports continued auditory hallucinations and states "they tell me to do this and do that, some good and some bad " Poor sleep again and said she feels "more depressed today because the man of her dreams is outside to pick her up but she's stuck in here " Pt was still actively and overtly manic  Seroquel increased to 500 mg QHS  Depakote level was 73 9 and within therapeutic window  On 9/18/2019 pt was very agitated/confused  She pulled the fire alarm and continued to disrobe  She was placed on a 1:1, which need to be continued due to behavior issues  She continued to reports poor sleep with frequent awakenings due to various delusions  She states "I felt that my daughter took me out of here last night " She continued to report auditory hallucinations of "tormenting voices" but denied that they were commanding her to hurt herself or others  No change in paranoia  Sexually preoccupied and continues to have intrusive thoughts about sexual encounters between her and various co-workers and "finding her true love " Patient continued to lack insight into reason for admission or need for treatment  Seroquel increased to 600 mg QHS and Depakote increased to 750 mg after dinner  On 9/19/2019 Patient remained confused, disorganized, and internally preoccupied with paranoid delusions  Slept but with frequent awakenings  Still reported auditory hallucinations that were telling her about "the man of her dreams " Showed mild improvement in mood and psychotic symptoms  Pt was removed from 1:1 to observe for improvement  Increased Seroquel to 700 mg QHS      On 9/20/2019 pt continued to be confused with bizare behavior  Reported poor sleep and increased fatigue with "tired" mood  Still having auditory hallucinations and paranoia with intrusive delusions  She stated "the hospital repeatedly pulls the fire alarm in an attempt to desensitize me, they want to kill me after getting all the information from me " Still remaining manic  Increased Seroquel to 800 mg QHS and kept Depakote at 750 mg QD  Ordered Depakote level for 9/22/2019  Over the weekend of 9/21/2019 and 9/22/2019 pt remained unchanged  Stated that her 1:1 was there "to keep her safe from people trying to rape her " Wanted to leave and see her psychiatrist and get out of treatment for her birthday  Continued to be sexually preoccupied  Refused her labs on AM of 9/22/2019 because she thought "it is too soon to check them  " Had visit with daughter and her boyfriend whom she believes was a former patient of hers that she told to protect her daughter  Labs refused on Sunday were drawn in the morning of 9/23/2019  Depakote was found to be in therapeutic range at 80  Pt did show mild improvement with less intense hallucination and paranoia  Still rambling and disorganized with nursing staff  Pt started to wander into a male peers room and required redirection  Said that she dreamt "her daughter was jumping off the statue [place] " Received Zyprexa overnight which was helpful for her nightmares and anxiety  Told medical team that she "swallowed a silver ring" which was now giving her nausea and pain in her stomach  Abdominal Xray ordered to r/o foreign body ingestion  On 9/24/2019 pt appeared more disorganized and tangental with significant derailment  Continuing to wax and wane  Religiously and sexually preoccupied  Said she is "hearing monsters and spirits " Still disrobing overnight  Thought a patient across the hallway was her    X-rays from the previous day did confirm the presence of a metallic ring in her right lateral mid abdomen  Good visit with boyfriend  Very disorganized and tangential  Olanzapine 5 mg QD was added to regimen  On 9/25pt refused to take olanzapine and spit the morning dose  Still disorganized and tangential  Patient was focused on only taking Seroquel 200 mg and Depakote 500 mg  Patient required extensive education that her manic and psychotic symptoms were not stable and no dosage adjustment should be made  Also told that olanzapine was added for faster improvement in manic symptom and once her xochitl was stable the medication could be reduced or discontinued at another time  Pt was agreeable to this  She was placed back on one-to-one for pulling a fire alarm and attempting to disrobe again yesterday  Spoke with pt psychiatrist Dr Flo Koyanagi who suggested Thorazine if symptoms did not improve       Pt was still overtly paranoid and delusional on 9/26/2019  Stated that people take my children away and make me work    Stated that people walked in to the behavioral unit at [facility] with guns    Further reports that she was going to earn her PhD in Psychiatry  Patient received Ativan 1 mg and Haldol 5 mg for agitation at 9:00pm  Patient was cheeking and spitting Zyprexa into the sink but appeared to of taking her Zyprexa this morning  Kept her on one to one due to fixation on male peer across the hallway  On 9/27/2019 pt began showing good improvement on medications and addition of Zyprexa  She was more appropriate and able to question her own paranoid delusions  Patient was agreeable to continuing Zyprexa, but not in agreement with considering Thorazine and talked about having "Thorazine shuffle" in past  Patient did report feeling sedated from morning dose of Zyprexa and agreed with plan of changing to evening  She was removed from her 1:1 after voicing understanding about allowing other patients on the unit to maintain there privacy and not pulling the fire alarm      On the weekend of 9/28/2019 to 9/[id number], pt was not showing any behavior of aggression or agitation  Slow improvement in disorganized thought process  Patient talked in length about her grandiose delusions of being with another patient on the unit  She also talked about her plan of making a shelter for people in [facility] and changing [facility]  Patient with poor insight regarding the statements  Sexually preoccupied with masturbation session  Still fixated on male peer on the unit and required placement back on her 1:1  Zyprexa increased to 10 mg QD on 9/28/2019 and then to 15 mg on 9/29/2019  Placed on 10 minute mouth checks afer each medication administration  On 9/30/2019 pt states that she was "swallowing the Zyprexa whole as this makes it ineffective " She continued to display persistent disorganized behavior with grandiosity, sexual preoccupation, and delusional thinking  Described paranoia about a "Tech who became a nurse and who was abusing her for blood    Patient denied having been naked in male patients room last night saying that this is bullshit   States that Cameron daughter and son-in-law will go and work for [employer] in [place]   Continued to display a waxing and waning psychotic features  Patient was again taken off one-to-one in the afternoon  On 10/1/2019 pt was visibly more sedated  Remained on 10 minute mouth checks to help with compliance  Still disorganized, delusional, and grandiose  Dreams of fires, Mormons, and wore blanket like a Toga  Less sleep at night and more verbally aggressive with staff  CBC, CMP, ammonia, and CPK were checked to rule out any underlying medical causes for confusion or disorganization  Ammonia was found to be elevated at 139 with Depakote being the most likely cause  Lactulose 20 g TID was initiated and tapered over next 3 days  Ammonia level recheck scheduled  On 10/2/2019 patient was still anxiou, pressured, and disorganized    Endorsed that she had nightmares of being strangled because of a breech birth with an umbilical cord wrapped around her neck    Also endorsed that she has constructed a calender on the wall which unit staff were not pleased about  Still having some auditory hallucinations  Reports that she has not had any side effects the lactulose and believes it has helped her with constipation  Patient has some insight to realize that she is being paranoid lately and fixated on World War 2, the Holocaust, and gassing    Still endorsing that she desires to become a psychologist   Stated that she does not want to be on Zyprexa secondary to weight gain  Overnight notes indicated that she did not disrobe or have behavioral disturbances  Case management had phone conversation with daughter who stated that she did not believe patient is ready for discharge as communication with the patient typically only occurs when she is going through psychosis  Patient had elevated ammonia at 139 umol/L  Pt continued on lactulose 20 g/30 mL oral solution 20 g TID  Ammonia level reordered for 0700 tomorrow morning 10/03/2019  On 10/3/2019 pt was significantly more reality oriented  Smiling and joking  Mood symptoms have drastically improved  Pt forward thinking about discharge and has made plan to follow up at [facility] with therapist  Lactulose level decreased today to 52 umol/L  This appears to have been a major contributing factor in patient's symptoms  Lactulose was moved to BID with plan to d/c it over the weekend  Ammonia and Depakote level rechecks ordered  On 10/4/2019 the pts valproic acid level was still slightly outside of therapeutic range at 46 ug/mL and ammonia level was still mildly elevated at 49 umol/L  Patient continued with lactulose  Pt was logical and linear in thought without delusional thinking or psychotic symptoms   She was disappointed that she was unable to leave yesterday, but understands that she needs to continue being monitored  Displaying very good insight into condition and situation  Continues to deny all SI/HI/AVH  On the weekend of 10/5/2019 and 10/6/2019, the patient continue to resist Zyprexa treatment as she says it makes her gain weight  Continued to indicated that she would swallow the Zyprexa to deactivate it  Psychiatrist on call switched the patient to Prolixin 5 mg  On 10/7/2019 pt's ammonia level was found to be back within normal limits at 33 umol/L and lactulose was discontinued  Patient's daughter had also expressed concerns that patient was continuing to endorse delusions about the [place] on the phone  Pt appeared linear and logical during interview  Waxing and waning continued  Pt switched back to Zyprexa to avoid changing medications prior to discharge  Pt had an appointment scheduled on Wednesday in which medication switched could be conducted if desired  Had plan to take one 1 month off of work before returning  Restarted Zyprexa 15 mg after dinner  Planned d/c the following morning on 10/8/2019 if she was stable  On 10/8/2019 pt showed many signs of deterioration from stable condition including worsening manic symptoms, rapid speech, need for redirection, and evident disorganized thought process with grandiose delusions  Switch of Zyprexa to Prolixin over the weekend was likely source of destabilization  Pt was restarted on Zyprexa 15 mg after dinner again yesterday and required further monitoring  Pt got angry when informed that her discharge planning would be postponed  She was eventually redirectable and agreed to continuing treatment as recommended  On 10/9/2019 pt started to once again show improvements with organization and thought process  Pt agreed with plan of increasing the dose of olanzapine to 20 mg after dinner  Depakote level will not be increased due to recent elevation of ammonia levels  Pt much more calm and cooperative today   Good insight into knowing she has been delusional over the past few days  Apologized for getting angry with treatment team regarding postponed discharge yesterday  Patient did require PRN Ativan and Haldol for agitation the previous night but displayed no signs at this time  On 10/10/2019 pt continued to show gradual improvement in manic and psychotic symptoms  Still slightly hypomanic  Future oriented and voices understanding importance of medications and need for outpatient follow-up  Agreed to feeling safe and was excited about plan to discharge in the morning  Pt was discharged on the morning of 10/11/2019  States that she has been eating and sleeping well  Denies SI/HI/AVH and is feeling safe  Pt much more reality oriented again and forward thinking  She had an appointment scheduled to see her psychiatrist on 10/12/2019  She was discharged with one week supply of Seroquel 800 mg QHS, Zyprexa 20 mg after dinner, and Depakote 500 mg after dinner      Mental Status at time of Discharge:     Appearance:  older than stated age, overweight and Good hygiene   Behavior:  Calm and cooperative   Speech:  normal pitch and normal volume   Mood:  Pretty good today "   Affect:  mood-congruent and redirectable   Thought Process:  logical and Linear   Thought Content:  normal   Perceptual Disturbances: None   Risk Potential: Suicidal Ideations none, Homicidal Ideations none and Potential for Aggression No   Sensorium:  person, place, time/date and situation   Cognition:  grossly intact   Consciousness:  alert and awake    Attention: attention span and concentration were age appropriate   Insight:  good   Judgment: good   Gait/Station: normal gait/station   Motor Activity: no abnormal movements       Discharge Diagnosis:   1) Bipolar I disorder, most recent episode manic, severe with psychotic features    Resolved Problems  Date Reviewed: 10/9/2019          Resolved    * (Principal) Bipolar 1 disorder, manic, moderate ([facility] [address] ) 2/23/2016     Resolved by Marci Villalobos MD          Discharge Medications:  See after visit summary for reconciled discharge medications provided to patient and family  Discharge instructions/Information to patient and family:   See after visit summary for information provided to patient and family  Provisions for Follow-Up Care:  See after visit summary for information related to follow-up care and any pertinent home health orders  Discharge Statement   I had direct contact with the patient on the day of discharge  Patient has regained control of mental status and is no longer in need of inpatient psychiatric hospitalization  Patient has appointment to see her psychiatrist on 10/12/2019  She was discharged with one week supply of Seroquel 800 mg QHS, Zyprexa 20 mg after dinner, and Depakote 500 mg after dinner

## 2019-10-11 VITALS
WEIGHT: 172.62 LBS | OXYGEN SATURATION: 98 % | TEMPERATURE: 96.7 F | RESPIRATION RATE: 18 BRPM | HEART RATE: 104 BPM | SYSTOLIC BLOOD PRESSURE: 114 MMHG | HEIGHT: 66 IN | DIASTOLIC BLOOD PRESSURE: 75 MMHG | BODY MASS INDEX: 27.74 KG/M2

## 2019-10-11 RX ORDER — OLANZAPINE 20 MG/1
20 TABLET ORAL
Qty: 7 TABLET | Refills: 0 | Status: SHIPPED | OUTPATIENT
Start: 2019-10-11 | End: 2019-11-11 | Stop reason: HOSPADM

## 2019-10-11 RX ORDER — DIVALPROEX SODIUM 500 MG/1
500 TABLET, EXTENDED RELEASE ORAL
Qty: 7 TABLET | Refills: 0 | Status: SHIPPED | OUTPATIENT
Start: 2019-10-11 | End: 2019-11-11 | Stop reason: HOSPADM

## 2019-10-11 RX ORDER — QUETIAPINE FUMARATE 400 MG/1
800 TABLET, FILM COATED ORAL
Qty: 14 TABLET | Refills: 0 | Status: SHIPPED | OUTPATIENT
Start: 2019-10-11 | End: 2019-11-11 | Stop reason: HOSPADM

## 2019-10-11 RX ORDER — DOCUSATE SODIUM 100 MG/1
100 CAPSULE, LIQUID FILLED ORAL 2 TIMES DAILY
Qty: 10 CAPSULE | Refills: 0
Start: 2019-10-11

## 2019-10-11 RX ADMIN — DOCUSATE SODIUM 100 MG: 100 CAPSULE, LIQUID FILLED ORAL at 08:23

## 2019-10-11 RX ADMIN — ACETAMINOPHEN 650 MG: 325 TABLET, FILM COATED ORAL at 09:10

## 2019-10-11 NOTE — PROGRESS NOTES
Patient bright and positive this morning  Pt said that she was ready for discharge today  Asked what she will do today and what she would eat  She said that she is going to get a haircut and drink [employer] coffee or vanilla kishore

## 2019-10-11 NOTE — DISCHARGE INSTRUCTIONS
Bipolar Disorder   WHAT YOU NEED TO KNOW:   Bipolar disorder is a long-term chemical imbalance that causes rapid changes in mood and behavior  High moods are called xochitl  Low moods are called depression  Sometimes you will feel manic and sometimes you will feel depressed  You can have alternating episodes of xocihtl and depression  This is called a mixed bipolar state  DISCHARGE INSTRUCTIONS:   Call 911 if:   · You think about hurting yourself or someone else  Contact your healthcare provider or psychiatrist if:   · You are having trouble managing your bipolar disorder  · You cannot sleep, or are sleeping all the time  · You cannot eat, or are eating more than usual     · You feel dizzy or your stomach is upset  · You cannot make it to your next meeting  · You have questions or concerns about your condition or care  Medicines:   · Medicines  may be given to help keep your mood stable, or to help you sleep  Changes in medicine are often needed as your bipolar disorder changes  · Take your medicine as directed  Contact your healthcare provider if you think your medicine is not helping or if you have side effects  Tell him or her if you are allergic to any medicine  Keep a list of the medicines, vitamins, and herbs you take  Include the amounts, and when and why you take them  Bring the list or the pill bottles to follow-up visits  Carry your medicine list with you in case of an emergency  Follow up with your healthcare provider or psychiatrist as directed:  Write down your questions so you remember to ask them during your visits  Manage bipolar disorder:  Watch for triggers of bipolar disorder symptoms, such as stress  Learn new ways to relax, such as deep breathing, to manage your stress  Tell someone if you feel a manic or depressive period might be coming on  Ask a friend or family member to help watch you for bipolar symptoms   Work to develop skills that will help you manage bipolar disorder  You may need to make lifestyle changes  Ask your healthcare provider or psychiatrist for resources  For support and more information:   · [address] [facility]), [address] [facility], [address] [facility], [address]  [place] [address]   Phone: [phone]  Phone: [phone]  Web Address: [facility]  · Depression and [address] ([employer])  [address] [place] [address], [address] [place] [address] , [address]  Phone: [phone]  Web Address: [employer] no  org   © 2017 [facility] Information is for End User's use only and may not be sold, redistributed or otherwise used for commercial purposes  All illustrations and images included in CareNotes® are the copyrighted property of [employer]  or Braulio Salvador  The above information is an  only  It is not intended as medical advice for individual conditions or treatments  Talk to your doctor, nurse or pharmacist before following any medical regimen to see if it is safe and effective for you

## 2019-10-11 NOTE — PROGRESS NOTES
Status: Up once overnight but was able to get back to sleep  Medication: no changes / no prns  D/C: Today - via [facility] at 11:30/12  Pt is scheduled to see PAULINA Gunn tomorrow at [facility]        10/11/19 5272   Team Meeting   Meeting Type Daily Rounds   Team Members Present   Team Members Present Physician;Nurse;;Occupational Therapist

## 2019-10-11 NOTE — CASE MANAGEMENT
CM met with Pt who verbalized readiness for discharge  Pt expressed concern if her doctor, [facility], would be in the office for her Saturday appointment, but CM reviewed that was the appointment & provider given by [facility]  Pt had no questions & reported she planned to get her medications filled at [facility], as she typically uses [address] [place] [address], but felt [facility] would be okay for a one-time fill  Pt had no questions & agreed to call the unit if she got home & had a questions

## 2019-10-11 NOTE — BH TRANSITION RECORD
Contact Information: If you have any questions, concerns, pended studies, tests and/or procedures, or emergencies regarding your inpatient behavioral health visit  Please contact [address] [place] [address] behavioral health unit [phone] and ask to speak to a , nurse or physician  A contact is available 24 hours/ 7 days a week at this number  Summary of Procedures Performed During your Stay:  Below is a list of major procedures performed during your hospital stay and a summary of results:  - Major Imaging Studies: X-ray  Pending Studies (From admission, onward)    None        If studies are pending at discharge, follow up with your PCP and/or referring provider

## 2019-10-11 NOTE — PROGRESS NOTES
Pleasant and cooperative still needs to be reminded to maintain boundaries with peers, anticipating discharge in AM

## 2019-10-22 ENCOUNTER — HOSPITAL ENCOUNTER (EMERGENCY)
Facility: HOSPITAL | Age: 49
End: 2019-10-23
Attending: EMERGENCY MEDICINE | Admitting: EMERGENCY MEDICINE
Payer: COMMERCIAL

## 2019-10-22 VITALS
SYSTOLIC BLOOD PRESSURE: 133 MMHG | BODY MASS INDEX: 29.05 KG/M2 | DIASTOLIC BLOOD PRESSURE: 60 MMHG | TEMPERATURE: 97.6 F | HEART RATE: 81 BPM | OXYGEN SATURATION: 98 % | RESPIRATION RATE: 19 BRPM | WEIGHT: 180 LBS

## 2019-10-22 DIAGNOSIS — F22 DELUSION (HCC): ICD-10-CM

## 2019-10-22 DIAGNOSIS — F31.2 BIPOLAR I DISORDER, MOST RECENT EPISODE MANIC, SEVERE WITH PSYCHOTIC FEATURES (HCC): Primary | ICD-10-CM

## 2019-10-22 DIAGNOSIS — F29 PSYCHOSES (HCC): ICD-10-CM

## 2019-10-22 LAB
AMPHETAMINES SERPL QL SCN: NEGATIVE
BARBITURATES UR QL: NEGATIVE
BENZODIAZ UR QL: NEGATIVE
COCAINE UR QL: NEGATIVE
ETHANOL EXG-MCNC: 0 MG/DL
EXT PREG TEST URINE: NEGATIVE
EXT. CONTROL ED NAV: NORMAL
METHADONE UR QL: NEGATIVE
OPIATES UR QL SCN: NEGATIVE
PCP UR QL: NEGATIVE
THC UR QL: POSITIVE

## 2019-10-22 PROCEDURE — 99285 EMERGENCY DEPT VISIT HI MDM: CPT | Performed by: EMERGENCY MEDICINE

## 2019-10-22 PROCEDURE — 80307 DRUG TEST PRSMV CHEM ANLYZR: CPT | Performed by: EMERGENCY MEDICINE

## 2019-10-22 PROCEDURE — 99285 EMERGENCY DEPT VISIT HI MDM: CPT

## 2019-10-22 PROCEDURE — 82075 ASSAY OF BREATH ETHANOL: CPT | Performed by: EMERGENCY MEDICINE

## 2019-10-22 PROCEDURE — 81025 URINE PREGNANCY TEST: CPT | Performed by: EMERGENCY MEDICINE

## 2019-10-22 RX ORDER — OLANZAPINE 10 MG/1
10 INJECTION, POWDER, LYOPHILIZED, FOR SOLUTION INTRAMUSCULAR 2 TIMES DAILY PRN
Status: CANCELLED | OUTPATIENT
Start: 2019-10-22

## 2019-10-22 RX ORDER — RISPERIDONE 1 MG/1
1 TABLET, ORALLY DISINTEGRATING ORAL EVERY 8 HOURS PRN
Status: CANCELLED | OUTPATIENT
Start: 2019-10-22

## 2019-10-22 RX ORDER — BENZTROPINE MESYLATE 1 MG/ML
1 INJECTION INTRAMUSCULAR; INTRAVENOUS EVERY 6 HOURS PRN
Status: CANCELLED | OUTPATIENT
Start: 2019-10-22

## 2019-10-22 RX ORDER — HALOPERIDOL 5 MG
5 TABLET ORAL EVERY 8 HOURS PRN
Status: CANCELLED | OUTPATIENT
Start: 2019-10-22

## 2019-10-22 RX ORDER — OLANZAPINE 10 MG/1
20 TABLET ORAL ONCE
Status: DISCONTINUED | OUTPATIENT
Start: 2019-10-22 | End: 2019-10-23 | Stop reason: HOSPADM

## 2019-10-22 RX ORDER — LORAZEPAM 0.5 MG/1
1 TABLET ORAL EVERY 4 HOURS PRN
Status: CANCELLED | OUTPATIENT
Start: 2019-10-22

## 2019-10-22 RX ORDER — BENZTROPINE MESYLATE 1 MG/1
1 TABLET ORAL EVERY 6 HOURS PRN
Status: CANCELLED | OUTPATIENT
Start: 2019-10-22

## 2019-10-22 RX ORDER — ACETAMINOPHEN 325 MG/1
650 TABLET ORAL EVERY 4 HOURS PRN
Status: CANCELLED | OUTPATIENT
Start: 2019-10-22

## 2019-10-22 RX ORDER — MAGNESIUM HYDROXIDE/ALUMINUM HYDROXICE/SIMETHICONE 120; 1200; 1200 MG/30ML; MG/30ML; MG/30ML
30 SUSPENSION ORAL EVERY 4 HOURS PRN
Status: CANCELLED | OUTPATIENT
Start: 2019-10-22

## 2019-10-22 RX ORDER — DIVALPROEX SODIUM 500 MG/1
500 TABLET, EXTENDED RELEASE ORAL ONCE
Status: DISCONTINUED | OUTPATIENT
Start: 2019-10-22 | End: 2019-10-23 | Stop reason: HOSPADM

## 2019-10-22 RX ORDER — TRAZODONE HYDROCHLORIDE 50 MG/1
25 TABLET ORAL
Status: CANCELLED | OUTPATIENT
Start: 2019-10-22
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
## 2019-10-22 NOTE — ED NOTES
201 faxed to [facility] for review at [facility]       DAHLIA Sunshine  10/22/19   7694
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
## 2019-10-22 NOTE — ED PROVIDER NOTES
History  Chief Complaint   Patient presents with    Psychiatric Evaluation     pt with [facility], reports delusions  pt reports psychiatrist sent her over for eval because she "couldn't drive over here anymore " pt denies SI/HI/AH/VH, pt reports "i forgot to bring my ear plugs  " per crisis, dr Mariana Lynch wanted to 302 pt  52-year-old female with history of bipolar 1 with psychotic features was sent over from her psychiatrist's office for evaluation of delusions  The patient was recently treated at [facility] for similar symptoms and was discharged 10 days ago  The patient had a follow-up appointment today with her psychiatrist who is going to 302 the patient but she said she would be willing to sign herself in for treatment with a 201  On arrival the patient is saying that she owns part of the [employer]  It was her idea to change the name  She reports that she is working on merging the [employer] with [employer] and that 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 Taking?    OLANZapine (ZyPREXA) 20 MG tablet   No No   Sig: Take 1 tablet (20 mg total) by mouth daily after dinner   QUEtiapine (SEROquel) 400 MG tablet   No No   Sig: Take 2 tablets (800 mg total) by mouth daily at bedtime   divalproex sodium (DEPAKOTE ER) 500 mg 24 hr tablet   No No   Sig: Take 1 tablet (500 mg total) by mouth daily after dinner   docusate sodium (COLACE) 100 mg capsule   No No   Sig: Take 1 capsule (100 mg total) by mouth 2 (two) times a day At 9am and 6pm      Facility-Administered Medications: None       Past Medical History: Diagnosis Date    Bipolar 1 disorder, manic, moderate ([address] ) 2/3/2016    Psychiatric disorder     Psychiatric illness     Psychosis ([facility] )     Suicide attempt [facility])        Past Surgical History:   Procedure Laterality Date    TONSILLECTOMY         Family History   Problem Relation Age of Onset    Depression Father     Lung cancer Father      I have reviewed and agree with the history as documented  Social History     Tobacco Use    Smoking status: Never Smoker    Smokeless tobacco: Never Used   Substance Use Topics    Alcohol use: Yes     Frequency: 2-3 times a week     Drinks per session: 5 or 6     Binge frequency: Less than monthly     Comment: States 6-9 drinks per week    Drug use: Yes     Types: Marijuana     Comment: "once in a while"        Review of Systems   Constitutional: Negative for chills and fever  HENT: Negative for congestion, sore throat and voice change  Eyes: Negative for photophobia and visual disturbance  Respiratory: Negative for cough, chest tightness and shortness of breath  Cardiovascular: Negative for chest pain, palpitations and leg swelling  Gastrointestinal: Negative for abdominal pain, constipation, diarrhea, nausea and vomiting  Endocrine: Negative for polydipsia, polyphagia and polyuria  Genitourinary: Negative for difficulty urinating, dysuria, flank pain and urgency  Musculoskeletal: Negative for back pain, gait problem and neck pain  Skin: Negative for pallor and rash  Neurological: Negative for dizziness, syncope, weakness, light-headedness, numbness and headaches  Psychiatric/Behavioral: Negative for agitation and confusion  All other systems reviewed and are negative        Physical Exam  ED Triage Vitals   Temperature Pulse Respirations Blood Pressure SpO2   10/22/19 1422 10/22/19 1422 10/22/19 1422 10/22/19 1422 10/22/19 1422   97 6 °F (36 4 °C) 94 18 137/62 98 %      Temp Source Heart Rate Source Patient Position - Orthostatic VS BP Location FiO2 (%)   10/22/19 1422 10/22/19 2003 10/22/19 2003 10/22/19 1422 --   Oral Monitor Lying Left arm       Pain Score       10/22/19 1422       No Pain             Orthostatic Vital Signs  Vitals:    10/22/19 1422 10/22/19 2003   BP: 137/62 133/60   Pulse: 94 81   Patient Position - Orthostatic VS:  Lying       Physical Exam   Constitutional: She is oriented to person, place, and time  She appears well-developed and well-nourished  HENT:   Head: Normocephalic and atraumatic  Eyes: Pupils are equal, round, and reactive to light  EOM are normal    Neck: Normal range of motion  Neck supple  Cardiovascular: Normal rate, regular rhythm, normal heart sounds and intact distal pulses  Pulmonary/Chest: Effort normal and breath sounds normal    Abdominal: Soft  Bowel sounds are normal  She exhibits no distension  There is no tenderness  There is no rebound and no guarding  Neurological: She is alert and oriented to person, place, and time  Skin: Skin is warm and dry  Capillary refill takes less than 2 seconds  Psychiatric: Her speech is tangential  Thought content is delusional  She expresses inappropriate judgment  She expresses no homicidal and no suicidal ideation  She expresses no suicidal plans and no homicidal plans  Nursing note and vitals reviewed        ED Medications  Medications   divalproex sodium (DEPAKOTE ER) 24 hr tablet 500 mg (500 mg Oral Not Given 10/23/19 0010)   OLANZapine (ZyPREXA) tablet 20 mg (20 mg Oral Not Given 10/23/19 0010)   QUEtiapine (SEROquel) tablet 800 mg (400 mg Oral Given 10/23/19 0011)       Diagnostic Studies  Results Reviewed     Procedure Component Value Units Date/Time    POCT pregnancy, urine [[id number]]  (Normal) Resulted:  10/22/19 1902    Lab Status:  Final result Updated:  10/22/19 1902     EXT PREG TEST UR (Ref: Negative) negative     Control valid    Rapid drug screen, urine [[id number]]  (Abnormal) Collected:  10/22/19 1550    Lab Status:  Final result Specimen:  Urine, Clean Catch Updated:  10/22/19 1628     Amph/Meth UR Negative     Barbiturate Ur Negative     Benzodiazepine Urine Negative     Cocaine Urine Negative     Methadone Urine Negative     Opiate Urine Negative     PCP Ur Negative     THC Urine Positive    Narrative:       Presumptive report  If requested, specimen will be sent to reference lab for confirmation  FOR MEDICAL PURPOSES ONLY  IF CONFIRMATION NEEDED PLEASE CONTACT THE LAB WITHIN 5 DAYS  Drug Screen Cutoff Levels:  AMPHETAMINE/METHAMPHETAMINES  1000 ng/mL  BARBITURATES     200 ng/mL  BENZODIAZEPINES     200 ng/mL  COCAINE      300 ng/mL  METHADONE      300 ng/mL  OPIATES      300 ng/mL  PHENCYCLIDINE     25 ng/mL  THC       50 ng/mL      POCT alcohol breath test [[id number]]  (Normal) Resulted:  10/22/19 1515    Lab Status:  Final result Updated:  10/22/19 1515     EXTBreath Alcohol 0 000                 No orders to display         Procedures  Procedures        ED Course                               MDM  Number of Diagnoses or Management Options  Delusion [facility]):   Psychoses [facility]):   Diagnosis management comments:  43-year-old female with history of bipolar 1 with psychotic features presented to the emergency department from her psychiatrist's office with acute psychosis  On arrival to the emergency department the patient had no medical complaints but was found to be delusional   The patient denied SI, HI and AVH  She was evaluated by crisis and voluntarily signed a 201 form for inpatient treatment at a behavior health facility [facility] accepted [facility] transfer and the patient will be transferred around 12:30 a m  Mack Andre       Disposition  Final diagnoses:   Psychoses ([facility] )   Delusion ([facility] )     Time reflects when diagnosis was documented in both MDM as applicable and the Disposition within this note     Time User Action Codes Description Comment    10/22/2019  7:45 PM Anson Mistry Add [F31 2] Bipolar I disorder, most recent episode manic, severe with psychotic features ([facility] )     10/22/2019  7:45 PM Analy Mistry Modify [F31 2] Bipolar I disorder, most recent episode manic, severe with psychotic features ([place] [address] )     10/22/2019 10:44 PM Nu Fill Add [F29] Psychoses ([place] [address] )     10/22/2019 11:03 PM Nu Fill Add [F22] Delusion [facility])       ED Disposition     ED Disposition Condition Date/Time Comment    Transfer to [address] [place] [address] Oct 22, 2019 10:56 PM Sanket Manrique should be transferred out to [facility] and has been medically cleared          MD Documentation      Most Recent Value   Patient Condition  The patient has been stabilized such that within reasonable medical probability, no material deterioration of the patient condition or the condition of the unborn child(elias) is likely to result from the transfer   Reason for Transfer  Level of Care needed not available at this facility   Benefits of Transfer  Continuity of care   Risks of Transfer  Increased discomfort during transfer, Potential for delay in receiving treatment, Possible worsening of condition or death during transfer   Accepting Physician  Dr Tico Gaines Name, [address]    (Name & Tel number)  DAHLIA Valdes   Transported by [employer] and Unit #)  Kristan Been   [phone]   Sending MD  Dr Robin Mancia   Provider Certification  The patient is stable for psychiatric transfer because they are medically stable, and is protected from harming him/herself or others during transport      RN Documentation      Most [address] [facility] Name, [facility] [address] [place] [address] 3B    (Name & Tel number)  DAHLIA Valdes   Transport Mode  Ambulance   Transported by [employer] and Unit #)  Kristan Been   [phone]   Level of Care  Basic life support   Patient Belongings Disposition  Sent with patient   Transfer Date  10/23/19   Transfer Time  0030      Follow-up Information    None Patient's Medications   Discharge Prescriptions    No medications on file     No discharge procedures on file  ED Provider  Attending physically available and evaluated Gaviota Hodgson I managed the patient along with the ED Attending      Electronically Signed by         Christi Quintana MD  10/23/19 1322

## 2019-10-22 NOTE — ED NOTES
Patient is accepted at [facility]B  Patient is accepted by Dr Esther Bajwa per Aurelia Lawson in Intake  Transportation is arranged with Hortencia Bowden  Transportation is scheduled for 0030  Patient may go to the floor after 2330  *Nurse report is to be called to [phone] prior to patient transfer  DAHLIA Cervantes  10/22/19   2002

## 2019-10-23 ENCOUNTER — HOSPITAL ENCOUNTER (INPATIENT)
Facility: HOSPITAL | Age: 49
LOS: 19 days | Discharge: HOME | DRG: 885 | End: 2019-11-11
Attending: PSYCHIATRY & NEUROLOGY | Admitting: PSYCHIATRY & NEUROLOGY
Payer: COMMERCIAL

## 2019-10-23 DIAGNOSIS — F31.2 BIPOLAR I DISORDER, CURRENT OR MOST RECENT EPISODE MANIC, WITH PSYCHOTIC FEATURES (HCC): ICD-10-CM

## 2019-10-23 DIAGNOSIS — F31.2 BIPOLAR I DISORDER, MOST RECENT EPISODE MANIC, SEVERE WITH PSYCHOTIC FEATURES (HCC): ICD-10-CM

## 2019-10-23 DIAGNOSIS — Z72.0 TOBACCO ABUSE: Primary | ICD-10-CM

## 2019-10-23 PROBLEM — Z00.8 MEDICAL CLEARANCE FOR PSYCHIATRIC ADMISSION: Status: ACTIVE | Noted: 2019-10-23

## 2019-10-23 PROCEDURE — 99222 1ST HOSP IP/OBS MODERATE 55: CPT | Performed by: PSYCHIATRY & NEUROLOGY

## 2019-10-23 PROCEDURE — 99253 IP/OBS CNSLTJ NEW/EST LOW 45: CPT | Performed by: FAMILY MEDICINE

## 2019-10-23 PROCEDURE — 90686 IIV4 VACC NO PRSV 0.5 ML IM: CPT | Performed by: PSYCHIATRY & NEUROLOGY

## 2019-10-23 RX ORDER — OLANZAPINE 10 MG/1
10 TABLET ORAL
Status: DISCONTINUED | OUTPATIENT
Start: 2019-10-23 | End: 2019-10-28

## 2019-10-23 RX ORDER — BENZTROPINE MESYLATE 1 MG/1
1 TABLET ORAL EVERY 6 HOURS PRN
Status: DISCONTINUED | OUTPATIENT
Start: 2019-10-23 | End: 2019-11-11 | Stop reason: HOSPADM

## 2019-10-23 RX ORDER — RISPERIDONE 1 MG/1
1 TABLET, ORALLY DISINTEGRATING ORAL EVERY 8 HOURS PRN
Status: DISCONTINUED | OUTPATIENT
Start: 2019-10-23 | End: 2019-11-11 | Stop reason: HOSPADM

## 2019-10-23 RX ORDER — BENZTROPINE MESYLATE 1 MG/ML
1 INJECTION INTRAMUSCULAR; INTRAVENOUS EVERY 6 HOURS PRN
Status: DISCONTINUED | OUTPATIENT
Start: 2019-10-23 | End: 2019-11-11 | Stop reason: HOSPADM

## 2019-10-23 RX ORDER — DIVALPROEX SODIUM 500 MG/1
500 TABLET, EXTENDED RELEASE ORAL
Status: DISCONTINUED | OUTPATIENT
Start: 2019-10-23 | End: 2019-10-28

## 2019-10-23 RX ORDER — QUETIAPINE FUMARATE 400 MG/1
400 TABLET, FILM COATED ORAL
Status: DISCONTINUED | OUTPATIENT
Start: 2019-10-23 | End: 2019-10-24

## 2019-10-23 RX ORDER — OLANZAPINE 10 MG/1
10 INJECTION, POWDER, LYOPHILIZED, FOR SOLUTION INTRAMUSCULAR 2 TIMES DAILY PRN
Status: DISCONTINUED | OUTPATIENT
Start: 2019-10-23 | End: 2019-11-11 | Stop reason: HOSPADM

## 2019-10-23 RX ORDER — ACETAMINOPHEN 325 MG/1
650 TABLET ORAL EVERY 4 HOURS PRN
Status: DISCONTINUED | OUTPATIENT
Start: 2019-10-23 | End: 2019-11-11 | Stop reason: HOSPADM

## 2019-10-23 RX ORDER — MAGNESIUM HYDROXIDE/ALUMINUM HYDROXICE/SIMETHICONE 120; 1200; 1200 MG/30ML; MG/30ML; MG/30ML
30 SUSPENSION ORAL EVERY 4 HOURS PRN
Status: DISCONTINUED | OUTPATIENT
Start: 2019-10-23 | End: 2019-11-11 | Stop reason: HOSPADM

## 2019-10-23 RX ORDER — QUETIAPINE FUMARATE 100 MG/1
TABLET, FILM COATED ORAL
Status: DISCONTINUED
Start: 2019-10-23 | End: 2019-10-23 | Stop reason: WASHOUT

## 2019-10-23 RX ORDER — QUETIAPINE FUMARATE 400 MG/1
TABLET, FILM COATED ORAL
Status: COMPLETED
Start: 2019-10-23 | End: 2019-10-23

## 2019-10-23 RX ORDER — HALOPERIDOL 5 MG
5 TABLET ORAL EVERY 8 HOURS PRN
Status: DISCONTINUED | OUTPATIENT
Start: 2019-10-23 | End: 2019-11-11 | Stop reason: HOSPADM

## 2019-10-23 RX ORDER — LORAZEPAM 1 MG/1
1 TABLET ORAL EVERY 4 HOURS PRN
Status: DISCONTINUED | OUTPATIENT
Start: 2019-10-23 | End: 2019-11-11 | Stop reason: HOSPADM

## 2019-10-23 RX ORDER — TRAZODONE HYDROCHLORIDE 50 MG/1
25 TABLET ORAL
Status: DISCONTINUED | OUTPATIENT
Start: 2019-10-23 | End: 2019-11-11 | Stop reason: HOSPADM

## 2019-10-23 RX ADMIN — INFLUENZA VIRUS VACCINE 0.5 ML: 15; 15; 15; 15 SUSPENSION INTRAMUSCULAR at 15:55

## 2019-10-23 RX ADMIN — QUETIAPINE 400 MG: 400 TABLET, FILM COATED ORAL at 20:17

## 2019-10-23 RX ADMIN — QUETIAPINE FUMARATE 400 MG: 400 TABLET, FILM COATED ORAL at 20:17

## 2019-10-23 RX ADMIN — QUETIAPINE FUMARATE 400 MG: 300 TABLET ORAL at 00:11

## 2019-10-23 NOTE — EMTALA/ACUTE CARE TRANSFER
[address] [id number]  Dept: [phone]      EOEXZT TRANSFER CONSENT    NAME Alberto Rodriguez                                         1970                              MRN [id number]    I have been informed of my rights regarding examination, treatment, and transfer   by Dr Jocelyne Ornelas MD    Benefits: Continuity of care    Risks: Increased discomfort during transfer, Potential for delay in receiving treatment, Possible worsening of condition or death during transfer      Consent for Transfer:  I acknowledge that my medical condition has been evaluated and explained to me by the emergency department physician or other qualified medical person and/or my attending physician, who has recommended that I be transferred to the service of  Accepting Physician: Dr Mikel Jones  at [facility] Name, [address] : [address]  The above potential benefits of such transfer, the potential risks associated with such transfer, and the probable risks of not being transferred have been explained to me, and I fully understand them  The doctor has explained that, in my case, the benefits of transfer outweigh the risks  I agree to be transferred  I authorize the performance of emergency medical procedures and treatments upon me in both transit and upon arrival at the receiving facility  Additionally, I authorize the release of any and all medical records to the receiving facility and request they be transported with me, if possible  I understand that the safest mode of transportation during a medical emergency is an ambulance and that the Hospital advocates the use of this mode of transport  Risks of traveling to the receiving facility by car, including absence of medical control, life sustaining equipment, such as oxygen, and medical personnel has been explained to me and I fully understand them      (DAVID CORRECT BOX BELOW)  [  ]  I consent to the stated transfer and to be transported by ambulance/helicopter  [  ]  I consent to the stated transfer, but refuse transportation by ambulance and accept full responsibility for my transportation by car  I understand the risks of non-ambulance transfers and I exonerate the Hospital and its staff from any deterioration in my condition that results from this refusal     X___________________________________________    DATE  10/22/19  TIME________  Signature of patient or legally responsible individual signing on patient behalf           RELATIONSHIP TO PATIENT_________________________          Provider Certification    NAME Francis Granados                                         1970                              MRN [id number]    A medical screening exam was performed on the above named patient  Based on the examination:    Condition Necessitating Transfer The primary encounter diagnosis was Bipolar I disorder, most recent episode manic, severe with psychotic features ([address] )  A diagnosis of Psychoses ([address] ) was also pertinent to this visit      Patient Condition: The patient has been stabilized such that within reasonable medical probability, no material deterioration of the patient condition or the condition of the unborn child(elias) is likely to result from the transfer    Reason for Transfer: Level of Care needed not available at this facility    Transfer Requirements: Facility [facility] 3B   · Space available and qualified personnel available for treatment as acknowledged by DAHLIA Cates  · Agreed to accept transfer and to provide appropriate medical treatment as acknowledged by       Dr Fara Dawn   · Appropriate medical records of the examination and treatment of the patient are provided at the time of transfer   [address] _______  · Transfer will be performed by qualified personnel from [facility]   [phone]  and appropriate transfer equipment as required, including the use of necessary and appropriate life support measures  Provider Certification: I have examined the patient and explained the following risks and benefits of being transferred/refusing transfer to the patient/family:  The patient is stable for psychiatric transfer because they are medically stable, and is protected from harming him/herself or others during transport      Based on these reasonable risks and benefits to the patient and/or the unborn child(elias), and based upon the information available at the time of the patients examination, I certify that the medical benefits reasonably to be expected from the provision of appropriate medical treatments at another medical facility outweigh the increasing risks, if any, to the individuals medical condition, and in the case of labor to the unborn child, from effecting the transfer      X____________________________________________ DATE 10/22/19        TIME_______      ORIGINAL - SEND TO MEDICAL RECORDS   COPY - SEND WITH PATIENT DURING TRANSFER

## 2019-10-23 NOTE — CONSULTS
Consult- Gely Bernardo 1970, 51 y o  female MRN: [id number]    Unit/Bed#: [facility] 347-02 Encounter: [id number]    Primary Care Provider: No primary care provider on file  Date and time admitted to hospital: 10/23/2019  1:43 AM      Inpatient consult for Medical Clearance for [facility] patient  Consult performed by: Jacqui Flowers MD  Consult ordered by: Mark Pearl MD          Medical clearance for psychiatric admission  Assessment & Plan  Patient is medically cleared for inpatient behavioral health treatment    * Bipolar I disorder, current or most recent episode manic, with psychotic features [facility])  Assessment & Plan  Metabolic profile pending  Further as per psych  Patient is very delusional at this time    Tobacco abuse:  Counseled on smoking cessation and placed on nicotine gum    Obstructive sleep apnea:  Patient states that she does have a known history of sleep apnea however refuses to use a CPAP machine at this time  VTE Prophylaxis: Reason for no pharmacologic prophylaxis Low risk  / reason for no mechanical VTE prophylaxis Low risk     Recommendations for Discharge:  · None    Counseling / Coordination of Care Time: 45 minutes  Greater than 50% of total time spent on patient counseling and coordination of care  Collaboration of Care: Were Recommendations Directly Discussed with Primary Treatment Team? - Yes     History of Present Illness:    Gely Bernardo is a 51 y o  female who is originally admitted to the psychiatry service due to uncontrolled bipolar disorder and delusional behavior  We are consulted for medical management  Patient is behaving in a very bizarre manner  She thinks that she has locked up in her place of work  She has some delusional ideas about L[employer] day  She also states that her ex-boyfriend is suffering from cancer and may die any day  It is hard to get a good history from her as it is hard to trust her answers    She denies any chest pain or shortness of breath or any medical history    Review of Systems:    Review of Systems   Constitutional: Negative for appetite change, chills, fatigue and fever  HENT: Negative for hearing loss, sore throat and trouble swallowing  Eyes: Negative for photophobia, discharge and visual disturbance  Respiratory: Negative for chest tightness and shortness of breath  Cardiovascular: Negative for chest pain and palpitations  Gastrointestinal: Negative for abdominal pain, blood in stool and vomiting  Endocrine: Negative for polydipsia and polyuria  Genitourinary: Negative for difficulty urinating, dysuria, flank pain and hematuria  Musculoskeletal: Negative for back pain and gait problem  Skin: Negative for rash  Allergic/Immunologic: Negative for environmental allergies and food allergies  Neurological: Negative for dizziness, seizures, syncope and headaches  Hematological: Does not bruise/bleed easily  Psychiatric/Behavioral: Positive for behavioral problems  The patient is nervous/anxious  All other systems reviewed and are negative  Past Medical and Surgical History:     Past Medical History:   Diagnosis Date    Bipolar 1 disorder, manic, moderate ([facility] [address] ) 2/3/2016    Psychiatric disorder     Psychiatric illness     Psychosis ([facility] )     Sleep apnea     Suicide attempt [facility])        Past Surgical History:   Procedure Laterality Date    ADENOIDECTOMY      TONSILLECTOMY         Meds/Allergies:    all medications and allergies reviewed    Allergies:    Allergies   Allergen Reactions    Lithium     Sulfa Antibiotics Other (See Comments)     unknown       Social History:     Marital Status: Single    Substance Use History:   Social History     Substance and Sexual Activity   Alcohol Use Yes    Frequency: 2-4 times a month    Drinks per session: 1 or 2    Binge frequency: Less than monthly     Social History     Tobacco Use   Smoking Status Never Smoker   Smokeless Tobacco Never Used     Social History Substance and Sexual Activity   Drug Use Yes    Types: Marijuana    Comment: "once in a while"       Family History:    Family History   Problem Relation Age of Onset    Depression Father     Lung cancer Father        Physical Exam:     Vitals:   Blood Pressure: 110/55 (10/23/19 0750)  Pulse: 94 (10/23/19 0750)  Temperature: 97 8 °F (36 6 °C) (10/23/19 0750)  Temp Source: Temporal (10/23/19 0750)  Respirations: 16 (10/23/19 0750)  Height: 5' 5" (165 1 cm) (10/23/19 0150)  Weight - Scale: 79 9 kg (176 lb 2 4 oz) (10/23/19 0150)  SpO2: 96 % (10/23/19 0150)    Physical Exam   Constitutional: She is oriented to person, place, and time  She appears well-developed and well-nourished  HENT:   Head: Normocephalic and atraumatic  Right Ear: External ear normal    Left Ear: External ear normal    Mouth/Throat: Oropharynx is clear and moist    Eyes: Pupils are equal, round, and reactive to light  Conjunctivae and EOM are normal    Neck: Normal range of motion  Neck supple  Cardiovascular: Normal rate, regular rhythm, normal heart sounds and intact distal pulses  Pulmonary/Chest: Effort normal and breath sounds normal    Abdominal: Soft  Bowel sounds are normal  She exhibits no mass  There is no tenderness  There is no rebound and no guarding  Genitourinary:   Genitourinary Comments: deferred   Musculoskeletal: Normal range of motion  Neurological: She is alert and oriented to person, place, and time  She has normal reflexes  Cranial nerves 2-12 are normal   Normal neurological exam   Skin: Skin is warm and dry  No rash noted  Psychiatric:   Bizarre delusional behavior noted   Nursing note and vitals reviewed  Additional Data:     Lab Results: I have personally reviewed pertinent reports  Lab Results   Component Value Date/Time    HGBA1C 5 7 08/13/2019 12:52 PM    HGBA1C 6 3 08/28/2018 07:47 AM               Imaging: I have personally reviewed pertinent reports        No orders to display       EKG, Pathology, and Other Studies Reviewed on Admission:   · EKG:  Ordered    ** Please Note: This note has been constructed using a voice recognition system   **

## 2019-10-23 NOTE — PROGRESS NOTES
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

## 2019-10-23 NOTE — CASE MANAGEMENT
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

## 2019-10-23 NOTE — ED NOTES
Insurance Authorization for admission:   Phone call placed to [employer]  Phone number: [phone]  Spoke to [place]  3 days approved  Level of care: Inpatient  Review on 10/25/19  Authorization # [id number]  EVS (Eligibility Verification System) called - [phone]    Automated system indicates: fee for service     Insurance Authorization for Transportation:    None needed as per Bud Day with [employer]

## 2019-10-23 NOTE — ED NOTES
Call placed to [facility], [phone], to request pre-cert; spoke with [facility] to give demographics and was provided tracking number [id number]  Call then transferred to provide clinical, however it went straight to voicemail where a message was left requesting a call back to complete authorization       DAHLIA Johns  10/22/19   [id number]

## 2019-10-23 NOTE — PLAN OF CARE
Problem: Alteration in Thoughts and Perception  Goal: Agree to be compliant with medication regime, as prescribed and report medication side effects  Description  Interventions:  - Offer appropriate PRN medication and supervise ingestion; conduct AIMS, as needed   Outcome: Progressing     Problem: Alteration in Thoughts and Perception  Goal: Treatment Goal: Gain control of psychotic behaviors/thinking, reduce/eliminate presenting symptoms and demonstrate improved reality functioning upon discharge  Outcome: Not Progressing  Goal: Verbalize thoughts and feelings  Description  Interventions:  - Promote a nonjudgmental and trusting relationship with the patient through active listening and therapeutic communication  - Assess patient's level of functioning, behavior and potential for risk  - Engage patient in 1 on 1 interactions  - Encourage patient to express fears, feelings, frustrations, and discuss symptoms    - Union patient to reality, help patient recognize reality-based thinking   - Administer medications as ordered and assess for potential side effects  - Provide the patient education related to the signs and symptoms of the illness and desired effects of prescribed medications  Outcome: Not Progressing  Goal: Refrain from acting on delusional thinking/internal stimuli  Description  Interventions:  - Monitor patient closely, per order   - Utilize least restrictive measures   - Set reasonable limits, give positive feedback for acceptable   - Administer medications as ordered and monitor of potential side effects  Outcome: Not Progressing  Goal: Recognize dysfunctional thoughts, communicate reality-based thoughts at the time of discharge  Description  Interventions:  - Provide medication and psycho-education to assist patient in compliance and developing insight into his/her illness   Outcome: Not Progressing

## 2019-10-23 NOTE — ED NOTES
Patient care handoff occurred at this time  Report given by Kristi Luna, RN  Patient resting comfortably, awaiting transport to [facility] @ 0030        Fabio Gipson, ELI  10/22/19 [id number]

## 2019-10-23 NOTE — PROGRESS NOTES
[facility] GROUP NOTE     10/23/19 1000   Activity/Group Checklist   Group Life Skills   Attendance Attended   Attendance Duration (min) 16-30  (pulled from group by physician)   Interactions Interacted appropriately   Affect/Mood Disorganized   Goals Achieved Able to listen to others; Able to engage in interactions   Objectives/Key Points  Define Self Esteem (positive/negative)  Raise awareness of personal strengths  Define ways to bolster positive self esteem  Understand factors playing into negative self esteem

## 2019-10-23 NOTE — PROGRESS NOTES
10/23/19 1000   Team Meeting   Meeting Type Daily Rounds   Team Members Present   Team Members Present Physician;Nurse;;; Other (Discipline and Name)   Physician Team Member Dr Jeffrey Cohen Team Member Lakesha Schmitt and Caryl Ormond   Other (Discipline and Name) Med Student Tiffany Bazzi and Resident Cora   Patient/Family Present   Patient Present No   Patient's Family Present No   Dr Ayala Sample will be taking as a patient

## 2019-10-23 NOTE — TREATMENT PLAN
TREATMENT PLAN REVIEW - [address] 51 y o  1970 female MRN: [id number]    [address] [address] Room / Bed: [place] [address]/[facility] 050-40 Encounter: [id number]          Admit Date/Time:  10/23/2019  1:43 AM    Treatment Team: Attending Provider: Susana Francis MD; Consulting Physician: Carri Buenrostro MD; Registered Nurse: [place] RN; Patient Care Technician: Billy Posey;  Patient Care Technician: Candido Seth; Patient Care Assistant: Valentine Jerome; Registered Nurse: Viktor Conrad RN; : MARY Saavedra; Resident: Edmond Birmingham MD    Diagnosis: Principal Problem:    Bipolar I disorder, current or most recent episode manic, with psychotic features [facility])      Patient Strengths/Assets: cooperative, financially secure, good past treatment response    Patient Barriers/Limitations: noncompliant with medication, poor insight    Short Term Goals: decrease in psychotic symptoms, mood stabilization, acceptance of psychiatric medications    Long Term Goals: resolution of manic symptoms, stabilization of mood, free of suicidal thoughts, free of homicidal thoughts, resolution of psychotic symptoms, adequate sleep, adequate appetite    Progress Towards Goals: continue psychiatric medications as prescribed    Recommended Treatment: medication management, patient medication education, group therapy, milieu therapy, continued Behavioral Health psychiatric evaluation/assessment process    Treatment Frequency: daily medication monitoring, group and milieu therapy daily, monitoring through interdisciplinary rounds, monitoring through weekly patient care conferences    Expected Discharge Date:  6-8 weeks    Discharge Plan: return to previous living arrangement, resume out patient treatmen with current psychiatrist (Dr Maxwell Randle)    Treatment Plan Created/Updated By: Edmond Birmingham MD

## 2019-10-23 NOTE — H&P
Psychiatric Evaluation - [address] [place] [address] 51 y o  female MRN: [id number]  Unit/Bed#: [facility] 855-47 Encounter: [id number]    Assessment/Plan   Active Problems:  Bipolar I Disorder, most recent episode manic, severe with psychotic features    Plan:   Check admission labs  Collaborate with family for baseline assessment and disposition planning  Resume Seroquel 400 mg qhs, Zyprexa 10 mg after dinner, Depakote 500 mg after dinner (check level)    Treatment options and alternatives were reviewed with the patient, who concurs with the above plan  Risks, benefits, and possible side effects of medications were explained to the patient, and she verbalizes understanding       -----------------------------------    Chief Complaint: "I did not take all of my medications because they put me in a dark fog"    History of Present Illness     Sander Borja is a 51 y o  female with a PMH of Bipolar I disorder who presents from her psychiatrist's office (Dr Krishna Osuna) on a voluntarily basis for evaluation due to delusional and bizarre behaviors  She was recently treated at [facility] (09/13/19 - 10/11/2019) for acute psychosis and xochitl on an involuntarily basis  She was discharged on Seroquel 800 mg qhs, Zyprexa 20 mg after dinner and Depakote 500 mg after dinner  The patient admits to non-compliance with some of her medications, and was only taking her Seroquel, due to feeling in "a dark fog" with the others  Per ED provider :  "On arrival the patient is saying that she owns part of the [employer]  It was her idea to change the name  She reports that she is working on merging the [employer] with [employer] and that she will have people work there as circus performers  She also says that she is going to eliminate concussions in football by outlawing fantasy football "    Per Crisis worker:  " Patient is a poor historian overall and has answers questions with loose associations   Patient is unable to relate if her sleep or appetite has changed  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 and states that despite all the stress, she was 'born to do this job'  When asked about her sleep she discussed wanting to switch to part-time evening shift which is interfering with her sleep schedule; however, she goes on to state that it all depends on what happens with her trip to [facility] [address]  Patient denies current suicidal/homicidal ideations and auditory/visual hallucinations  Patient does report experiencing hallucinations if she doesn't sleep for a few days; when this occurs it is of spirits and dead people "    Bladimir Liz is very distracted during our interview, and admits to auditory hallucinations of her family fighting and saying things to her  She denies that the voices are commanding her to hurt herself or others  She continues to present as bizarre and disorganized, with pressured speech and tangential thought processes with loose association  She has mild paranoia, and feels as though she is being watched, but states she feels safe in her room  She is cooperative, and willing to re-start all of the medications she stopped taking  She denies any Homicidal or Suicidal ideations, intent or plan  The patient is consenting for safety on the unit    Medical Review Of Systems:  Complete review of systems is negative except as noted above  Psychiatric Review Of Systems:  Problems with sleep: no  Appetite changes: no  Weight changes: no  Low energy/anergy: no  Low interest/pleasure/anhedonia: no  Somatic symptoms: no  Anxiety/panic: no  Courtney: no  Guilt/hopeless: no  Self injurious behavior/risky behavior: no    Historical Information     Psychiatric History:   Psychiatric medication trial:  Seroquel 800 mg qhs, Zyprexa 20 mg after dinner and Depakote 500 mg after dinner    [facility] - 10/11/2019 and 2/02/16 - 2/23/16)  Suicide attempts: OD on Seroquel 2 weeks prior to admission in 2016  Violent behavior: past agitation  Outpatient treatment: Dr Kiley Dixon and his NP Gianluca Casey    Substance Abuse History:  Social History     Tobacco Use    Smoking status: Never Smoker    Smokeless tobacco: Never Used   Substance Use Topics    Alcohol use: Yes     Frequency: 2-4 times a month     Drinks per session: 1 or 2     Binge frequency: Less than monthly    Drug use: Yes     Types: Marijuana     Comment: "once in a while"      Patient reports using medical grade THC and alcohol   I have assessed this patient for substance use within the past 12 months  I spent time with Felicie Galeazzi in counseling and education on risk of substance abuse  I assessed motivation and encouraged her for treatment as appropriate  Family Psychiatric History:   Brother - Bipolar disorder    Social History:  Education: college graduate  Learning Disabilities: denies  Marital history:   Living arrangement: Lives in home, alone  Occupational History: Psychiatric Unit Nurse at [employer]: gets along well with co-workers and daughter    Other Pertinent History: None      Traumatic History:   Abuse: denies  Other Traumatic Events: denies    Past Medical History:   Past Medical History:   Diagnosis Date    Bipolar 1 disorder, manic, moderate ([place] [address] ) 2/3/2016    Psychiatric disorder     Psychiatric illness     Psychosis ([facility] )     Suicide attempt ([place] [address] )         -----------------------------------  Objective    Temp:  [97 4 °F (36 3 °C)-97 8 °F (36 6 °C)] 97 8 °F (36 6 °C)  HR:  [] 94  Resp:  [16-19] 16  BP: (110-137)/(55-68) 110/55    Mental Status Evaluation:  Appearance:  alert, casually dressed, appears stated age and appropriate grooming and hygiene   Behavior:  pleasant, cooperative, sitting comfortably, good eye contact, no abnormal movements and normal gait and balance   Speech:  clear, increased rate, pressured, normal volume and coherent   Mood:  "irritated"   Affect:  mood-congruent and anxious Thought Process:  disorganized, illogical, incoherent, tangential, loose associations, increased rate of thoughts, flight of ideas   Thought Content: delusions (that she is an undercover psychiatrist on the unit), paranoid ideation, obsessive thoughts, intrusive thoughts   Perceptual disturbances: auditory hallucinations (of family members fighting or telling her who to be with) and no reported visual hallucinations   Risk Potential: No active or passive suicidal or homicidal ideation   Cognition: disoriented, memory impaired due to manic/psychotic episode, appears to be of average intelligence, impaired abstract reasoning and attention span appeared shorter than expected for age   Insight:  Poor   Judgment: Limited     Meds/Allergies   Allergies   Allergen Reactions    Lithium     Sulfa Antibiotics Other (See Comments)     unknown     all current active meds have been reviewed, current meds:   Current Facility-Administered Medications   Medication Dose Route Frequency    acetaminophen (TYLENOL) tablet 650 mg  650 mg Oral Q4H PRN    aluminum-magnesium hydroxide-simethicone (MYLANTA) 200-200-20 mg/5 mL oral suspension 30 mL  30 mL Oral Q4H PRN    benztropine (COGENTIN) injection 1 mg  1 mg Intramuscular Q6H PRN    benztropine (COGENTIN) tablet 1 mg  1 mg Oral Q6H PRN    haloperidol (HALDOL) tablet 5 mg  5 mg Oral Q8H PRN    influenza vaccine, quadrivalent (FLUARIX) IM injection 0 5 mL  0 5 mL Intramuscular Once    LORazepam (ATIVAN) tablet 1 mg  1 mg Oral Q4H PRN    magnesium hydroxide (MILK OF MAGNESIA) 400 mg/5 mL oral suspension 30 mL  30 mL Oral Daily PRN    nicotine polacrilex (NICORETTE) gum 2 mg  2 mg Oral Q2H PRN    OLANZapine (ZyPREXA) IM injection 10 mg  10 mg Intramuscular BID PRN    risperiDONE (RisperDAL M-TABS) dispersible tablet 1 mg  1 mg Oral Q8H PRN    traZODone (DESYREL) tablet 25 mg  25 mg Oral HS PRN    and PTA meds:   Prior to Admission Medications   Prescriptions Last Dose Informant Patient Reported? Taking? OLANZapine (ZyPREXA) 20 MG tablet Not Taking at Unknown time  No No   Sig: Take 1 tablet (20 mg total) by mouth daily after dinner   Patient not taking: Reported on 10/23/2019   QUEtiapine (SEROquel) 400 MG tablet   No Yes   Sig: Take 2 tablets (800 mg total) by mouth daily at bedtime   divalproex sodium (DEPAKOTE ER) 500 mg 24 hr tablet Not Taking at Unknown time  No No   Sig: Take 1 tablet (500 mg total) by mouth daily after dinner   Patient not taking: Reported on 10/23/2019   docusate sodium (COLACE) 100 mg capsule Not Taking at Unknown time  No No   Sig: Take 1 capsule (100 mg total) by mouth 2 (two) times a day At 9am and 6pm   Patient not taking: Reported on 10/23/2019      Facility-Administered Medications: None       Behavioral Health Medications: all current active meds have been reviewed  Changes as above  Laboratory results:  I have personally reviewed all pertinent laboratory/tests results  Recent Results (from the past 48 hour(s))   POCT alcohol breath test    Collection Time: 10/22/19  3:15 PM   Result Value Ref Range    EXTBreath Alcohol 0 000    Rapid drug screen, urine    Collection Time: 10/22/19  3:50 PM   Result Value Ref Range    Amph/Meth UR Negative Negative    Barbiturate Ur Negative Negative    Benzodiazepine Urine Negative Negative    Cocaine Urine Negative Negative    Methadone Urine Negative Negative    Opiate Urine Negative Negative    PCP Ur Negative Negative    THC Urine Positive (A) Negative   POCT pregnancy, urine    Collection Time: 10/22/19  7:02 PM   Result Value Ref Range    EXT PREG TEST UR (Ref: Negative) negative     Control valid              -----------------------------------    Risks / Benefits of Treatment:     Risks, benefits, and possible side effects of medications explained to patient  The patient verbalizes understanding and agreement for treatment       Counseling / Coordination of Care:     Patient's presentation on admission and proposed treatment plan were discussed with the treatment team   Diagnosis, medication changes and treatment plan were reviewed with the patient  Recent stressors were discussed with the patient  Events leading to admission were reviewed with the patient  Importance of medication and treatment compliance was reviewed with the patient  Inpatient Psychiatric Certification:     Certification: Based upon physical, mental and social evaluations, I certify that inpatient psychiatric services are medically necessary for this patient for a duration of at least 27 midnights for the treatment of Bipolar I disorder, current or most recent episode manic, with psychotic features [facility])    Available alternative community resources do not meet the patient's mental health care needs  I further attest that an established written individualized plan of care has been implemented and is outlined in the patient's medical records  This note has been constructed using a voice recognition system  There may be translation, syntax, or grammatical errors  If you have any questions, please contact the dictating provider

## 2019-10-23 NOTE — PROGRESS NOTES
Patient was disheveled, delayed and disorganized in conversation this morning  Seemed unable to concentrate on the questions being asked of her  Was able to say that she would like a flu vaccine  When asked about AH, patient said, "I only hear your voices and the voices of everyone out in the pérez--I can't listen to everything at once " Patient became preoccupied with whether she should wear her shirt and whether she should wear it forward or backwards and was unable to converse further with the RN

## 2019-10-23 NOTE — PROGRESS NOTES
Pt was brought to B-ED by [facility] due to her bizarre behavior and delusional thinking  She signed a 201  UDS was positive for THC  The patient admits to not taking her medications  The only medication she is taking in Seroquel at   On admission she denies SI, HI, and hallucinations  She stated that she plans to turn her home into a homeless shelter  Several times during the interview she started speaking something unrelated to the topic and stated she was talking to her daughter  It is confirmed that the patient does have a daughter in her 19's  She also stated that she is working to have [employer] with [facility] and would like her family to stay at the [facility] for Thanksgiving  At times during the interview the patient did not answer the question and just stared at the wall  She complains of poor sleep and stated that she has sleep apnea  Her CPAP machine is missing parts and she has not been using at home  On her paperwork from [facility] it states that she is on Fluphenazine decanoate IM but when I went over the medication she denied using injectables  The patient became very upset when she saw staff members she knew and emotional support was provided

## 2019-10-23 NOTE — DISCHARGE INSTR - OTHER ORDERS
[facility] Crisis Phone Number : [phone]    [facility] Crisis Phone Number : [phone]    National Suicide Hotline : 1 236.995.7601    Crisis Text Line : [place] is a toll-free telephone number for people in [facility] who are seeking a listening ear for additional support in their recovery from mental illness  The PEER LINE is peer-run and peer-friendly  Callers to the [address] will speak directly to other individuals who have experienced the mental health recovery process  The PEER LINE staff possess these three core values to assist and support the PEER LINE caller:  Acceptance   [facility]  Promote individual recovery and strengthen communities  Funding Information  This project is funded, in part, under contract with [facility], through funds provided by the [employer]  Recovery Partnership has always promoted, used, and remains faithful to procedure and language that reflects recovery-based and person-first principles  Recovery [facility] [employer]   You can call the Peer Line 24 hours a day  Phone: 0-946-VM-PEERS  (5-439.168.3347)   HOW TO GET SUBSTANCE ABUSE HELP:  If you or someone you know has a drug or alcohol problem, there is help:  [address]: [address]: [phone]  An assessment is the first step  In addition to those listed there are other programs available in the area but assessment is best to determine an appropriate level of care    If you DO NOT have Medical Assistance (MA) or [employer], an assessment can be scheduled at one of these providers:  [address] [place] [address] Shiv [address], [address]  [address] , [facility], [address] [facility] [address] [facility] [place], [address]   Step by 8012 [facility] [address] , [place], 98 [facility]  [phone]   Treatment Trends  [place]front  [address] [facility] , [place], 98 [facility]  [address] [facility],[address] , [address], [place], [address] [place] [phone]     If you [address], an assessment can be scheduled at one of these providers:  [place] [facility]  [address] , [place], 98 [facility]  [address]  Elizabeth [address], [address]  [address] D&A Intake Unit  620 [facility] [address] , 1st Floor, [place], [address]  [address], 300 [facility],6Th Floor, [place], [address] [place] [address] , [place], [address]  [address], [address], [address] 253 [facility] [address] [place] [address] [place], [address]   Step by 8012 [facility] [address] , [place], 98 [facility]  [phone]   Treatment Trends  [place]  13[address] [facility] , [place], 98 [facility]  2000 [facility],[address] , [address], [place], [address] [place] [phone]     If you [address], an assessment can be scheduled at one of these providers  Please contact these Providers to determine if they are in your network plan:  [facility] D&A Intake Unit  [address] [place] [address] [address] , 1st Floor, [place], [address] [address]  [address] [facility] [address], [address]  [address], [address] [place] [address], [address]   NET ([facility])  [address], [place], [address] 75 [facility] [place] [address] [facility] [address] , [address], [place], [address]

## 2019-10-23 NOTE — PROGRESS NOTES
10/23/19 1415   Activity/Group Checklist   Group Other (Comment)  (Art Therapy Process Group/Quotes, Discussion)   Attendance Attended   Attendance Duration (min) Greater than 60   Interactions Disorganized interaction   Affect/Mood Appropriate   Goals Achieved Able to listen to others; Able to engage in interactions; Able to recieve feedback; Able to give feedback to another  (Able to engage materials; full participation/fragmented)

## 2019-10-23 NOTE — PROGRESS NOTES
Patient's roommate's bed was found covered in juice, lotion, and body wash  Her roommate was on continual observation in another room at the time  When asked about the mess, patient said, "Someone must have had a superbowl party " She then said, "It's not that big of a deal, let's just get rid of it," and started pulling the sheets off the bed  Patient's roommate will be moved to a different room

## 2019-10-23 NOTE — PLAN OF CARE
Patient began attending art therapy with full participation; attentive, but disorganized/fragmented thoughts

## 2019-10-23 NOTE — ED NOTES
Transportation Authorization form completed and faxed to [facility] for review       DAHLIA Burns  10/22/19   2723 dyspnea/cough

## 2019-10-24 PROCEDURE — 99231 SBSQ HOSP IP/OBS SF/LOW 25: CPT | Performed by: PSYCHIATRY & NEUROLOGY

## 2019-10-24 RX ADMIN — QUETIAPINE 500 MG: 400 TABLET, FILM COATED ORAL at 21:38

## 2019-10-24 NOTE — PROGRESS NOTES
Pt presents mostly in public areas, is visible and social with peers  Guarded, entitled, irritable and paranoid towards staff  Pt is engaged in delusions of grandeur, thinking she "is an undercover psychiatrist here " Engaged in comments of hypersexuality  Apparently told peers in dayroom "If anyone talking on the phone to their families, I'll give them head " Attempted to grab radio from another peer who grew agitated towards the patient  Consistently disorganized content  Denies SI and HI  Patient appears confused of location and situation        Was overheard mentioning over phone call a "thank you letter from [employer] "

## 2019-10-24 NOTE — PROGRESS NOTES
10/24/19 1020   Team Meeting   Meeting Type Tx Team Meeting   Initial Conference Date 10/24/19   Next Conference Date 11/24/19   Team Members Present   Team Members Present Physician;Nurse;;   Physician Team Member Dr Rigoberto Gray Team Member [facility] Management Team Member [address] [facility] [address] Work Team Member Pope Cui   Other (Discipline and Name) RN Student Brett Moreno   Patient/Family Present   Patient Present Yes   Patient's Family Present No     Treatment plan reviewed with and signed by patient  Patient was disorganized but cooperative throughout

## 2019-10-24 NOTE — PROGRESS NOTES
Patient was disorganized in conversation today  Was observed talking to another patient in the milieu while taking notes as if she were his psychiatrist  Patient appears preoccupied and her responses are highly delayed, though she denies experiencing hallucinations  Patient also denies SI and HI

## 2019-10-24 NOTE — CASE MANAGEMENT
Treatment team met with Pt to discuss goals and treatment planning  Pt was engaged and was in agreement, signed Tx Plan  SW offered copy of treatment plan, Pt preferred to keep Tx plan in personal unit folder  MARY placed in Pt folder on unit      Pt signed DENNIS for [place] [employer] [facility] and Cheo Rutledge

## 2019-10-24 NOTE — PLAN OF CARE
Problem: Alteration in Thoughts and Perception  Goal: Verbalize thoughts and feelings  Description  Interventions:  - Promote a nonjudgmental and trusting relationship with the patient through active listening and therapeutic communication  - Assess patient's level of functioning, behavior and potential for risk  - Engage patient in 1 on 1 interactions  - Encourage patient to express fears, feelings, frustrations, and discuss symptoms    - [place] patient to reality, help patient recognize reality-based thinking   - Administer medications as ordered and assess for potential side effects  - Provide the patient education related to the signs and symptoms of the illness and desired effects of prescribed medications  Outcome: Progressing  Goal: Refrain from acting on delusional thinking/internal stimuli  Description  Interventions:  - Monitor patient closely, per order   - Utilize least restrictive measures   - Set reasonable limits, give positive feedback for acceptable   - Administer medications as ordered and monitor of potential side effects  Outcome: Progressing  Goal: Agree to be compliant with medication regime, as prescribed and report medication side effects  Description  Interventions:  - Offer appropriate PRN medication and supervise ingestion; conduct AIMS, as needed   Outcome: Progressing  Goal: Recognize dysfunctional thoughts, communicate reality-based thoughts at the time of discharge  Description  Interventions:  - Provide medication and psycho-education to assist patient in compliance and developing insight into his/her illness   Outcome: Progressing     Problem: Ineffective Coping  Goal: Participates in unit activities  Description  Interventions:  - Provide therapeutic environment   - Provide required programming   - Redirect inappropriate behaviors   Outcome: Progressing     Problem: DISCHARGE PLANNING  Goal: Discharge to home or other facility with appropriate resources  Description  INTERVENTIONS:  - Identify barriers to discharge w/patient and caregiver  - Arrange for needed discharge resources and transportation as appropriate  - Identify discharge learning needs (meds, wound care, etc )  - Arrange for interpretive services to assist at discharge as needed  - Refer to Case Management Department for coordinating discharge planning if the patient needs post-hospital services based on physician/advanced practitioner order or complex needs related to functional status, cognitive ability, or social support system  Outcome: Progressing

## 2019-10-24 NOTE — CASE MANAGEMENT
SW met with Pt during treatment team planning  Pt was able to recognize some staff and spoke to them by name  Pt was aware she was in the hospital and asked to be discharged on 11/5/2019, so she can vote  Pt has made minimal progress  Pt continues to have delusions, flight of ideas, and is not fully aware of time and place consistently

## 2019-10-24 NOTE — PROGRESS NOTES
[facility] GROUP NOTE     10/24/19 1100   Activity/Group Checklist   Group Life Skills  (Mindfulness/task completion)   Attendance Attended   Attendance Duration (min) 46-60   Interactions Disorganized interaction   Affect/Mood   (Able to attend to task, conversations continue to be disorg )

## 2019-10-24 NOTE — PROGRESS NOTES
Patient was observed by several patients coming out of bedroom naked and grabbing a blanket from a peer, then walking back to bedroom  Patient was given scheduled seroquel 400mg early, as pt refused all dinner meds stating "I'll refuse anything except seroquel " Pt educated on unit rules and that she cannot walk through halls without clothing on  Pt verbalized understanding, but is remaining in bed naked while covered  Sravanthi MHT came to witness this interaction with this RN

## 2019-10-24 NOTE — CASE MANAGEMENT
MARY spoke with Tiara Grayson , Pt daughter, Who stated she called Dr Lidia Bonner and made appointment for Pt due to feeling she was not well  Daughter stated Pt plays all the game and be aware that Pt will put on act to leave  If Pt is wearing a ring, please take it off  Pt should take out contacts if she is not wearing glasses  Also, please do not allow her to use phone or she will call them repeatedly  Daughter stated Pt delusion usually revolves around her ex-boyfriend who is in hospice  AND a co-worker she used to date, who now is engaged to someone else  Pt will call this co-worker obsessively and that is why Pt begins to wear ring on ring finger

## 2019-10-24 NOTE — PROGRESS NOTES
10/24/19 0700   Team Meeting   Meeting Type Daily Rounds   Team Members Present   Team Members Present Physician;Nurse;;; Other (Discipline and Name)   Physician Team Member [id number] Team Member [id number] [employer] [employer] Team Member [employer]   Social Work Team Member Sheree Campa   Other (Discipline and Name) MD javi Shepherd   Patient/Family Present   Patient Present No   Patient's Family Present No     Medication adjustments

## 2019-10-24 NOTE — PROGRESS NOTES
[facility] GROUP NOTE     10/24/19 0930   Activity/Group Checklist   Group Community meeting   Attendance Attended   Attendance Duration (min) 16-30   Interactions Disorganized interaction   Affect/Mood Other (Comment)  (Flight of Ideas, Nonsensical at times, loosely on topic )   Objectives/Key Points:  Living intentionally  Goal Setting  Thought for the Day  Self Check In

## 2019-10-25 PROCEDURE — 99231 SBSQ HOSP IP/OBS SF/LOW 25: CPT | Performed by: PSYCHIATRY & NEUROLOGY

## 2019-10-25 RX ADMIN — QUETIAPINE 500 MG: 400 TABLET, FILM COATED ORAL at 21:17

## 2019-10-25 NOTE — PROGRESS NOTES
[facility] GROUP NOTE    Patient sat on mat, appearing to meditate, eyes closed  2 instances of responding to internal stimuli, but was able to be gently redirected to task at hand  10/25/19 1000   Activity/Group Checklist   Group Exercise  (Yoga)   Attendance Attended   Attendance Duration (min) 31-45   Interactions Did not interact   Affect/Mood Calm; Other (Comment)  (Some self dialoguling/ respoinding to internal stimuli )   Primary Objectives/Concepts Covered  Mind/Body Connection  Body awareness  Importance of utilizing stillness  Solitude vs Isolation  Intentional focus through breath, movement  Use of relaxation as a preventative and   Restorative practice

## 2019-10-25 NOTE — PLAN OF CARE
Problem: Alteration in Thoughts and Perception  Goal: Treatment Goal: Gain control of psychotic behaviors/thinking, reduce/eliminate presenting symptoms and demonstrate improved reality functioning upon discharge  10/25/2019 1642 by Cheron Closs, RN  Outcome: Not Progressing  10/25/2019 1524 by Cheron Closs, RN  Outcome: Not Progressing  Goal: Verbalize thoughts and feelings  Description  Interventions:  - Promote a nonjudgmental and trusting relationship with the patient through active listening and therapeutic communication  - Assess patient's level of functioning, behavior and potential for risk  - Engage patient in 1 on 1 interactions  - Encourage patient to express fears, feelings, frustrations, and discuss symptoms    - [place] patient to reality, help patient recognize reality-based thinking   - Administer medications as ordered and assess for potential side effects  - Provide the patient education related to the signs and symptoms of the illness and desired effects of prescribed medications  10/25/2019 1642 by Cheron Closs, RN  Outcome: Not Progressing  10/25/2019 1524 by Cheron Closs, RN  Outcome: Not Progressing  Goal: Refrain from acting on delusional thinking/internal stimuli  Description  Interventions:  - Monitor patient closely, per order   - Utilize least restrictive measures   - Set reasonable limits, give positive feedback for acceptable   - Administer medications as ordered and monitor of potential side effects  10/25/2019 1642 by Cheron Closs, RN  Outcome: Not Progressing  10/25/2019 1524 by Cheron Closs, RN  Outcome: Not Progressing  Goal: Agree to be compliant with medication regime, as prescribed and report medication side effects  Description  Interventions:  - Offer appropriate PRN medication and supervise ingestion; conduct AIMS, as needed   10/25/2019 1642 by Cheron Closs, RN  Outcome: Not Progressing  10/25/2019 1524 by Cheron Closs, RN  Outcome: Not Progressing  Goal: Recognize dysfunctional thoughts, communicate reality-based thoughts at the time of discharge  Description  Interventions:  - Provide medication and psycho-education to assist patient in compliance and developing insight into his/her illness   10/25/2019 1642 by Beena Delgado RN  Outcome: Not Progressing  10/25/2019 1524 by Beena Delgado RN  Outcome: Not Progressing     Problem: DISCHARGE PLANNING  Goal: Discharge to home or other facility with appropriate resources  Description  INTERVENTIONS:  - Identify barriers to discharge w/patient and caregiver  - Arrange for needed discharge resources and transportation as appropriate  - Identify discharge learning needs (meds, wound care, etc )  - Arrange for interpretive services to assist at discharge as needed  - Refer to Case Management Department for coordinating discharge planning if the patient needs post-hospital services based on physician/advanced practitioner order or complex needs related to functional status, cognitive ability, or social support system  10/25/2019 1642 by Beena Delgado RN  Outcome: Not Progressing  10/25/2019 1524 by Beena Delgado RN  Outcome: Progressing     Problem: Ineffective Coping  Goal: Participates in unit activities  Description  Interventions:  - Provide therapeutic environment   - Provide required programming   - Redirect inappropriate behaviors   10/25/2019 1642 by Beena Delgado RN  Outcome: Not Progressing  10/25/2019 1524 by Beena Delgado RN  Outcome: Progressing

## 2019-10-25 NOTE — PROGRESS NOTES
Progress Note - [facility] 51 y o  female MRN: [id number]  Unit/Bed#: U 347-02 Encounter: [id number]    Assessment/Plan   Principal Problem:    Bipolar I disorder, current or most recent episode manic, with psychotic features ([facility] [address] )  Active Problems:    Medical clearance for psychiatric admission    Recommended Treatment:   - Continue Seroquel to 500 mg qhs --> Consider increasing over the weekend if patient continues to be disorganized  - Continue Zyprexa 10 mg after dinner, Depakote 500 mg after dinner   - Continue with group therapy, milieu therapy and occupational therapy  Risks, benefits and possible side effects of Medications: Risks, benefits, and possible side effects of medications have been explained to the patient, who verbalizes understanding  Progress Toward Goals: slow improvement    ------------------------------------------------------------    Subjective: Aicha Prescott has been non-compliant with medications  She took her Seroquel last night, but continues to refuse her Zyprexa and Depakote  She reports good sleep overnight and "good" mood today  She continues to be hyper-verbal, with delusions of grandiose (thinking she is a soon to be psychiatrist)  She has no insight to her illness, with very disorganized and bizarre behavior and thoughts  She does have moments of clarity 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  She is fixated on the upcoming elections on 11/05 and is adamant about going to vote  She continues to report auditory hallucinations which she describes as "friends in her head discussing what she should do in her situation " She states "they tell her to stay where she is and work through it "   The patient was again given the option to go to [facility], but declined stating "she needed to be closer to [place] in [place] " She denies any suicidal or homicidal ideation, intent or plan       Patient is consenting for safety on the unit     Psychiatric Review of Systems:  Behavior over the last 24 hours: unchanged  Sleep: insomnia  Appetite: good  Medication side effects: none  ROS: Complete review of systems is negative except as noted above      Vital signs in last 24 hours:  Temp:  [98 1 °F (36 7 °C)] 98 1 °F (36 7 °C)  HR:  [95] 95  Resp:  [16] 16  BP: (138)/(69) 138/69    Mental Status Evaluation:  Appearance:  alert, casually dressed, appears stated age and appropriate grooming and hygiene   Behavior:  pleasant, cooperative, sitting comfortably, good eye contact, no abnormal movements and normal gait and balance   Speech:  spontaneous, clear, increased rate, pressured, normal volume and incoherent   Mood:  "good"   Affect:  mood-congruent and anxious   Thought Process:  disorganized, illogical, incoherent, perseverative, loose associations, increased rate of thoughts, flight of ideas   Thought Content: delusions of grandiose, mild paranoia, obsessive thoughts, intrusive thoughts   Perceptual disturbances: auditory hallucinations (pt states they discuss things with her, but denies any commands), no reported visual hallucinations and appears to be responding to internal stimuli   Risk Potential: No active or passive suicidal or homicidal ideation   Cognition: disoriented, memory impaired due to manic/psychotic episode, appears to be of average intelligence, impaired abstract reasoning and attention span appeared shorter than expected for age   Insight:  Poor   Judgment: Poor       Current Medications:    Current Facility-Administered Medications:  acetaminophen 650 mg Oral Q4H PRN Derik Mac MD   aluminum-magnesium hydroxide-simethicone 30 mL Oral Q4H PRN Derik Mac MD   benztropine 1 mg Intramuscular Q6H PRN Derik Mac MD   benztropine 1 mg Oral Q6H PRN Derik Mac MD   divalproex sodium 500 mg Oral After Marv Hurtado MD   haloperidol 5 mg Oral Q8H PRN Derik Mac MD   LORazepam 1 mg Oral Q4H PRN Alejandra Landaverde MD   magnesium hydroxide 30 mL Oral Daily PRN Alejandra Landaverde MD   nicotine polacrilex 2 mg Oral Q2H PRN Alejandra Landaverde MD   OLANZapine 10 mg Intramuscular BID PRN Alejandra Landaverde MD   OLANZapine 10 mg Oral After 2155 Kira Thornton MD   QUEtiapine 500 mg Oral HS Brian Ragland MD   risperiDONE 1 mg Oral Q8H PRN Alejandra Landaverde MD   traZODone 25 mg Oral HS PRN Alejandra Landaverde MD       Behavioral Health Medications: all current active meds have been reviewed  Changes as above  Laboratory results:  I have personally reviewed all pertinent laboratory/tests results  No results found for this or any previous visit (from the past 48 hour(s))  This note has been constructed using a voice recognition system  There may be translation, syntax, or grammatical errors  If you have any questions, please contact the dictating provider

## 2019-10-25 NOTE — PROGRESS NOTES
10/25/19 1200   Activity/Group Checklist   Group   (recovery group)   Attendance Attended   Attendance Duration (min) 31-45   Interactions Disorganized interaction   Affect/Mood Appropriate   Goals Achieved Able to listen to others; Able to engage in interactions; Able to self-disclose   Patient has difficulty following the conversation of others, and her self disclosures are disjointed

## 2019-10-25 NOTE — CASE MANAGEMENT
Sw met Pt briefly, as she was going into group  Pt continues to be delusional, confused, agitated at times, and other times Pt is laughing at what appears to be internal stimuli  Little progress made but Pt is participating in group and coming out of her room for meals

## 2019-10-25 NOTE — PLAN OF CARE
Problem: DISCHARGE PLANNING  Goal: Discharge to home or other facility with appropriate resources  Description  INTERVENTIONS:  - Identify barriers to discharge w/patient and caregiver  - Arrange for needed discharge resources and transportation as appropriate  - Identify discharge learning needs (meds, wound care, etc )  - Arrange for interpretive services to assist at discharge as needed  - Refer to Case Management Department for coordinating discharge planning if the patient needs post-hospital services based on physician/advanced practitioner order or complex needs related to functional status, cognitive ability, or social support system  Outcome: Progressing     Problem: Ineffective Coping  Goal: Participates in unit activities  Description  Interventions:  - Provide therapeutic environment   - Provide required programming   - Redirect inappropriate behaviors   Outcome: Progressing     Problem: Alteration in Thoughts and Perception  Goal: Treatment Goal: Gain control of psychotic behaviors/thinking, reduce/eliminate presenting symptoms and demonstrate improved reality functioning upon discharge  Outcome: Not Progressing  Goal: Verbalize thoughts and feelings  Description  Interventions:  - Promote a nonjudgmental and trusting relationship with the patient through active listening and therapeutic communication  - Assess patient's level of functioning, behavior and potential for risk  - Engage patient in 1 on 1 interactions  - Encourage patient to express fears, feelings, frustrations, and discuss symptoms    - [place] patient to reality, help patient recognize reality-based thinking   - Administer medications as ordered and assess for potential side effects  - Provide the patient education related to the signs and symptoms of the illness and desired effects of prescribed medications  Outcome: Not Progressing  Goal: Refrain from acting on delusional thinking/internal stimuli  Description  Interventions:  - Monitor patient closely, per order   - Utilize least restrictive measures   - Set reasonable limits, give positive feedback for acceptable   - Administer medications as ordered and monitor of potential side effects  Outcome: Not Progressing  Goal: Agree to be compliant with medication regime, as prescribed and report medication side effects  Description  Interventions:  - Offer appropriate PRN medication and supervise ingestion; conduct AIMS, as needed   Outcome: Not Progressing  Goal: Recognize dysfunctional thoughts, communicate reality-based thoughts at the time of discharge  Description  Interventions:  - Provide medication and psycho-education to assist patient in compliance and developing insight into his/her illness   Outcome: Not Progressing
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
## 2019-10-25 NOTE — PROGRESS NOTES
Staff went into the patient's room to obtain blood work  She flatly refused to let staff obtain blood work and was cursing at staff  She told staff "to not come in her room today "  She is sleeping naked

## 2019-10-26 LAB
ALBUMIN SERPL BCP-MCNC: 3.9 G/DL (ref 3–5.2)
ALP SERPL-CCNC: 86 U/L (ref 43–122)
ALT SERPL W P-5'-P-CCNC: 25 U/L (ref 9–52)
AMMONIA PLAS-SCNC: <9 UMOL/L (ref 9–33)
ANION GAP SERPL CALCULATED.3IONS-SCNC: 6 MMOL/L (ref 5–14)
AST SERPL W P-5'-P-CCNC: 20 U/L (ref 14–36)
BASOPHILS # BLD AUTO: 0 THOUSANDS/ΜL (ref 0–0.1)
BASOPHILS NFR BLD AUTO: 0 % (ref 0–1)
BILIRUB SERPL-MCNC: 0.3 MG/DL
BUN SERPL-MCNC: 12 MG/DL (ref 5–25)
CALCIUM SERPL-MCNC: 9.1 MG/DL (ref 8.4–10.2)
CHLORIDE SERPL-SCNC: 101 MMOL/L (ref 97–108)
CHOLEST SERPL-MCNC: 144 MG/DL
CO2 SERPL-SCNC: 31 MMOL/L (ref 22–30)
CREAT SERPL-MCNC: 0.62 MG/DL (ref 0.6–1.2)
EOSINOPHIL # BLD AUTO: 0.2 THOUSAND/ΜL (ref 0–0.4)
EOSINOPHIL NFR BLD AUTO: 4 % (ref 0–6)
ERYTHROCYTE [DISTWIDTH] IN BLOOD BY AUTOMATED COUNT: 13.8 %
GFR SERPL CREATININE-BSD FRML MDRD: 107 ML/MIN/1.73SQ M
GLUCOSE P FAST SERPL-MCNC: 109 MG/DL (ref 70–99)
GLUCOSE SERPL-MCNC: 109 MG/DL (ref 70–99)
HCT VFR BLD AUTO: 35.7 % (ref 36–46)
HDLC SERPL-MCNC: 46 MG/DL
HGB BLD-MCNC: 12 G/DL (ref 12–16)
LDLC SERPL CALC-MCNC: 84 MG/DL
LYMPHOCYTES # BLD AUTO: 1.7 THOUSANDS/ΜL (ref 0.5–4)
LYMPHOCYTES NFR BLD AUTO: 31 % (ref 25–45)
MCH RBC QN AUTO: 27.2 PG (ref 26–34)
MCHC RBC AUTO-ENTMCNC: 33.7 G/DL (ref 31–36)
MCV RBC AUTO: 81 FL (ref 80–100)
MONOCYTES # BLD AUTO: 0.4 THOUSAND/ΜL (ref 0.2–0.9)
MONOCYTES NFR BLD AUTO: 7 % (ref 1–10)
NEUTROPHILS # BLD AUTO: 3.1 THOUSANDS/ΜL (ref 1.8–7.8)
NEUTS SEG NFR BLD AUTO: 57 % (ref 45–65)
NONHDLC SERPL-MCNC: 98 MG/DL
PLATELET # BLD AUTO: 191 THOUSANDS/UL (ref 150–450)
PMV BLD AUTO: 6.7 FL (ref 8.9–12.7)
POTASSIUM SERPL-SCNC: 4 MMOL/L (ref 3.6–5)
PROT SERPL-MCNC: 6.9 G/DL (ref 5.9–8.4)
RBC # BLD AUTO: 4.42 MILLION/UL (ref 4–5.2)
SODIUM SERPL-SCNC: 138 MMOL/L (ref 137–147)
TRIGL SERPL-MCNC: 69 MG/DL
TSH SERPL DL<=0.05 MIU/L-ACNC: 3.91 UIU/ML (ref 0.47–4.68)
VALPROATE SERPL-MCNC: <10 UG/ML (ref 50–120)
WBC # BLD AUTO: 5.4 THOUSAND/UL (ref 4.5–11)

## 2019-10-26 PROCEDURE — 82140 ASSAY OF AMMONIA: CPT | Performed by: PSYCHIATRY & NEUROLOGY

## 2019-10-26 PROCEDURE — 84443 ASSAY THYROID STIM HORMONE: CPT | Performed by: PSYCHIATRY & NEUROLOGY

## 2019-10-26 PROCEDURE — 80061 LIPID PANEL: CPT | Performed by: PSYCHIATRY & NEUROLOGY

## 2019-10-26 PROCEDURE — 80053 COMPREHEN METABOLIC PANEL: CPT | Performed by: PSYCHIATRY & NEUROLOGY

## 2019-10-26 PROCEDURE — 80164 ASSAY DIPROPYLACETIC ACD TOT: CPT | Performed by: PSYCHIATRY & NEUROLOGY

## 2019-10-26 PROCEDURE — 85025 COMPLETE CBC W/AUTO DIFF WBC: CPT | Performed by: PSYCHIATRY & NEUROLOGY

## 2019-10-26 PROCEDURE — 99231 SBSQ HOSP IP/OBS SF/LOW 25: CPT | Performed by: PSYCHIATRY & NEUROLOGY

## 2019-10-26 PROCEDURE — 86592 SYPHILIS TEST NON-TREP QUAL: CPT | Performed by: PSYCHIATRY & NEUROLOGY

## 2019-10-26 RX ADMIN — QUETIAPINE 500 MG: 400 TABLET, FILM COATED ORAL at 21:32

## 2019-10-26 NOTE — PLAN OF CARE
Problem: DISCHARGE PLANNING  Goal: Discharge to home or other facility with appropriate resources  Description  INTERVENTIONS:  - Identify barriers to discharge w/patient and caregiver  - Arrange for needed discharge resources and transportation as appropriate  - Identify discharge learning needs (meds, wound care, etc )  - Arrange for interpretive services to assist at discharge as needed  - Refer to Case Management Department for coordinating discharge planning if the patient needs post-hospital services based on physician/advanced practitioner order or complex needs related to functional status, cognitive ability, or social support system  10/26/2019 1413 by Damaris Hernandez RN  Outcome: Progressing  10/26/2019 1300 by Damaris Hernandez RN  Outcome: Progressing     Problem: Alteration in Thoughts and Perception  Goal: Treatment Goal: Gain control of psychotic behaviors/thinking, reduce/eliminate presenting symptoms and demonstrate improved reality functioning upon discharge  10/26/2019 1413 by Damaris Hernandez RN  Outcome: Not Progressing  10/26/2019 1300 by Damaris Hernandez RN  Outcome: Progressing  Goal: Verbalize thoughts and feelings  Description  Interventions:  - Promote a nonjudgmental and trusting relationship with the patient through active listening and therapeutic communication  - Assess patient's level of functioning, behavior and potential for risk  - Engage patient in 1 on 1 interactions  - Encourage patient to express fears, feelings, frustrations, and discuss symptoms    - [place] patient to reality, help patient recognize reality-based thinking   - Administer medications as ordered and assess for potential side effects  - Provide the patient education related to the signs and symptoms of the illness and desired effects of prescribed medications  10/26/2019 1413 by Damaris Hernandez RN  Outcome: Not Progressing  10/26/2019 1300 by Damaris Hernandez RN  Outcome: Progressing  Goal: Refrain from acting on delusional thinking/internal stimuli  Description  Interventions:  - Monitor patient closely, per order   - Utilize least restrictive measures   - Set reasonable limits, give positive feedback for acceptable   - Administer medications as ordered and monitor of potential side effects  10/26/2019 1413 by Josiah Bradford RN  Outcome: Not Progressing  10/26/2019 1300 by Josiah Bradford RN  Outcome: Not Progressing  Goal: Agree to be compliant with medication regime, as prescribed and report medication side effects  Description  Interventions:  - Offer appropriate PRN medication and supervise ingestion; conduct AIMS, as needed   10/26/2019 1413 by Josiah Bradford RN  Outcome: Not Progressing  10/26/2019 1300 by Josiah Bradford RN  Outcome: Progressing  Goal: Recognize dysfunctional thoughts, communicate reality-based thoughts at the time of discharge  Description  Interventions:  - Provide medication and psycho-education to assist patient in compliance and developing insight into his/her illness   10/26/2019 1413 by Josiah Bradford RN  Outcome: Not Progressing  10/26/2019 1300 by Josiah Bradford RN  Outcome: Progressing     Problem: Ineffective Coping  Goal: Participates in unit activities  Description  Interventions:  - Provide therapeutic environment   - Provide required programming   - Redirect inappropriate behaviors   10/26/2019 1413 by Josiah Bradford RN  Outcome: Not Progressing  10/26/2019 1300 by Josiah Bradford RN  Outcome: Progressing
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
## 2019-10-26 NOTE — PROGRESS NOTES
Patient denies SI and any psychosis to RN  She appears to be disorganized, slight irritable edge, some lability  Pt made several disorganized/confused statements, discussed not being able to "run" the [employer] [address], and stated several times "I'm just confused  I'm confused  Everything is confusing " She thanked RN for introducing self  She stated she will take HS Seroquel  Will continue to monitor

## 2019-10-26 NOTE — PROGRESS NOTES
Progress Note - 707 [facility] 51 y o  female MRN: [id number]  Unit/Bed#: [place] 161-26 Encounter: [id number]  Staff reported patient still delusional and irritable but overall cooperative  When I met with patient she was somewhat superficial she was irrational and almost difficult to follow as she was trying to say almost incoherent  She was calm and cooperative but still psychotic      Behavior over the last 24 hours:  Slowly improving  Sleep: normal  Appetite: normal  Medication side effects: No  ROS: no complaints    Medications:   Current Facility-Administered Medications   Medication Dose Route Frequency Provider Last Rate Last Dose    acetaminophen (TYLENOL) tablet 650 mg  650 mg Oral Q4H PRN Alicia Person MD        aluminum-magnesium hydroxide-simethicone (MYLANTA) 200-200-20 mg/5 mL oral suspension 30 mL  30 mL Oral Q4H PRN Alicia Person MD        benztropine (COGENTIN) injection 1 mg  1 mg Intramuscular Q6H PRN Alicia Person MD        benztropine (COGENTIN) tablet 1 mg  1 mg Oral Q6H PRN Alicia Person MD        divalproex sodium (DEPAKOTE ER) 24 hr tablet 500 mg  500 mg Oral After Dinner Chasity Lara MD        haloperidol (HALDOL) tablet 5 mg  5 mg Oral Q8H PRN Alicia Person MD        LORazepam (ATIVAN) tablet 1 mg  1 mg Oral Q4H PRN Alicia Person MD        magnesium hydroxide (MILK OF MAGNESIA) 400 mg/5 mL oral suspension 30 mL  30 mL Oral Daily PRN Alicia Person MD        nicotine polacrilex (NICORETTE) gum 2 mg  2 mg Oral Q2H PRN Alicia Person MD        OLANZapine (ZyPREXA) IM injection 10 mg  10 mg Intramuscular BID PRN Alicia Person MD        OLANZapine (ZyPREXA) tablet 10 mg  10 mg Oral After Dinner Chasity Lara MD        QUEtiapine (SEROquel) tablet 500 mg  500 mg Oral HS Chasity Lara MD   500 mg at 10/25/19 2117    risperiDONE (RisperDAL M-TABS) dispersible tablet 1 mg  1 mg Oral Q8H PRN Alicia Person MD        traZODone (DESYREL) tablet 25 mg  25 mg Oral HS PRN Sadie Myles MD         Medications Prior to Admission   Medication    QUEtiapine (SEROquel) 400 MG tablet    divalproex sodium (DEPAKOTE ER) 500 mg 24 hr tablet    docusate sodium (COLACE) 100 mg capsule    OLANZapine (ZyPREXA) 20 MG tablet       Labs:   Admission on 10/23/2019   Component Date Value    Ammonia 10/26/2019 <9*    WBC 10/26/2019 5 40     RBC 10/26/2019 4 42     Hemoglobin 10/26/2019 12 0     Hematocrit 10/26/2019 35 7*    MCV 10/26/2019 81     MCH 10/26/2019 27 2     MCHC 10/26/2019 33 7     RDW 10/26/2019 13 8     MPV 10/26/2019 6 7*    Platelets 24/79/1891 191     Neutrophils Relative 10/26/2019 57     Lymphocytes Relative 10/26/2019 31     Monocytes Relative 10/26/2019 7     Eosinophils Relative 10/26/2019 4     Basophils Relative 10/26/2019 0     Neutrophils Absolute 10/26/2019 3 10     Lymphocytes Absolute 10/26/2019 1 70     Monocytes Absolute 10/26/2019 0 40     Eosinophils Absolute 10/26/2019 0 20     Basophils Absolute 10/26/2019 0 00     Sodium 10/26/2019 138     Potassium 10/26/2019 4 0     Chloride 10/26/2019 101     CO2 10/26/2019 31*    ANION GAP 10/26/2019 6     BUN 10/26/2019 12     Creatinine 10/26/2019 0 62     Glucose 10/26/2019 109*    Glucose, Fasting 10/26/2019 109*    Calcium 10/26/2019 9 1     AST 10/26/2019 20     ALT 10/26/2019 25     Alkaline Phosphatase 10/26/2019 86     Total Protein 10/26/2019 6 9     Albumin 10/26/2019 3 9     Total Bilirubin 10/26/2019 0 30     eGFR 10/26/2019 107     Cholesterol 10/26/2019 144     Triglycerides 10/26/2019 69     HDL, Direct 10/26/2019 46     LDL Calculated 10/26/2019 84     Non-HDL-Chol (CHOL-HDL) 10/26/2019 98     TSH 3RD GENERATON 10/26/2019 3 910     Valproic Acid, Total 10/26/2019 <10 0*       Mental Status Evaluation:  Appearance:  casually dressed   Behavior:  Cooperative   Speech:  Mostly normal rate and rhythm Mood:  irritable and labile   Affect:  mood-congruent   Associations: tangential associations   Thought Process:  Disorganized   Thought Content:  delusions  grandiose   Perceptual Disturbances: Denied auditory and visual hallucinations   Risk Potential: Denies suicidal homicidal thoughts and plans   Sensorium:  person and place   Memory Memory affected due to delusional and psychotic symptoms   Consciousness:  alert    Attention: attention span appeared shorter than expected for age   Insight:  impaired due to Psychosis   Judgment: impaired due to Psychosis   Gait/Station: normal gait/station   Motor Activity: no abnormal movements     Progress Toward Goals: * patient stays in the milieu interacting with other peers and staff  She is able to advocate for herself but still very delusional     Assessment/Plan   Principal Problem:    Bipolar I disorder, current or most recent episode manic, with psychotic features ([facility] )  Active Problems:    Medical clearance for psychiatric admission   Medications:  Depakote 500 mg after dinner  Zyprexa 10 mg after dinner  Seroquel 500 mg at bedtime    Recommended Treatment: Continue with group therapy, milieu therapy and occupational therapy  Risks, benefits and possible side effects of Medications:   Risks, benefits, and possible side effects of medications explained to patient and patient verbalizes understanding  Counseling / Coordination of Care  Total floor / unit time spent today 20 minutes  Greater than 50% of total time was spent with the patient and / or family counseling and / or coordination of care

## 2019-10-26 NOTE — PLAN OF CARE
Problem: Alteration in Thoughts and Perception  Goal: Treatment Goal: Gain control of psychotic behaviors/thinking, reduce/eliminate presenting symptoms and demonstrate improved reality functioning upon discharge  Outcome: Progressing  Goal: Verbalize thoughts and feelings  Description  Interventions:  - Promote a nonjudgmental and trusting relationship with the patient through active listening and therapeutic communication  - Assess patient's level of functioning, behavior and potential for risk  - Engage patient in 1 on 1 interactions  - Encourage patient to express fears, feelings, frustrations, and discuss symptoms    - [place] patient to reality, help patient recognize reality-based thinking   - Administer medications as ordered and assess for potential side effects  - Provide the patient education related to the signs and symptoms of the illness and desired effects of prescribed medications  Outcome: Progressing  Goal: Agree to be compliant with medication regime, as prescribed and report medication side effects  Description  Interventions:  - Offer appropriate PRN medication and supervise ingestion; conduct AIMS, as needed   Outcome: Progressing  Goal: Recognize dysfunctional thoughts, communicate reality-based thoughts at the time of discharge  Description  Interventions:  - Provide medication and psycho-education to assist patient in compliance and developing insight into his/her illness   Outcome: Progressing     Problem: DISCHARGE PLANNING  Goal: Discharge to home or other facility with appropriate resources  Description  INTERVENTIONS:  - Identify barriers to discharge w/patient and caregiver  - Arrange for needed discharge resources and transportation as appropriate  - Identify discharge learning needs (meds, wound care, etc )  - Arrange for interpretive services to assist at discharge as needed  - Refer to Case Management Department for coordinating discharge planning if the patient needs post-hospital services based on physician/advanced practitioner order or complex needs related to functional status, cognitive ability, or social support system  Outcome: Progressing     Problem: Ineffective Coping  Goal: Participates in unit activities  Description  Interventions:  - Provide therapeutic environment   - Provide required programming   - Redirect inappropriate behaviors   Outcome: Progressing     Problem: Alteration in Thoughts and Perception  Goal: Refrain from acting on delusional thinking/internal stimuli  Description  Interventions:  - Monitor patient closely, per order   - Utilize least restrictive measures   - Set reasonable limits, give positive feedback for acceptable   - Administer medications as ordered and monitor of potential side effects  Outcome: Not Progressing

## 2019-10-26 NOTE — PROGRESS NOTES
10/25/19 1415   Activity/Group Checklist   Group Other (Comment)  (Art Therapy Process Group/Open Choice, Discussion)   Attendance Attended   Attendance Duration (min) Greater than 60   Interactions Disorganized interaction   Affect/Mood Appropriate   Goals Achieved Identified feelings; Discussed coping strategies; Identified distorted thoughts/beliefs; Able to listen to others; Able to engage in interactions; Able to reflect/comment on own behavior;Able to recieve feedback; Able to give feedback to another  (Able to engage materials; full participation/fragmented)

## 2019-10-27 LAB — RPR SER QL: NORMAL

## 2019-10-27 PROCEDURE — 99232 SBSQ HOSP IP/OBS MODERATE 35: CPT | Performed by: PSYCHIATRY & NEUROLOGY

## 2019-10-27 RX ADMIN — QUETIAPINE 500 MG: 400 TABLET, FILM COATED ORAL at 21:28

## 2019-10-27 NOTE — PROGRESS NOTES
Pt was playing ping pong with a peer and began slamming her ping pong racket onto the ping pong table very loudly  She was asked to stop doing this  She then made comments to peer about how he is "a dumbass"  Pt also stated to him "You aren't allowed to vote in the election because you're not a citizen " Ping pong was halted and pt was redirected, but she only laughed and stated "What is the day today? You're probably going to have to reorient me every morning " Pt was encouraged to go to bed  She is currently continuing to talk to herself in the corner  Will monitor

## 2019-10-27 NOTE — PROGRESS NOTES
Progress Note - 707 [facility] 51 y o  female MRN: [id number]  Unit/Bed#: [place] [facility] 010-76 Encounter: [id number]  Staff reported patient at time is almost incoherent  When I met with patient she started talking about the spiritual group and she knew the leader, fracisco green used the phrase, " it reoriented me to reality"  We talked about that and anything that was not based in fact, to put it aside   " Yes, it might be a gossip"    That was  The most coherent thing PT said in our conversation  Behavior over the last 24 hours:  Slowly improving, with some moments of   Realizing what is happening     Sleep: normal  Appetite: normal  Medication side effects: No  ROS: no complaints    Medications:   Current Facility-Administered Medications   Medication Dose Route Frequency Provider Last Rate Last Dose    acetaminophen (TYLENOL) tablet 650 mg  650 mg Oral Q4H PRN José Conner MD        aluminum-magnesium hydroxide-simethicone (MYLANTA) 200-200-20 mg/5 mL oral suspension 30 mL  30 mL Oral Q4H PRN José Conner MD        benztropine (COGENTIN) injection 1 mg  1 mg Intramuscular Q6H PRN José Conner MD        benztropine (COGENTIN) tablet 1 mg  1 mg Oral Q6H PRN José Conner MD        divalproex sodium (DEPAKOTE ER) 24 hr tablet 500 mg  500 mg Oral After Aj Banegas MD        haloperidol (HALDOL) tablet 5 mg  5 mg Oral Q8H PRN José Conner MD        LORazepam (ATIVAN) tablet 1 mg  1 mg Oral Q4H PRN José Conner MD        magnesium hydroxide (MILK OF MAGNESIA) 400 mg/5 mL oral suspension 30 mL  30 mL Oral Daily PRN José Conner MD        nicotine polacrilex (NICORETTE) gum 2 mg  2 mg Oral Q2H PRN José Conner MD        OLANZapine (ZyPREXA) IM injection 10 mg  10 mg Intramuscular BID PRN José Conner MD        OLANZapine (ZyPREXA) tablet 10 mg  10 mg Oral After Aj Banegas MD        QUEtiapine (SEROquel) tablet 500 mg 500 mg Oral HS Moncho Gomez MD   500 mg at 10/26/19 2132    risperiDONE (RisperDAL M-TABS) dispersible tablet 1 mg  1 mg Oral Q8H PRN Orlando Jo MD        traZODone (DESYREL) tablet 25 mg  25 mg Oral HS PRN Orlando Jo MD         Medications Prior to Admission   Medication    QUEtiapine (SEROquel) 400 MG tablet    divalproex sodium (DEPAKOTE ER) 500 mg 24 hr tablet    docusate sodium (COLACE) 100 mg capsule    OLANZapine (ZyPREXA) 20 MG tablet       Labs:   Admission on 10/23/2019   Component Date Value    Ammonia 10/26/2019 <9*    WBC 10/26/2019 5 40     RBC 10/26/2019 4 42     Hemoglobin 10/26/2019 12 0     Hematocrit 10/26/2019 35 7*    MCV 10/26/2019 81     MCH 10/26/2019 27 2     MCHC 10/26/2019 33 7     RDW 10/26/2019 13 8     MPV 10/26/2019 6 7*    Platelets 24/02/2184 191     Neutrophils Relative 10/26/2019 57     Lymphocytes Relative 10/26/2019 31     Monocytes Relative 10/26/2019 7     Eosinophils Relative 10/26/2019 4     Basophils Relative 10/26/2019 0     Neutrophils Absolute 10/26/2019 3 10     Lymphocytes Absolute 10/26/2019 1 70     Monocytes Absolute 10/26/2019 0 40     Eosinophils Absolute 10/26/2019 0 20     Basophils Absolute 10/26/2019 0 00     Sodium 10/26/2019 138     Potassium 10/26/2019 4 0     Chloride 10/26/2019 101     CO2 10/26/2019 31*    ANION GAP 10/26/2019 6     BUN 10/26/2019 12     Creatinine 10/26/2019 0 62     Glucose 10/26/2019 109*    Glucose, Fasting 10/26/2019 109*    Calcium 10/26/2019 9 1     AST 10/26/2019 20     ALT 10/26/2019 25     Alkaline Phosphatase 10/26/2019 86     Total Protein 10/26/2019 6 9     Albumin 10/26/2019 3 9     Total Bilirubin 10/26/2019 0 30     eGFR 10/26/2019 107     Cholesterol 10/26/2019 144     Triglycerides 10/26/2019 69     HDL, Direct 10/26/2019 46     LDL Calculated 10/26/2019 84     Non-HDL-Chol (CHOL-HDL) 10/26/2019 98     TSH 3RD GENERATON 10/26/2019 3 910     Valproic Acid, Total 10/26/2019 <10 0*       Mental Status Evaluation:  Appearance:  casually dressed   Behavior:  Cooperative   Speech:  Mostly normal rate and rhythm   Mood:  Less irritable   Affect:  Still mostly flat   Associations: tangential associations   Thought Process:  Starting   Thought Content:  Still delusional   Perceptual Disturbances: Denied auditory and visual hallucinations,  But at times appears to be responding to internal stimuli      Risk Potential: Denies suicidal homicidal thoughts and plans   Sensorium:  person and place   Memory Memory affected due to delusional and psychotic symptoms   Consciousness:  alert    Attention: attention span appeared shorter than expected for age   Insight:  impaired due to Psychosis   Judgment: impaired due to Psychosis   Gait/Station: normal gait/station   Motor Activity: no abnormal movements     Progress Toward Goals: * patient stays in the milieu interacting with other peers and staff  She is able to advocate for herself but still very delusional   For the first time used the expression re-orienting herself to reality  Assessment/Plan   Principal Problem:    Bipolar I disorder, current or most recent episode manic, with psychotic features ([facility] )  Active Problems:    Medical clearance for psychiatric admission   Medications:  Depakote 500 mg after dinner  Zyprexa 10 mg after dinner  Seroquel 500 mg at bedtime    Recommended Treatment: Continue with group therapy, milieu therapy and occupational therapy  Risks, benefits and possible side effects of Medications:   Risks, benefits, and possible side effects of medications explained to patient and patient verbalizes understanding  Counseling / Coordination of Care  Total floor / unit time spent today 25 minutes  Greater than 50% of total time was spent with the patient and / or family counseling and / or coordination of care  Talking about how to make progress and return to reality

## 2019-10-27 NOTE — PROGRESS NOTES
Pt playing ping pong "I can't let the man take my jewelry off, this is from [place], [address] [place], [place] [address]norma Eng, the last man who took my jewelery off he had to put it back on"

## 2019-10-27 NOTE — PROGRESS NOTES
Patient denies all symptoms  She appears constricted and lost in thought at times  Her responses to questions are slow and somewhat blocked  She has irrational and disorganized responses to assessment questions  She stated "I wasn't doing good, but I am okay now, I don't want to talk about it" and appeared distressed  She is participating in the milieu and playing ping pong with peers  Will continue to monitor  patient

## 2019-10-28 PROCEDURE — 93005 ELECTROCARDIOGRAM TRACING: CPT

## 2019-10-28 PROCEDURE — 99232 SBSQ HOSP IP/OBS MODERATE 35: CPT | Performed by: PSYCHIATRY & NEUROLOGY

## 2019-10-28 RX ORDER — DIVALPROEX SODIUM 500 MG/1
1000 TABLET, EXTENDED RELEASE ORAL
Status: DISCONTINUED | OUTPATIENT
Start: 2019-10-28 | End: 2019-11-07

## 2019-10-28 RX ORDER — OLANZAPINE 5 MG/1
5 TABLET ORAL
Status: DISCONTINUED | OUTPATIENT
Start: 2019-10-28 | End: 2019-10-28

## 2019-10-28 RX ORDER — QUETIAPINE FUMARATE 300 MG/1
600 TABLET, FILM COATED ORAL
Status: DISCONTINUED | OUTPATIENT
Start: 2019-10-28 | End: 2019-10-29

## 2019-10-28 RX ADMIN — QUETIAPINE FUMARATE 600 MG: 300 TABLET ORAL at 21:19

## 2019-10-28 RX ADMIN — DIVALPROEX SODIUM 1000 MG: 500 TABLET, EXTENDED RELEASE ORAL at 17:58

## 2019-10-28 NOTE — PROGRESS NOTES
Pt was cooperative with HS medications  Upon taking the medications she said, "I am in [employer]  It was a busy day " Pt has been quieter and more mellow and is social with some peers

## 2019-10-28 NOTE — PROGRESS NOTES
10/28/19 1100   Activity/Group Checklist   Group   (recovery group)   Attendance Attended   Attendance Duration (min) 46-60   Interactions Disorganized interaction   Affect/Mood Appropriate   Goals Achieved Able to listen to others  (patient remains delusional)

## 2019-10-28 NOTE — PROGRESS NOTES
Pt stated to peer, "You're so annoying to me, you are going to be reassigned to a new job, probably in [employer] " Pt is disorganized and does not make sense  She denies symptoms  She was observed standing against the wall in her bathroom with the lights off for an extended period of time

## 2019-10-28 NOTE — PROGRESS NOTES
10/28/19 0800   Team Meeting   Meeting Type Daily Rounds   Team Members Present   Team Members Present Physician;Nurse;;; Other (Discipline and Name)   Physician Team Member [facility]   Nursing Team Member [facility] Management Team Member Charly   Social Work Team Member Rolan Alfaro   Patient/Family Present   Patient Present No   Patient's Family Present No     Increased Seroquel

## 2019-10-28 NOTE — PLAN OF CARE
Pt is refusing all medications but Seroquel, which will be increased to 600mg today  Doctor will discuss long acting injection with Pt  Pt continues to react to internal stimuli, appear disheveled, delusional, and rambling at times     Problem: Alteration in Thoughts and Perception  Goal: Verbalize thoughts and feelings  Description  Interventions:  - Promote a nonjudgmental and trusting relationship with the patient through active listening and therapeutic communication  - Assess patient's level of functioning, behavior and potential for risk  - Engage patient in 1 on 1 interactions  - Encourage patient to express fears, feelings, frustrations, and discuss symptoms    - [place] patient to reality, help patient recognize reality-based thinking   - Administer medications as ordered and assess for potential side effects  - Provide the patient education related to the signs and symptoms of the illness and desired effects of prescribed medications  Outcome: Progressing  Goal: Refrain from acting on delusional thinking/internal stimuli  Description  Interventions:  - Monitor patient closely, per order   - Utilize least restrictive measures   - Set reasonable limits, give positive feedback for acceptable   - Administer medications as ordered and monitor of potential side effects  Outcome: Not Progressing  Goal: Agree to be compliant with medication regime, as prescribed and report medication side effects  Description  Interventions:  - Offer appropriate PRN medication and supervise ingestion; conduct AIMS, as needed   Outcome: Not Progressing  Goal: Recognize dysfunctional thoughts, communicate reality-based thoughts at the time of discharge  Description  Interventions:  - Provide medication and psycho-education to assist patient in compliance and developing insight into his/her illness   Outcome: Not Progressing     Problem: Ineffective Coping  Goal: Participates in unit activities  Description  Interventions:  - Provide therapeutic environment   - Provide required programming   - Redirect inappropriate behaviors   Outcome: Progressing     Problem: DISCHARGE PLANNING  Goal: Discharge to home or other facility with appropriate resources  Description  INTERVENTIONS:  - Identify barriers to discharge w/patient and caregiver  - Arrange for needed discharge resources and transportation as appropriate  - Identify discharge learning needs (meds, wound care, etc )  - Arrange for interpretive services to assist at discharge as needed  - Refer to Case Management Department for coordinating discharge planning if the patient needs post-hospital services based on physician/advanced practitioner order or complex needs related to functional status, cognitive ability, or social support system  Outcome: Progressing     Problem: SUBSTANCE USE/ABUSE  Goal: Will have no detox symptoms and will verbalize plan for changing substance-related behavior  Description  INTERVENTIONS:  - Monitor physical status and assess for symptoms of withdrawal  - Administer medication as ordered  - Provide emotional support with 1 on 1 interaction with staff  - Encourage recovery focused program/ addiction education  - Assess for verbalization of changing behaviors related to substance abuse  - Initiate consults and referrals as appropriate (Case Management, Spiritual Care, etc )  Outcome: Not Progressing  Goal: By discharge, will develop insight into their chemical dependency and sustain motivation to continue in recovery  Description  INTERVENTIONS:  - Attends all daily group sessions and scheduled AA groups  - Actively practices coping skills through participation in the therapeutic community and adherence to program rules  - Reviews and completes assignments from individual treatment plan  - Assist patient development of understanding of their personal cycle of addiction and relapse triggers  Outcome: Not Progressing  Goal: By discharge, patient will have ongoing treatment plan addressing chemical dependency  Description  INTERVENTIONS:  - Assist patient with resources and/or appointments for ongoing recovery based living  Outcome: Not Progressing

## 2019-10-28 NOTE — PROGRESS NOTES
Pharmacy Medication Education Group     Patient presents to group with clipboard and paper mimicking staff and mumbling to self  Pt  Made incoherent statements and mumbling in response to group miliue  She was very disturbed by television going on and stated "I can't be in here"  When invega was being explained to group and the control of dopamine the patient randomly said in an elevated way "wow that is the best explanation I have ever heard since I was in school" (which did not match patient affect or mood  Patient has disorganized interaction with this writer, other patients and staff  Additionally the patient could not stay focused on group topics  Patient was not behaviorally appropriate for this period of group       Diego Sanchez, PharmD, BCPP, Clinical Pharmacist - Psychiatry

## 2019-10-28 NOTE — PROGRESS NOTES
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

## 2019-10-28 NOTE — PLAN OF CARE
Problem: Alteration in Thoughts and Perception  Goal: Verbalize thoughts and feelings  Description  Interventions:  - Promote a nonjudgmental and trusting relationship with the patient through active listening and therapeutic communication  - Assess patient's level of functioning, behavior and potential for risk  - Engage patient in 1 on 1 interactions  - Encourage patient to express fears, feelings, frustrations, and discuss symptoms    - [place] patient to reality, help patient recognize reality-based thinking   - Administer medications as ordered and assess for potential side effects  - Provide the patient education related to the signs and symptoms of the illness and desired effects of prescribed medications  Outcome: Progressing     Problem: SUBSTANCE USE/ABUSE  Goal: By discharge, patient will have ongoing treatment plan addressing chemical dependency  Description  INTERVENTIONS:  - Assist patient with resources and/or appointments for ongoing recovery based living  Outcome: Progressing     Problem: Alteration in Thoughts and Perception  Goal: Treatment Goal: Gain control of psychotic behaviors/thinking, reduce/eliminate presenting symptoms and demonstrate improved reality functioning upon discharge  Outcome: Not Progressing  Goal: Refrain from acting on delusional thinking/internal stimuli  Description  Interventions:  - Monitor patient closely, per order   - Utilize least restrictive measures   - Set reasonable limits, give positive feedback for acceptable   - Administer medications as ordered and monitor of potential side effects  Outcome: Not Progressing  Goal: Agree to be compliant with medication regime, as prescribed and report medication side effects  Description  Interventions:  - Offer appropriate PRN medication and supervise ingestion; conduct AIMS, as needed   Outcome: Not Progressing  Goal: Recognize dysfunctional thoughts, communicate reality-based thoughts at the time of discharge  Description  Interventions:  - Provide medication and psycho-education to assist patient in compliance and developing insight into his/her illness   Outcome: Not Progressing     Problem: SUBSTANCE USE/ABUSE  Goal: Will have no detox symptoms and will verbalize plan for changing substance-related behavior  Description  INTERVENTIONS:  - Monitor physical status and assess for symptoms of withdrawal  - Administer medication as ordered  - Provide emotional support with 1 on 1 interaction with staff  - Encourage recovery focused program/ addiction education  - Assess for verbalization of changing behaviors related to substance abuse  - Initiate consults and referrals as appropriate (Case Management, Spiritual Care, etc )  Outcome: Not Progressing  Goal: By discharge, will develop insight into their chemical dependency and sustain motivation to continue in recovery  Description  INTERVENTIONS:  - Attends all daily group sessions and scheduled AA groups  - Actively practices coping skills through participation in the therapeutic community and adherence to program rules  - Reviews and completes assignments from individual treatment plan  - Assist patient development of understanding of their personal cycle of addiction and relapse triggers  Outcome: Not Progressing

## 2019-10-28 NOTE — PROGRESS NOTES
Progress Note - [address] [facility] 51 y o  female MRN: [id number]  Unit/Bed#: [facility] 347-02 Encounter: [id number]    Assessment/Plan   Principal Problem:    Bipolar I disorder, current or most recent episode manic, with psychotic features ([address] )  Active Problems:    Medical clearance for psychiatric admission    Recommended Treatment:   - Increase Seroquel to 600 mg qhs (monitor BP, check orthostatics)  - Increase Depakote 1000 mg after dinner   - Discontinue Zyprexa 5 mg after dinner  - EKG to assess QT interval  - Continue with group therapy, milieu therapy and occupational therapy  Risks, benefits and possible side effects of Medications: Risks, benefits, and possible side effects of medications have been explained to the patient, who verbalizes understanding  Progress Toward Goals: slow improvement    ------------------------------------------------------------    Subjective: Aicha Prescott has been non-compliant with medications  She is taking her Seroquel, but continues to refuse her Zyprexa and Depakote  Today she states she will take them tonight  She reports good sleep overnight and "good" mood today  She continues to be hyper-verbal, with delusions of grandiose (thinking she is a psychiatrist on the unit)  She has no insight to her illness, with very disorganized and bizarre behavior and thoughts  She does have moments of clarity 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  However, she believes she is  to Phu," an ex-boyfriend, and is fixated on that fact that he will be picking her up for the upcoming elections on 11/05  Another patient reports that she is inappropriate on the unit and has offered sexual favors  She continues to report auditory hallucinations which she describes as "friends fighting/aruging about what she should do in her situation " She denies any commands to hurt herself or others   The patient denies any suicidal or homicidal ideation, intent or plan  Patient is consenting for safety on the unit  Psychiatric Review of Systems:  Behavior over the last 24 hours: unchanged  Sleep: normal  Appetite: good  Medication side effects: none  ROS: Complete review of systems is negative except as noted above      Vital signs in last 24 hours:  Temp:  [97 6 °F (36 4 °C)-97 8 °F (36 6 °C)] 97 6 °F (36 4 °C)  HR:  [] 104  Resp:  [16] 16  BP: (128-130)/(61-63) 130/63    Mental Status Evaluation:  Appearance:  alert, casually dressed, appears stated age and appropriate grooming and hygiene   Behavior:  pleasant, cooperative, sitting comfortably, good eye contact, no abnormal movements and normal gait and balance   Speech:  spontaneous, clear, increased rate, pressured, normal volume and incoherent   Mood:  "good"   Affect:  mood-congruent and anxious   Thought Process:  disorganized, illogical, incoherent, perseverative, loose associations, increased rate of thoughts, flight of ideas   Thought Content: delusions of grandiose, mild paranoia, obsessive thoughts, intrusive thoughts   Perceptual disturbances: auditory hallucinations (pt states they discuss things with her, but denies any commands), no reported visual hallucinations and appears to be responding to internal stimuli   Risk Potential: No active or passive suicidal or homicidal ideation   Cognition: disoriented, memory impaired due to manic/psychotic episode, appears to be of average intelligence, impaired abstract reasoning and attention span appeared shorter than expected for age   Insight:  Poor   Judgment: Poor       Current Medications:    Current Facility-Administered Medications:  acetaminophen 650 mg Oral Q4H PRN Galina Silver MD   aluminum-magnesium hydroxide-simethicone 30 mL Oral Q4H PRN Galina Silver MD   benztropine 1 mg Intramuscular Q6H PRN Galina Silver MD   benztropine 1 mg Oral Q6H PRN Galina Silver MD   divalproex sodium 500 mg Oral After [employer] Edmond Birmingham MD   haloperidol 5 mg Oral Q8H PRN Galina Silver MD   LORazepam 1 mg Oral Q4H PRN Galina Silver MD   magnesium hydroxide 30 mL Oral Daily PRN Galina Silver MD   nicotine polacrilex 2 mg Oral Q2H PRN Galina Silver MD   OLANZapine 10 mg Intramuscular BID PRN Galina Silver MD   OLANZapine 10 mg Oral After 2155 Kira Thornton MD   QUEtiapine 500 mg Oral HS Edmond Birmingham MD   risperiDONE 1 mg Oral Q8H PRN Galina Silver MD   traZODone 25 mg Oral HS PRN Galina Silver MD       Behavioral Health Medications: all current active meds have been reviewed  Changes as above  Laboratory results:  I have personally reviewed all pertinent laboratory/tests results  No results found for this or any previous visit (from the past 48 hour(s))  This note has been constructed using a voice recognition system  There may be translation, syntax, or grammatical errors  If you have any questions, please contact the dictating provider

## 2019-10-28 NOTE — PROGRESS NOTES
Pt states she has command AH but would not go into detail on what they say  Pt denies SI,HI, and VH  Pt is observed talking to herself  Pt is out in the milieu  Compliant with medications  Will monitor

## 2019-10-29 LAB
ATRIAL RATE: 88 BPM
P AXIS: 62 DEGREES
PR INTERVAL: 148 MS
QRS AXIS: 49 DEGREES
QRSD INTERVAL: 76 MS
QT INTERVAL: 368 MS
QTC INTERVAL: 445 MS
T WAVE AXIS: 58 DEGREES
VENTRICULAR RATE: 88 BPM

## 2019-10-29 PROCEDURE — 99232 SBSQ HOSP IP/OBS MODERATE 35: CPT | Performed by: PSYCHIATRY & NEUROLOGY

## 2019-10-29 PROCEDURE — 93010 ELECTROCARDIOGRAM REPORT: CPT | Performed by: INTERNAL MEDICINE

## 2019-10-29 RX ADMIN — DIVALPROEX SODIUM 1000 MG: 500 TABLET, EXTENDED RELEASE ORAL at 17:27

## 2019-10-29 RX ADMIN — QUETIAPINE FUMARATE 700 MG: 400 TABLET, EXTENDED RELEASE ORAL at 21:15

## 2019-10-29 NOTE — PLAN OF CARE
Problem: Alteration in Thoughts and Perception  Goal: Verbalize thoughts and feelings  Description  Interventions:  - Promote a nonjudgmental and trusting relationship with the patient through active listening and therapeutic communication  - Assess patient's level of functioning, behavior and potential for risk  - Engage patient in 1 on 1 interactions  - Encourage patient to express fears, feelings, frustrations, and discuss symptoms    - [place] patient to reality, help patient recognize reality-based thinking   - Administer medications as ordered and assess for potential side effects  - Provide the patient education related to the signs and symptoms of the illness and desired effects of prescribed medications  Outcome: Progressing  Goal: Agree to be compliant with medication regime, as prescribed and report medication side effects  Description  Interventions:  - Offer appropriate PRN medication and supervise ingestion; conduct AIMS, as needed   Outcome: Progressing     Problem: SUBSTANCE USE/ABUSE  Goal: By discharge, will develop insight into their chemical dependency and sustain motivation to continue in recovery  Description  INTERVENTIONS:  - Attends all daily group sessions and scheduled AA groups  - Actively practices coping skills through participation in the therapeutic community and adherence to program rules  - Reviews and completes assignments from individual treatment plan  - Assist patient development of understanding of their personal cycle of addiction and relapse triggers  Outcome: Progressing  Goal: By discharge, patient will have ongoing treatment plan addressing chemical dependency  Description  INTERVENTIONS:  - Assist patient with resources and/or appointments for ongoing recovery based living  Outcome: Progressing     Problem: Alteration in Thoughts and Perception  Goal: Treatment Goal: Gain control of psychotic behaviors/thinking, reduce/eliminate presenting symptoms and demonstrate improved reality functioning upon discharge  Outcome: Not Progressing  Goal: Refrain from acting on delusional thinking/internal stimuli  Description  Interventions:  - Monitor patient closely, per order   - Utilize least restrictive measures   - Set reasonable limits, give positive feedback for acceptable   - Administer medications as ordered and monitor of potential side effects  Outcome: Not Progressing  Goal: Recognize dysfunctional thoughts, communicate reality-based thoughts at the time of discharge  Description  Interventions:  - Provide medication and psycho-education to assist patient in compliance and developing insight into his/her illness   Outcome: Not Progressing     Problem: SUBSTANCE USE/ABUSE  Goal: Will have no detox symptoms and will verbalize plan for changing substance-related behavior  Description  INTERVENTIONS:  - Monitor physical status and assess for symptoms of withdrawal  - Administer medication as ordered  - Provide emotional support with 1 on 1 interaction with staff  - Encourage recovery focused program/ addiction education  - Assess for verbalization of changing behaviors related to substance abuse  - Initiate consults and referrals as appropriate (Case Management, Spiritual Care, etc )  Outcome: Not Progressing

## 2019-10-29 NOTE — CASE MANAGEMENT
MARY called [facility] to in regards to Pt outpatient services and medications  MARY spoke with nurse Jackie Barraza who stated Fluphenazine Decanoate was not given at [facility] because it was to start after 14 days of tolerating other prescribed medications on 10/12 by  Dr Willy Tariq saw Pt again before recent admit to [facility] Pt only taking Seroquel 1x at night and was not med compliant       Medication log (11/8/18-10/12/18) and last two Psychiatric progress notes received (10/19, 10/22)

## 2019-10-29 NOTE — PROGRESS NOTES
10/29/19 1100   Activity/Group Checklist   Group   (recovery group)   Attendance Attended   Attendance Duration (min) 46-60   Interactions Unable to interact   Affect/Mood Blunted/flat   Goals Achieved Able to listen to others   Topic: Gallo Funes identifying underlying anger  She continue be delusional having difficulty staying on topic

## 2019-10-29 NOTE — PROGRESS NOTES
10/29/19 0700   Team Meeting   Meeting Type Daily Rounds   Team Members Present   Team Members Present Physician;Nurse;;; Other (Discipline and Name)   Physician Team Member  [facility]   Nursing Team Member [address] Management Team Member [id number] Work Team Member Stephanie Mccartney   Other (Discipline and Name) Therapist- Jaya; Students- Lova Apgar   Patient/Family Present   Patient Present No   Patient's Family Present No     Continue to titrate medications

## 2019-10-29 NOTE — PROGRESS NOTES
Pt is disorganized when asked if she was having any pain she responded by saying " No I'll wait for election year " then she was asked if she was eating and sleeping okay and responded by saying "Danny Pillow but someone is going to have to let Eusebio out for fresh air " Pt was asked if she was having SI,HI or any hallucinations which she responded with a quick no  Pt is out in the milieu making bizarre hand gestures/movements  Compliant with medications  Will monitor

## 2019-10-29 NOTE — PROGRESS NOTES
Progress Note - [address] [facility] 51 y o  female MRN: [id number]  Unit/Bed#: [facility] 347-02 Encounter: [id number]    Assessment/Plan   Principal Problem:    Bipolar I disorder, current or most recent episode manic, with psychotic features ([facility] [address] )  Active Problems:    Medical clearance for psychiatric admission    Recommended Treatment:   - Change Seroquel to extended release and increase dose to 700 mg qhs (monitor BP, check orthostatics)  - Continue Depakote 1000 mg after dinner   - Continue with group therapy, milieu therapy and occupational therapy  Risks, benefits and possible side effects of Medications: Risks, benefits, and possible side effects of medications have been explained to the patient, who verbalizes understanding  Progress Toward Goals: slow improvement    ------------------------------------------------------------    Subjective: Nohemy Bailon has been compliant with medications  She reports good sleep overnight but "sad" mood today  She continues to be hyper-verbal, with delusions of grandiose (thinking she is a psychiatrist for [facility])  She complains "people are trying to fix the elections " She has no insight to her illness, with very disorganized and bizarre behavior and thoughts  She continues to believe that she is  to Phu," an ex-boyfriend, and is fixated on that fact that he will be picking her up for the upcoming elections on 11/05  She continues to be sexually pre-occupied and inappropriate with men on the unit at times  She continues to report auditory hallucinations which she describes as " instructions from friends who tell her to stay here, you're safe here " She denies any commands to hurt herself or others  The patient denies any suicidal or homicidal ideation, intent or plan  Patient is consenting for safety on the unit      Psychiatric Review of Systems:  Behavior over the last 24 hours: unchanged  Sleep: normal  Appetite: good  Medication side effects: none  ROS: headache, congestion, sore throat      Vital signs in last 24 hours:  Temp:  [97 7 °F (36 5 °C)-98 °F (36 7 °C)] 97 7 °F (36 5 °C)  HR:  [] 102  Resp:  [16] 16  BP: (128-142)/(60-75) 131/75    Mental Status Evaluation:  Appearance:  alert, casually dressed, appears stated age and appropriate grooming and hygiene   Behavior:  pleasant, cooperative, sitting comfortably, good eye contact, no abnormal movements and normal gait and balance   Speech:  spontaneous, clear, increased rate, pressured, normal volume and incoherent   Mood:  "sad"   Affect:  mood-congruent, anxious and bothered   Thought Process:  disorganized, illogical, incoherent, perseverative, loose associations, increased rate of thoughts, flight of ideas   Thought Content: delusions of grandiose, mild paranoia, obsessive thoughts, intrusive thoughts   Perceptual disturbances: auditory hallucinations (pt states they discuss things with her, but denies any commands), no reported visual hallucinations and appears to be responding to internal stimuli   Risk Potential: No active or passive suicidal or homicidal ideation   Cognition: disoriented, memory impaired due to manic/psychotic episode, appears to be of average intelligence, impaired abstract reasoning and attention span appeared shorter than expected for age   Insight:  Poor   Judgment: Poor       Current Medications:    Current Facility-Administered Medications:  acetaminophen 650 mg Oral Q4H PRN Jw Forbes MD   aluminum-magnesium hydroxide-simethicone 30 mL Oral Q4H PRN Jw Forbes MD   benztropine 1 mg Intramuscular Q6H PRMARIELA Forbes MD   benztropine 1 mg Oral Q6H PRN Jw Forbes MD   divalproex sodium 1,000 mg Oral After Daniel Martínez MD   haloperidol 5 mg Oral Q8H PRN Jw Forbes MD   LORazepam 1 mg Oral Q4H PRMARIELA Forbes MD   magnesium hydroxide 30 mL Oral Daily PRN Jw Forbes MD   nicotine polacrilex 2 mg Oral Q2H PRN Prabhakar Peralta MD   OLANZapine 10 mg Intramuscular BID PRN Prabhakar Peralta MD   QUEtiapine 700 mg Oral HS Funmi Cosby MD   risperiDONE 1 mg Oral Q8H PRN Prabhakar Peralta MD   traZODone 25 mg Oral HS PRN Prabhakar Peralta MD       Behavioral Health Medications: all current active meds have been reviewed  Changes as above  Laboratory results:  I have personally reviewed all pertinent laboratory/tests results  No results found for this or any previous visit (from the past 48 hour(s))  This note has been constructed using a voice recognition system  There may be translation, syntax, or grammatical errors  If you have any questions, please contact the dictating provider

## 2019-10-29 NOTE — PROGRESS NOTES
Pt refused orthostatic blood pressure  Staff explained that it was a specific dr's order and she still refused

## 2019-10-29 NOTE — CASE MANAGEMENT
Phone call to Min Bruce in regards to referring Pt to case management services upon discharge  [phone]  SW will relay information and contact information to Pt if she is interested in these services

## 2019-10-29 NOTE — PROGRESS NOTES
[facility] GROUP NOTE     10/29/19 0930   Activity/Group Checklist   Group Community meeting   Attendance Attended   Attendance Duration (min) 0-15  (arrived late)   Interactions Interacted appropriately   Affect/Mood Blunted/flat  (Sat on periphery with blanket over head, able to be engaged)   Goals Achieved Able to listen to others; Able to engage in interactions   Objectives/Key Points:  Living intentionally  Goal Setting  Thought for the Day  Self Check In

## 2019-10-29 NOTE — PROGRESS NOTES
Pt remains disorganized, delusional, grandiose, appears internally stimulated though pt denies  Pt is more quiet today, not as interactive in the milieu, appears depressed  She denies all S/S, cooperative with direction and medications

## 2019-10-30 PROCEDURE — 99232 SBSQ HOSP IP/OBS MODERATE 35: CPT | Performed by: PSYCHIATRY & NEUROLOGY

## 2019-10-30 RX ORDER — QUETIAPINE 400 MG/1
800 TABLET, FILM COATED, EXTENDED RELEASE ORAL
Status: DISCONTINUED | OUTPATIENT
Start: 2019-10-30 | End: 2019-10-30

## 2019-10-30 RX ADMIN — DIVALPROEX SODIUM 1000 MG: 500 TABLET, EXTENDED RELEASE ORAL at 17:09

## 2019-10-30 RX ADMIN — QUETIAPINE FUMARATE 700 MG: 400 TABLET, EXTENDED RELEASE ORAL at 21:12

## 2019-10-30 NOTE — PROGRESS NOTES
Pt states she has AH of talking/ideas that can be negative or positive  Pt states its better that she talks about the voices and makes it easier to deal with and she tries to keep busy so she does not hear the voices  Pt denies SI,HI, and VH  Pt is out in the milieu and intermittently confused  Compliant with medications  Will monitor

## 2019-10-30 NOTE — PROGRESS NOTES
Patient has been withdrawn and disheveled today, spending most of the day in bed  Said she was trying to take a nap because she felt like she was getting a cold  Denied other symptoms and needs  Responses were delayed and patient appeared preoccupied

## 2019-10-30 NOTE — PROGRESS NOTES
Refused orthostatic during the night  Staff explained that the doctor ordered the orthostatic for a reason and still refused

## 2019-10-30 NOTE — PROGRESS NOTES
Progress Note - [address] [facility] 51 y o  female MRN: [id number]  Unit/Bed#: U 347-02 Encounter: [id number]    Assessment/Plan   Principal Problem:    Bipolar I disorder, current or most recent episode manic, with psychotic features ([facility] [address] )  Active Problems:    Medical clearance for psychiatric admission    Recommended Treatment:   - Continue Seroquel  mg qhs (monitor BP); do mouth checks  - Continue Depakote ER 1000 mg after dinner   - Pending Depakote level on Friday  - Continue with group therapy, milieu therapy and occupational therapy  Risks, benefits and possible side effects of Medications: Risks, benefits, and possible side effects of medications have been explained to the patient, who verbalizes understanding  Progress Toward Goals: slow improvement    ------------------------------------------------------------    Subjective: Mireille Kennedy has been compliant with medications  She reports good sleep overnight but "confused" mood today  She continues to be hyper-verbal, with delusions of grandiose (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)  She states "I'm here for protective custody because people don't like that I'm trying to stop child molestation and poisoning by moving kids [place] over to my casino ([place])  " She continues to have no insight to her illness, with very disorganized and bizarre behavior and thoughts  She continues to believe that she is  to "[employer]," an ex-boyfriend, and now believes that her daughter (not Kyra Polanco) will be picking her up for the upcoming elections on 11/05  She continues to report auditory hallucinations which she describes as " instructions from friends who tell her to stay here, you're safe here " She denies any commands to hurt herself or others  Today she does ask for clarification on whether or not there is a [place] civil war happening in regards to the election   The patient denies any suicidal or homicidal ideation, intent or plan      Patient is consenting for safety on the unit  Psychiatric Review of Systems:  Behavior over the last 24 hours: unchanged  Sleep: normal  Appetite: good  Medication side effects: none  ROS: headache, congestion, sore throat      Vital signs in last 24 hours:  Temp:  [98 °F (36 7 °C)-98 1 °F (36 7 °C)] 98 °F (36 7 °C)  HR:  [] 104  Resp:  [16] 16  BP: (115-135)/(58-69) 126/69    Mental Status Evaluation:  Appearance:  alert, casually dressed, appears stated age and appropriate grooming and hygiene   Behavior:  pleasant, cooperative, sitting comfortably, fair eye contact, no abnormal movements and normal gait and balance   Speech:  spontaneous, clear, increased rate, pressured, normal volume and incoherent   Mood:  "confused"   Affect:  mood-congruent, anxious and bothered   Thought Process:  disorganized, illogical, incoherent, perseverative, loose associations, increased rate of thoughts, flight of ideas   Thought Content: delusions of grandiose, mild paranoia, obsessive thoughts, intrusive thoughts   Perceptual disturbances: auditory hallucinations (pt states they discuss things with her, but denies any commands), no reported visual hallucinations and appears to be responding to internal stimuli   Risk Potential: No active or passive suicidal or homicidal ideation   Cognition: disoriented, memory impaired due to manic/psychotic episode, appears to be of average intelligence, impaired abstract reasoning and attention span appeared shorter than expected for age   Insight:  Poor   Judgment: Poor       Current Medications:    Current Facility-Administered Medications:  acetaminophen 650 mg Oral Q4H PRN Michelle Coleman MD   aluminum-magnesium hydroxide-simethicone 30 mL Oral Q4H PRN Michelle Coleman MD   benztropine 1 mg Intramuscular Q6H PRN Michelle Coleman MD   benztropine 1 mg Oral Q6H PRN Michelle Coleman MD   divalproex sodium 1,000 mg Oral After Robin Avila MD haloperidol 5 mg Oral Q8H PRN Ryan Lock, MD   LORazepam 1 mg Oral Q4H PRN Ryan Lock, MD   magnesium hydroxide 30 mL Oral Daily PRN Ryan Lock, MD   nicotine polacrilex 2 mg Oral Q2H PRN Ryan Lock, MD   OLANZapine 10 mg Intramuscular BID PRN Ryan Lock, MD   QUEtiapine 700 mg Oral HS Renetta Roblero MD   risperiDONE 1 mg Oral Q8H PRN Ryan Lock, MD   traZODone 25 mg Oral HS PRN Ryan Lock, MD       Behavioral Health Medications: all current active meds have been reviewed  Changes as above  Laboratory results:  I have personally reviewed all pertinent laboratory/tests results  No results found for this or any previous visit (from the past 48 hour(s))  This note has been constructed using a voice recognition system  There may be translation, syntax, or grammatical errors  If you have any questions, please contact the dictating provider

## 2019-10-30 NOTE — PROGRESS NOTES
10/29/19 1415   Activity/Group Checklist   Group Other (Comment)  (Art Therapy Process Group/Visual Introduction, Discussion)   Attendance Attended   Attendance Duration (min) Greater than 60   Interactions Disorganized interaction   Affect/Mood Appropriate   Goals Achieved Able to listen to others; Able to engage in interactions; Able to recieve feedback  (Able to engage materials; remains fragmented)

## 2019-10-30 NOTE — PROGRESS NOTES
Patient overheard staff correcting and limiting another patient  She was told that staff had the situation handled and continued to criticize the patient, eventually stating "yeah, fuck you, get out of here" to other patient  Pt was immediately reprimanded and told that she is not to speak to other patients that way

## 2019-10-30 NOTE — PROGRESS NOTES
10/30/19 0700   Team Meeting   Meeting Type Daily Rounds   Team Members Present   Team Members Present Physician;Nurse;;; Other (Discipline and Name)   Physician Team Member [facility]   Nursing Team Member Zi   Care Management Team Member Lakesha   Social Work Team Member Donita Stinson   Other (Discipline and Name) Resident Cora and Med student YANETH   Patient/Family Present   Patient Present No   Patient's Family Present No   Will continue to monitor; medication adjustments

## 2019-10-30 NOTE — CASE MANAGEMENT
MARY spoke to Gibson Hyatt from [facility] in regards to meeting with Pt to set up meeting in regards to choices upon discharge, however MARY explained Pt is not able to plan for D/C at this time due to mental status  Gibson Hyatt agreed to come sometime next week instead

## 2019-10-30 NOTE — PLAN OF CARE
Problem: Alteration in Thoughts and Perception  Goal: Agree to be compliant with medication regime, as prescribed and report medication side effects  Description  Interventions:  - Offer appropriate PRN medication and supervise ingestion; conduct AIMS, as needed   Outcome: Progressing  Goal: Recognize dysfunctional thoughts, communicate reality-based thoughts at the time of discharge  Description  Interventions:  - Provide medication and psycho-education to assist patient in compliance and developing insight into his/her illness   Outcome: Progressing     Problem: SUBSTANCE USE/ABUSE  Goal: Will have no detox symptoms and will verbalize plan for changing substance-related behavior  Description  INTERVENTIONS:  - Monitor physical status and assess for symptoms of withdrawal  - Administer medication as ordered  - Provide emotional support with 1 on 1 interaction with staff  - Encourage recovery focused program/ addiction education  - Assess for verbalization of changing behaviors related to substance abuse  - Initiate consults and referrals as appropriate (Case Management, Spiritual Care, etc )  Outcome: Progressing  Goal: By discharge, will develop insight into their chemical dependency and sustain motivation to continue in recovery  Description  INTERVENTIONS:  - Attends all daily group sessions and scheduled AA groups  - Actively practices coping skills through participation in the therapeutic community and adherence to program rules  - Reviews and completes assignments from individual treatment plan  - Assist patient development of understanding of their personal cycle of addiction and relapse triggers  Outcome: Progressing  Goal: By discharge, patient will have ongoing treatment plan addressing chemical dependency  Description  INTERVENTIONS:  - Assist patient with resources and/or appointments for ongoing recovery based living  Outcome: Progressing     Problem: Alteration in Thoughts and Perception  Goal: Treatment Goal: Gain control of psychotic behaviors/thinking, reduce/eliminate presenting symptoms and demonstrate improved reality functioning upon discharge  Outcome: Not Progressing  Goal: Verbalize thoughts and feelings  Description  Interventions:  - Promote a nonjudgmental and trusting relationship with the patient through active listening and therapeutic communication  - Assess patient's level of functioning, behavior and potential for risk  - Engage patient in 1 on 1 interactions  - Encourage patient to express fears, feelings, frustrations, and discuss symptoms    - [place] patient to reality, help patient recognize reality-based thinking   - Administer medications as ordered and assess for potential side effects  - Provide the patient education related to the signs and symptoms of the illness and desired effects of prescribed medications  Outcome: Not Progressing  Goal: Refrain from acting on delusional thinking/internal stimuli  Description  Interventions:  - Monitor patient closely, per order   - Utilize least restrictive measures   - Set reasonable limits, give positive feedback for acceptable   - Administer medications as ordered and monitor of potential side effects  Outcome: Not Progressing

## 2019-10-31 PROCEDURE — 99232 SBSQ HOSP IP/OBS MODERATE 35: CPT | Performed by: PSYCHIATRY & NEUROLOGY

## 2019-10-31 RX ORDER — QUETIAPINE 400 MG/1
800 TABLET, FILM COATED, EXTENDED RELEASE ORAL
Status: DISCONTINUED | OUTPATIENT
Start: 2019-10-31 | End: 2019-11-11 | Stop reason: HOSPADM

## 2019-10-31 RX ADMIN — DIVALPROEX SODIUM 1000 MG: 500 TABLET, EXTENDED RELEASE ORAL at 17:18

## 2019-10-31 RX ADMIN — QUETIAPINE FUMARATE 800 MG: 400 TABLET, EXTENDED RELEASE ORAL at 21:19

## 2019-10-31 NOTE — PROGRESS NOTES
10/30/19 1415   Activity/Group Checklist   Group Other (Comment)  (Art Therapy Process Group/Beyond Barriers, Discussion)   Attendance Attended   Attendance Duration (min) Greater than 60   Interactions Disorganized interaction  (Slight improvement noted in organization)   Affect/Mood Constricted   Goals Achieved Able to listen to others; Able to engage in interactions; Able to recieve feedback; Able to give feedback to another  (Able to engage materials; full participation)

## 2019-10-31 NOTE — PROGRESS NOTES
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

## 2019-10-31 NOTE — PLAN OF CARE
Problem: Alteration in Thoughts and Perception  Goal: Verbalize thoughts and feelings  Description  Interventions:  - Promote a nonjudgmental and trusting relationship with the patient through active listening and therapeutic communication  - Assess patient's level of functioning, behavior and potential for risk  - Engage patient in 1 on 1 interactions  - Encourage patient to express fears, feelings, frustrations, and discuss symptoms    - [place] patient to reality, help patient recognize reality-based thinking   - Administer medications as ordered and assess for potential side effects  - Provide the patient education related to the signs and symptoms of the illness and desired effects of prescribed medications  Outcome: Progressing  Goal: Refrain from acting on delusional thinking/internal stimuli  Description  Interventions:  - Monitor patient closely, per order   - Utilize least restrictive measures   - Set reasonable limits, give positive feedback for acceptable   - Administer medications as ordered and monitor of potential side effects  Outcome: Progressing  Goal: Agree to be compliant with medication regime, as prescribed and report medication side effects  Description  Interventions:  - Offer appropriate PRN medication and supervise ingestion; conduct AIMS, as needed   Outcome: Progressing     Problem: SUBSTANCE USE/ABUSE  Goal: Will have no detox symptoms and will verbalize plan for changing substance-related behavior  Description  INTERVENTIONS:  - Monitor physical status and assess for symptoms of withdrawal  - Administer medication as ordered  - Provide emotional support with 1 on 1 interaction with staff  - Encourage recovery focused program/ addiction education  - Assess for verbalization of changing behaviors related to substance abuse  - Initiate consults and referrals as appropriate (Case Management, Spiritual Care, etc )  Outcome: Progressing  Goal: By discharge, will develop insight into their chemical dependency and sustain motivation to continue in recovery  Description  INTERVENTIONS:  - Attends all daily group sessions and scheduled AA groups  - Actively practices coping skills through participation in the therapeutic community and adherence to program rules  - Reviews and completes assignments from individual treatment plan  - Assist patient development of understanding of their personal cycle of addiction and relapse triggers  Outcome: Progressing  Goal: By discharge, patient will have ongoing treatment plan addressing chemical dependency  Description  INTERVENTIONS:  - Assist patient with resources and/or appointments for ongoing recovery based living  Outcome: Progressing     Problem: Alteration in Thoughts and Perception  Goal: Treatment Goal: Gain control of psychotic behaviors/thinking, reduce/eliminate presenting symptoms and demonstrate improved reality functioning upon discharge  Outcome: Not Progressing  Goal: Recognize dysfunctional thoughts, communicate reality-based thoughts at the time of discharge  Description  Interventions:  - Provide medication and psycho-education to assist patient in compliance and developing insight into his/her illness   Outcome: Not Progressing

## 2019-10-31 NOTE — PROGRESS NOTES
10/31/19 0700   Team Meeting   Meeting Type Daily Rounds   Team Members Present   Team Members Present Physician;Nurse;;; Other (Discipline and Name)   Physician Team Member [address] Team Member [facility]   Social Work Team Member Noy Ram   Other (Discipline and Name) Therapist- Dolores Montiel; MD student [id number]   Patient/Family Present   Patient Present No   Patient's Family Present No     Ensure mouth checks are completed, please make note that they are, continue to titrate medications

## 2019-10-31 NOTE — PROGRESS NOTES
Progress Note - [address] [facility] 51 y o  female MRN: [id number]  Unit/Bed#: U 347-02 Encounter: [id number]    Assessment/Plan   Principal Problem:    Bipolar I disorder, current or most recent episode manic, with psychotic features ([facility] [address] )  Active Problems:    Medical clearance for psychiatric admission    Recommended Treatment:   - Increase Seroquel XR to 800 mg qhs (monitor BP); do mouth checks  - Continue Depakote ER 1000 mg after dinner   - Pending Depakote level on Friday  - Continue with group therapy, milieu therapy and occupational therapy  Risks, benefits and possible side effects of Medications: Risks, benefits, and possible side effects of medications have been explained to the patient, who verbalizes understanding  Progress Toward Goals: slow improvement    ------------------------------------------------------------    Subjective: Vida Goodson has been compliant with medications  She reports good sleep overnight with "worried" mood  She continues to be hyper-verbal stating "I have a new book coming out called [employer]_ fire stories I'm writing, it's my thesis to become a psychiatrist  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, who practically works as a psychiatrist " She continues to have no insight to her illness, with very disorganized and bizarre behavior and thoughts  She reports an upcoming family reunion with a trip to the mountains  She continues to report auditory hallucinations which she describes as " instructions from friends who tell her to stay here, you're safe here " She denies any commands to hurt herself or others  Today she asks me "Do you feel safe being locked in here against your will 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 with no ?" The patient denies any suicidal or homicidal ideation, intent or plan  Patient is consenting for safety on the unit      Psychiatric Review of Systems:  Behavior over the last 24 hours: unchanged  Sleep: normal  Appetite: good  Medication side effects: none  ROS: migraine, photosensitivity, increased flatus    Vital signs in last 24 hours:  Temp:  [97 7 °F (36 5 °C)-97 8 °F (36 6 °C)] 97 7 °F (36 5 °C)  HR:  [] 89  Resp:  [16] 16  BP: ()/(55-81) 150/60    Mental Status Evaluation:  Appearance:  alert, casually dressed, appears stated age and appropriate grooming and hygiene   Behavior:  pleasant, cooperative, sitting comfortably, good eye contact, no abnormal movements and normal gait and balance   Speech:  spontaneous, clear, normal rate, pressured, normal volume and incoherent   Mood:  "worried"   Affect:  mood-congruent   Thought Process:  disorganized, illogical, incoherent, tangential, loose associations, increased rate of thoughts, flight of ideas   Thought Content: delusions of grandiose, mild paranoia, obsessive thoughts, intrusive thoughts   Perceptual disturbances: auditory hallucinations (pt states they discuss things with her, but denies any commands), no reported visual hallucinations and appears to be responding to internal stimuli   Risk Potential: No active or passive suicidal or homicidal ideation   Cognition: disoriented, memory impaired due to manic/psychotic episode, appears to be of average intelligence, impaired abstract reasoning and attention span appeared shorter than expected for age   Insight:  Poor   Judgment: Poor       Current Medications:    Current Facility-Administered Medications:  acetaminophen 650 mg Oral Q4H PRN Cecilia Patel MD   aluminum-magnesium hydroxide-simethicone 30 mL Oral Q4H PRN Cecilia Patel MD   benztropine 1 mg Intramuscular Q6H PRN Cecilia Patel MD   benztropine 1 mg Oral Q6H PRN Cecilia Patel MD   divalproex sodium 1,000 mg Oral After Brab Saleh MD   haloperidol 5 mg Oral Q8H PRN Cecilia Patel MD   LORazepam 1 mg Oral Q4H PRN Cecilia Patel MD   magnesium hydroxide 30 mL Oral Daily PRN Cecilia Patel MD   nicotine polacrilex 2 mg Oral Q2H PRN Oliver Lira MD   OLANZapine 10 mg Intramuscular BID PRN Oliver Lira MD   QUEtiapine 800 mg Oral HS Sanjeev Cates MD   risperiDONE 1 mg Oral Q8H PRN Oliver Lira MD   traZODone 25 mg Oral HS PRN Oliver Lira MD       Behavioral Health Medications: all current active meds have been reviewed  Changes as above  Laboratory results:  I have personally reviewed all pertinent laboratory/tests results  No results found for this or any previous visit (from the past 48 hour(s))  This note has been constructed using a voice recognition system  There may be translation, syntax, or grammatical errors  If you have any questions, please contact the dictating provider

## 2019-10-31 NOTE — PLAN OF CARE
Problem: Alteration in Thoughts and Perception  Goal: Verbalize thoughts and feelings  Description  Interventions:  - Promote a nonjudgmental and trusting relationship with the patient through active listening and therapeutic communication  - Assess patient's level of functioning, behavior and potential for risk  - Engage patient in 1 on 1 interactions  - Encourage patient to express fears, feelings, frustrations, and discuss symptoms    - [place] patient to reality, help patient recognize reality-based thinking   - Administer medications as ordered and assess for potential side effects  - Provide the patient education related to the signs and symptoms of the illness and desired effects of prescribed medications  Outcome: Progressing  Goal: Refrain from acting on delusional thinking/internal stimuli  Description  Interventions:  - Monitor patient closely, per order   - Utilize least restrictive measures   - Set reasonable limits, give positive feedback for acceptable   - Administer medications as ordered and monitor of potential side effects  Outcome: Progressing  Goal: Agree to be compliant with medication regime, as prescribed and report medication side effects  Description  Interventions:  - Offer appropriate PRN medication and supervise ingestion; conduct AIMS, as needed   Outcome: Progressing  Goal: Recognize dysfunctional thoughts, communicate reality-based thoughts at the time of discharge  Description  Interventions:  - Provide medication and psycho-education to assist patient in compliance and developing insight into his/her illness   Outcome: Progressing     Problem: Ineffective Coping  Goal: Participates in unit activities  Description  Interventions:  - Provide therapeutic environment   - Provide required programming   - Redirect inappropriate behaviors   Outcome: Progressing     Problem: DISCHARGE PLANNING  Goal: Discharge to home or other facility with appropriate resources  Description  INTERVENTIONS:  - Identify barriers to discharge w/patient and caregiver  - Arrange for needed discharge resources and transportation as appropriate  - Identify discharge learning needs (meds, wound care, etc )  - Arrange for interpretive services to assist at discharge as needed  - Refer to Case Management Department for coordinating discharge planning if the patient needs post-hospital services based on physician/advanced practitioner order or complex needs related to functional status, cognitive ability, or social support system  Outcome: Progressing     Problem: SUBSTANCE USE/ABUSE  Goal: Will have no detox symptoms and will verbalize plan for changing substance-related behavior  Description  INTERVENTIONS:  - Monitor physical status and assess for symptoms of withdrawal  - Administer medication as ordered  - Provide emotional support with 1 on 1 interaction with staff  - Encourage recovery focused program/ addiction education  - Assess for verbalization of changing behaviors related to substance abuse  - Initiate consults and referrals as appropriate (Case Management, Spiritual Care, etc )  Outcome: Progressing  Goal: By discharge, will develop insight into their chemical dependency and sustain motivation to continue in recovery  Description  INTERVENTIONS:  - Attends all daily group sessions and scheduled AA groups  - Actively practices coping skills through participation in the therapeutic community and adherence to program rules  - Reviews and completes assignments from individual treatment plan  - Assist patient development of understanding of their personal cycle of addiction and relapse triggers  Outcome: Progressing  Goal: By discharge, patient will have ongoing treatment plan addressing chemical dependency  Description  INTERVENTIONS:  - Assist patient with resources and/or appointments for ongoing recovery based living  Outcome: Progressing

## 2019-10-31 NOTE — PROGRESS NOTES
10/31/19 1415   Activity/Group Checklist   Group Other (Comment)  (Art Therapy Process Group/Inspired, Discussion)   Attendance Attended   Attendance Duration (min) Greater than 60   Interactions Interacted appropriately  (Minimal random comments; improving)   Affect/Mood Appropriate   Goals Achieved Able to listen to others; Able to engage in interactions; Able to recieve feedback; Able to give feedback to another  (Able to engage materials; full participation)

## 2019-10-31 NOTE — PROGRESS NOTES
Pt complaining of migraine but declined offers of PRN medications  Pt in her room now, lying down with the lights off, states she is hydrating and lying down to help with headaches  Pt did express interest in going to the next group    She denied all S/S, remains disorganized and appears preoccupied, responses delayed

## 2019-11-01 PROCEDURE — 99232 SBSQ HOSP IP/OBS MODERATE 35: CPT | Performed by: PSYCHIATRY & NEUROLOGY

## 2019-11-01 RX ADMIN — QUETIAPINE FUMARATE 800 MG: 400 TABLET, EXTENDED RELEASE ORAL at 21:35

## 2019-11-01 RX ADMIN — DIVALPROEX SODIUM 1000 MG: 500 TABLET, EXTENDED RELEASE ORAL at 18:04

## 2019-11-01 NOTE — PROGRESS NOTES
Patient agreed to have her blood pressure taken once today but would not agree to having orthostatics taken
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
## 2019-11-01 NOTE — PROGRESS NOTES
Patient has been preoccupied with politics today  Reportedly said her goal for the day is to "free [place] from [place]" and asked about having a current events group so she can "ground" herself and "not get so lost in thoughts that are not true " When asked how she was doing she said she thinks she should "be able to get out of here on primary day to go vote " Patient said she's just trying to keep to herself until she can leave  Patient denied symptoms and needs  Remains disheveled

## 2019-11-01 NOTE — PROGRESS NOTES
Pt presents as visible in public areas, but mostly bedroom  Observed as cooperative but uninterested  Appears tired frequently  Currently denies SI, HI and A/V hallucinations  Compliant with meds and mouth checks  No complaints or questions at this time  Reports still forgetting entire story preceding admission but declares "My father keeps trying to sell me off to the highest bidder " Pt reorientation attempted   Pt replied "well don't chart what I said then "

## 2019-11-01 NOTE — PROGRESS NOTES
Progress Note - 707 [facility] 51 y o  female MRN: [id number]  Unit/Bed#: U 347-02 Encounter: [id number]    Assessment/Plan   Principal Problem:    Bipolar I disorder, current or most recent episode manic, with psychotic features ([facility] [address] )  Active Problems:    Medical clearance for psychiatric admission    Recommended Treatment:   - Continue Seroquel XR to 800 mg qhs (monitor BP); do mouth checks  - Continue Depakote ER 1000 mg after dinner   - Pending Depakote level on Friday  - Continue with group therapy, milieu therapy and occupational therapy  Risks, benefits and possible side effects of Medications: Risks, benefits, and possible side effects of medications have been explained to the patient, who verbalizes understanding  Progress Toward Goals: slow improvement    ------------------------------------------------------------    Subjective: Giuliana Cox has been compliant with medications  She reports good sleep overnight with "better" mood  She states "last night I was able to let go of everything and get a really good night's sleep " She continues to be hyper-verbal stating "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  helping her with a 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 " She continues to have no insight to her illness, with very disorganized and bizarre behavior and thoughts  She continues to report auditory hallucinations which she describes as " my friends having an argument about who's James Tse get  first" and "Cody's voice yelling that he's happy " She states "these conversations are silly as there are more pressing things we need to focus on like free[place] " She denies any commands to hurt herself or others  The patient denies any suicidal or homicidal ideation, intent or plan  Patient is consenting for safety on the unit      Psychiatric Review of Systems:  Behavior over the last 24 hours: unchanged  Sleep: normal  Appetite: good  Medication side effects: none  ROS: migraine (improving, 2-3/1- today), dizziness    Vital signs in last 24 hours:  Temp:  [97 7 °F (36 5 °C)] 97 7 °F (36 5 °C)  HR:  [] 89  Resp:  [16] 16  BP: ()/(55-76) 150/60    Mental Status Evaluation:  Appearance:  alert, casually dressed, appears stated age and appropriate grooming and hygiene   Behavior:  pleasant, cooperative, sitting comfortably, good eye contact, no abnormal movements and normal gait and balance   Speech:  spontaneous, clear, normal rate, pressured, normal volume and incoherent   Mood:  "better"   Affect:  mood-congruent   Thought Process:  disorganized, illogical, incoherent, tangential, loose associations, increased rate of thoughts, flight of ideas   Thought Content: delusions of grandiose, mild paranoia, obsessive thoughts, intrusive thoughts   Perceptual disturbances: auditory hallucinations (pt states they discuss things with her, but denies any commands), no reported visual hallucinations and appears to be responding to internal stimuli   Risk Potential: No active or passive suicidal or homicidal ideation   Cognition: disoriented, memory impaired due to manic/psychotic episode, appears to be of average intelligence, impaired abstract reasoning and attention span appeared shorter than expected for age   Insight:  Poor   Judgment: Poor       Current Medications:    Current Facility-Administered Medications:  acetaminophen 650 mg Oral Q4H PRN Yanick Machuca MD   aluminum-magnesium hydroxide-simethicone 30 mL Oral Q4H PRN Yanick Machuca MD   benztropine 1 mg Intramuscular Q6H PRMARIELA Machuca MD   benztropine 1 mg Oral Q6H PRN Yanick Machuca MD   divalproex sodium 1,000 mg Oral After Mp Mcgowan MD   haloperidol 5 mg Oral Q8H PRMARIELA Machuca MD   LORazepam 1 mg Oral Q4H PRMARIELA Machuca MD   magnesium hydroxide 30 mL Oral Daily PRN Yanick Machuca MD   nicotine polacrilex 2 mg Oral Q2H PRN Bobbi Alvarado MD   OLANZapine 10 mg Intramuscular BID PRN Bobbi Alvarado MD   QUEtiapine 800 mg Oral HS Josefina Ferris MD   risperiDONE 1 mg Oral Q8H PRN Bobbi Alvarado MD   traZODone 25 mg Oral HS PRN Bobbi Alvarado MD       Behavioral Health Medications: all current active meds have been reviewed  Changes as above  Laboratory results:  I have personally reviewed all pertinent laboratory/tests results  No results found for this or any previous visit (from the past 48 hour(s))  This note has been constructed using a voice recognition system  There may be translation, syntax, or grammatical errors  If you have any questions, please contact the dictating provider

## 2019-11-01 NOTE — PLAN OF CARE
Problem: Alteration in Thoughts and Perception  Goal: Verbalize thoughts and feelings  Description  Interventions:  - Promote a nonjudgmental and trusting relationship with the patient through active listening and therapeutic communication  - Assess patient's level of functioning, behavior and potential for risk  - Engage patient in 1 on 1 interactions  - Encourage patient to express fears, feelings, frustrations, and discuss symptoms    - [place] patient to reality, help patient recognize reality-based thinking   - Administer medications as ordered and assess for potential side effects  - Provide the patient education related to the signs and symptoms of the illness and desired effects of prescribed medications  Outcome: Progressing  Goal: Refrain from acting on delusional thinking/internal stimuli  Description  Interventions:  - Monitor patient closely, per order   - Utilize least restrictive measures   - Set reasonable limits, give positive feedback for acceptable   - Administer medications as ordered and monitor of potential side effects  Outcome: Progressing  Goal: Agree to be compliant with medication regime, as prescribed and report medication side effects  Description  Interventions:  - Offer appropriate PRN medication and supervise ingestion; conduct AIMS, as needed   Outcome: Progressing  Goal: Recognize dysfunctional thoughts, communicate reality-based thoughts at the time of discharge  Description  Interventions:  - Provide medication and psycho-education to assist patient in compliance and developing insight into his/her illness   Outcome: Progressing     Problem: DISCHARGE PLANNING  Goal: Discharge to home or other facility with appropriate resources  Description  INTERVENTIONS:  - Identify barriers to discharge w/patient and caregiver  - Arrange for needed discharge resources and transportation as appropriate  - Identify discharge learning needs (meds, wound care, etc )  - Arrange for interpretive services to assist at discharge as needed  - Refer to Case Management Department for coordinating discharge planning if the patient needs post-hospital services based on physician/advanced practitioner order or complex needs related to functional status, cognitive ability, or social support system  Outcome: Progressing     Problem: Ineffective Coping  Goal: Participates in unit activities  Description  Interventions:  - Provide therapeutic environment   - Provide required programming   - Redirect inappropriate behaviors   Outcome: Progressing     Problem: SUBSTANCE USE/ABUSE  Goal: Will have no detox symptoms and will verbalize plan for changing substance-related behavior  Description  INTERVENTIONS:  - Monitor physical status and assess for symptoms of withdrawal  - Administer medication as ordered  - Provide emotional support with 1 on 1 interaction with staff  - Encourage recovery focused program/ addiction education  - Assess for verbalization of changing behaviors related to substance abuse  - Initiate consults and referrals as appropriate (Case Management, Spiritual Care, etc )  Outcome: Progressing  Goal: By discharge, will develop insight into their chemical dependency and sustain motivation to continue in recovery  Description  INTERVENTIONS:  - Attends all daily group sessions and scheduled AA groups  - Actively practices coping skills through participation in the therapeutic community and adherence to program rules  - Reviews and completes assignments from individual treatment plan  - Assist patient development of understanding of their personal cycle of addiction and relapse triggers  Outcome: Progressing  Goal: By discharge, patient will have ongoing treatment plan addressing chemical dependency  Description  INTERVENTIONS:  - Assist patient with resources and/or appointments for ongoing recovery based living  Outcome: Progressing

## 2019-11-01 NOTE — PROGRESS NOTES
11/01/19 1100   Activity/Group Checklist   Group   (recovery group)   Attendance Attended   Attendance Duration (min) 46-60   Interactions Interacted appropriately   Affect/Mood Appropriate   Goals Achieved Able to listen to others; Able to engage in interactions; Able to self-disclose; Able to give feedback to another   Stages of change and worksheet- she lacks insight on a topic that she should know and her thoughts are still disorganized

## 2019-11-01 NOTE — PLAN OF CARE
Problem: Alteration in Thoughts and Perception  Goal: Treatment Goal: Gain control of psychotic behaviors/thinking, reduce/eliminate presenting symptoms and demonstrate improved reality functioning upon discharge  Outcome: Progressing  Goal: Verbalize thoughts and feelings  Description  Interventions:  - Promote a nonjudgmental and trusting relationship with the patient through active listening and therapeutic communication  - Assess patient's level of functioning, behavior and potential for risk  - Engage patient in 1 on 1 interactions  - Encourage patient to express fears, feelings, frustrations, and discuss symptoms    - [place] patient to reality, help patient recognize reality-based thinking   - Administer medications as ordered and assess for potential side effects  - Provide the patient education related to the signs and symptoms of the illness and desired effects of prescribed medications  Outcome: Progressing  Goal: Refrain from acting on delusional thinking/internal stimuli  Description  Interventions:  - Monitor patient closely, per order   - Utilize least restrictive measures   - Set reasonable limits, give positive feedback for acceptable   - Administer medications as ordered and monitor of potential side effects  Outcome: Progressing  Goal: Agree to be compliant with medication regime, as prescribed and report medication side effects  Description  Interventions:  - Offer appropriate PRN medication and supervise ingestion; conduct AIMS, as needed   Outcome: Progressing  Goal: Recognize dysfunctional thoughts, communicate reality-based thoughts at the time of discharge  Description  Interventions:  - Provide medication and psycho-education to assist patient in compliance and developing insight into his/her illness   Outcome: Progressing     Problem: DISCHARGE PLANNING  Goal: Discharge to home or other facility with appropriate resources  Description  INTERVENTIONS:  - Identify barriers to discharge w/patient and caregiver  - Arrange for needed discharge resources and transportation as appropriate  - Identify discharge learning needs (meds, wound care, etc )  - Arrange for interpretive services to assist at discharge as needed  - Refer to Case Management Department for coordinating discharge planning if the patient needs post-hospital services based on physician/advanced practitioner order or complex needs related to functional status, cognitive ability, or social support system  Outcome: Progressing     Problem: SUBSTANCE USE/ABUSE  Goal: Will have no detox symptoms and will verbalize plan for changing substance-related behavior  Description  INTERVENTIONS:  - Monitor physical status and assess for symptoms of withdrawal  - Administer medication as ordered  - Provide emotional support with 1 on 1 interaction with staff  - Encourage recovery focused program/ addiction education  - Assess for verbalization of changing behaviors related to substance abuse  - Initiate consults and referrals as appropriate (Case Management, Spiritual Care, etc )  Outcome: Progressing  Goal: By discharge, will develop insight into their chemical dependency and sustain motivation to continue in recovery  Description  INTERVENTIONS:  - Attends all daily group sessions and scheduled AA groups  - Actively practices coping skills through participation in the therapeutic community and adherence to program rules  - Reviews and completes assignments from individual treatment plan  - Assist patient development of understanding of their personal cycle of addiction and relapse triggers  Outcome: Progressing  Goal: By discharge, patient will have ongoing treatment plan addressing chemical dependency  Description  INTERVENTIONS:  - Assist patient with resources and/or appointments for ongoing recovery based living  Outcome: Progressing

## 2019-11-02 LAB — VALPROATE SERPL-MCNC: 61.1 UG/ML (ref 50–120)

## 2019-11-02 PROCEDURE — 80164 ASSAY DIPROPYLACETIC ACD TOT: CPT | Performed by: PSYCHIATRY & NEUROLOGY

## 2019-11-02 PROCEDURE — 99232 SBSQ HOSP IP/OBS MODERATE 35: CPT | Performed by: NURSE PRACTITIONER

## 2019-11-02 RX ADMIN — QUETIAPINE FUMARATE 800 MG: 400 TABLET, EXTENDED RELEASE ORAL at 21:21

## 2019-11-02 RX ADMIN — DIVALPROEX SODIUM 1000 MG: 500 TABLET, EXTENDED RELEASE ORAL at 17:32

## 2019-11-02 NOTE — PROGRESS NOTES
11/01/19 1415   Activity/Group Checklist   Group Other (Comment)  (Art Therapy Process Group/Exploring [place], Discussion)   Attendance Attended   Attendance Duration (min) Greater than 60   Interactions Interacted appropriately  (Comments went off on tangents; required some redirection)   Affect/Mood Appropriate   Goals Achieved Able to listen to others; Able to engage in interactions; Able to recieve feedback; Able to give feedback to another  (Able to engage materials; full participation) She is weaning off  We discuss correlation between htn and exogenous estrogen   Not a true contraindication but we do observe that her BP has been coming down as she weans down on estrace      She has had hysterectomy     We discuss natural ways of avoiding hot flashes carb reduction CV exercise caffeine reduction

## 2019-11-02 NOTE — PLAN OF CARE
Problem: Alteration in Thoughts and Perception  Goal: Treatment Goal: Gain control of psychotic behaviors/thinking, reduce/eliminate presenting symptoms and demonstrate improved reality functioning upon discharge  Outcome: Progressing  Goal: Verbalize thoughts and feelings  Description  Interventions:  - Promote a nonjudgmental and trusting relationship with the patient through active listening and therapeutic communication  - Assess patient's level of functioning, behavior and potential for risk  - Engage patient in 1 on 1 interactions  - Encourage patient to express fears, feelings, frustrations, and discuss symptoms    - [place] patient to reality, help patient recognize reality-based thinking   - Administer medications as ordered and assess for potential side effects  - Provide the patient education related to the signs and symptoms of the illness and desired effects of prescribed medications  Outcome: Progressing  Goal: Refrain from acting on delusional thinking/internal stimuli  Description  Interventions:  - Monitor patient closely, per order   - Utilize least restrictive measures   - Set reasonable limits, give positive feedback for acceptable   - Administer medications as ordered and monitor of potential side effects  Outcome: Progressing  Goal: Agree to be compliant with medication regime, as prescribed and report medication side effects  Description  Interventions:  - Offer appropriate PRN medication and supervise ingestion; conduct AIMS, as needed   Outcome: Progressing     Problem: SUBSTANCE USE/ABUSE  Goal: By discharge, will develop insight into their chemical dependency and sustain motivation to continue in recovery  Description  INTERVENTIONS:  - Attends all daily group sessions and scheduled AA groups  - Actively practices coping skills through participation in the therapeutic community and adherence to program rules  - Reviews and completes assignments from individual treatment plan  - Assist patient development of understanding of their personal cycle of addiction and relapse triggers  Outcome: Progressing  Goal: By discharge, patient will have ongoing treatment plan addressing chemical dependency  Description  INTERVENTIONS:  - Assist patient with resources and/or appointments for ongoing recovery based living  Outcome: Progressing     Problem: Alteration in Thoughts and Perception  Goal: Recognize dysfunctional thoughts, communicate reality-based thoughts at the time of discharge  Description  Interventions:  - Provide medication and psycho-education to assist patient in compliance and developing insight into his/her illness   Outcome: Not Progressing     Problem: SUBSTANCE USE/ABUSE  Goal: Will have no detox symptoms and will verbalize plan for changing substance-related behavior  Description  INTERVENTIONS:  - Monitor physical status and assess for symptoms of withdrawal  - Administer medication as ordered  - Provide emotional support with 1 on 1 interaction with staff  - Encourage recovery focused program/ addiction education  - Assess for verbalization of changing behaviors related to substance abuse  - Initiate consults and referrals as appropriate (Case Management, Spiritual Care, etc )  Outcome: Not Progressing

## 2019-11-02 NOTE — PROGRESS NOTES
Pt vomited a large amount of liquid, pt unable to state if she is having other symptoms and cannot state when this started  Pt had no complaints prior to now, suddenly became nauseous after lunch with no apparent trigger

## 2019-11-02 NOTE — PROGRESS NOTES
Pt has had no further complaints of nausea or discomfort since lunch  She denies all S/S at this time, compliant with mouth checks but appeared irritable while doing them, rolling eyes and stating that she took them already

## 2019-11-02 NOTE — PROGRESS NOTES
Progress Note - 707 [facility] 51 y o  female MRN: [id number]  Unit/Bed#: [address]i [address] 217-02 Encounter: [id number]    The patient was seen for continuing care and reviewed with treatment team  Staff reports that the patient has been self isolating in the room and minimally verbal with staff and peers  Not currently attending groups  During assessment the patient remains guarded, irritable, and avoids eye contact with writer  Denies any thoughts to hurt herself or others  However, reports increased fatigue from Depakote  Denies any auditory or visual hallucinations, although she continues to appear preoccupied  Remains delusional regarding political news and [place]   No current medication changes  Mental Status Evaluation:  Appearance:  Marginal/poor hygiene   Behavior:  guarded, Superficial and Dismissive   Fund of knowledge  Not assessed   Speech:   Language: [place]; minimally verbal  No overt abnormality   Mood:  irritable and anxious   Affect:   Associations: blunted  Intact   Thought Process:  Circumstantial and disorganized   Thought Content:  Paranoid and mistrustful, Grandiose delusions and Obsessive ruminations   Perceptual Disturbances: Appears to be responding to internal stimuli   Risk Potential: No suicidal or homicidal ideation   Orientation  Oriented x 3   Memory Not tested   Attention/Concentration attention span appeared shorter than expected for age   Insight:  No insight   Judgment: Poor judgment   Gait/Station: normal gait/station and normal balance   Motor Activity: No abnormal movement noted     Progress Toward Goals: unchanged    Assessment/Plan    Principal Problem:    Bipolar I disorder, current or most recent episode manic, with psychotic features ([facility] [address] )  Active Problems:    Medical clearance for psychiatric admission      Recommended Treatment: Continue with pharmacotherapy, group therapy, milieu therapy and occupational therapy    The patient will be maintained on the following medications:    Current Facility-Administered Medications:  acetaminophen 650 mg Oral Q4H PRN Narendra Mcnulty MD   aluminum-magnesium hydroxide-simethicone 30 mL Oral Q4H PRN Narendra Mcnulty MD   benztropine 1 mg Intramuscular Q6H PRN Narendra Mcnulty MD   benztropine 1 mg Oral Q6H PRN Narendra Mcnulty MD   divalproex sodium 1,000 mg Oral After Hui Sheridan MD   haloperidol 5 mg Oral Q8H PRN Narendra Mcnulty MD   LORazepam 1 mg Oral Q4H PRN Narendra Mcnulty MD   magnesium hydroxide 30 mL Oral Daily PRN Narendra Mcnulty MD   nicotine polacrilex 2 mg Oral Q2H PRN Narendra Mcnulty MD   OLANZapine 10 mg Intramuscular BID PRN Narendra Mcnulty MD   QUEtiapine 800 mg Oral HS Elsa Moffett MD   risperiDONE 1 mg Oral Q8H PRN Narendra Mcnulty MD   traZODone 25 mg Oral HS PRN Narendra Mcnulty MD       Risks, benefits and possible side effects of Medications:   Risks, benefits, and possible side effects of medications explained to patient and patient verbalizes understanding

## 2019-11-02 NOTE — PROGRESS NOTES
Pt is seclusive to her room, remains mildly confused, disorganized, some flight of ideas  Pt stated yes when asked about AH but then stated she either heard voices or thoughts but wasn't sure  Pt complaining of drowsiness from depakote, assured she will get used to this  Pt better oriented, understands she is here for psychiatric reasons, did not speak about sexual delusions and preoccupations  Pt complaining of anxiety, asking if writer ever experienced a premonition or feeling of relatives in danger and then stated, "Yeah you can't talk about these things because they will think you are crazy "  Pt spoke of a time about a month ago when she was experiencing these feelings  She did not mention any current delusional thoughts

## 2019-11-03 PROCEDURE — 99232 SBSQ HOSP IP/OBS MODERATE 35: CPT | Performed by: NURSE PRACTITIONER

## 2019-11-03 RX ADMIN — QUETIAPINE FUMARATE 800 MG: 400 TABLET, EXTENDED RELEASE ORAL at 21:18

## 2019-11-03 RX ADMIN — DIVALPROEX SODIUM 1000 MG: 500 TABLET, EXTENDED RELEASE ORAL at 17:48

## 2019-11-03 NOTE — PROGRESS NOTES
Pt remains seclusive, no changes from earlier, denies all S/S, pt reports good appetite and sleep, "foginess" from depakote

## 2019-11-03 NOTE — PLAN OF CARE
Problem: Alteration in Thoughts and Perception  Goal: Treatment Goal: Gain control of psychotic behaviors/thinking, reduce/eliminate presenting symptoms and demonstrate improved reality functioning upon discharge  Outcome: Progressing  Goal: Verbalize thoughts and feelings  Description  Interventions:  - Promote a nonjudgmental and trusting relationship with the patient through active listening and therapeutic communication  - Assess patient's level of functioning, behavior and potential for risk  - Engage patient in 1 on 1 interactions  - Encourage patient to express fears, feelings, frustrations, and discuss symptoms    - [place] patient to reality, help patient recognize reality-based thinking   - Administer medications as ordered and assess for potential side effects  - Provide the patient education related to the signs and symptoms of the illness and desired effects of prescribed medications  Outcome: Progressing  Goal: Refrain from acting on delusional thinking/internal stimuli  Description  Interventions:  - Monitor patient closely, per order   - Utilize least restrictive measures   - Set reasonable limits, give positive feedback for acceptable   - Administer medications as ordered and monitor of potential side effects  Outcome: Progressing  Goal: Agree to be compliant with medication regime, as prescribed and report medication side effects  Description  Interventions:  - Offer appropriate PRN medication and supervise ingestion; conduct AIMS, as needed   Outcome: Progressing  Goal: Recognize dysfunctional thoughts, communicate reality-based thoughts at the time of discharge  Description  Interventions:  - Provide medication and psycho-education to assist patient in compliance and developing insight into his/her illness   Outcome: Progressing     Problem: SUBSTANCE USE/ABUSE  Goal: By discharge, will develop insight into their chemical dependency and sustain motivation to continue in recovery  Description  INTERVENTIONS:  - Attends all daily group sessions and scheduled AA groups  - Actively practices coping skills through participation in the therapeutic community and adherence to program rules  - Reviews and completes assignments from individual treatment plan  - Assist patient development of understanding of their personal cycle of addiction and relapse triggers  Outcome: Progressing  Goal: By discharge, patient will have ongoing treatment plan addressing chemical dependency  Description  INTERVENTIONS:  - Assist patient with resources and/or appointments for ongoing recovery based living  Outcome: Progressing     Problem: SUBSTANCE USE/ABUSE  Goal: Will have no detox symptoms and will verbalize plan for changing substance-related behavior  Description  INTERVENTIONS:  - Monitor physical status and assess for symptoms of withdrawal  - Administer medication as ordered  - Provide emotional support with 1 on 1 interaction with staff  - Encourage recovery focused program/ addiction education  - Assess for verbalization of changing behaviors related to substance abuse  - Initiate consults and referrals as appropriate (Case Management, Spiritual Care, etc )  Outcome: Not Progressing

## 2019-11-03 NOTE — PROGRESS NOTES
Progress Note - [facility] 51 y o  female MRN: [id number]  Unit/Bed#: [facility] 850-12 Encounter: [id number]    The patient was seen for continuing care and reviewed with treatment team  Staff reports that the patient continues to remains self isolating to room, minimally interactive with staff and peers, but continues to be compliant with medications  During assessment the patient remains calm and cooperative  Continues to report excessive sleepiness, but states that it has been decreasing  Patient continues to remain bizarre, but appears less disorganized  Reports her mood as being "stable " Denies any thoughts to hurt herself or others  Denies any auditory or visual hallucinations  Patient does not report any delusional thoughts  Appears to be improving in linear thoughts and states that her thoughts are "less intrusive and rude " No current medication changes       Mental Status Evaluation:  Appearance:  Marginal/poor hygiene   Behavior:  cooperative   Fund of knowledge  improving   Speech:   Language: Normal rate and Normal volume  No overt abnormality   Mood:  improving   Affect:   Associations: blunted  Intact   Thought Process:  improved linear thoughts   Thought Content:  Does not verbalize delusional material   Perceptual Disturbances: Denies hallucinations and does not appear to be responding to internal stimuli   Risk Potential: No suicidal or homicidal ideation   Orientation  Oriented x 3   Memory Not tested   Attention/Concentration attention span appeared shorter than expected for age   Insight:  limited   Judgment: Limited   Gait/Station: normal gait/station and normal balance   Motor Activity: No abnormal movement noted     Progress Toward Goals: slightly improving    Assessment/Plan    Principal Problem:    Bipolar I disorder, current or most recent episode manic, with psychotic features ([facility] [address] )  Active Problems:    Medical clearance for psychiatric admission      Recommended Treatment: Continue with pharmacotherapy, group therapy, milieu therapy and occupational therapy  The patient will be maintained on the following medications:    Current Facility-Administered Medications:  acetaminophen 650 mg Oral Q4H PRN Galina Silver MD   aluminum-magnesium hydroxide-simethicone 30 mL Oral Q4H PRN aGlina Silver MD   benztropine 1 mg Intramuscular Q6H PRN Galina Silver MD   benztropine 1 mg Oral Q6H PRN Galina Silver MD   divalproex sodium 1,000 mg Oral After Josi Blanc MD   haloperidol 5 mg Oral Q8H PRN Galina Silver MD   LORazepam 1 mg Oral Q4H PRN Galina Silver MD   magnesium hydroxide 30 mL Oral Daily PRN Galina Silver MD   nicotine polacrilex 2 mg Oral Q2H PRN Galina Silver MD   OLANZapine 10 mg Intramuscular BID PRN Galina Silver MD   QUEtiapine 800 mg Oral HS Edmond Birmingham MD   risperiDONE 1 mg Oral Q8H PRN Galina Silver MD   traZODone 25 mg Oral HS PRN Galina Silver MD       Risks, benefits and possible side effects of Medications:   Risks, benefits, and possible side effects of medications explained to patient and patient verbalizes understanding

## 2019-11-03 NOTE — PROGRESS NOTES
Pt is quiet, drowsy, more isolative  Pt states she is feeling well and denies all S/S  She denies being depressed or tired, states she is isolating on purpose because she is easily distracted by noise in the milieu, wants to be alone with her thoughts  She appears better oriented to reaility, states she signed RN next to her name on accident during group  No delusional statements heard or reported

## 2019-11-04 PROCEDURE — 99232 SBSQ HOSP IP/OBS MODERATE 35: CPT | Performed by: PSYCHIATRY & NEUROLOGY

## 2019-11-04 RX ADMIN — DIVALPROEX SODIUM 1000 MG: 500 TABLET, EXTENDED RELEASE ORAL at 17:30

## 2019-11-04 RX ADMIN — QUETIAPINE FUMARATE 800 MG: 400 TABLET, EXTENDED RELEASE ORAL at 21:46

## 2019-11-04 NOTE — PLAN OF CARE
Problem: Alteration in Thoughts and Perception  Goal: Verbalize thoughts and feelings  Description  Interventions:  - Promote a nonjudgmental and trusting relationship with the patient through active listening and therapeutic communication  - Assess patient's level of functioning, behavior and potential for risk  - Engage patient in 1 on 1 interactions  - Encourage patient to express fears, feelings, frustrations, and discuss symptoms    - [place] patient to reality, help patient recognize reality-based thinking   - Administer medications as ordered and assess for potential side effects  - Provide the patient education related to the signs and symptoms of the illness and desired effects of prescribed medications  Outcome: Progressing  Goal: Refrain from acting on delusional thinking/internal stimuli  Description  Interventions:  - Monitor patient closely, per order   - Utilize least restrictive measures   - Set reasonable limits, give positive feedback for acceptable   - Administer medications as ordered and monitor of potential side effects  Outcome: Progressing  Goal: Agree to be compliant with medication regime, as prescribed and report medication side effects  Description  Interventions:  - Offer appropriate PRN medication and supervise ingestion; conduct AIMS, as needed   Outcome: Progressing  Goal: Recognize dysfunctional thoughts, communicate reality-based thoughts at the time of discharge  Description  Interventions:  - Provide medication and psycho-education to assist patient in compliance and developing insight into his/her illness   Outcome: Progressing     Problem: DISCHARGE PLANNING  Goal: Discharge to home or other facility with appropriate resources  Description  INTERVENTIONS:  - Identify barriers to discharge w/patient and caregiver  - Arrange for needed discharge resources and transportation as appropriate  - Identify discharge learning needs (meds, wound care, etc )  - Arrange for interpretive services to assist at discharge as needed  - Refer to Case Management Department for coordinating discharge planning if the patient needs post-hospital services based on physician/advanced practitioner order or complex needs related to functional status, cognitive ability, or social support system  Outcome: Progressing     Problem: Ineffective Coping  Goal: Participates in unit activities  Description  Interventions:  - Provide therapeutic environment   - Provide required programming   - Redirect inappropriate behaviors   Outcome: Progressing     Problem: SUBSTANCE USE/ABUSE  Goal: Will have no detox symptoms and will verbalize plan for changing substance-related behavior  Description  INTERVENTIONS:  - Monitor physical status and assess for symptoms of withdrawal  - Administer medication as ordered  - Provide emotional support with 1 on 1 interaction with staff  - Encourage recovery focused program/ addiction education  - Assess for verbalization of changing behaviors related to substance abuse  - Initiate consults and referrals as appropriate (Case Management, Spiritual Care, etc )  Outcome: Progressing  Goal: By discharge, will develop insight into their chemical dependency and sustain motivation to continue in recovery  Description  INTERVENTIONS:  - Attends all daily group sessions and scheduled AA groups  - Actively practices coping skills through participation in the therapeutic community and adherence to program rules  - Reviews and completes assignments from individual treatment plan  - Assist patient development of understanding of their personal cycle of addiction and relapse triggers  Outcome: Progressing  Goal: By discharge, patient will have ongoing treatment plan addressing chemical dependency  Description  INTERVENTIONS:  - Assist patient with resources and/or appointments for ongoing recovery based living  Outcome: Progressing

## 2019-11-04 NOTE — PROGRESS NOTES
Pt is calm, cooperative with staff  She feels irritable that she is not going home tomorrow and remains preoccupied about primary elections  She denies all S/S, unable to state why she is in treatment  She is tangential in conversation, disorganized, flight of ideas  Pt attempted to tell writer why she was admitted but never got to the end  Pt still having delusions of grandeur

## 2019-11-04 NOTE — PLAN OF CARE
Problem: Alteration in Thoughts and Perception  Goal: Verbalize thoughts and feelings  Description  Interventions:  - Promote a nonjudgmental and trusting relationship with the patient through active listening and therapeutic communication  - Assess patient's level of functioning, behavior and potential for risk  - Engage patient in 1 on 1 interactions  - Encourage patient to express fears, feelings, frustrations, and discuss symptoms    - [place] patient to reality, help patient recognize reality-based thinking   - Administer medications as ordered and assess for potential side effects  - Provide the patient education related to the signs and symptoms of the illness and desired effects of prescribed medications  Outcome: Progressing  Goal: Refrain from acting on delusional thinking/internal stimuli  Description  Interventions:  - Monitor patient closely, per order   - Utilize least restrictive measures   - Set reasonable limits, give positive feedback for acceptable   - Administer medications as ordered and monitor of potential side effects  Outcome: Progressing  Goal: Agree to be compliant with medication regime, as prescribed and report medication side effects  Description  Interventions:  - Offer appropriate PRN medication and supervise ingestion; conduct AIMS, as needed   Outcome: Progressing     Problem: SUBSTANCE USE/ABUSE  Goal: Will have no detox symptoms and will verbalize plan for changing substance-related behavior  Description  INTERVENTIONS:  - Monitor physical status and assess for symptoms of withdrawal  - Administer medication as ordered  - Provide emotional support with 1 on 1 interaction with staff  - Encourage recovery focused program/ addiction education  - Assess for verbalization of changing behaviors related to substance abuse  - Initiate consults and referrals as appropriate (Case Management, Spiritual Care, etc )  Outcome: Progressing  Goal: By discharge, will develop insight into their chemical dependency and sustain motivation to continue in recovery  Description  INTERVENTIONS:  - Attends all daily group sessions and scheduled AA groups  - Actively practices coping skills through participation in the therapeutic community and adherence to program rules  - Reviews and completes assignments from individual treatment plan  - Assist patient development of understanding of their personal cycle of addiction and relapse triggers  Outcome: Progressing  Goal: By discharge, patient will have ongoing treatment plan addressing chemical dependency  Description  INTERVENTIONS:  - Assist patient with resources and/or appointments for ongoing recovery based living  Outcome: Progressing     Problem: Alteration in Thoughts and Perception  Goal: Treatment Goal: Gain control of psychotic behaviors/thinking, reduce/eliminate presenting symptoms and demonstrate improved reality functioning upon discharge  Outcome: Not Progressing  Goal: Recognize dysfunctional thoughts, communicate reality-based thoughts at the time of discharge  Description  Interventions:  - Provide medication and psycho-education to assist patient in compliance and developing insight into his/her illness   Outcome: Not Progressing
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
## 2019-11-04 NOTE — PROGRESS NOTES
11/04/19 0900   Team Meeting   Meeting Type Daily Rounds   Initial Conference Date 11/04/19   Team Members Present   Team Members Present Physician;Nurse;;; Other (Discipline and Name)  (Jose Alfredo Srivastava (pharmacist))   Physician Team Member [facility]   [facility] Management Team Member Lewis Price   Social Work Team Member Oskar   Other (Discipline and Name) Cora (Resident)   Patient/Family Present   Patient Present No   Patient's Family Present No     Assess for Partial at [facility]  Titrate medications

## 2019-11-05 PROCEDURE — 99232 SBSQ HOSP IP/OBS MODERATE 35: CPT | Performed by: PSYCHIATRY & NEUROLOGY

## 2019-11-05 RX ADMIN — QUETIAPINE FUMARATE 800 MG: 400 TABLET, EXTENDED RELEASE ORAL at 21:18

## 2019-11-05 RX ADMIN — DIVALPROEX SODIUM 1000 MG: 500 TABLET, EXTENDED RELEASE ORAL at 18:09

## 2019-11-05 NOTE — PLAN OF CARE
Problem: Alteration in Thoughts and Perception  Goal: Refrain from acting on delusional thinking/internal stimuli  Description  Interventions:  - Monitor patient closely, per order   - Utilize least restrictive measures   - Set reasonable limits, give positive feedback for acceptable   - Administer medications as ordered and monitor of potential side effects  Outcome: Progressing  Goal: Agree to be compliant with medication regime, as prescribed and report medication side effects  Description  Interventions:  - Offer appropriate PRN medication and supervise ingestion; conduct AIMS, as needed   Outcome: Progressing  Goal: Recognize dysfunctional thoughts, communicate reality-based thoughts at the time of discharge  Description  Interventions:  - Provide medication and psycho-education to assist patient in compliance and developing insight into his/her illness   Outcome: Progressing     Problem: SUBSTANCE USE/ABUSE  Goal: By discharge, patient will have ongoing treatment plan addressing chemical dependency  Description  INTERVENTIONS:  - Assist patient with resources and/or appointments for ongoing recovery based living  Outcome: Progressing     Problem: Alteration in Thoughts and Perception  Goal: Verbalize thoughts and feelings  Description  Interventions:  - Promote a nonjudgmental and trusting relationship with the patient through active listening and therapeutic communication  - Assess patient's level of functioning, behavior and potential for risk  - Engage patient in 1 on 1 interactions  - Encourage patient to express fears, feelings, frustrations, and discuss symptoms    - [place] patient to reality, help patient recognize reality-based thinking   - Administer medications as ordered and assess for potential side effects  - Provide the patient education related to the signs and symptoms of the illness and desired effects of prescribed medications  Outcome: Not Progressing

## 2019-11-05 NOTE — PROGRESS NOTES
Pt denies all symptoms  Pt has poor eye contact and is scant with nurse  Pt is calm and cooperative  Pt is about the unit and social with peers  Compliant with medications  Will monitor

## 2019-11-05 NOTE — PROGRESS NOTES
[facility] GROUP NOTE  Appropriate participation in group activity and discussion  Increase in organized and novel thought/verbalization  11/05/19 0930   Activity/Group Checklist   Group Community meeting   Attendance Attended   Attendance Duration (min) 16-30   Interactions Interacted appropriately   Affect/Mood Appropriate   Goals Achieved Able to listen to others; Able to engage in interactions   Objectives/Key Points:  Living intentionally  Goal Setting  Thought for the Day  Self Check In

## 2019-11-05 NOTE — PROGRESS NOTES
11/05/19 1100   Activity/Group Checklist   Group   (recovery group)   Attendance Attended   Attendance Duration (min) 46-60   Interactions Disorganized interaction   Affect/Mood Blunted/flat   Goals Achieved Able to listen to others; Able to engage in interactions   Topic: Self sabotage and irrational beliefs

## 2019-11-05 NOTE — PROGRESS NOTES
Patient has been attending groups but has been scant in conversation today  Currently in her room napping between groups  Denied symptoms and needs

## 2019-11-05 NOTE — PROGRESS NOTES
Pt presents as visible in public areas as well as bedroom  Observed as guarded, suspicious and constricted on approach  Currently denies SI, HI and A/V hallucinations but does elaborate loosely associated and disorganized thoughts frequently    Compliant with meds  Mouth checks completed

## 2019-11-05 NOTE — PROGRESS NOTES
When asked how she was doing this morning, patient reported, "I took a shower and am doing all the things that are recommended for me to be discharged " Patient was preoccupied with poor eye contact and walked away from RN while she was talking

## 2019-11-05 NOTE — PROGRESS NOTES
Progress Note - 707 [facility] 51 y o  female MRN: [id number]  Unit/Bed#: U 347-02 Encounter: [id number]    Assessment/Plan   Principal Problem:    Bipolar I disorder, current or most recent episode manic, with psychotic features ([facility] [address] )  Active Problems:    Medical clearance for psychiatric admission    Recommended Treatment:   - Continue Seroquel XR to 800 mg qhs (monitor BP); continue mouth checks  - Continue Depakote ER 1000 mg after dinner   - Continue with group therapy, milieu therapy and occupational therapy  Risks, benefits and possible side effects of Medications: Risks, benefits, and possible side effects of medications have been explained to the patient, who verbalizes understanding  Progress Toward Goals: slow improvement    ------------------------------------------------------------    Subjective: Mica Parson has been compliant with medications with confirmed mouth checks  Nursing staff reports that overnight the patient was very disorganized and not easily redirectable, but she did sleep all night  Alayna reports good sleep and appetite with "gray" mood  She is more calm and less hyper-verbal today  She shows a glimmer of insight to her illness today by stating "Dr Judi Cruz wanted me to come here, I came on a 201, I may be here for my Bipolar " But she does continue to be disorganized with bizarre behavior and thoughts, stating "I hope I'm getting paid while I'm here," and asks "are the male doctors making the female doctors do all the work?" She reports a decrease in auditory hallucinations, which has allowed better focus and concentration  She admits her headaches have improved since getting her glasses  The patient denies any suicidal or homicidal ideation, intent or plan  Patient is consenting for safety on the unit      Psychiatric Review of Systems:  Behavior over the last 24 hours: unchanged  Sleep: normal  Appetite: good  Medication side effects: none  ROS: negative, except for stated above      Vital signs in last 24 hours:  Temp:  [98 1 °F (36 7 °C)-98 2 °F (36 8 °C)] 98 2 °F (36 8 °C)  HR:  [101-104] 101  Resp:  [16] 16  BP: (122-136)/(58-81) 135/58    Mental Status Evaluation:  Appearance:  alert, casually dressed, appears stated age and appropriate grooming and hygiene   Behavior:  pleasant, cooperative, sitting comfortably, good eye contact, no abnormal movements and normal gait and balance   Speech:  spontaneous, clear, normal rate, pressured, normal volume and incoherent   Mood:  "gray"   Affect:  mood-congruent   Thought Process:  disorganized, illogical, incoherent, loose associations, flight of ideas   Thought Content: 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, 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), mild paranoia, obsessive thoughts   Perceptual disturbances: auditory hallucinations (decreasing, no commands to hurt herself or others), no reported visual hallucinations and does not appear to be responding to internal stimuli at this time   Risk Potential: No active or passive suicidal or homicidal ideation   Cognition: disoriented, memory impaired due to manic/psychotic episode, appears to be of average intelligence, impaired abstract reasoning and attention span appeared shorter than expected for age   Insight:  Poor   Judgment: Poor       Current Medications:    Current Facility-Administered Medications:  acetaminophen 650 mg Oral Q4H PRN Derik Mac MD   aluminum-magnesium hydroxide-simethicone 30 mL Oral Q4H PRN Derik Mac MD   benztropine 1 mg Intramuscular Q6H PRN Derik Mac MD   benztropine 1 mg Oral Q6H PRN Derik Mac MD   divalproex sodium 1,000 mg Oral After Marv Hurtado MD   haloperidol 5 mg Oral Q8H PRN Derik Mac MD   LORazepam 1 mg Oral Q4H PRN Derik Mac MD   magnesium hydroxide 30 mL Oral Daily PRN Derik Mac MD   nicotine polacrilex 2 mg Oral Q2H PRN Derik Mac MD OLANZapine 10 mg Intramuscular BID PRN Michelle Coleman MD   QUEtiapine 800 mg Oral HS Dimas Hernandez MD   risperiDONE 1 mg Oral Q8H PRN Michelle Coleman MD   traZODone 25 mg Oral HS PRN Michelle Coleman MD       Behavioral Health Medications: all current active meds have been reviewed  Changes as above  Laboratory results:  I have personally reviewed all pertinent laboratory/tests results  No results found for this or any previous visit (from the past 48 hour(s))  This note has been constructed using a voice recognition system  There may be translation, syntax, or grammatical errors  If you have any questions, please contact the dictating provider

## 2019-11-06 PROCEDURE — 99232 SBSQ HOSP IP/OBS MODERATE 35: CPT | Performed by: PSYCHIATRY & NEUROLOGY

## 2019-11-06 RX ADMIN — QUETIAPINE FUMARATE 800 MG: 400 TABLET, EXTENDED RELEASE ORAL at 21:10

## 2019-11-06 RX ADMIN — DIVALPROEX SODIUM 1000 MG: 500 TABLET, EXTENDED RELEASE ORAL at 17:20

## 2019-11-06 NOTE — PLAN OF CARE
Problem: Alteration in Thoughts and Perception  Goal: Refrain from acting on delusional thinking/internal stimuli  Description  Interventions:  - Monitor patient closely, per order   - Utilize least restrictive measures   - Set reasonable limits, give positive feedback for acceptable   - Administer medications as ordered and monitor of potential side effects  Outcome: Progressing  Goal: Agree to be compliant with medication regime, as prescribed and report medication side effects  Description  Interventions:  - Offer appropriate PRN medication and supervise ingestion; conduct AIMS, as needed   Outcome: Progressing  Goal: Recognize dysfunctional thoughts, communicate reality-based thoughts at the time of discharge  Description  Interventions:  - Provide medication and psycho-education to assist patient in compliance and developing insight into his/her illness   Outcome: Progressing     Problem: SUBSTANCE USE/ABUSE  Goal: By discharge, patient will have ongoing treatment plan addressing chemical dependency  Description  INTERVENTIONS:  - Assist patient with resources and/or appointments for ongoing recovery based living  Outcome: Progressing     Problem: Alteration in Thoughts and Perception  Goal: Treatment Goal: Gain control of psychotic behaviors/thinking, reduce/eliminate presenting symptoms and demonstrate improved reality functioning upon discharge  Outcome: Not Progressing  Goal: Verbalize thoughts and feelings  Description  Interventions:  - Promote a nonjudgmental and trusting relationship with the patient through active listening and therapeutic communication  - Assess patient's level of functioning, behavior and potential for risk  - Engage patient in 1 on 1 interactions  - Encourage patient to express fears, feelings, frustrations, and discuss symptoms    - [place] patient to reality, help patient recognize reality-based thinking   - Administer medications as ordered and assess for potential side effects  - Provide the patient education related to the signs and symptoms of the illness and desired effects of prescribed medications  Outcome: Not Progressing

## 2019-11-06 NOTE — PROGRESS NOTES
[facility] GROUP NOTE  Reported feeling calmer although was "more self conscious" of whether or not she was doing the movements correctly  Positive reinforcement given for effort and encouragement provided, as needed  Ability to self regulate is emerging, with increased ability to tolerate relaxation work  11/06/19 1000   Activity/Group Checklist   Group Personal control  (Yoga)   Attendance Attended   Attendance Duration (min) 31-45   Interactions Interacted appropriately   Affect/Mood Appropriate   Goals Achieved Able to engage in interactions; Able to listen to others; Able to experience relief/decrease in symptoms   Primary Objectives/Concepts Covered  Mind/Body Connection  Body awareness  Importance of utilizing stillness  Solitude vs Isolation  Intentional focus through breath, movement  Use of relaxation as a preventative and   Restorative practice

## 2019-11-06 NOTE — PROGRESS NOTES
11/06/19 1100   Activity/Group Checklist   Group   (recovery grp)   Attendance Attended   Attendance Duration (min) 46-60   Interactions Interacted appropriately   Affect/Mood Blunted/flat   Goals Achieved Able to listen to others; Able to engage in interactions; Able to self-disclose; Able to recieve feedback   Topic: Faulty beliefs

## 2019-11-06 NOTE — PROGRESS NOTES
Patient was disorganized in conversation today  When asked about symptoms such as AH patient said, "I just feel like I have too much responsibility " Patient said she feels overwhelmed and doesn't like trying to sleep in the hospital  When asked about her CPAP machine patient expressed that she wanted one provided but then gave disorganized reasons for this  Respiratory was contacted and said they do not have any CPAPs available and patients must have them brought in from home

## 2019-11-06 NOTE — PROGRESS NOTES
11/05/19 0700   Team Meeting   Meeting Type Daily Rounds   Initial Conference Date 11/05/19   Team Members Present   Team Members Present Physician;Nurse;;; Other (Discipline and Name)   Physician Team Member [id number]   Nursing Team Member [facility] Management Team Member [place]   Social Work Team Member Oskar   Other (Discipline and Name) Sami Arms, NP   Patient/Family Present   Patient Present No   Patient's Family Present No   Ref to [employer] closer to discharge

## 2019-11-07 PROCEDURE — 99232 SBSQ HOSP IP/OBS MODERATE 35: CPT | Performed by: PSYCHIATRY & NEUROLOGY

## 2019-11-07 RX ORDER — DIVALPROEX SODIUM 500 MG/1
1500 TABLET, EXTENDED RELEASE ORAL
Status: DISCONTINUED | OUTPATIENT
Start: 2019-11-07 | End: 2019-11-11 | Stop reason: HOSPADM

## 2019-11-07 RX ADMIN — QUETIAPINE FUMARATE 800 MG: 400 TABLET, EXTENDED RELEASE ORAL at 21:15

## 2019-11-07 RX ADMIN — DIVALPROEX SODIUM 1500 MG: 500 TABLET, EXTENDED RELEASE ORAL at 17:26

## 2019-11-07 NOTE — PLAN OF CARE
Problem: Alteration in Thoughts and Perception  Goal: Treatment Goal: Gain control of psychotic behaviors/thinking, reduce/eliminate presenting symptoms and demonstrate improved reality functioning upon discharge  Outcome: Progressing  Goal: Verbalize thoughts and feelings  Description  Interventions:  - Promote a nonjudgmental and trusting relationship with the patient through active listening and therapeutic communication  - Assess patient's level of functioning, behavior and potential for risk  - Engage patient in 1 on 1 interactions  - Encourage patient to express fears, feelings, frustrations, and discuss symptoms    - [place] patient to reality, help patient recognize reality-based thinking   - Administer medications as ordered and assess for potential side effects  - Provide the patient education related to the signs and symptoms of the illness and desired effects of prescribed medications  Outcome: Progressing  Goal: Refrain from acting on delusional thinking/internal stimuli  Description  Interventions:  - Monitor patient closely, per order   - Utilize least restrictive measures   - Set reasonable limits, give positive feedback for acceptable   - Administer medications as ordered and monitor of potential side effects  Outcome: Progressing  Goal: Agree to be compliant with medication regime, as prescribed and report medication side effects  Description  Interventions:  - Offer appropriate PRN medication and supervise ingestion; conduct AIMS, as needed   Outcome: Progressing  Goal: Recognize dysfunctional thoughts, communicate reality-based thoughts at the time of discharge  Description  Interventions:  - Provide medication and psycho-education to assist patient in compliance and developing insight into his/her illness   Outcome: Progressing     Problem: SUBSTANCE USE/ABUSE  Goal: Will have no detox symptoms and will verbalize plan for changing substance-related behavior  Description  INTERVENTIONS:  - Monitor physical status and assess for symptoms of withdrawal  - Administer medication as ordered  - Provide emotional support with 1 on 1 interaction with staff  - Encourage recovery focused program/ addiction education  - Assess for verbalization of changing behaviors related to substance abuse  - Initiate consults and referrals as appropriate (Case Management, Spiritual Care, etc )  Outcome: Progressing  Goal: By discharge, will develop insight into their chemical dependency and sustain motivation to continue in recovery  Description  INTERVENTIONS:  - Attends all daily group sessions and scheduled AA groups  - Actively practices coping skills through participation in the therapeutic community and adherence to program rules  - Reviews and completes assignments from individual treatment plan  - Assist patient development of understanding of their personal cycle of addiction and relapse triggers  Outcome: Progressing  Goal: By discharge, patient will have ongoing treatment plan addressing chemical dependency  Description  INTERVENTIONS:  - Assist patient with resources and/or appointments for ongoing recovery based living  Outcome: Progressing

## 2019-11-07 NOTE — PROGRESS NOTES
Pt denies all symptoms  Pt is intermittently confused when nurse went to give pt her medications pt stated "Didn't you just give me meds, you just gave me Seroquel right?" Pt was informed the nurse did not give her any medications all she did was scan her bracelet, pt stated "okay I'm just a little confused today I guess " Pt is calm, flat, but cooperative with medications  Mouth checks completed  Will monitor

## 2019-11-07 NOTE — PROGRESS NOTES
11/07/19 0700   Team Meeting   Meeting Type Daily Rounds   Team Members Present   Team Members Present Physician;Nurse;;; Other (Discipline and Name)   Physician Team Member [id number] Team Member [facility] Management Team Member Conception Money   Other (Discipline and Name) Student- NICKY;  Therapist- Jaya   Patient/Family Present   Patient Present No   Patient's Family Present No     Continue treatment

## 2019-11-07 NOTE — PROGRESS NOTES
Progress Note - 707 [facility] 51 y o  female MRN: [id number]  Unit/Bed#: -02 Encounter: [id number]    Assessment/Plan   Principal Problem:    Bipolar I disorder, current or most recent episode manic, with psychotic features ([facility] [address] )  Active Problems:    Medical clearance for psychiatric admission    Recommended Treatment:   - Continue Seroquel XR to 800 mg qhs (monitor BP); continue mouth checks  - Continue Depakote ER 1000 mg after dinner   - Continue Supplemental Oxygen NC 2L at night for GINETTE, until pt gets her CPAP  - Continue with group therapy, milieu therapy and occupational therapy  Risks, benefits and possible side effects of Medications: Risks, benefits, and possible side effects of medications have been explained to the patient, who verbalizes understanding  Progress Toward Goals: slow improvement    ------------------------------------------------------------    Subjective: Deanne Syed has been compliant with medications with confirmed mouth checks  She reports good sleep last night with "better" mood and energy today  She continues to be disorganized with delusions of grandiosity about owning a [facility] [facility] re-named " [place]" which she will turn partly into "[employer]  She continues to acknowledge that she is being treated as a patient in our unit (no longer believes she's Candi Oglesby psychiatrist) and that she was sent my Janak Duncan and Dr Lacy Redding for not following their plans and not taking her Depakote  She reports some feelings of guilt stating "I feel like I've been selfish, Charlette Tse had a lot more than other people have  I can use my all my stuff for the shelter " She admits the thoughts in her head are less aggressive today, and they continue to say "You're in the right place at the right time " She denies any paranoia and denies any homicidal/suicidal ideation, intent or plan  Patient is consenting for safety on the unit      Psychiatric Review of Systems:  Behavior over the last 24 hours: unchanged  Sleep: normal  Appetite: good  Medication side effects: none  ROS: negative, except for stated above      Vital signs in last 24 hours:  Temp:  [97 6 °F (36 4 °C)-97 9 °F (36 6 °C)] 97 6 °F (36 4 °C)  HR:  [] 95  Resp:  [16] 16  BP: (111-137)/(50-84) 130/84    Mental Status Evaluation:  Appearance:  alert, casually dressed, appears stated age and appropriate grooming and hygiene   Behavior:  pleasant, cooperative, sitting comfortably, good eye contact, no abnormal movements and normal gait and balance   Speech:  spontaneous, clear, normal rate, pressured, normal volume and incoherent   Mood:  "better, great"   Affect:  mood-congruent   Thought Process:  disorganized, illogical, incoherent, loose associations, flight of ideas   Thought Content: delusions (turning [employer] into [employer]), obsessive thoughts   Perceptual disturbances: auditory hallucinations (decreasing, no commands to hurt herself or others), no reported visual hallucinations and does not appear to be responding to internal stimuli at this time   Risk Potential: No active or passive suicidal or homicidal ideation   Cognition: disoriented, memory impaired due to manic/psychotic episode, appears to be of average intelligence, impaired abstract reasoning and attention span appeared shorter than expected for age   Insight:  Poor   Judgment: Poor       Current Medications:    Current Facility-Administered Medications:  acetaminophen 650 mg Oral Q4H PRN Jett Orellana MD   aluminum-magnesium hydroxide-simethicone 30 mL Oral Q4H PRN Jett Orellana MD   benztropine 1 mg Intramuscular Q6H PRN Jett Orellana MD   benztropine 1 mg Oral Q6H PRN Jett Orellana MD   divalproex sodium 1,500 mg Oral After Michelle Ramos MD   haloperidol 5 mg Oral Q8H PRN Jett Orellana MD   LORazepam 1 mg Oral Q4H PRN Jett Orellana MD   magnesium hydroxide 30 mL Oral Daily PRN Felix Cotto MD Fede   nicotine polacrilex 2 mg Oral Q2H PRN Wayne Long MD   OLANZapine 10 mg Intramuscular BID PRN Wayne Long MD   QUEtiapine 800 mg Oral HS Eliaazr Fishman MD   risperiDONE 1 mg Oral Q8H PRN Wayne Long MD   traZODone 25 mg Oral HS PRN Wayne Long MD       Behavioral Health Medications: all current active meds have been reviewed  Changes as above  Laboratory results:  I have personally reviewed all pertinent laboratory/tests results  No results found for this or any previous visit (from the past 48 hour(s))  This note has been constructed using a voice recognition system  There may be translation, syntax, or grammatical errors  If you have any questions, please contact the dictating provider

## 2019-11-07 NOTE — PROGRESS NOTES
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

## 2019-11-07 NOTE — PROGRESS NOTES
11/06/19 1415   Activity/Group Checklist   Group Other (Comment)  (Art Therapy Process Group/Visual Introduction, Discussion)   Attendance Attended   Attendance Duration (min) Greater than 60   Interactions Interacted appropriately  (Some improvement in organization)   Affect/Mood Appropriate;Normal range   Goals Achieved Able to listen to others; Able to engage in interactions; Able to recieve feedback; Able to give feedback to another  (Able to engage materials; full participation)

## 2019-11-07 NOTE — PROGRESS NOTES
Progress Note - 707 [facility] 51 y o  female MRN: [id number]  Unit/Bed#: -02 Encounter: [id number]    Assessment/Plan   Principal Problem:    Bipolar I disorder, current or most recent episode manic, with psychotic features ([facility] [address] )  Active Problems:    Medical clearance for psychiatric admission    Recommended Treatment:   - Continue Seroquel XR to 800 mg qhs (monitor BP); continue mouth checks  - Continue Depakote ER 1000 mg after dinner   - Start Supplemental Oxygen NC 2L at night for GINETTE, until pt gets her CPAP  - Continue with group therapy, milieu therapy and occupational therapy  Risks, benefits and possible side effects of Medications: Risks, benefits, and possible side effects of medications have been explained to the patient, who verbalizes understanding  Progress Toward Goals: slow improvement    ------------------------------------------------------------    Subjective: Sanket Cochran has been compliant with medications with confirmed mouth checks  She reports that her loud snoring wakes her up a couple of times throughout the night  She reports that her friend Anasatsia Ordoñez will likely bring her CPAP machine on Monday, which should help  She continues to be disorganized, but reports a decrease in her auditory hallucinations  She is still having delusions about turning her apartment building into a homeless shelter  The patient mentions Jayesh Ibarra more today stating, "He is dying from stage IV cancer, I'm trying to get over that relationship " She also states "I spoke to my daughter and she informed me that it was the local elections yesterday, not the primaries, so I feel relieved that I did not miss it " She reports again today that she is patient on the unit, but states she is here under protected custody due to homicidal threats against Emperatriz Cuellonorma   She clarifies that she no longer has these homicidal thoughts, and states "it does not even matter, because I'll never be around him " The patient denies any suicidal ideation, intent or plan  Patient is consenting for safety on the unit  Psychiatric Review of Systems:  Behavior over the last 24 hours: unchanged  Sleep: normal  Appetite: good  Medication side effects: none  ROS: negative, except for stated above      Vital signs in last 24 hours:  Temp:  [97 6 °F (36 4 °C)] 97 6 °F (36 4 °C)  HR:  [] 95  Resp:  [16-18] 16  BP: (112-142)/(63-84) 142/63    Mental Status Evaluation:  Appearance:  alert, casually dressed, appears stated age and appropriate grooming and hygiene   Behavior:  pleasant, cooperative, sitting comfortably, good eye contact, no abnormal movements and normal gait and balance   Speech:  spontaneous, clear, normal rate, pressured, normal volume and incoherent   Mood:  "good"   Affect:  mood-congruent   Thought Process:  disorganized, illogical, incoherent, loose associations, flight of ideas   Thought Content: delusions (turning apartment bl into a homeless shelter), mild paranoia, obsessive thoughts   Perceptual disturbances: auditory hallucinations (decreasing, no commands to hurt herself or others), no reported visual hallucinations and does not appear to be responding to internal stimuli at this time   Risk Potential: No active or passive suicidal or homicidal ideation   Cognition: disoriented, memory impaired due to manic/psychotic episode, appears to be of average intelligence, impaired abstract reasoning and attention span appeared shorter than expected for age   Insight:  Poor   Judgment: Poor       Current Medications:    Current Facility-Administered Medications:  acetaminophen 650 mg Oral Q4H PRN Bobbi Alvarado MD   aluminum-magnesium hydroxide-simethicone 30 mL Oral Q4H PRN Bobbi Alvarado MD   benztropine 1 mg Intramuscular Q6H PRN Bobbi Alvarado MD   benztropine 1 mg Oral Q6H PRN Bobbi Alvarado MD   divalproex sodium 1,000 mg Oral After Agustin Cowart MD   haloperidol 5 mg Oral Q8H PRN Valentino Combs MD   LORazepam 1 mg Oral Q4H PRN Valentino Combs MD   magnesium hydroxide 30 mL Oral Daily PRN Valentino Combs MD   nicotine polacrilex 2 mg Oral Q2H PRN Valentino Combs MD   OLANZapine 10 mg Intramuscular BID PRN Valentino Combs MD   QUEtiapine 800 mg Oral HS Travon Pelayo MD   risperiDONE 1 mg Oral Q8H PRN Valentino Combs MD   traZODone 25 mg Oral HS PRN Valentino Combs MD       Behavioral Health Medications: all current active meds have been reviewed  Changes as above  Laboratory results:  I have personally reviewed all pertinent laboratory/tests results  No results found for this or any previous visit (from the past 48 hour(s))  This note has been constructed using a voice recognition system  There may be translation, syntax, or grammatical errors  If you have any questions, please contact the dictating provider

## 2019-11-07 NOTE — PROGRESS NOTES
[facility] GROUP NOTE     11/07/19 1100   Activity/Group Checklist   Group Exercise  (yoga)   Attendance Attended   Attendance Duration (min) 31-45   Interactions Did not interact   Affect/Mood Calm   Goals Achieved   (Moved independently from group  Frequently still )   Primary Objectives/Concepts Covered  Mind/Body Connection  Body awareness  Importance of utilizing stillness  Solitude vs Isolation  Intentional focus through breath, movement  Use of relaxation as a preventative and   Restorative practice

## 2019-11-08 PROCEDURE — 99232 SBSQ HOSP IP/OBS MODERATE 35: CPT | Performed by: NURSE PRACTITIONER

## 2019-11-08 RX ADMIN — QUETIAPINE FUMARATE 800 MG: 400 TABLET, EXTENDED RELEASE ORAL at 21:22

## 2019-11-08 RX ADMIN — DIVALPROEX SODIUM 1500 MG: 500 TABLET, EXTENDED RELEASE ORAL at 17:19

## 2019-11-08 NOTE — PROGRESS NOTES
When this writer got out of report I checked on the patient and she was sleeping  I tried several times to awaken her so she could put her oxygen but was not effective

## 2019-11-08 NOTE — PLAN OF CARE
Problem: Alteration in Thoughts and Perception  Goal: Verbalize thoughts and feelings  Description  Interventions:  - Promote a nonjudgmental and trusting relationship with the patient through active listening and therapeutic communication  - Assess patient's level of functioning, behavior and potential for risk  - Engage patient in 1 on 1 interactions  - Encourage patient to express fears, feelings, frustrations, and discuss symptoms    - [place] patient to reality, help patient recognize reality-based thinking   - Administer medications as ordered and assess for potential side effects  - Provide the patient education related to the signs and symptoms of the illness and desired effects of prescribed medications  Outcome: Progressing  Goal: Agree to be compliant with medication regime, as prescribed and report medication side effects  Description  Interventions:  - Offer appropriate PRN medication and supervise ingestion; conduct AIMS, as needed   Outcome: Progressing     Problem: SUBSTANCE USE/ABUSE  Goal: By discharge, will develop insight into their chemical dependency and sustain motivation to continue in recovery  Description  INTERVENTIONS:  - Attends all daily group sessions and scheduled AA groups  - Actively practices coping skills through participation in the therapeutic community and adherence to program rules  - Reviews and completes assignments from individual treatment plan  - Assist patient development of understanding of their personal cycle of addiction and relapse triggers  Outcome: Progressing  Goal: By discharge, patient will have ongoing treatment plan addressing chemical dependency  Description  INTERVENTIONS:  - Assist patient with resources and/or appointments for ongoing recovery based living  Outcome: Progressing     Problem: Alteration in Thoughts and Perception  Goal: Treatment Goal: Gain control of psychotic behaviors/thinking, reduce/eliminate presenting symptoms and demonstrate improved reality functioning upon discharge  Outcome: Not Progressing  Goal: Refrain from acting on delusional thinking/internal stimuli  Description  Interventions:  - Monitor patient closely, per order   - Utilize least restrictive measures   - Set reasonable limits, give positive feedback for acceptable   - Administer medications as ordered and monitor of potential side effects  Outcome: Not Progressing  Goal: Recognize dysfunctional thoughts, communicate reality-based thoughts at the time of discharge  Description  Interventions:  - Provide medication and psycho-education to assist patient in compliance and developing insight into his/her illness   Outcome: Not Progressing     Problem: SUBSTANCE USE/ABUSE  Goal: Will have no detox symptoms and will verbalize plan for changing substance-related behavior  Description  INTERVENTIONS:  - Monitor physical status and assess for symptoms of withdrawal  - Administer medication as ordered  - Provide emotional support with 1 on 1 interaction with staff  - Encourage recovery focused program/ addiction education  - Assess for verbalization of changing behaviors related to substance abuse  - Initiate consults and referrals as appropriate (Case Management, Spiritual Care, etc )  Outcome: Not Progressing

## 2019-11-08 NOTE — PROGRESS NOTES
11/07/19 1415   Activity/Group Checklist   Group Other (Comment)  (Art Therapy Process Group/Exploring Risk, Discussion)   Attendance Attended   Attendance Duration (min) Greater than 60   Interactions Interacted appropriately  (Mildly tangential)   Affect/Mood Appropriate   Goals Achieved Able to listen to others; Able to engage in interactions; Able to recieve feedback; Able to give feedback to another  (Able to engage materials; full participation)

## 2019-11-08 NOTE — PLAN OF CARE
Pt was slightly irritable today, claiming she was triggered by a staff member who resembled a colleague she once had to report  Pt was focused on discharge planning  SW informed Pt she would not be discharge until Doctor felt she was stable due to readmit from [facility]  Pt is in agreement with [employer] partial upon discharge         Problem: Alteration in Thoughts and Perception  Goal: Verbalize thoughts and feelings  Description  Interventions:  - Promote a nonjudgmental and trusting relationship with the patient through active listening and therapeutic communication  - Assess patient's level of functioning, behavior and potential for risk  - Engage patient in 1 on 1 interactions  - Encourage patient to express fears, feelings, frustrations, and discuss symptoms    - [place] patient to reality, help patient recognize reality-based thinking   - Administer medications as ordered and assess for potential side effects  - Provide the patient education related to the signs and symptoms of the illness and desired effects of prescribed medications  Outcome: Progressing  Goal: Refrain from acting on delusional thinking/internal stimuli  Description  Interventions:  - Monitor patient closely, per order   - Utilize least restrictive measures   - Set reasonable limits, give positive feedback for acceptable   - Administer medications as ordered and monitor of potential side effects  Outcome: Progressing  Goal: Agree to be compliant with medication regime, as prescribed and report medication side effects  Description  Interventions:  - Offer appropriate PRN medication and supervise ingestion; conduct AIMS, as needed   Outcome: Progressing  Goal: Recognize dysfunctional thoughts, communicate reality-based thoughts at the time of discharge  Description  Interventions:  - Provide medication and psycho-education to assist patient in compliance and developing insight into his/her illness   Outcome: Progressing     Problem: DISCHARGE PLANNING  Goal: Discharge to home or other facility with appropriate resources  Description  INTERVENTIONS:  - Identify barriers to discharge w/patient and caregiver  - Arrange for needed discharge resources and transportation as appropriate  - Identify discharge learning needs (meds, wound care, etc )  - Arrange for interpretive services to assist at discharge as needed  - Refer to Case Management Department for coordinating discharge planning if the patient needs post-hospital services based on physician/advanced practitioner order or complex needs related to functional status, cognitive ability, or social support system  Outcome: Progressing     Problem: Ineffective Coping  Goal: Participates in unit activities  Description  Interventions:  - Provide therapeutic environment   - Provide required programming   - Redirect inappropriate behaviors   Outcome: Progressing     Problem: SUBSTANCE USE/ABUSE  Goal: Will have no detox symptoms and will verbalize plan for changing substance-related behavior  Description  INTERVENTIONS:  - Monitor physical status and assess for symptoms of withdrawal  - Administer medication as ordered  - Provide emotional support with 1 on 1 interaction with staff  - Encourage recovery focused program/ addiction education  - Assess for verbalization of changing behaviors related to substance abuse  - Initiate consults and referrals as appropriate (Case Management, Spiritual Care, etc )  Outcome: Progressing  Goal: By discharge, will develop insight into their chemical dependency and sustain motivation to continue in recovery  Description  INTERVENTIONS:  - Attends all daily group sessions and scheduled AA groups  - Actively practices coping skills through participation in the therapeutic community and adherence to program rules  - Reviews and completes assignments from individual treatment plan  - Assist patient development of understanding of their personal cycle of addiction and relapse triggers  Outcome: Progressing  Goal: By discharge, patient will have ongoing treatment plan addressing chemical dependency  Description  INTERVENTIONS:  - Assist patient with resources and/or appointments for ongoing recovery based living  Outcome: Progressing

## 2019-11-08 NOTE — PROGRESS NOTES
11/08/19 0800   Team Meeting   Meeting Type Daily Rounds   Team Members Present   Team Members Present Physician;Nurse;; Other (Discipline and Name)   Physician Team Member [facility]   Nursing Team Member Zi   Social Work Team Member Phuong Levine   Other (Discipline and Name) eyal Morfin student   Patient/Family Present   Patient Present No   Patient's Family Present No   Increased Depakote  Will monitor

## 2019-11-08 NOTE — PROGRESS NOTES
Pt denies all symptoms  Pt is withdrawn, flat and depressed  Nurse went to go give the patient her scheduled dose of Depakote and pt stated "I can't take that, like, um, just nevermind I want to be discharged I guess I'll take it " Pt then took Depakote and mouth checks were completed  Will monitor

## 2019-11-08 NOTE — PROGRESS NOTES
Progress Note - [address] [facility] 51 y o  female MRN: [id number]  Unit/Bed#: U 347-02 Encounter: [id number]    Assessment/Plan   Principal Problem:    Bipolar I disorder, current or most recent episode manic, with psychotic features ([facility] [address] )  Active Problems:    Medical clearance for psychiatric admission    Recommended Treatment:   - Continue Seroquel XR to 800 mg qhs (monitor BP); continue mouth checks  - Continue Depakote ER 1500 mg after dinner   - Pending Depakote level for Monday (11/11)  - Continue Supplemental Oxygen NC 2L at night for GINETTE, until pt gets her CPAP  - Continue with group therapy, milieu therapy and occupational therapy  Risks, benefits and possible side effects of Medications: Risks, benefits, and possible side effects of medications have been explained to the patient, who verbalizes understanding  Progress Toward Goals: slow improvement    ------------------------------------------------------------    Subjective: Dawson Harmon has been compliant with medications with confirmed mouth checks  She is still disorganized and delusional  She states "I'd 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, I need to check my account to see if money has been coming in so I'll know if I'm getting paid for working here " She states "Dr Sudhakar Dowell may have sent me to this unit due to an inappropriate shirt I was wearing that may have been too revealing " Dawson Harmon admits that she struggled to clear her head this morning during [address], but was less pre-occupied with her thoughts as the day went on  She states "the thoughts are the same, they continue to be supportive telling me I'm in the right place " She reports good sleep and appetite, "worried" mood and good energy with decreased concentration  She denies any paranoia and denies any homicidal/suicidal ideation, intent or plan  Patient is consenting for safety on the unit      Psychiatric Review of Systems:  Behavior over the last 24 hours: unchanged  Sleep: normal  Appetite: good  Medication side effects: none  ROS: negative, except for stated above      Vital signs in last 24 hours:  Temp:  [97 6 °F (36 4 °C)-97 9 °F (36 6 °C)] 97 9 °F (36 6 °C)  HR:  [] 106  Resp:  [16] 16  BP: (123-134)/(64-84) 128/64    Mental Status Evaluation:  Appearance:  alert, casually dressed, appears stated age and appropriate grooming and hygiene   Behavior:  pleasant, cooperative, sitting comfortably, good eye contact, no abnormal movements and normal gait and balance   Speech:  spontaneous, clear, normal rate, pressured, normal volume and incoherent   Mood:  "worried"   Affect:  mood-congruent   Thought Process:  disorganized, illogical, incoherent, loose associations, flight of ideas   Thought Content: delusions (nurse on the unit, not patient), obsessive thoughts   Perceptual disturbances: auditory hallucinations (decreasing, no commands to hurt herself or others), no reported visual hallucinations and does not appear to be responding to internal stimuli at this time   Risk Potential: No active or passive suicidal or homicidal ideation   Cognition: disoriented, memory impaired due to manic/psychotic episode, appears to be of average intelligence, impaired abstract reasoning and attention span appeared shorter than expected for age   Insight:  Poor   Judgment: Poor       Current Medications:    Current Facility-Administered Medications:  acetaminophen 650 mg Oral Q4H PRN Sadie Myles MD   aluminum-magnesium hydroxide-simethicone 30 mL Oral Q4H PRN Sadie Myles MD   benztropine 1 mg Intramuscular Q6H PRN Sadie Myles MD   benztropine 1 mg Oral Q6H PRN Sadie Myles MD   divalproex sodium 1,500 mg Oral After Jo Ann Laguna MD   haloperidol 5 mg Oral Q8H PRN Sadie Myles MD   LORazepam 1 mg Oral Q4H PRN Sadie Myles MD   magnesium hydroxide 30 mL Oral Daily PRN Sadie Myles MD   nicotine polacrilex 2 mg Oral Q2H PRN Narendra Mcnulty MD   OLANZapine 10 mg Intramuscular BID PRN Narendra Mcnulty MD   QUEtiapine 800 mg Oral HS Elsa Moffett MD   risperiDONE 1 mg Oral Q8H PRN Narendra Mcnulty MD   traZODone 25 mg Oral HS PRN Narendra Mcnulty MD       Behavioral Health Medications: all current active meds have been reviewed  Changes as above  Laboratory results:  I have personally reviewed all pertinent laboratory/tests results  No results found for this or any previous visit (from the past 48 hour(s))  This note has been constructed using a voice recognition system  There may be translation, syntax, or grammatical errors  If you have any questions, please contact the dictating provider

## 2019-11-08 NOTE — PROGRESS NOTES
Pt remains mildly confused, grandiose  She is calm and cooperative with care  Pt denies all S/S, she is present in the milieu socializing appropriately  Pt is disorganized and tangential, appears thought blocked

## 2019-11-09 PROCEDURE — 99231 SBSQ HOSP IP/OBS SF/LOW 25: CPT | Performed by: PHYSICIAN ASSISTANT

## 2019-11-09 RX ADMIN — QUETIAPINE FUMARATE 800 MG: 400 TABLET, EXTENDED RELEASE ORAL at 21:16

## 2019-11-09 RX ADMIN — ACETAMINOPHEN 650 MG: 325 TABLET ORAL at 09:12

## 2019-11-09 RX ADMIN — DIVALPROEX SODIUM 1500 MG: 500 TABLET, EXTENDED RELEASE ORAL at 17:35

## 2019-11-09 NOTE — PROGRESS NOTES
Progress Note - [address] [facility] 51 y o  female MRN: [id number]   Unit/Bed#: [facility] 438-73 Encounter: [id number]    Per Nursing: Nursing reports that patient has been observed sitting quietly on the unit  She has had minimal social interaction with peers and appears guarded  She has exhibited pressured speech, but is pleasant during interactions  Nursing continues to suspect internal preoccupation, despite patient denying all symptoms  She has been compliant with her medications, and mouth checks are being performed after medication administration  Per Patient: Patient states that she feels "alright," and she feels that she slept much better last evening with the use of her CPAP machine  She is distracted at times by peers on the unit, and states that her peers like to joke with her  She states that she feels better overall, and her mood is good  She denies all symptoms  She denies any feelings of depression or sadness  She denies any feelings of anxiety  She denies any active or passive suicidal ideation, intent or plan  When asked about auditory or visual hallucinations, the patient denies the presence of any voices, and states, "my head is pretty clear right now " She does appear occasionally inattentive and distracted during the conversation, however she is noticeably less tangential and hyperverbal when compared with yesterday's interaction  She does appear more tired and calmer than yesterday  It is still possible that she may be internally preoccupied, but she is exhibiting less thought blocking today, when compared with yesterday's interview  She is able to contract for safety on the unit  Behavior over the last 24 hours: unchanged  Sleep: improved, slept better  Appetite: normal  Medication side effects: No   ROS: no complaints  Any positives in the Comprehensive Review of Systems were noted in the HPI  All other Review of Systems were negative          Mental Status Evaluation:    Appearance:  disheveled   Behavior:  cooperative, calm, bizarre   Speech:  normal rate and volume   Mood:  normal, euthymic   Affect:  constricted   Thought Process:  goal directed, more organized than yesterday, does not appear to be exhibiting substantial thought blocking   Associations: intact associations   Thought Content:  Grandiose at times   Perceptual Disturbances: denies auditory hallucinations when asked, Appears internally preoccupied at times, does not appear as overtly internally distracted when compared with yesterday's interview   Risk Potential: Suicidal ideation - None at present, contracts for safety on the unit  Homicidal ideation - None at present  Potential for aggression - Not at present   Sensorium:  oriented to person, place and time/date   Memory:  recent and remote memory grossly intact   Consciousness:  alert and awake   Attention: decreased concentration and decreased attention span, very distractible and inattentive   Insight:  limited   Judgment: limited   Gait/Station: normal gait/station   Motor Activity: no abnormal movements     Vital signs in last 24 hours:  Vitals:    11/10/19 1251   BP: 119/62   Pulse: 97   Resp: 16   Temp:    SpO2:          Laboratory results: I have personally reviewed all pertinent laboratory/tests results  Progress Toward Goals: progressing    Assessment/Plan   Principal Problem:    Bipolar I disorder, current or most recent episode manic, with psychotic features [facility]  Active Problems:    Medical clearance for psychiatric admission    Recommended Treatment:     Planned medication and treatment changes:     All current active medications have been reviewed  Encourage group therapy, milieu therapy and occupational therapy  Behavioral Health checks every 7 minutes  Continue current medications:    Current Facility-Administered Medications:  acetaminophen 650 mg Oral Q4H PRN Deepti Johnson MD   aluminum-magnesium hydroxide-simethicone 30 mL Oral Q4H PRN Meliza Vail MD   benztropine 1 mg Intramuscular Q6H PRN Meliza Vail MD   benztropine 1 mg Oral Q6H PRN Meliza Vail MD   divalproex sodium 1,500 mg Oral After Luis Manuel Krueger MD   haloperidol 5 mg Oral Q8H PRN Meliza Vail MD   LORazepam 1 mg Oral Q4H PRN Meliza Vail MD   magnesium hydroxide 30 mL Oral Daily PRN Meliza Vail MD   nicotine polacrilex 2 mg Oral Q2H PRN Meliza Vail MD   OLANZapine 10 mg Intramuscular BID PRN Meliza Vail MD   QUEtiapine 800 mg Oral HS Good Capellan MD   risperiDONE 1 mg Oral Q8H PRN Meliaz Vail MD   traZODone 25 mg Oral HS PRN Meliza Vail MD           Plan:  1) Patient is remains pleasant during interaction, and she appears to be slightly less hyperverbal and tangential, when compared with yesterday's interaction  She denies any auditory or visual hallucinations, however there are times where she does appear internally preoccupied and distracted  She remains grandiose as well  Will continue to closely monitor patient on the unit, however she does appear to be trending in a more positive direction    2) Continue all current medications as directed, and continue with mouth checks after medication administration to ensure compliance:   Depakote ER 1500 mg once daily by mouth after dinner  o Valproic Acid level will be drawn tomorrow, Monday, 11/11/2019  o The order has been placed and scheduled   Seroquel  mg once daily by mouth at bedtime  o Will continue to closely monitor patient on the unit for persisting signs of psychosis and internal preoccupation  o May need to consider adjunctive treatment with additional antipsychotic medication if grandiosity and disorganized thoughts persist  3) Medical   All medical comorbidities are under the care of [facility]  4) Continue to work with Case Management to determine patient's disposition following discharge  Risks / Benefits of Treatment:    Risks, benefits, and possible side effects of medications explained to patient and patient verbalizes understanding and agreement for treatment  Counseling / Coordination of Care:    Patient's progress reviewed with nursing staff  Medications, treatment progress and treatment plan reviewed with patient      Governor CARA Olmedo 11/10/19

## 2019-11-09 NOTE — PROGRESS NOTES
Progress Note - Maxime [age] y o  female MRN: [id number]   Unit/Bed#: [facility] 250-38 Encounter: [id number]    Per Nursing: Nursing reports that patient has appeared disheveled but has remained cooperative on the unit  She told nursing that she had difficulty using her CPAP machine last night  She has also denied mood symptoms and stated that she was happy that her daughter is planning on visiting her soon  She has been compliant with her medications on the unit  Per Patient: Patient states that she is "pretty good," and she "slept way better" last evening  Of note, she is very disorganized when speaking, and she appears to exhibit thought blocking and is internally distracted and preoccupied  She states that, "the plan right now is to be discharged on Monday " She states that her  is currently working on her outpatient planning and she will get public transportation if she needs to  She will also participate in partial hospitalization  She states that when her fine motor skills and reflexes have improved, she will  her car and return to work  She reports that in terms of jobs, she is looking into working for [employer], but she has a number of other options  She would also consider working for the [employer] or [employer], but 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  She stops mid-sentence and thinks quietly to herself  She requires multiple prompts from Provider to answer the questions being asked and is inattentive  She states that she is thinking about her job opportunities and reflecting about 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  She states that she still needs to take time off to prepare for her financial planning  She denies any thoughts of wanting to harm herself or others  She denies any active or passive suicidal ideation, intent or plan   She admits to occasional auditory hallucinations of "family members or coworkers arguing," and reports that she heard these voices earlier today  She states that she is considering wearing ear plugs all day  She states that she can keep herself safe on the unit  Behavior over the last 24 hours: unchanged  Sleep: improved, slept better  Appetite: normal  Medication side effects: No   ROS: no complaints  Any positives in the Comprehensive Review of Systems were noted in the HPI  All other Review of Systems were negative  Mental Status Evaluation:    Appearance:  age appropriate, casually dressed   Behavior:  pleasant, cooperative, bizarre   Speech:  nonsensical   Mood:  normal, euthymic   Affect:  constricted   Thought Process:  disorganized, illogical, tangential   Associations: loose associations, blocking, thought blocking   Thought Content:  grandiose, ruminating thoughts   Perceptual Disturbances: auditory hallucinations of family members and co-workers arguing, appears responding to internal stimuli, appears preoccupied   Risk Potential: Suicidal ideation - None at present, contracts for safety on the unit  Homicidal ideation - None at present  Potential for aggression - Not at present   Sensorium:  oriented to person, place and time/date   Memory:  recent and remote memory grossly intact   Consciousness:  alert and awake   Attention: poor concentration and poor attention span   Insight:  impaired   Judgment: limited   Gait/Station: normal gait/station   Motor Activity: no abnormal movements     Vital signs in last 24 hours:  Vitals:    11/09/19 0706   BP: 122/59   Pulse: (!) 109   Resp: 16   Temp:    SpO2:          Laboratory results: I have personally reviewed all pertinent laboratory/tests results      Progress Toward Goals: progressing    Assessment/Plan   Principal Problem:    Bipolar I disorder, current or most recent episode manic, with psychotic features [facility])  Active Problems:    Medical clearance for psychiatric admission    Recommended Treatment: Planned medication and treatment changes: All current active medications have been reviewed  Encourage group therapy, milieu therapy and occupational therapy  Behavioral Health checks every 7 minutes  Continue current medications:    Current Facility-Administered Medications:  acetaminophen 650 mg Oral Q4H PRN Alicia Person MD   aluminum-magnesium hydroxide-simethicone 30 mL Oral Q4H PRN Alicia Person MD   benztropine 1 mg Intramuscular Q6H PRN Alicia Person MD   benztropine 1 mg Oral Q6H PRN Alicia Person MD   divalproex sodium 1,500 mg Oral After Alec Gonzalez MD   haloperidol 5 mg Oral Q8H PRN Alicia Person MD   LORazepam 1 mg Oral Q4H PRN Alicia Person MD   magnesium hydroxide 30 mL Oral Daily PRN Alicia Person MD   nicotine polacrilex 2 mg Oral Q2H PRN Alicia Person MD   OLANZapine 10 mg Intramuscular BID PRN Alicia Person MD   QUEtiapine 800 mg Oral HS Chasity Lara MD   risperiDONE 1 mg Oral Q8H PRN Alicia Person MD   traZODone 25 mg Oral HS PRN Alicia Person MD           Plan:  1) Patient is pleasant and friendly during interaction, however she continues to exhibit thought blocking and disorganized thought process  She appears to be internally preoccupied, but states that she is not currently experiencing auditory hallucinations  She states that she heard voices this morning  She remains grandiose as well  Will continue to closely monitor patient on the unit     2) Continue all current medications as directed:    Depakote ER 1500 mg once daily by mouth after dinner  o A Valproic Acid level will be drawn on Monday, 11/11/2019  o The order has been placed and scheduled   Seroquel  mg once daily by mouth at bedtime  o Will continue to closely monitor patient on the unit  o May need to consider adjunctive treatment with additional antipsychotic medication if grandiosity and disorganized thoughts persist  3) Medical   All medical comorbidities are under the care of [facility]  4) Continue to work with Case Management to determine patient's disposition following discharge  Risks / Benefits of Treatment:    Risks, benefits, and possible side effects of medications explained to patient and patient verbalizes understanding and agreement for treatment  Counseling / Coordination of Care:    Patient's progress reviewed with nursing staff  Medications, treatment progress and treatment plan reviewed with patient      Sukhwinder Hernandez PA-C 11/09/19

## 2019-11-09 NOTE — PROGRESS NOTES
11/08/19 1415   Activity/Group Checklist   Group Other (Comment)  ( Art Therapy Process Group/Zooming Out, Discussion)   Attendance Attended   Attendance Duration (min) Greater than 60   Interactions Interacted appropriately   Affect/Mood Appropriate   Goals Achieved Able to listen to others; Able to engage in interactions; Able to recieve feedback; Able to give feedback to another  (Able to engage materials; full participation)

## 2019-11-09 NOTE — PROGRESS NOTES
Pt observed in dayroom sitting quietly  Socially constricted and scant  Appears guarded, with pressured speech, seemingly attempting to rush interaction with RN, but pleasant and cooperative  Poor eye contact  Denies SI, HI and psychosis; but RN suspects continued internal stimulation and delusions given excessive guarded body language  Mouth checks observed, med compliant       CPAP placed on with respiratory therapist

## 2019-11-09 NOTE — PROGRESS NOTES
Pt approached nurses station and reported that "something is wrong with my CPAP, there is not enough air coming out of it and going into my nose " RN called respiratory; respiratory reported that the doctor will have to see her/be consulted to see her tomorrow regarding changing her settings, but respiratory is not coming up to change settings as he already had been up tonight to set it up for her  RN went to let patient know and she appears to be back in bed

## 2019-11-09 NOTE — PROGRESS NOTES
Patient appeared disheveled and was pleasant but blocked/delayed in conversation this morning  Said she tried to use her CPAP last night for a little while but felt the air pressure was not as high as she remembered it  Said she thinks she may need a new sleep study to adjust her settings  Patient denied problems with her mood and other symptoms--said she's happy that her daughter plans to visit her soon, though she couldn't remember what day  Said her daughter's planning a trip to [place] soon with her   Said she had some back and neck pain due to the beds here and was given Tylenol po

## 2019-11-10 PROCEDURE — 94660 CPAP INITIATION&MGMT: CPT

## 2019-11-10 PROCEDURE — 99231 SBSQ HOSP IP/OBS SF/LOW 25: CPT | Performed by: PHYSICIAN ASSISTANT

## 2019-11-10 RX ADMIN — QUETIAPINE FUMARATE 800 MG: 400 TABLET, EXTENDED RELEASE ORAL at 21:15

## 2019-11-10 RX ADMIN — DIVALPROEX SODIUM 1500 MG: 500 TABLET, EXTENDED RELEASE ORAL at 17:25

## 2019-11-10 NOTE — PLAN OF CARE
Problem: Alteration in Thoughts and Perception  Goal: Treatment Goal: Gain control of psychotic behaviors/thinking, reduce/eliminate presenting symptoms and demonstrate improved reality functioning upon discharge  Outcome: Progressing  Goal: Verbalize thoughts and feelings  Description  Interventions:  - Promote a nonjudgmental and trusting relationship with the patient through active listening and therapeutic communication  - Assess patient's level of functioning, behavior and potential for risk  - Engage patient in 1 on 1 interactions  - Encourage patient to express fears, feelings, frustrations, and discuss symptoms    - [place] patient to reality, help patient recognize reality-based thinking   - Administer medications as ordered and assess for potential side effects  - Provide the patient education related to the signs and symptoms of the illness and desired effects of prescribed medications  Outcome: Progressing  Goal: Refrain from acting on delusional thinking/internal stimuli  Description  Interventions:  - Monitor patient closely, per order   - Utilize least restrictive measures   - Set reasonable limits, give positive feedback for acceptable   - Administer medications as ordered and monitor of potential side effects  Outcome: Progressing  Goal: Agree to be compliant with medication regime, as prescribed and report medication side effects  Description  Interventions:  - Offer appropriate PRN medication and supervise ingestion; conduct AIMS, as needed   Outcome: Progressing  Goal: Recognize dysfunctional thoughts, communicate reality-based thoughts at the time of discharge  Description  Interventions:  - Provide medication and psycho-education to assist patient in compliance and developing insight into his/her illness   Outcome: Progressing     Problem: DISCHARGE PLANNING  Goal: Discharge to home or other facility with appropriate resources  Description  INTERVENTIONS:  - Identify barriers to discharge w/patient and caregiver  - Arrange for needed discharge resources and transportation as appropriate  - Identify discharge learning needs (meds, wound care, etc )  - Arrange for interpretive services to assist at discharge as needed  - Refer to Case Management Department for coordinating discharge planning if the patient needs post-hospital services based on physician/advanced practitioner order or complex needs related to functional status, cognitive ability, or social support system  Outcome: Progressing     Problem: Ineffective Coping  Goal: Participates in unit activities  Description  Interventions:  - Provide therapeutic environment   - Provide required programming   - Redirect inappropriate behaviors   Outcome: Progressing     Problem: SUBSTANCE USE/ABUSE  Goal: Will have no detox symptoms and will verbalize plan for changing substance-related behavior  Description  INTERVENTIONS:  - Monitor physical status and assess for symptoms of withdrawal  - Administer medication as ordered  - Provide emotional support with 1 on 1 interaction with staff  - Encourage recovery focused program/ addiction education  - Assess for verbalization of changing behaviors related to substance abuse  - Initiate consults and referrals as appropriate (Case Management, Spiritual Care, etc )  Outcome: Progressing  Goal: By discharge, will develop insight into their chemical dependency and sustain motivation to continue in recovery  Description  INTERVENTIONS:  - Attends all daily group sessions and scheduled AA groups  - Actively practices coping skills through participation in the therapeutic community and adherence to program rules  - Reviews and completes assignments from individual treatment plan  - Assist patient development of understanding of their personal cycle of addiction and relapse triggers  Outcome: Progressing  Goal: By discharge, patient will have ongoing treatment plan addressing chemical dependency  Description  INTERVENTIONS:  - Assist patient with resources and/or appointments for ongoing recovery based living  Outcome: Progressing

## 2019-11-10 NOTE — PROGRESS NOTES
Pt received @ 1500  Pleasant, cooperative, med/meal compliant  Hyper beginning of shift but calm after dinner  Denies depression & anxiety  Visible on unit, social w/peers   Will continue to monitor

## 2019-11-11 VITALS
HEART RATE: 106 BPM | BODY MASS INDEX: 29.96 KG/M2 | TEMPERATURE: 98.2 F | HEIGHT: 65 IN | SYSTOLIC BLOOD PRESSURE: 120 MMHG | RESPIRATION RATE: 16 BRPM | OXYGEN SATURATION: 96 % | DIASTOLIC BLOOD PRESSURE: 71 MMHG | WEIGHT: 179.8 LBS

## 2019-11-11 LAB — VALPROATE SERPL-MCNC: 97.9 UG/ML (ref 50–120)

## 2019-11-11 PROCEDURE — 99238 HOSP IP/OBS DSCHRG MGMT 30/<: CPT | Performed by: PSYCHIATRY & NEUROLOGY

## 2019-11-11 PROCEDURE — 80164 ASSAY DIPROPYLACETIC ACD TOT: CPT | Performed by: PSYCHIATRY & NEUROLOGY

## 2019-11-11 RX ORDER — QUETIAPINE 400 MG/1
800 TABLET, FILM COATED, EXTENDED RELEASE ORAL
Qty: 60 TABLET | Refills: 0 | Status: SHIPPED | OUTPATIENT
Start: 2019-11-11 | End: 2019-12-02 | Stop reason: HOSPADM

## 2019-11-11 RX ORDER — ACETAMINOPHEN 325 MG/1
975 TABLET ORAL EVERY 6 HOURS PRN
Status: DISCONTINUED | OUTPATIENT
Start: 2019-11-11 | End: 2019-11-11 | Stop reason: HOSPADM

## 2019-11-11 RX ORDER — ACETAMINOPHEN 325 MG/1
650 TABLET ORAL EVERY 4 HOURS PRN
Status: DISCONTINUED | OUTPATIENT
Start: 2019-11-11 | End: 2019-11-11 | Stop reason: HOSPADM

## 2019-11-11 RX ORDER — DIVALPROEX SODIUM 500 MG/1
1500 TABLET, EXTENDED RELEASE ORAL
Qty: 90 TABLET | Refills: 0 | Status: SHIPPED | OUTPATIENT
Start: 2019-11-11 | End: 2019-12-02 | Stop reason: HOSPADM

## 2019-11-11 NOTE — PLAN OF CARE
Problem: Alteration in Thoughts and Perception  Goal: Treatment Goal: Gain control of psychotic behaviors/thinking, reduce/eliminate presenting symptoms and demonstrate improved reality functioning upon discharge  Outcome: Adequate for Discharge  Goal: Verbalize thoughts and feelings  Description  Interventions:  - Promote a nonjudgmental and trusting relationship with the patient through active listening and therapeutic communication  - Assess patient's level of functioning, behavior and potential for risk  - Engage patient in 1 on 1 interactions  - Encourage patient to express fears, feelings, frustrations, and discuss symptoms    - [place] patient to reality, help patient recognize reality-based thinking   - Administer medications as ordered and assess for potential side effects  - Provide the patient education related to the signs and symptoms of the illness and desired effects of prescribed medications  Outcome: Adequate for Discharge  Goal: Refrain from acting on delusional thinking/internal stimuli  Description  Interventions:  - Monitor patient closely, per order   - Utilize least restrictive measures   - Set reasonable limits, give positive feedback for acceptable   - Administer medications as ordered and monitor of potential side effects  Outcome: Adequate for Discharge  Goal: Agree to be compliant with medication regime, as prescribed and report medication side effects  Description  Interventions:  - Offer appropriate PRN medication and supervise ingestion; conduct AIMS, as needed   Outcome: Adequate for Discharge  Goal: Recognize dysfunctional thoughts, communicate reality-based thoughts at the time of discharge  Description  Interventions:  - Provide medication and psycho-education to assist patient in compliance and developing insight into his/her illness   Outcome: Adequate for Discharge     Problem: DISCHARGE PLANNING  Goal: Discharge to home or other facility with appropriate resources  Description  INTERVENTIONS:  - Identify barriers to discharge w/patient and caregiver  - Arrange for needed discharge resources and transportation as appropriate  - Identify discharge learning needs (meds, wound care, etc )  - Arrange for interpretive services to assist at discharge as needed  - Refer to Case Management Department for coordinating discharge planning if the patient needs post-hospital services based on physician/advanced practitioner order or complex needs related to functional status, cognitive ability, or social support system  Outcome: Adequate for Discharge     Problem: Ineffective Coping  Goal: Participates in unit activities  Description  Interventions:  - Provide therapeutic environment   - Provide required programming   - Redirect inappropriate behaviors   Outcome: Adequate for Discharge     Problem: SUBSTANCE USE/ABUSE  Goal: Will have no detox symptoms and will verbalize plan for changing substance-related behavior  Description  INTERVENTIONS:  - Monitor physical status and assess for symptoms of withdrawal  - Administer medication as ordered  - Provide emotional support with 1 on 1 interaction with staff  - Encourage recovery focused program/ addiction education  - Assess for verbalization of changing behaviors related to substance abuse  - Initiate consults and referrals as appropriate (Case Management, Spiritual Care, etc )  Outcome: Adequate for Discharge  Goal: By discharge, will develop insight into their chemical dependency and sustain motivation to continue in recovery  Description  INTERVENTIONS:  - Attends all daily group sessions and scheduled AA groups  - Actively practices coping skills through participation in the therapeutic community and adherence to program rules  - Reviews and completes assignments from individual treatment plan  - Assist patient development of understanding of their personal cycle of addiction and relapse triggers  Outcome: Adequate for Discharge  Goal: By discharge, patient will have ongoing treatment plan addressing chemical dependency  Description  INTERVENTIONS:  - Assist patient with resources and/or appointments for ongoing recovery based living  Outcome: Adequate for Discharge

## 2019-11-11 NOTE — PROGRESS NOTES
11/11/19 0600   Team Meeting   Meeting Type Daily Rounds   Initial Conference Date 11/11/19   Team Members Present   Team Members Present Nurse;Physician;; Other (Discipline and Name)   Physician Team Member Dinah S Stone Cox Team Member [place] Work Team Member Tabitha Sadler   Other (Discipline and Name) SPEEDY Guy, 2 nursing students   Patient/Family Present   Patient Present No   Patient's Family Present No     [facility] referral made  D/C today

## 2019-11-11 NOTE — PROGRESS NOTES
Patient denies all symptoms  She is cooperative with HS medications  She has been visible and social with peers  Appears less disorganized

## 2019-11-11 NOTE — DISCHARGE SUMMARY
Discharge Summary - [address] [place] [address] 51 y o  female MRN: [id number]  Unit/Bed#: Lynnette Daily Encounter: [id number]     Admission Date:   Admission Orders (From admission, onward)     Ordered        10/23/19 0148  DISCHARGE READMIT Admit Patient to [address] [place] [address] (use with Discharge Readmit Navigator in [facility]3 Discharge Readmit scenario including from any IP unit or different campus ED to [facility])  Nurse to release order when pt  arrives to [facility] Unit  Once                         Discharge Date: No discharge date for patient encounter  Attending Psychiatrist: Agustina Lennon MD    Reason for Admission:   Miguel Anaya is a 51 y o  female with a PMH of Bipolar I disorder with psychosis, prior hospitalization, most recent discharged from [facility] who presents with worsening of her symptoms in context of noncompliance, substance use and other psychosocial stressors   Miguel Anaya was referred by her psychiatrist during her office visit for disorganized and bizarre behavior and thoughts  She was admitted to the psychiatric unit on a involuntarily 302 commitment basis  Please see initial H&P for full details  Hospital Course: On admission, Miguel Anaya was resumed on Seroquel, Zyprexa and Depakote  Her Zyprexa was shortly discontinued due to the patient's refusal and complaint of weight gain  Her other medications were titrated as appropriate  She tolerated these medications with no acute side effects  The patient's mood brightened over the course of treatment, and she was seen in [facility] interacting appropriately with peers, Miguel Anaya did not demonstrate dangerous behavior to self or others during her inpatient stay  Miguel Anaya denied suicidal or homicidal ideations at the time of her discharge  At discharge, Miguel Anaya showed clearer insight, understanding she was being treated in-patient due to medication-noncompliance   She denied any auditory hallucinations and was not internally preoccupied as noted during the prior weeks  While she continued to have some residual disorganized thoughts, she showed clarity with decreased delusions of grandiosity  She was agreeable to follow up at [facility] for partial hospitalization and remain compliant on her medications  Labs/Imaging:   I have personally reviewed all pertinent laboratory/tests results    Most Recent Labs:   Lab Results   Component Value Date    WBC 5 40 10/26/2019    RBC 4 42 10/26/2019    HGB 12 0 10/26/2019    HCT 35 7 (L) 10/26/2019     10/26/2019    RDW 13 8 10/26/2019    NEUTROABS 3 10 10/26/2019    SODIUM 138 10/26/2019    K 4 0 10/26/2019     10/26/2019    CO2 31 (H) 10/26/2019    BUN 12 10/26/2019    CREATININE 0 62 10/26/2019    GLUC 109 (H) 10/26/2019    GLUF 109 (H) 10/26/2019    CALCIUM 9 1 10/26/2019    AST 20 10/26/2019    ALT 25 10/26/2019    ALKPHOS 86 10/26/2019    TP 6 9 10/26/2019    ALB 3 9 10/26/2019    TBILI 0 30 10/26/2019    CHOLESTEROL 144 10/26/2019    HDL 46 10/26/2019    TRIG 69 10/26/2019    LDLCALC 84 10/26/2019    NONHDLC 98 10/26/2019    VALPROICTOT 97 9 11/11/2019    AMMONIA <9 (L) 10/26/2019    UWS3VXBEVXSH 3 910 10/26/2019    FREET4 0 93 08/13/2019    T3FREE 2 27 (L) 08/13/2019    PREGSERUM Negative 02/03/2016    RPR Non-Reactive 10/26/2019    HGBA1C 5 7 08/13/2019     08/13/2019       Mental Status Evaluation:  Appearance:  alert, casually dressed, appears stated age and appropriate grooming and hygiene   Behavior:  pleasant, cooperative, sitting comfortably, good eye contact, no abnormal movements and normal gait and balance   Speech:  spontaneous, clear, normal rate, normal volume and coherent   Mood:  "good"   Affect:  mood-congruent, appropriate range and euthymic   Thought Process:  disorganized, logical, coherent, tangential, normal rate of thoughts   Thought Content: no verbalized delusions, no overt paranoia, no obsessive thinking   Perceptual disturbances: no reported auditory hallucinations, no reported visual hallucinations and does not appear to be responding to internal stimuli at this time   Risk Potential: No active or passive suicidal or homicidal ideation   Cognition: oriented to person, place, time, and situation, memory grossly intact, appears to be of average intelligence, impaired abstract reasoning and age-appropriate attention span and concentration   Insight:  Fair   Judgment: Limited     Discharge Diagnosis:   Principal Problem:    Bipolar I disorder, current or most recent episode manic, with psychotic features [facility]  Active Problems:    Medical clearance for psychiatric admission      Discharge Medications:  See list above, as well as the after visit summary containing reconciled discharge medications provided to patient and family  Discharge instructions/Information to patient and family:   See after visit summary for information provided to patient and family  Provisions for Follow-Up Care:  See after visit summary for information related to follow-up care and any pertinent home health orders  This note has been constructed using a voice recognition system  There may be translation, syntax, or grammatical errors  If you have any questions, please contact the dictating provider

## 2019-11-11 NOTE — NURSING NOTE
Patient pleasant this morning   this writer known patient long time   Patient was so happy this writer is her nurse today  Patient denies all symptoms   Visible   Social with peers  Meal compliant

## 2019-11-11 NOTE — PLAN OF CARE
Pt is being discharged today at 12:30, transported by L[facility]FT to her apartment  PT was referred to [facility] partial day program and will begin 11/12/2019  Pt prognosis is guarded due to the fact Pt has been non-compliant with medication upon discharge in the past    Pt is in agreement with discharge and insurance is stating there is not longer clinical necessity so Pt will be discharged today  Daughter was informed via phone on Friday 11/8/2019, understands DC plan    Pt thought process  clearer, she is alert, and oriented x4  Problem: Alteration in Thoughts and Perception  Goal: Verbalize thoughts and feelings  Description  Interventions:  - Promote a nonjudgmental and trusting relationship with the patient through active listening and therapeutic communication  - Assess patient's level of functioning, behavior and potential for risk  - Engage patient in 1 on 1 interactions  - Encourage patient to express fears, feelings, frustrations, and discuss symptoms    - [place] patient to reality, help patient recognize reality-based thinking   - Administer medications as ordered and assess for potential side effects  - Provide the patient education related to the signs and symptoms of the illness and desired effects of prescribed medications  Outcome: Progressing  Goal: Refrain from acting on delusional thinking/internal stimuli  Description  Interventions:  - Monitor patient closely, per order   - Utilize least restrictive measures   - Set reasonable limits, give positive feedback for acceptable   - Administer medications as ordered and monitor of potential side effects  Outcome: Progressing  Goal: Agree to be compliant with medication regime, as prescribed and report medication side effects  Description  Interventions:  - Offer appropriate PRN medication and supervise ingestion; conduct AIMS, as needed   Outcome: Progressing  Goal: Recognize dysfunctional thoughts, communicate reality-based thoughts at the time of discharge  Description  Interventions:  - Provide medication and psycho-education to assist patient in compliance and developing insight into his/her illness   Outcome: Progressing     Problem: DISCHARGE PLANNING  Goal: Discharge to home or other facility with appropriate resources  Description  INTERVENTIONS:  - Identify barriers to discharge w/patient and caregiver  - Arrange for needed discharge resources and transportation as appropriate  - Identify discharge learning needs (meds, wound care, etc )  - Arrange for interpretive services to assist at discharge as needed  - Refer to Case Management Department for coordinating discharge planning if the patient needs post-hospital services based on physician/advanced practitioner order or complex needs related to functional status, cognitive ability, or social support system  Outcome: Progressing     Problem: Ineffective Coping  Goal: Participates in unit activities  Description  Interventions:  - Provide therapeutic environment   - Provide required programming   - Redirect inappropriate behaviors   Outcome: Progressing     Problem: SUBSTANCE USE/ABUSE  Goal: Will have no detox symptoms and will verbalize plan for changing substance-related behavior  Description  INTERVENTIONS:  - Monitor physical status and assess for symptoms of withdrawal  - Administer medication as ordered  - Provide emotional support with 1 on 1 interaction with staff  - Encourage recovery focused program/ addiction education  - Assess for verbalization of changing behaviors related to substance abuse  - Initiate consults and referrals as appropriate (Case Management, Spiritual Care, etc )  Outcome: Progressing  Goal: By discharge, will develop insight into their chemical dependency and sustain motivation to continue in recovery  Description  INTERVENTIONS:  - Attends all daily group sessions and scheduled AA groups  - Actively practices coping skills through participation in the therapeutic community and adherence to program rules  - Reviews and completes assignments from individual treatment plan  - Assist patient development of understanding of their personal cycle of addiction and relapse triggers  Outcome: Progressing  Goal: By discharge, patient will have ongoing treatment plan addressing chemical dependency  Description  INTERVENTIONS:  - Assist patient with resources and/or appointments for ongoing recovery based living  Outcome: Progressing

## 2019-11-12 ENCOUNTER — OFFICE VISIT (OUTPATIENT)
Dept: PSYCHIATRY | Facility: CLINIC | Age: 49
End: 2019-11-12
Payer: COMMERCIAL

## 2019-11-12 ENCOUNTER — OFFICE VISIT (OUTPATIENT)
Dept: PSYCHOLOGY | Facility: CLINIC | Age: 49
End: 2019-11-12
Payer: COMMERCIAL

## 2019-11-12 VITALS
SYSTOLIC BLOOD PRESSURE: 119 MMHG | DIASTOLIC BLOOD PRESSURE: 77 MMHG | RESPIRATION RATE: 20 BRPM | TEMPERATURE: 98.2 F | HEART RATE: 100 BPM | HEIGHT: 65 IN | WEIGHT: 181.6 LBS | BODY MASS INDEX: 30.26 KG/M2

## 2019-11-12 DIAGNOSIS — F31.2 BIPOLAR I DISORDER, MOST RECENT EPISODE MANIC, SEVERE WITH PSYCHOTIC FEATURES (HCC): Primary | Chronic | ICD-10-CM

## 2019-11-12 PROBLEM — Z00.8 MEDICAL CLEARANCE FOR PSYCHIATRIC ADMISSION: Status: RESOLVED | Noted: 2019-10-23 | Resolved: 2019-11-12

## 2019-11-12 PROCEDURE — G0177 OPPS/PHP; TRAIN & EDUC SERV: HCPCS

## 2019-11-12 PROCEDURE — G0410 GRP PSYCH PARTIAL HOSP 45-50: HCPCS

## 2019-11-12 PROCEDURE — 99215 OFFICE O/P EST HI 40 MIN: CPT | Performed by: PSYCHIATRY & NEUROLOGY

## 2019-11-12 PROCEDURE — 90791 PSYCH DIAGNOSTIC EVALUATION: CPT

## 2019-11-12 PROCEDURE — G0176 OPPS/PHP;ACTIVITY THERAPY: HCPCS

## 2019-11-12 NOTE — PSYCH
Subjective:     Patient ID: Gely Bernardo is a 51 y o  female    [facility] Clinical Progress Notes      Specialized Services Documentation  Therapist must complete separate progress note for each specific clinical activity in which the individual participated during the day  Allied Therapy Group ([id number]) Pt did not engage in self-discovery and creative expression "[facility]" group  Pt was completing initial intake procedures with other treatment team members during 80% of group  Pt present for remaining 20% of session during final discussion  Pt was attentive to peers as they shared and offered positive feedback to peers regarding their finished products  No apparent significant progress observed toward established goals due to limited time present and limited participation  Continue to engage pt in group allied therapy in order to continue to progress toward long-term goal achievement   Tx Plan Objective: 1 1, 1 2, Therapist: Roseline Spain MS, OTR/L
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
## 2019-11-12 NOTE — PSYCH
Assessment/Plan:      Diagnoses and all orders for this visit:    Bipolar I disorder, most recent episode manic, severe with psychotic features ([facility] [address] )          Subjective:     Patient ID: Cherry Vázquez is a 51 y o  female  [facility] Treatment Plan   AREAS OF NEED: Bipolar disorder as evidenced by delusional thinking, manic behavior, disorganized thinking, circumstantial speech and paranoia due to chronic lifetime mental illness  Date Initiated: 11/12/19     Strengths: "Open, approachable, honest"         LONG TERM GOAL:   Date Initiated: 11/12/2019  1 0 I will identify three ways in which my overall moods have stabilized since attending [facility]  Target Date: 12/10/19  Completion Date:       SHORT TERM OBJECTIVES:     Date Initiated: 11/12/2019  1 1 I will identify one small task I can complete daily after program in order to improve my overall productivity and stabilize my moods  Revision Date:   Target Date: 11/21/19  Completion Date:     Date Initiated: 11/12/2019  1 2 I will learn and practice three coping skills and share my results with the treatment team   Revision Date:   Target Date: 11/20/19  Completion Date:    Date Initiated: 11/12/2019  1 3 I will take medications as prescribed and share questions and concerns if arise  Revision Date:  Target Date: 11/20/19  Completion Date:     Date Initiated: 11/12/2019  1 4 I will identify 3 ways my supports can assist in my recovery and agree to staff/support contact as indicated      Revision Date:  Target Date: 11/20/19  Completion Date:         7 DAY REVISION:    Date Initiated:  Revision Date:   Target Date:   Completion Date:      PSYCHIATRY:  Date Initiated:  11/12/2019  Medication Management and Education       Revision Date:   The person(s) responsible for carrying out the plan is Arnold Augustin MD    NURSING:   Date Initiated: 11/12/2019  1 1,1 2,1 3,1 4 This RN will provide daily wellness group five days weekly to educate Cherry Vázquez on S/S of her diagnoses and medications used in treatment  Revision Date:  The person(s) responsible for carrying out the plan is Romana Locker, RN    PSYCHOLOGY:   Date Initiated: 11/12/2019       1 1, 1 2, 1 4 Provide psychotherapy group 5 times per week to allow opportunity for Abi Burroughs  to explore stressors and ways of coping  Revision Date:   The person(s) responsible for carrying out the plan is [facility]vidio [facility]    ALLIED THERAPY:   Date Initiated: 11/12/2019  1 1,1 2 Engage Abi Burroughs in AT group 5 times daily to encourage development and use of wellness tools to decrease symptoms and promote recovery through meaningful activity  Revision Date:   The person(s) responsible for carrying out the plan is GLORIA Natarajan     CASE MANAGEMENT:   Date Initiated: 11/12/2019      1 0 This  will meet with Abi Burroughs  3-4 times weekly to assess treatment progress, discharge planning, connection to community supports and UR as indicated  Revision Date:   The person(s) responsible for carrying out the plan is Clementine Archibald MA, LPC    TREATMENT REVIEW/COMMENTS:     DISCHARGE CRITERIA: Identify 3 signs of progress and complete relapse prevention plan  DISCHARGE PLAN: Return to outpatient therapy and medication management   Estimated Length of Stay: 10 treatment days        Diagnosis and Treatment Plan explained to Sydney Contreras relates understanding diagnosis and is agreeable to Treatment Plan           CLIENT COMMENTS / Please share your thoughts, feelings, need and/or experiences regarding your treatment plan: _____________________________________________________________________________________________________________________________________________________________________________________________________________________________________________________________________________________________________________________ Date/Time: ______________     Patient Signature: _________________________________     Date/Time: ______________      Signature: _________________________________    Date/Time: ______________

## 2019-11-12 NOTE — PSYCH
Reason for visit:   Chief Complaint   Patient presents with    Manic Behavior    Follow-up       CLEMENTINE     Sanjeev Bourgeois is a 51 y o  female with bipolar disorder referred from [address] psychiatric unit where she was admitted from 10/23/2019 to 11/11/2019 due to bizarre behavior and psychotic symptoms  She was disorganized, grandiose, had auditory hallucinations and was not compliance with her treatment  She was released from [facility] on 10/11/19 where she was admitted for similar reasons  Onset of symptoms was a few months ago with rapidly worsening course since that time  Psychosocial Stressors: mental health  And poor compliance   She states that she was worry about her daughter because she found an itinerary for a trip to [facility] and she was scared because her daughter's boyfriend is from [address] [place] [address] and she started thinking about woman been "slaves" in [facility] , was ruminating about that and also about the children in [place], and stopped taking her medications  Shawn Campos feels better, her mood still euphoric , still disorganized, circumstantial and paranoid  She denies any suicidal or homicidal thoughts, plan or intent  Review Of Systems:     Mood Emotional Lability   Behavior Normal    Thought Content Normal   General Decreased Functioning   Personality Normal   Other Psych Symptoms Normal   Constitutional Negative   ENT Negative   Cardiovascular Negative   Respiratory Negative   Gastrointestinal Negative   Genitourinary Negative   Musculoskeletal Negative   Integumentary Negative   Neurological Negative   Endocrine Normal    Other Symptoms Normal        Past Psychiatric History:      Past Inpatient Psychiatric Treatment:   She has a long history of mental illness , was admitted on 1993, was in [facility] in [place] many years ago, has  multiple impatient admissions at [facility]     Past Outpatient Psychiatric Treatment:      [facility]err[facility] Liz and his NP Efren Didinatasha  Past Suicide Attempts:    Yes, overdose on medications   Past Violent Behavior:    no  Past Psychiatric Medication Trials:    Depakote, Lithium, Risperdal, Seroquel, Invega, Zyprexa, Haldol, Thorazine, Prolixin and Stelazine    Family Psychiatric History:   Family History   Problem Relation Age of Onset    Depression Father     Lung cancer Father        Social History:    Education: college graduate  Learning Disabilities: none  Marital history:   Living arrangement, social support: lives alone  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  Functioning Relationships: good support system  Other Pertinent History: no legal or  history     Social History     Substance and Sexual Activity   Drug Use Yes    Types: Marijuana    Comment: "once in a while"       Traumatic History:       Abuse: none  Other Traumatic Events: none    The following portions of the patient's history were reviewed and updated as appropriate:   She  has a past medical history of Bipolar 1 disorder, manic, moderate ([facility] ) (2/3/2016), Head injury, Psychiatric disorder, Psychiatric illness, Psychosis ([facility] ), Sleep apnea, and Suicide attempt ([facility] )  She   Patient Active Problem List    Diagnosis Date Noted    Bipolar I disorder, most recent episode manic, severe with psychotic features ([facility] ) 09/13/2019     She  has a past surgical history that includes Tonsillectomy and Adenoidectomy  Her family history includes Depression in her father; Lung cancer in her father  She  reports that she has never smoked  She has never used smokeless tobacco  She reports that she drinks alcohol  She reports that she has current or past drug history  Drug: Marijuana    Current Outpatient Medications   Medication Sig Dispense Refill    divalproex sodium (DEPAKOTE ER) 500 mg 24 hr tablet Take 3 tablets (1,500 mg total) by mouth daily after dinner 90 tablet 0    QUEtiapine (SEROquel XR) 400 mg 24 hr tablet Take 2 tablets (800 mg total) by mouth daily at bedtime 60 tablet 0    docusate sodium (COLACE) 100 mg capsule Take 1 capsule (100 mg total) by mouth 2 (two) times a day At 9am and 6pm (Patient not taking: Reported on 10/23/2019) 10 capsule 0     No current facility-administered medications for this visit  She is allergic to lithium and sulfa antibiotics          Mental status:  Appearance calm and cooperative , adequate hygiene and grooming and poor eye contact    Mood dysphoric   Affect affect appropriate    Speech pressured   Thought Processes circumstantial   Hallucinations no hallucinations present    Thought Content paranoid ideation    Abnormal Thoughts no suicidal thoughts  and no homicidal thoughts    Orientation  oriented to person and place and time   Remote Memory short term memory intact and long term memory intact   Attention Span concentration impaired   Intellect Appears to be of Average Intelligence   Fund of Knowledge displays adequate knowledge of current events, adequate fund of knowledge regarding past history and adequate fund of knowledge regarding vocabulary    Insight Insight intact   Judgement judgment was intact   Muscle Strength Muscle strength and tone were normal and Normal gait    Language no difficulty naming common objects, no difficulty repeating a phrase  and no difficulty writing a sentence    Pain none   Pain Scale 0         Laboratory Results: No results found for this or any previous visit     Lab Results   Component Value Date    WBC 5 40 10/26/2019    HGB 12 0 10/26/2019    HCT 35 7 (L) 10/26/2019    MCV 81 10/26/2019     10/26/2019     Lab Results   Component Value Date    SODIUM 138 10/26/2019    K 4 0 10/26/2019     10/26/2019    CO2 31 (H) 10/26/2019    AGAP 6 10/26/2019    BUN 12 10/26/2019    CREATININE 0 62 10/26/2019    GLUC 109 (H) 10/26/2019    GLUF 109 (H) 10/26/2019    CALCIUM 9 1 10/26/2019    AST 20 10/26/2019    ALT 25 10/26/2019    ALKPHOS 86 10/26/2019    TP 6 9 10/26/2019    TBILI 0 30 10/26/2019    EGFR 107 10/26/2019     Lab Results   Component Value Date    CHOLESTEROL 144 10/26/2019    CHOLESTEROL 167 08/28/2018    CHOLESTEROL 122 02/16/2016     Lab Results   Component Value Date    HDL 46 10/26/2019    HDL 42 08/28/2018    HDL 39 (L) 02/16/2016     Lab Results   Component Value Date    TRIG 69 10/26/2019    TRIG 242 (H) 08/28/2018    TRIG 137 02/16/2016     Lab Results   Component Value Date    [place] 98 10/26/2019    [place] 125 08/28/2018         Assessment/Plan:      Diagnoses and all orders for this visit:    Bipolar I disorder, most recent episode manic, severe with psychotic features ([facility] )          Treatment Recommendations- Risks Benefits         Immediate Medical/Psychiatric/Psychotherapy Treatments and Any Precautions:     1  Admit to [place] 2  Medication Management 3  Group Therapy  Risks, Benefits And Possible Side Effects Of Medications:  Risks, benefits, and possible side effects of medications explained to patient and patient verbalizes understanding    Controlled Medication Discussion: No records found for controlled prescriptions according to [facility] M[address] [address]        [facility] Physician's Orders     Admit to: Partial Hospitalization, 5 x per week, for 15 days  Vital signs routine   Diet regular diet    Group Psychotherapy 9 x per week  Allied Therapy Group 6 x per week  Diagnosis:   1   Bipolar I disorder, most recent episode manic, severe with psychotic features ([facility] )       Medications:   Current Outpatient Medications:     divalproex sodium (DEPAKOTE ER) 500 mg 24 hr tablet, Take 3 tablets (1,500 mg total) by mouth daily after dinner, Disp: 90 tablet, Rfl: 0    QUEtiapine (SEROquel XR) 400 mg 24 hr tablet, Take 2 tablets (800 mg total) by mouth daily at bedtime, Disp: 60 tablet, Rfl: 0    docusate sodium (COLACE) 100 mg capsule, Take 1 capsule (100 mg total) by mouth 2 (two) times a day At 9am and 6pm (Patient not taking: Reported on 10/23/2019), Disp: 10 capsule, Rfl: 0  No current facility-administered medications for this visit  I certify that the continuation of Partial Hospitalization services is medically necessary to improve and/or maintain the patients condition and functional level, and to prevent relapse or hospitalization, and that this could not be done at a less intensive level of care  Blade Cueto MD

## 2019-11-12 NOTE — PSYCH
Subjective:     Patient ID: China Pennington is a 51 y o  female  [facility] Clinical Progress Notes      Specialized Services Documentation  Therapist must complete separate progress note for each specific clinical activity in which the individual participated during the day  Group Psychotherapy   8111-2531 China Pennington  actively shared in psychotherapy group focused on WRAP development and use  Alayna spontaneously engaged throughout group exploring key facets of recovery through lucio analysis and sections of the WRAP during group task  She shared examples of categories and took notes for her own WRAP  Group explored the benefits of WRAP development and strategies to using this tool to support ongoing recovery  Good initial progress noted toward goal   Continue psychotherapy to explore recovery strategies and use      Tx Plan Objective: 1 1, Therapist:  Julieth CASTRO

## 2019-11-12 NOTE — PSYCH
Subjective:     Patient ID: Narda Burnham is a 51 y o  female  [facility] Clinical Progress Notes      Specialized Services Documentation  Therapist must complete separate progress note for each specific clinical activity in which the individual participated during the day  Group Psychotherapy     ([id number]) Narda Burnham was present for psychotherapy process group today  Clients participated in a mindfulness exercise on a breathing break  Clients checked in, shared how they were feeling, and how they are at conflict resolution  Clients engaged in an open discussion on conflict resolution skills including mindfulness of emotion and stress regulation, respect for the opposing person in the discussion, and an ability to make changes  Clients engaged in a team activity that focused on conflict resolution as an entire group  Clients were encouraged to all participate in order to make the activity work  Alayna identified feeling overwhelmed  She received support from her peers and engaged in group  She made progress towards goals today through group participation and is encouraged to continue participating to progress towards long term goals  TX Plan Objective: 1 1, 1 2   Therapist:  Dany Lujan MA, [facility]      Education Therapy   Time:  0037-8922  Previous goal met: Yes   Readiness to Learning: Receptive  Barriers to Learning: None  Learning Assessment  Time: 6066-4503  Education Completed: Illness and Wellness Tools, Aftercare Planning  Teaching Method: Verbal, Written and Demonstration  Shared Area of Learning: Yes   Goal Set: "Make dinner then clean up after myself"  [address] Plan Objective: 1 4 Therapist:  Dany Lujan, [address]         Other   ([phone] with [place] from [facility] with a contact number [phone]  Narda Burnham was authorized 5 days from 11/12/2019 to 11/18/19 with an authorization code of [id number]       Case Management Note    Dany Lujan LPC    Current suicide risk : Low     (5578-2186) CM met with Jay Vazquez  Reviewed program structure, expectations and she was given on call number and crisis phone numbers  Jay Vazquez completed releases and OP providers/ PCP notified of admission and health care coordination form completed  Completed initial psycho-social evaluation and initial treatment goals discussed  Medications changes/added/denied? No    Treatment session number: 1    Individual Case Management Visit provided today?  Yes     [facility] follow up physician's orders: None at this time

## 2019-11-13 ENCOUNTER — OFFICE VISIT (OUTPATIENT)
Dept: PSYCHOLOGY | Facility: CLINIC | Age: 49
End: 2019-11-13
Payer: COMMERCIAL

## 2019-11-13 DIAGNOSIS — F31.2 BIPOLAR I DISORDER, MOST RECENT EPISODE MANIC, SEVERE WITH PSYCHOTIC FEATURES (HCC): Primary | ICD-10-CM

## 2019-11-13 PROCEDURE — G0410 GRP PSYCH PARTIAL HOSP 45-50: HCPCS

## 2019-11-13 PROCEDURE — G0176 OPPS/PHP;ACTIVITY THERAPY: HCPCS

## 2019-11-13 PROCEDURE — G0177 OPPS/PHP; TRAIN & EDUC SERV: HCPCS

## 2019-11-13 NOTE — PSYCH
Subjective:     Patient ID: Keshia Gomez is a 51 y o  female  [facility] Clinical Progress Notes      Specialized Services Documentation  Therapist must complete separate progress note for each specific clinical activity in which the individual participated during the day  Group Psychotherapy ([id number]) Keshia Gomez  was present for group psychotherapy process today  This writer facilitated an active listening active by, opening a breathing exercises and encourage group check-in  Groups were encourage to discuss their currently feelings and how they communicate  This writer facilitated an open discussion on active listening, clients engaged with their peers about their experiences regarding relationship conflicts and their own struggle with communication  Keshia Gomez was active during group and was able to verbalize her thoughts about communication, but began to become tangental  Keshia Gomez  made little progress towards goal through group participation and is encourage to continue participating in groups, to accomplish long term goals        Tx Plan Objective: 1 1, 1 2, Therapist:  Markus Jaquez 60 Clinical Intern, [place], [facility]

## 2019-11-13 NOTE — PSYCH
Subjective:     Patient ID: Miri Bach is a 51 y o  female  [facility] Clinical Progress Notes      Specialized Services Documentation  Therapist must complete separate progress note for each specific clinical activity in which the individual participated during the day  Group Psychotherapy   0930-1030 Miri Bach actively shared in psychotherapy group focused on distress tolerance radical acceptance and the concept of letting go  Engaged in therapist led exercises and reported benefit from mindfulness object meditation   During group discussion on letting go, she participated in group reading  Group reinforced importance of consistently caring for ones self and use of strategies explored to refocus to the present  Some beginning progress toward goal   Continue psychotherapy to offer opportunity to increase self reflection and skills to manage distress      Tx Plan Objective: 1 2, Therapist:  Violeta Ivey MT-BC

## 2019-11-13 NOTE — PSYCH
Subjective:     Patient ID: Narda Burnham is a 51 y o  female    [facility] Clinical Progress Notes      Specialized Services Documentation  Therapist must complete separate progress note for each specific clinical activity in which the individual participated during the day  Allied Therapy Group ([id number]) Pt moderately participated in Healthy routine and habit development group  This group educated pt's on developing meaningful daily routines composed of at least 1 of the following categories: achievement, closeness to others, and enjoyment  Pt's were provided with a handout including a worksheet to fill out a weekly routine, and were instructed to identify 1 activity from at least 1 of the categories in each day  Educated that developing routines to structure time can facilitate effective distress tolerance and development of healthy habits  Pt partially engaged in group discussion, appeared to attend to peers as they shared  Pt provided support appropriately to a peer   Pt required encouragement and repetition to follow multi-step directions, required directions of group task to be repeated 3 times in order to complete  Pt required verbal cues to redirect attention to task, however was redirectable  Pt appears to be making some good progress toward established goals  Continue to engage pt in group allied therapy in order to continue to progress toward long-term goal achievement   Tx Plan Objective: 1 1, 1 2, 1 4, Therapist: Jairo Miranda MS, OTR/L

## 2019-11-13 NOTE — PSYCH
Subjective:     Patient ID: Narda Burnham is a 51 y o  female  [facility] Clinical Progress Notes      Specialized Services Documentation  Therapist must complete separate progress note for each specific clinical activity in which the individual participated during the day  Education Therapy   Time:  8835-2527  Previous goal met: Yes   Readiness to Learning: Receptive  Barriers to Learning: None  Learning Assessment  Time: 4415-6052  Education Completed: Illness and Wellness Tools, Aftercare Planning  Teaching Method: Verbal, Written and Demonstration  Shared Area of Learning: Yes   Goal Set: "Call supports"  TX Plan Objective: 1 4 Therapist:  Dany Lujan [facility]      Other     Case Management Note    Dany Lujan LPC    Current suicide risk : Low     (0197-5910) Met with Alayna to have her sign her treatment plan and review how she was adjusting to programming  Hank Payton did not provide this writer with a case management check-in sheet at beginning of the treatment day  Hank Payton appeared disheveled and confused, identifying that she was unsure of what to share with this writer at this moment as she feels overwhelmed  Attempted to utilize silence for Hank Payton to reflect and potentially verbalize needs or wants, but Hank Payton requested to move forward with treatment plan task  Reviewed details of her treatment plan including interventions and objectives  Barriers to progress includes struggles with delusions and inability to focus on task at hand for concentration  Progress towards goals noted as attendance second day of programming  Medications changes/added/denied? No    Treatment session number: 2    Individual Case Management Visit provided today?  Yes     [facility] follow up physician's orders: None at this time

## 2019-11-14 ENCOUNTER — HOSPITAL ENCOUNTER (INPATIENT)
Facility: HOSPITAL | Age: 49
LOS: 18 days | Discharge: HOME | DRG: 885 | End: 2019-12-02
Attending: PSYCHIATRY & NEUROLOGY | Admitting: PSYCHIATRY & NEUROLOGY
Payer: COMMERCIAL

## 2019-11-14 ENCOUNTER — HOSPITAL ENCOUNTER (EMERGENCY)
Facility: HOSPITAL | Age: 49
Discharge: OTHER INSTITUTION | End: 2019-11-14
Attending: EMERGENCY MEDICINE | Admitting: EMERGENCY MEDICINE
Payer: COMMERCIAL

## 2019-11-14 ENCOUNTER — OFFICE VISIT (OUTPATIENT)
Dept: PSYCHOLOGY | Facility: CLINIC | Age: 49
End: 2019-11-14
Payer: COMMERCIAL

## 2019-11-14 VITALS
RESPIRATION RATE: 16 BRPM | OXYGEN SATURATION: 95 % | SYSTOLIC BLOOD PRESSURE: 131 MMHG | TEMPERATURE: 97.6 F | DIASTOLIC BLOOD PRESSURE: 62 MMHG | HEART RATE: 89 BPM

## 2019-11-14 DIAGNOSIS — F32.A DEPRESSION: ICD-10-CM

## 2019-11-14 DIAGNOSIS — F31.2 BIPOLAR I DISORDER, MOST RECENT EPISODE MANIC, SEVERE WITH PSYCHOTIC FEATURES (HCC): Chronic | ICD-10-CM

## 2019-11-14 DIAGNOSIS — F31.2 BIPOLAR I DISORDER, MOST RECENT EPISODE MANIC, SEVERE WITH PSYCHOTIC FEATURES (HCC): Primary | Chronic | ICD-10-CM

## 2019-11-14 DIAGNOSIS — F31.2 BIPOLAR I DISORDER, MOST RECENT EPISODE MANIC, SEVERE WITH PSYCHOTIC FEATURES (HCC): Primary | ICD-10-CM

## 2019-11-14 DIAGNOSIS — F31.9 BIPOLAR 1 DISORDER (HCC): ICD-10-CM

## 2019-11-14 DIAGNOSIS — F25.0 SCHIZOAFFECTIVE DISORDER, BIPOLAR TYPE (HCC): Primary | Chronic | ICD-10-CM

## 2019-11-14 LAB
AMPHETAMINES SERPL QL SCN: NEGATIVE
BARBITURATES UR QL: NEGATIVE
BENZODIAZ UR QL: NEGATIVE
COCAINE UR QL: NEGATIVE
ETHANOL EXG-MCNC: 0 MG/DL
EXT PREG TEST URINE: NEGATIVE
EXT. CONTROL ED NAV: NORMAL
METHADONE UR QL: NEGATIVE
OPIATES UR QL SCN: NEGATIVE
PCP UR QL: NEGATIVE
THC UR QL: NEGATIVE

## 2019-11-14 PROCEDURE — 82075 ASSAY OF BREATH ETHANOL: CPT | Performed by: EMERGENCY MEDICINE

## 2019-11-14 PROCEDURE — 99285 EMERGENCY DEPT VISIT HI MDM: CPT

## 2019-11-14 PROCEDURE — 99283 EMERGENCY DEPT VISIT LOW MDM: CPT | Performed by: EMERGENCY MEDICINE

## 2019-11-14 PROCEDURE — 81025 URINE PREGNANCY TEST: CPT | Performed by: EMERGENCY MEDICINE

## 2019-11-14 PROCEDURE — 80307 DRUG TEST PRSMV CHEM ANLYZR: CPT | Performed by: EMERGENCY MEDICINE

## 2019-11-14 RX ORDER — HALOPERIDOL 5 MG
5 TABLET ORAL EVERY 6 HOURS PRN
Status: DISCONTINUED | OUTPATIENT
Start: 2019-11-14 | End: 2019-12-02 | Stop reason: HOSPADM

## 2019-11-14 RX ORDER — LORAZEPAM 0.5 MG/1
1 TABLET ORAL EVERY 6 HOURS PRN
Status: CANCELLED | OUTPATIENT
Start: 2019-11-14

## 2019-11-14 RX ORDER — ACETAMINOPHEN 325 MG/1
975 TABLET ORAL EVERY 6 HOURS PRN
Status: DISCONTINUED | OUTPATIENT
Start: 2019-11-14 | End: 2019-12-02 | Stop reason: HOSPADM

## 2019-11-14 RX ORDER — LORAZEPAM 2 MG/ML
1 INJECTION INTRAMUSCULAR EVERY 6 HOURS PRN
Status: DISCONTINUED | OUTPATIENT
Start: 2019-11-14 | End: 2019-12-02 | Stop reason: HOSPADM

## 2019-11-14 RX ORDER — HALOPERIDOL 5 MG/ML
5 INJECTION INTRAMUSCULAR EVERY 6 HOURS PRN
Status: CANCELLED | OUTPATIENT
Start: 2019-11-14

## 2019-11-14 RX ORDER — OLANZAPINE 5 MG/1
5 TABLET ORAL EVERY 6 HOURS PRN
Status: CANCELLED | OUTPATIENT
Start: 2019-11-14

## 2019-11-14 RX ORDER — TRAZODONE HYDROCHLORIDE 50 MG/1
50 TABLET ORAL
Status: CANCELLED | OUTPATIENT
Start: 2019-11-14

## 2019-11-14 RX ORDER — BENZTROPINE MESYLATE 1 MG/ML
1 INJECTION INTRAMUSCULAR; INTRAVENOUS EVERY 6 HOURS PRN
Status: DISCONTINUED | OUTPATIENT
Start: 2019-11-14 | End: 2019-12-02 | Stop reason: HOSPADM

## 2019-11-14 RX ORDER — TRAZODONE HYDROCHLORIDE 50 MG/1
50 TABLET ORAL
Status: DISCONTINUED | OUTPATIENT
Start: 2019-11-14 | End: 2019-12-02 | Stop reason: HOSPADM

## 2019-11-14 RX ORDER — LORAZEPAM 2 MG/ML
1 INJECTION INTRAMUSCULAR EVERY 6 HOURS PRN
Status: CANCELLED | OUTPATIENT
Start: 2019-11-14

## 2019-11-14 RX ORDER — MAGNESIUM HYDROXIDE/ALUMINUM HYDROXICE/SIMETHICONE 120; 1200; 1200 MG/30ML; MG/30ML; MG/30ML
30 SUSPENSION ORAL EVERY 4 HOURS PRN
Status: CANCELLED | OUTPATIENT
Start: 2019-11-14

## 2019-11-14 RX ORDER — ACETAMINOPHEN 325 MG/1
650 TABLET ORAL EVERY 4 HOURS PRN
Status: CANCELLED | OUTPATIENT
Start: 2019-11-14

## 2019-11-14 RX ORDER — RISPERIDONE 1 MG/1
1 TABLET, ORALLY DISINTEGRATING ORAL
Status: DISCONTINUED | OUTPATIENT
Start: 2019-11-14 | End: 2019-12-02 | Stop reason: HOSPADM

## 2019-11-14 RX ORDER — HYDROXYZINE HYDROCHLORIDE 25 MG/1
25 TABLET, FILM COATED ORAL EVERY 6 HOURS PRN
Status: DISCONTINUED | OUTPATIENT
Start: 2019-11-14 | End: 2019-12-02 | Stop reason: HOSPADM

## 2019-11-14 RX ORDER — LORAZEPAM 1 MG/1
1 TABLET ORAL EVERY 6 HOURS PRN
Status: DISCONTINUED | OUTPATIENT
Start: 2019-11-14 | End: 2019-12-02 | Stop reason: HOSPADM

## 2019-11-14 RX ORDER — OLANZAPINE 10 MG/1
5 INJECTION, POWDER, LYOPHILIZED, FOR SOLUTION INTRAMUSCULAR EVERY 6 HOURS PRN
Status: DISCONTINUED | OUTPATIENT
Start: 2019-11-14 | End: 2019-12-02 | Stop reason: HOSPADM

## 2019-11-14 RX ORDER — ACETAMINOPHEN 325 MG/1
975 TABLET ORAL EVERY 6 HOURS PRN
Status: CANCELLED | OUTPATIENT
Start: 2019-11-14

## 2019-11-14 RX ORDER — BENZTROPINE MESYLATE 1 MG/1
1 TABLET ORAL EVERY 6 HOURS PRN
Status: CANCELLED | OUTPATIENT
Start: 2019-11-14

## 2019-11-14 RX ORDER — OLANZAPINE 5 MG/1
5 TABLET ORAL EVERY 6 HOURS PRN
Status: DISCONTINUED | OUTPATIENT
Start: 2019-11-14 | End: 2019-12-02 | Stop reason: HOSPADM

## 2019-11-14 RX ORDER — ACETAMINOPHEN 325 MG/1
650 TABLET ORAL EVERY 6 HOURS PRN
Status: DISCONTINUED | OUTPATIENT
Start: 2019-11-14 | End: 2019-12-02 | Stop reason: HOSPADM

## 2019-11-14 RX ORDER — ACETAMINOPHEN 325 MG/1
650 TABLET ORAL EVERY 6 HOURS PRN
Status: CANCELLED | OUTPATIENT
Start: 2019-11-14

## 2019-11-14 RX ORDER — MAGNESIUM HYDROXIDE/ALUMINUM HYDROXICE/SIMETHICONE 120; 1200; 1200 MG/30ML; MG/30ML; MG/30ML
30 SUSPENSION ORAL EVERY 4 HOURS PRN
Status: DISCONTINUED | OUTPATIENT
Start: 2019-11-14 | End: 2019-12-02 | Stop reason: HOSPADM

## 2019-11-14 RX ORDER — RISPERIDONE 1 MG/1
1 TABLET, ORALLY DISINTEGRATING ORAL
Status: CANCELLED | OUTPATIENT
Start: 2019-11-14

## 2019-11-14 RX ORDER — HALOPERIDOL 5 MG/ML
5 INJECTION INTRAMUSCULAR EVERY 6 HOURS PRN
Status: DISCONTINUED | OUTPATIENT
Start: 2019-11-14 | End: 2019-12-02 | Stop reason: HOSPADM

## 2019-11-14 RX ORDER — HYDROXYZINE HYDROCHLORIDE 25 MG/1
25 TABLET, FILM COATED ORAL EVERY 6 HOURS PRN
Status: CANCELLED | OUTPATIENT
Start: 2019-11-14

## 2019-11-14 RX ORDER — BENZTROPINE MESYLATE 1 MG/1
1 TABLET ORAL EVERY 6 HOURS PRN
Status: DISCONTINUED | OUTPATIENT
Start: 2019-11-14 | End: 2019-12-02 | Stop reason: HOSPADM

## 2019-11-14 RX ORDER — HALOPERIDOL 5 MG
5 TABLET ORAL EVERY 6 HOURS PRN
Status: CANCELLED | OUTPATIENT
Start: 2019-11-14

## 2019-11-14 RX ORDER — BENZTROPINE MESYLATE 1 MG/ML
1 INJECTION INTRAMUSCULAR; INTRAVENOUS EVERY 6 HOURS PRN
Status: CANCELLED | OUTPATIENT
Start: 2019-11-14

## 2019-11-14 RX ORDER — OLANZAPINE 10 MG/1
5 INJECTION, POWDER, LYOPHILIZED, FOR SOLUTION INTRAMUSCULAR EVERY 6 HOURS PRN
Status: CANCELLED | OUTPATIENT
Start: 2019-11-14

## 2019-11-14 RX ORDER — ACETAMINOPHEN 325 MG/1
650 TABLET ORAL EVERY 4 HOURS PRN
Status: DISCONTINUED | OUTPATIENT
Start: 2019-11-14 | End: 2019-12-02 | Stop reason: HOSPADM

## 2019-11-14 RX ORDER — CLONAZEPAM 0.5 MG/1
1 TABLET ORAL ONCE
Status: COMPLETED | OUTPATIENT
Start: 2019-11-14 | End: 2019-11-14

## 2019-11-14 RX ADMIN — CLONAZEPAM 1 MG: 0.5 TABLET ORAL at 18:21

## 2019-11-14 NOTE — ED NOTES
Pt sitting upright on bed, talking with care associate at bedside  MD boyd spoke with this RN, has communicated with Crisis worker as well  States she would be comfortable with patient signing [phone] if patient is willing       Humberto Ocasio, ELI  11/14/19 8951

## 2019-11-14 NOTE — ED NOTES
Patient states she cannot stay at hospital because she has to rewrite the constitution with President Ricardo Ferrera  11/14/19 5611

## 2019-11-14 NOTE — ED NOTES
Daughter called, inquiring on patient  States patient did very well with program down by Aunt in [place] who called earlier  States patient should try to be transferred there  This Rn confirmed that Caller's contact information is up to date       Chevy Malin RN  11/14/19 7722

## 2019-11-14 NOTE — ED NOTES
Insurance Authorization for admission:   Phone call placed to [employer] [place] [employer]  Phone number: [phone]  Spoke to Nikki  TBD days approved  Level of care: Acute Inpatient MH (201)  Review on TBD  Authorization # [id number]  Per Nikki- Accepting facility must call for number of days approved  EVS (Eligibility Verification System) called - [phone]  Automated system indicates: Patient's MA coverage is fee for service  Insurance Authorization for Transportation:    Auth request form completed and faxed to [employer] DAHLIA Ocasio  11/14/19   5185

## 2019-11-14 NOTE — ED NOTES
Patient is accepted at [facility]  Patient is accepted by Maegan Gleason, Dr Brian John per Nicole Deshpande  Transportation is arranged with TBD  Transportation is scheduled for TBD  Nurse report is to be called to [phone] prior to patient transfer      Transport to be set up for after 5pm

## 2019-11-14 NOTE — PSYCH
Subjective:     Patient ID: Alberto Rodriguez is a 51 y o  female  [facility] Clinical Progress Notes      Specialized Services Documentation  Therapist must complete separate progress note for each specific clinical activity in which the individual participated during the day  Group Psychotherapy   [id number] Did not attend due to higher level of care  Hank CASTRO    Allied Therapy   [id number]  Alberto Rodriguez actively shared in [facility] group focused on self- care  She was encouraged to identify aspects of self-care and barriers to it  The concept of self -care versus selfishness explored  She identified engaging in "green blood"  as self-care  Group reinforced the necessity of self -care in wellness versus seeing this as a luxury and tips to increase self-care  Cheryl Chloe was not focused, muttering, and agitated  She was off topic and appeared responding to internal stimuli and/or delusional thoughts  Discharged to higher level of care       Tx Plan Objective: 1 1, Therapist:  Hank CASTRO

## 2019-11-14 NOTE — EMTALA/ACUTE CARE TRANSFER
[facility] [address]  Dept: [phone]      MLEXHT TRANSFER CONSENT    NAME Urbano Morales                                         1970                              MRN [id number]    I have been informed of my rights regarding examination, treatment, and transfer   by Dr Sony Oviedo DO    Benefits: Specialized equipment and/or services available at the receiving facility (Include comment)________________________    Risks: Potential for delay in receiving treatment, Potential deterioration of medical condition, Increased discomfort during transfer, Possible worsening of condition or death during transfer      Consent for Transfer:  I acknowledge that my medical condition has been evaluated and explained to me by the emergency department physician or other qualified medical person and/or my attending physician, who has recommended that I be transferred to the service of  Accepting Physician: Dr Yasemin Daniels at [address] [place] [address] Name, [facility] : [facility],   The above potential benefits of such transfer, the potential risks associated with such transfer, and the probable risks of not being transferred have been explained to me, and I fully understand them  The doctor has explained that, in my case, the benefits of transfer outweigh the risks  I agree to be transferred  I authorize the performance of emergency medical procedures and treatments upon me in both transit and upon arrival at the receiving facility  Additionally, I authorize the release of any and all medical records to the receiving facility and request they be transported with me, if possible  I understand that the safest mode of transportation during a medical emergency is an ambulance and that the Hospital advocates the use of this mode of transport   Risks of traveling to the receiving facility by car, including absence of medical control, life sustaining equipment, such as oxygen, and medical personnel has been explained to me and I fully understand them  (DAVID CORRECT BOX BELOW)  [  ]  I consent to the stated transfer and to be transported by ambulance/helicopter  [  ]  I consent to the stated transfer, but refuse transportation by ambulance and accept full responsibility for my transportation by car  I understand the risks of non-ambulance transfers and I exonerate the Hospital and its staff from any deterioration in my condition that results from this refusal     X___________________________________________    DATE  19  TIME________  Signature of patient or legally responsible individual signing on patient behalf           RELATIONSHIP TO PATIENT_________________________          Provider Certification    NAME Gaviota Hodgson                                         1970                              MRN [id number]    A medical screening exam was performed on the above named patient  Based on the examination:    Condition Necessitating Transfer The encounter diagnosis was Bipolar I disorder, most recent episode manic, severe with psychotic features ([facility] [address] )      Patient Condition: The patient has been stabilized such that within reasonable medical probability, no material deterioration of the patient condition or the condition of the unborn child(elias) is likely to result from the transfer    Reason for Transfer: Level of Care needed not available at this facility    Transfer Requirements: [facility] available and qualified personnel available for treatment as acknowledged by    · Agreed to accept transfer and to provide appropriate medical treatment as acknowledged by       Dr Declan Lozoya  · Appropriate medical records of the examination and treatment of the patient are provided at the time of transfer   [address] _______  · Transfer will be performed by qualified personnel from    and appropriate transfer equipment as required, including the use of necessary and appropriate life support measures  Provider Certification: I have examined the patient and explained the following risks and benefits of being transferred/refusing transfer to the patient/family:  General risk, such as traffic hazards, adverse weather conditions, rough terrain or turbulence, possible failure of equipment (including vehicle or aircraft), or consequences of actions of persons outside the control of the transport personnel, The possibility of a transport vehicle being unavailable, Risk of worsening condition, The patient is stable for psychiatric transfer because they are medically stable, and is protected from harming him/herself or others during transport, Unanticipated needs of medical equipment and personnel during transport      Based on these reasonable risks and benefits to the patient and/or the unborn child(elias), and based upon the information available at the time of the patients examination, I certify that the medical benefits reasonably to be expected from the provision of appropriate medical treatments at another medical facility outweigh the increasing risks, if any, to the individuals medical condition, and in the case of labor to the unborn child, from effecting the transfer      X____________________________________________ DATE 11/14/19        TIME_______      ORIGINAL - SEND TO MEDICAL RECORDS   COPY - SEND WITH PATIENT DURING TRANSFER

## 2019-11-14 NOTE — ED NOTES
CM and attending meet at patient bedside, patient alert and oriented to person, place, and situation  Patient presents to the ED via EMS from her partial program  Patient had difficulty describing circumstances leading up to her arrival at the ED  Patient is aware that she was at her partial program prior to coming to the ED, however, when asked what year it was she was unable to state the year "I think it's 1996 " Patient admits to not being consistent with her medications at this time and suddenly stopping her Depakote medication because they "make her feel like she is in a dark cave " Patient met with Dr Digna Quiroz this afternoon, who also facilitates patient's partial program  Dr Digna Quiroz initiated patient's [phone] however when CM offered patient 201 patient would like to sign herself in for treatment to benefit from medication management  Patient currently has no SI/VI/AI but appears to have psychosis  CM faxed 201 to crisis intake for review  Patient is being reviewed at [facility]

## 2019-11-14 NOTE — ED PROVIDER NOTES
History  Chief Complaint   Patient presents with    Psychiatric Evaluation     Pt sent by Carolyn Hawkins and sent for [phone] for not taking care of herself, irradic behavior, calling and screaming at people in the middle of the night  pt denies everything, difficulty answering questions with a straight answer     56-year-old female presents after being sent in by Dr Carolyn Hawkins, a psychiatrist, for [phone] petition  Patient with diagnosis of bipolar 1 disorder and concerned that she is not taking care of herself and is displaying erratic behavior  Patient voices no specific complaints but is noted from conversation with her that she does not have a clear thought process and is confabulating and is intermittently confused  Patient denies any SI or HI  Prior to Admission Medications   Prescriptions Last Dose Informant Patient Reported? Taking?    QUEtiapine (SEROquel XR) 400 mg 24 hr tablet   No No   Sig: Take 2 tablets (800 mg total) by mouth daily at bedtime   Patient taking differently: Take 800 mg by mouth daily at bedtime    divalproex sodium (DEPAKOTE ER) 500 mg 24 hr tablet   No No   Sig: Take 3 tablets (1,500 mg total) by mouth daily after dinner   Patient not taking: Reported on 11/14/2019   docusate sodium (COLACE) 100 mg capsule   No No   Sig: Take 1 capsule (100 mg total) by mouth 2 (two) times a day At 9am and 6pm   Patient not taking: Reported on 10/23/2019      Facility-Administered Medications: None       Past Medical History:   Diagnosis Date    Bipolar 1 disorder, manic, moderate ([facility] [address] ) 2/3/2016    Head injury     Psychiatric disorder     Psychiatric illness     Psychosis ([facility] [address] )     Schizoaffective disorder ([facility] [address] ) 11/15/2019    Sleep apnea     Suicide attempt [facility])        Past Surgical History:   Procedure Laterality Date    ADENOIDECTOMY      TONSILLECTOMY         Family History   Problem Relation Age of Onset    Depression Father     Lung cancer Father      I have reviewed and agree with the history as documented  Social History     Tobacco Use    Smoking status: Never Smoker    Smokeless tobacco: Never Used   Substance Use Topics    Alcohol use: Yes     Frequency: 2-4 times a month     Drinks per session: 1 or 2     Binge frequency: Less than monthly    Drug use: Not Currently     Comment: Pt refusing to answer question directly"  States "nothing you'll see on urine or maybe some alcohol, maybe other stuff you cant detect'        Review of Systems   Constitutional: Negative for activity change, appetite change, chills, diaphoresis, fatigue and fever  HENT: Negative for dental problem, ear pain, sore throat, trouble swallowing and voice change  Eyes: Negative for pain and visual disturbance  Respiratory: Negative for cough, chest tightness, shortness of breath and wheezing  Cardiovascular: Negative for chest pain, palpitations and leg swelling  Gastrointestinal: Negative for abdominal pain, anal bleeding, blood in stool, diarrhea, nausea, rectal pain and vomiting  Endocrine: Negative for polydipsia, polyphagia and polyuria  Genitourinary: Negative for difficulty urinating, dysuria, flank pain, frequency, hematuria and urgency  Musculoskeletal: Negative for back pain, joint swelling, myalgias, neck pain and neck stiffness  Skin: Negative for pallor, rash and wound  Neurological: Negative for dizziness, facial asymmetry, speech difficulty, weakness, light-headedness, numbness and headaches  Hematological: Negative for adenopathy  Psychiatric/Behavioral: Positive for behavioral problems and sleep disturbance  Negative for agitation  The patient is not nervous/anxious  All other systems reviewed and are negative  Physical Exam  Physical Exam   Constitutional: She is oriented to person, place, and time  She appears well-developed and well-nourished  No distress  HENT:   Head: Normocephalic and atraumatic     Right Ear: External ear normal    Left Ear: External ear normal    Nose: Nose normal    Mouth/Throat: Oropharynx is clear and moist  No oropharyngeal exudate  Eyes: Pupils are equal, round, and reactive to light  Conjunctivae and EOM are normal  Right eye exhibits no discharge  Left eye exhibits no discharge  No scleral icterus  Neck: Normal range of motion  Neck supple  No JVD present  No tracheal deviation present  No thyromegaly present  Cardiovascular: Normal rate, regular rhythm, S1 normal, S2 normal, normal heart sounds, intact distal pulses and normal pulses  Exam reveals no gallop and no friction rub  No murmur heard  Pulmonary/Chest: Effort normal and breath sounds normal  No stridor  No respiratory distress  She has no wheezes  She has no rales  She exhibits no tenderness  Abdominal: Soft  She exhibits no distension and no mass  There is no tenderness  There is no rebound and no guarding  No hernia  Musculoskeletal: Normal range of motion  She exhibits no edema, tenderness or deformity  Lymphadenopathy:     She has no cervical adenopathy  Neurological: She is alert and oriented to person, place, and time  She has normal strength and normal reflexes  She displays no atrophy, no tremor and normal reflexes  No cranial nerve deficit or sensory deficit  She exhibits normal muscle tone  Coordination normal  GCS eye subscore is 4  GCS verbal subscore is 5  GCS motor subscore is 6  Skin: Skin is warm and dry  Capillary refill takes less than 2 seconds  No rash noted  She is not diaphoretic  No erythema  No pallor  Psychiatric: She has a normal mood and affect  Her behavior is normal  Her speech is tangential  Thought content is not paranoid  She expresses no homicidal and no suicidal ideation  She expresses no suicidal plans and no homicidal plans  She is inattentive  Nursing note and vitals reviewed        Vital Signs  ED Triage Vitals [11/14/19 1037]   Temperature Pulse Respirations Blood Pressure SpO2   97 6 °F (36 4 °C) (!) 108 18 133/63 98 %      Temp Source Heart Rate Source Patient Position - Orthostatic VS BP Location FiO2 (%)   Oral Monitor Sitting Right arm --      Pain Score       No Pain           Vitals:    11/14/19 1037 11/14/19 1408 11/14/19 1615 11/14/19 2238   BP: 133/63 109/57 119/55 131/62   Pulse: (!) 108 99 99 89   Patient Position - Orthostatic VS: Sitting Lying Lying Lying         Visual Acuity      ED Medications  Medications   clonazePAM (KlonoPIN) tablet 1 mg (1 mg Oral Given 11/14/19 1821)       Diagnostic Studies  Results Reviewed     Procedure Component Value Units Date/Time    POCT pregnancy, urine [[id number]]  (Normal) Resulted:  11/14/19 1411    Lab Status:  Final result Updated:  11/14/19 1411     EXT PREG TEST UR (Ref: Negative) negative     Control valid    Rapid drug screen, urine [[id number]]  (Normal) Collected:  11/14/19 1117    Lab Status:  Final result Specimen:  Urine, Clean Catch Updated:  11/14/19 1201     Amph/Meth UR Negative     Barbiturate Ur Negative     Benzodiazepine Urine Negative     Cocaine Urine Negative     Methadone Urine Negative     Opiate Urine Negative     PCP Ur Negative     THC Urine Negative    Narrative:       FOR MEDICAL PURPOSES ONLY  IF CONFIRMATION NEEDED PLEASE CONTACT THE LAB WITHIN 5 DAYS      Drug Screen Cutoff Levels:  AMPHETAMINE/METHAMPHETAMINES  1000 ng/mL  BARBITURATES     200 ng/mL  BENZODIAZEPINES     200 ng/mL  COCAINE      300 ng/mL  METHADONE      300 ng/mL  OPIATES      300 ng/mL  PHENCYCLIDINE     25 ng/mL  THC       50 ng/mL      POCT alcohol breath test [[id number]]  (Normal) Resulted:  11/14/19 1112    Lab Status:  Final result Updated:  11/14/19 1112     EXTBreath Alcohol 0 000                 No orders to display              Procedures  Procedures       ED Course                               MDM  Number of Diagnoses or Management Options  Diagnosis management comments: 16-year-old female with history of bipolar 1 disorder sent in to emergency department for 302 petition  Concerned that patient is not caring for herself and is behaving erratically  Labs, medical screening, imaging prn, EKG, crisis evaluation, disposition as appropriate- 201 vs  302  Patient medically cleared for inpatient psychiatric admission  Amount and/or Complexity of Data Reviewed  Clinical lab tests: ordered and reviewed        Disposition  Final diagnoses:   Depression   Bipolar 1 disorder ([facility] )     Time reflects when diagnosis was documented in both MDM as applicable and the Disposition within this note     Time User Action Codes Description Comment    11/14/2019  3:28 PM Char, [address] [F31 2] Bipolar I disorder, most recent episode manic, severe with psychotic features ([facility] )     11/14/2019  3:28 PM Char, Devora Modify [F31 2] Bipolar I disorder, most recent episode manic, severe with psychotic features ([facility] )     11/14/2019  3:28 PM Char, Devora Modify [F31 2] Bipolar I disorder, most recent episode manic, severe with psychotic features ([place] [address] )     11/14/2019  6:32 PM Denise Francois Add [F32 9] Depression     11/14/2019  6:32 PM Mei Dang Add [F31 9] Bipolar 1 disorder [facility])       ED Disposition     ED Disposition Condition Date/Time Comment    Transfer to [facility]  Thu Nov 14, 2019  3:59 PM Bernarda Galvin should be transferred out to [facility] and has been medically cleared          MD Documentation      Most Recent Value   Patient Condition  The patient has been stabilized such that within reasonable medical probability, no material deterioration of the patient condition or the condition of the unborn child(elias) is likely to result from the transfer   Reason for Transfer  Level of Care needed not available at this facility   Benefits of Transfer  Specialized equipment and/or services available at the receiving facility (Include comment)________________________   Risks of Transfer  Potential for delay in receiving treatment, Potential deterioration of medical condition, Increased discomfort during transfer, Possible worsening of condition or death during transfer   Accepting Physician  Dr Simran Calvillo Name, Vlad Antony   Provider Certification  General risk, such as traffic hazards, adverse weather conditions, rough terrain or turbulence, possible failure of equipment (including vehicle or aircraft), or consequences of actions of persons outside the control of the transport personnel, The possibility of a transport vehicle being unavailable, Risk of worsening condition, The patient is stable for psychiatric transfer because they are medically stable, and is protected from harming him/herself or others during transport, Unanticipated needs of medical equipment and personnel during transport      RN Documentation      Most [address] [place] [address] Name, [address] [place] [address] [place],       Follow-up Information    None         Discharge Medication List as of 11/14/2019 11:08 PM      CONTINUE these medications which have NOT CHANGED    Details   divalproex sodium (DEPAKOTE ER) 500 mg 24 hr tablet Take 3 tablets (1,500 mg total) by mouth daily after dinner, Starting Mon 11/11/2019, Until Wed 12/11/2019, Print      docusate sodium (COLACE) 100 mg capsule Take 1 capsule (100 mg total) by mouth 2 (two) times a day At 9am and 6pm, Starting Fri 10/11/2019, No Print      QUEtiapine (SEROquel XR) 400 mg 24 hr tablet Take 2 tablets (800 mg total) by mouth daily at bedtime, Starting Mon 11/11/2019, Until Wed 12/11/2019, Print           No discharge procedures on file      ED Provider  Electronically Signed by           Severa Balo, DO  11/19/19 0898

## 2019-11-14 NOTE — ED NOTES
Pt re-oriented to hospital environment and plan of care  Pt state she is aware she has signed a 201 and will be admitted for treatment       Cong Robles RN  11/14/19 7144

## 2019-11-14 NOTE — ED NOTES
Spoke with relative by phone  Tasneem Pritchett - [phone]  Relative wanted to pass along that she would like the patient to go to "[facility]" in [place]  Pt has been to that facility in the past   Relative was concerned due to patient's 3 hospitalization in "a little while" and that the patient was released from most recent one with no notification or communication with family  Relative advised that patient was under evaluation here and that due to privacy concerns very little information can be passed along       Nirav Saldivar RN  11/14/19 0237

## 2019-11-14 NOTE — ED NOTES
Pt's wallet brought from [facility], who transported pt  He reports all belongings were brought to hospital   Security at bedside and went through wallet to lock up   Bag # [id number]     Louise Moreira RN  75/16/12 5660

## 2019-11-14 NOTE — PSYCH
Subjective:     Patient ID: Alberto Lawson is a 51 y o  female  [facility] Discharge Summary:   Admission Date: 11/12/19    Patient was referred by [facility] [facility]    Discharge Date: 11/14/19     Was this a routine discharge? No, admission to a higher level of care was required     Diagnosis: Axis I:   1  Bipolar I disorder, most recent episode manic, severe with psychotic features [facility])        Treating Physician: Dr Deysi Cronin MD    Treatment Complications: Actively psychotic behaviors while in program, unable to stabilize in partial hospitalization setting    Presenting Need:   As per Dr Parisi Mage: Anny Goodman a 51 y  o  female with bipolar disorder referred from [address] psychiatric unit where she was admitted from 10/23/2019 to 11/11/2019 due to bizarre behavior and psychotic symptoms  She was disorganized, grandiose, had auditory hallucinations and was not compliance with her treatment  She was released from [place] [address] on 10/11/19 where she was admitted for similar reasons  Onset of symptoms was a few months ago with rapidly worsening course since that time  Psychosocial Stressors: mental health  And poor compliance   She states that she was worry about her daughter because she found an itinerary for a trip to [facility] and she was scared because her daughter's boyfriend is from [address] [place] [address] and she started thinking about woman been "slaves" in [place] , was ruminating about that and also about the children in [place], and stopped taking her medications      Today Alayna Lawler feels better, her mood still euphoric , still disorganized, circumstantial and paranoid  She denies any suicidal or homicidal thoughts, plan or intent        As per this writer: Alberto Lawson is a 51year old female using she/her/her pronouns referred to [facility] via [address] due to recent inpatient hospitalization from 10/23/19 to 11/11/19    Felicie Galeazzi was taken to the hospital as referred by her outpatient psychiatrist due to grandiose thinking, paranoid ideations, and for not taking her medications as prescribed  Alayna shared with this writer a scattered timeline of events starting at her birthday when she went on a solo trip to [place] for herself to see one of her favorite artists  She described then seeing a movie "that messed me up," and when she came home she was unable to return to work fully functioning  Alayna shared paranoid ideations relating to a friend/consensual relationship with a previous coworker and other challenges she has encountered with stabilizing her moods  She identified not taking medications as prescribed but was unable to relay to this writer a reasoning for her last admission to the hospital, as she believed it was due to the type of shirt she was wearing  Alayna denied SI, HI, SIB today      As per Linette Cabrera: "I have tried to go back to work right from a hospitalization before and it has not worked for me so I figured I would try this "  Johnathan Harman identified strengths as caring, open to others, and a hard worker  Course of treatment includes:    group counseling, medication management, individual case management, allied therapy, psychoeducation, psychiatric evaluation and family contact with laurita Mulligan    Treatment Progress: Linette Cabrera was present at [facility] for her third treatment day when she began to demonstrate more bizarre behaviors  During her second treatment day, Johnathan Hammer remained more quiet and reserved throughout program, but at the end of the day she had more confusing speech and appeared more disheveled  The following were her behaviors in the program today 11/14/19: This writer received a phone message from Aamir daughter regarding concerns about her behaviors throughout the night, including erratic phone calls to her entire family, discussing seeing dead people    This writer assessed Alayna in morning assessment, and Alayna noted that she was up all night for a second night in a row without sleep  This writer met with Jadielapolinar Hammer for a check-in session at 1453 to discuss Urszlua's concerns  Johnathan Hammer entered this writer's office and stated "We are going on strike tomorrow " This writer prompted Johnathan Hammer to discuss where she currently is and why she is currently in this program, and Johnathan Hammer ignored this writer  This writer told Johnathan Hammer that her daughter called this writer expressing concern, and Johnathan Hammer went on a tangent regarding her moving this weekend and she left her phone on the transport [employer] and she was cancelling the following day due to moving  This writer attempted to assess Alayna for safety and Johnathan Hammer said "I just have to make it until 2pm until transport comes to get me," and walked out of the office  On Alayna's check in sheet, she wrote "Forgotten about it" where "Name" would be, "NELLA" where "" should be, and "Send Concerns to [facility]" for her concerns  At 0935, when Johnathan Hammer was to be in the first group, she locked herself in the kitchen area  This writer unlocked the door and Johnathan Hammer said "I have an important project I need to work on I am not going to group "  This writer told Johnathan Hammer that requirement in program is for her to be in groups and participate and she eventually went  This writer consulted with Eliel Iqbal emergency contact and daughter and explained the situation  [address] stated that she received multiple phone calls from her mother over the last 24 hours and her family has as well  Urszula discussed struggling with having her mother stabilized and believes she needs to return to the hospital   This writer shared that in discussion, Johnathan Hammer denied going to the hospital for an evaluation  [address] stated that her mother is not moving this weekend, that her family is attempting to have Johnathan Hammer live with her sister since it is apparent she cannot live alone   This writer said she would keep [address] posted on the situation if things change  In groups, she was speaking about delusions of green blood and disruptive in group  This writer consulted with the psychiatrist and due to intensification of behaviors this writer contacted dispatch for an ambulance and police officers come for a transport to the emergency room  Upon arrival, Soundra Holstein saw the police officers and immediately became defensive  She was saying "I am helping them out with HIPAA disputes," and "This jacket is a small pox blanket that I received for helping out the doctors "  Soundra Holstein willingly went with the officers and was transported to [facility] Emergency Room for an evaluation  Soundra Holstein was accepted at this time to [facility] for further psychiatric evaluation and care  She will be discharged from [facility] due to requiring a higher level of psychiatric care  Aftercare recommendations include: Follow recommendations made by higher level of care to establish stability        Discharge Medications include:  Current Outpatient Medications:     divalproex sodium (DEPAKOTE ER) 500 mg 24 hr tablet, Take 3 tablets (1,500 mg total) by mouth daily after dinner, Disp: 90 tablet, Rfl: 0    docusate sodium (COLACE) 100 mg capsule, Take 1 capsule (100 mg total) by mouth 2 (two) times a day At 9am and 6pm (Patient not taking: Reported on 10/23/2019), Disp: 10 capsule, Rfl: 0    QUEtiapine (SEROquel XR) 400 mg 24 hr tablet, Take 2 tablets (800 mg total) by mouth daily at bedtime, Disp: 60 tablet, Rfl: 0

## 2019-11-14 NOTE — ED NOTES
Transportation arranged with [employer]  Transportation scheduled for 2330       *Nurse report to be called to [phone] prior to transportDAHLIA Elder  11/14/19   [id number]

## 2019-11-14 NOTE — PSYCH
Assessment/Plan:       Diagnoses and all orders for this visit:     Bipolar I disorder, most recent episode manic, severe with psychotic features ([facility] [address] )            Subjective:      Patient ID: Urbano Morales is a 51 y o  female      [facility] Treatment Plan   AREAS OF NEED: Bipolar disorder as evidenced by delusional thinking, manic behavior, disorganized thinking, circumstantial speech and paranoia due to chronic lifetime mental illness  Date Initiated: 11/12/19      Strengths: "Open, approachable, honest"            LONG TERM GOAL:   Date Initiated: 11/12/2019  1 0 I will identify three ways in which my overall moods have stabilized since attending [facility]  Target Date: 12/10/19  Completion Date: Not completed, discharge to higher level of care on 11/14/19         SHORT TERM OBJECTIVES:      Date Initiated: 11/12/2019  1 1 I will identify one small task I can complete daily after program in order to improve my overall productivity and stabilize my moods  Revision Date:   Target Date: 11/21/19  Completion Date: Not completed, discharge to higher level of care on 11/14/19      Date Initiated: 11/12/2019  1 2 I will learn and practice three coping skills and share my results with the treatment team   Revision Date:   Target Date: 11/20/19  Completion Date: Not completed, discharge to higher level of care on 11/14/19      Date Initiated: 11/12/2019  1 3 I will take medications as prescribed and share questions and concerns if arise  Revision Date:  Target Date: 11/20/19  Completion Date: Not completed, discharge to higher level of care on 11/14/19      Date Initiated: 11/12/2019  1 4 I will identify 3 ways my supports can assist in my recovery and agree to staff/support contact as indicated      Revision Date:  Target Date: 11/20/19  Completion Date: Not completed, discharge to higher level of care on 11/14/19           7 DAY REVISION:     Date Initiated:  Revision Date:   Target Date:   Completion Date:        PSYCHIATRY:  Date Initiated:  11/12/2019  Medication Management and Education       Revision Date:   The person(s) responsible for carrying out the plan is Radha Bates MD     NURSING:   Date Initiated: 11/12/2019  1 1,1 2,1 3,1 4 This RN will provide daily wellness group five days weekly to educate Mahendra Lackey on S/S of her diagnoses and medications used in treatment  Revision Date:  The person(s) responsible for carrying out the plan is Lilly Chase RN     PSYCHOLOGY:   Date Initiated: 11/12/2019       1 1, 1 2, 1 4 Provide psychotherapy group 5 times per week to allow opportunity for Mahendra Lackey  to explore stressors and ways of coping  Revision Date:   The person(s) responsible for carrying out the plan is Constantino Ochoa MA, [place]     ALLIED THERAPY:   Date Initiated: 11/12/2019  1 1,1 2 Engage Mahendra Lackey in AT group 5 times daily to encourage development and use of wellness tools to decrease symptoms and promote recovery through meaningful activity  Revision Date:   The person(s) responsible for carrying out the plan is GLORIA Andrade      CASE MANAGEMENT:   Date Initiated: 11/12/2019      1 0 This  will meet with Mahendra Lackey  3-4 times weekly to assess treatment progress, discharge planning, connection to community supports and UR as indicated  Revision Date:   The person(s) responsible for carrying out the plan is Clementine Archibald MA, [facility]     TREATMENT REVIEW/COMMENTS:      DISCHARGE CRITERIA: Identify 3 signs of progress and complete relapse prevention plan      DISCHARGE PLAN: Return to outpatient therapy and medication management   Estimated Length of Stay: 10 treatment days         Diagnosis and Treatment Plan explained to Jeffrie Olszewski relates understanding diagnosis and is agreeable to Treatment Plan             CLIENT COMMENTS / Please share your thoughts, feelings, need and/or experiences regarding your treatment plan: _____________________________________________________________________________________________________________________________________________________________________________________________________________________________________________________________________________________________________________________ Date/Time: ______________      Patient Signature: _________________________________      Date/Time: ______________       Signature: _________________________________     Date/Time: ______________

## 2019-11-14 NOTE — ED NOTES
Patient called her father and asked him to pick her up from the ED  Patient was reinformed that she signed a 201  Patient then stated " I know, I'm not going to actually leave   I just want him to visit "     [facility] Emergency Department Officer  11/14/19 [id number]

## 2019-11-14 NOTE — PSYCH
Subjective:     Patient ID: Miri Bach is a 51 y o  female  [facility] Clinical Progress Notes      Specialized Services Documentation  Therapist must complete separate progress note for each specific clinical activity in which the individual participated during the day  Group Psychotherapy     ([id number]) Miri Bach did not participate in group psychotherapy today due to requiring an assessment for a higher level of care  Case Management Note    Leatha Humphreys LPC    Current suicide risk : Low     This writer received a phone message from Aamir daughter regarding concerns about her behaviors throughout the night, including erratic phone calls to her entire family, discussing seeing dead people  This writer assessed Akilah Hilton in morning assessment, and Akilah Hilton noted that she was up all night for a second night in a row without sleep  This writer met with Akilah Hilton for a check-in session at 0456 to discuss Urszula's concerns  Akilah Hilton entered this writer's office and stated "We are going on strike tomorrow " This writer prompted Akilah Yobani to discuss where she currently is and why she is currently in this program, and Akilah Yobani ignored this writer  This writer told Akilah Yobani that her daughter called this writer expressing concern, and Akilah Yobani went on a tangent regarding her moving this weekend and she left her phone on the transport [employer]ad[employer] Shells and she was cancelling the following day due to moving  This writer attempted to assess Alayna for safety and Akilah Hilton said "I just have to make it until 2pm until transport comes to get me," and walked out of the office  On Alayna's check in sheet, she wrote "Forgotten about it" where "Name" would be, "NELLA" where "" should be, and "Send Concerns to [facility]" for her concerns  At 0935, when Akilah Hilton was to be in the first group, she locked herself in the kitchen area    This writer unlocked the door and Akilah Hilton said "I have an important project I need to work on I am not going to group "  This writer told Hank Payton that requirement in program is for her to be in groups and participate and she eventually went  This writer consulted with [facility] [place] emergency contact and daughter and explained the situation  Cora Green stated that she received multiple phone calls from her mother over the last 24 hours and her family has as well  Urszula discussed struggling with having her mother stabilized and believes she needs to return to the hospital   This writer shared that in discussion, Hank Payton denied going to the hospital for an evaluation  Cora Green stated that her mother is not moving this weekend, that her family is attempting to have Hank Payton live with her sister since it is apparent she cannot live alone  This writer said she would keep Cora Green posted on the situation if things change  In groups, she was speaking about delusions of green blood and disruptive in group  This writer consulted with the psychiatrist and due to intensification of behaviors this writer contacted dispatch for an ambulance and police officers come for a transport to the emergency room  Upon arrival, Hank Payton saw the police officers and immediately became defensive  She was saying "I am helping them out with HIPAA disputes," and "This jacket is a small pox blanket that I received for helping out the doctors "  Hank Payton willingly went with the officers and was transported to [facility] Emergency Room for an evaluation  Medications changes/added/denied? No    Treatment session number: 3    Individual Case Management Visit provided today? Yes     [facility] follow up physician's orders: As per client's chart, she has signed a 201 and requires a higher level of care for psychiatric treatment  Due to this requirement, Hank Payton will be discharged from the partial level of care at this time       DATE 11/14/19  TIME 3:02 PM  IOP/DISCHARGE TODAY  Dr Noni Morrissey MD

## 2019-11-14 NOTE — ED NOTES
Patient currently responding to internal stimuli and states she's having auditory hallucinations from her stalkers        Iliana Kaden  11/14/19 7971

## 2019-11-15 ENCOUNTER — APPOINTMENT (OUTPATIENT)
Dept: PSYCHOLOGY | Facility: CLINIC | Age: 49
End: 2019-11-15
Payer: COMMERCIAL

## 2019-11-15 PROBLEM — F25.9 SCHIZOAFFECTIVE DISORDER (HCC): Chronic | Status: RESOLVED | Noted: 2019-11-15 | Resolved: 2019-11-15

## 2019-11-15 PROBLEM — F25.0 SCHIZOAFFECTIVE DISORDER, BIPOLAR TYPE (HCC): Chronic | Status: ACTIVE | Noted: 2019-11-15

## 2019-11-15 PROBLEM — G47.33 OBSTRUCTIVE SLEEP APNEA: Status: ACTIVE | Noted: 2019-11-15

## 2019-11-15 PROBLEM — F25.9 SCHIZOAFFECTIVE DISORDER (HCC): Chronic | Status: ACTIVE | Noted: 2019-11-15

## 2019-11-15 PROBLEM — F25.9 SCHIZOAFFECTIVE DISORDER (HCC): Status: ACTIVE | Noted: 2019-11-15

## 2019-11-15 PROBLEM — E78.1 HYPERTRIGLYCERIDEMIA: Status: ACTIVE | Noted: 2019-11-15

## 2019-11-15 PROBLEM — Z72.0 TOBACCO ABUSE: Status: ACTIVE | Noted: 2019-11-15

## 2019-11-15 LAB
ALBUMIN SERPL BCP-MCNC: 4.5 G/DL (ref 3–5.2)
ALP SERPL-CCNC: 77 U/L (ref 43–122)
ALT SERPL W P-5'-P-CCNC: 29 U/L (ref 9–52)
ANION GAP SERPL CALCULATED.3IONS-SCNC: 9 MMOL/L (ref 5–14)
AST SERPL W P-5'-P-CCNC: 25 U/L (ref 14–36)
ATRIAL RATE: 89 BPM
BACTERIA UR QL AUTO: ABNORMAL /HPF
BASOPHILS # BLD AUTO: 0 THOUSANDS/ΜL (ref 0–0.1)
BASOPHILS NFR BLD AUTO: 1 % (ref 0–1)
BILIRUB SERPL-MCNC: 0.3 MG/DL
BILIRUB UR QL STRIP: NEGATIVE
BUN SERPL-MCNC: 11 MG/DL (ref 5–25)
CALCIUM SERPL-MCNC: 9.3 MG/DL (ref 8.4–10.2)
CHLORIDE SERPL-SCNC: 101 MMOL/L (ref 97–108)
CHOLEST SERPL-MCNC: 164 MG/DL
CLARITY UR: CLEAR
CO2 SERPL-SCNC: 29 MMOL/L (ref 22–30)
COLOR UR: ABNORMAL
CREAT SERPL-MCNC: 0.56 MG/DL (ref 0.6–1.2)
EOSINOPHIL # BLD AUTO: 0.1 THOUSAND/ΜL (ref 0–0.4)
EOSINOPHIL NFR BLD AUTO: 2 % (ref 0–6)
ERYTHROCYTE [DISTWIDTH] IN BLOOD BY AUTOMATED COUNT: 14.5 %
GFR SERPL CREATININE-BSD FRML MDRD: 111 ML/MIN/1.73SQ M
GLUCOSE SERPL-MCNC: 105 MG/DL (ref 70–99)
GLUCOSE UR STRIP-MCNC: NEGATIVE MG/DL
HCG SERPL QL: NEGATIVE
HCT VFR BLD AUTO: 39.4 % (ref 36–46)
HDLC SERPL-MCNC: 50 MG/DL
HGB BLD-MCNC: 13.2 G/DL (ref 12–16)
HGB UR QL STRIP.AUTO: 25
KETONES UR STRIP-MCNC: NEGATIVE MG/DL
LDLC SERPL CALC-MCNC: 62 MG/DL
LEUKOCYTE ESTERASE UR QL STRIP: NEGATIVE
LYMPHOCYTES # BLD AUTO: 1.4 THOUSANDS/ΜL (ref 0.5–4)
LYMPHOCYTES NFR BLD AUTO: 21 % (ref 25–45)
MCH RBC QN AUTO: 26.9 PG (ref 26–34)
MCHC RBC AUTO-ENTMCNC: 33.6 G/DL (ref 31–36)
MCV RBC AUTO: 80 FL (ref 80–100)
MONOCYTES # BLD AUTO: 0.5 THOUSAND/ΜL (ref 0.2–0.9)
MONOCYTES NFR BLD AUTO: 8 % (ref 1–10)
NEUTROPHILS # BLD AUTO: 4.6 THOUSANDS/ΜL (ref 1.8–7.8)
NEUTS SEG NFR BLD AUTO: 69 % (ref 45–65)
NITRITE UR QL STRIP: NEGATIVE
NON-SQ EPI CELLS URNS QL MICRO: ABNORMAL /HPF
NONHDLC SERPL-MCNC: 114 MG/DL
P AXIS: 13 DEGREES
PH UR STRIP.AUTO: 7 [PH]
PLATELET # BLD AUTO: 213 THOUSANDS/UL (ref 150–450)
PMV BLD AUTO: 7.9 FL (ref 8.9–12.7)
POTASSIUM SERPL-SCNC: 4 MMOL/L (ref 3.6–5)
PR INTERVAL: 124 MS
PROT SERPL-MCNC: 8.1 G/DL (ref 5.9–8.4)
PROT UR STRIP-MCNC: NEGATIVE MG/DL
QRS AXIS: 74 DEGREES
QRSD INTERVAL: 78 MS
QT INTERVAL: 378 MS
QTC INTERVAL: 459 MS
RBC # BLD AUTO: 4.91 MILLION/UL (ref 4–5.2)
RBC #/AREA URNS AUTO: ABNORMAL /HPF
SODIUM SERPL-SCNC: 139 MMOL/L (ref 137–147)
SP GR UR STRIP.AUTO: 1.01 (ref 1–1.04)
T WAVE AXIS: 64 DEGREES
TRIGL SERPL-MCNC: 259 MG/DL
TSH SERPL DL<=0.05 MIU/L-ACNC: 2.5 UIU/ML (ref 0.47–4.68)
UROBILINOGEN UA: NEGATIVE MG/DL
VENTRICULAR RATE: 89 BPM
WBC # BLD AUTO: 6.7 THOUSAND/UL (ref 4.5–11)
WBC #/AREA URNS AUTO: ABNORMAL /HPF

## 2019-11-15 PROCEDURE — 85025 COMPLETE CBC W/AUTO DIFF WBC: CPT | Performed by: NURSE PRACTITIONER

## 2019-11-15 PROCEDURE — 82652 VIT D 1 25-DIHYDROXY: CPT | Performed by: NURSE PRACTITIONER

## 2019-11-15 PROCEDURE — 93005 ELECTROCARDIOGRAM TRACING: CPT

## 2019-11-15 PROCEDURE — 84443 ASSAY THYROID STIM HORMONE: CPT | Performed by: NURSE PRACTITIONER

## 2019-11-15 PROCEDURE — 86592 SYPHILIS TEST NON-TREP QUAL: CPT | Performed by: NURSE PRACTITIONER

## 2019-11-15 PROCEDURE — 80053 COMPREHEN METABOLIC PANEL: CPT | Performed by: NURSE PRACTITIONER

## 2019-11-15 PROCEDURE — 99253 IP/OBS CNSLTJ NEW/EST LOW 45: CPT | Performed by: FAMILY MEDICINE

## 2019-11-15 PROCEDURE — 81001 URINALYSIS AUTO W/SCOPE: CPT | Performed by: NURSE PRACTITIONER

## 2019-11-15 PROCEDURE — 84703 CHORIONIC GONADOTROPIN ASSAY: CPT | Performed by: NURSE PRACTITIONER

## 2019-11-15 PROCEDURE — 80061 LIPID PANEL: CPT | Performed by: NURSE PRACTITIONER

## 2019-11-15 PROCEDURE — 99222 1ST HOSP IP/OBS MODERATE 55: CPT | Performed by: PSYCHIATRY & NEUROLOGY

## 2019-11-15 PROCEDURE — 93010 ELECTROCARDIOGRAM REPORT: CPT | Performed by: INTERNAL MEDICINE

## 2019-11-15 RX ORDER — DIPHENHYDRAMINE HCL 25 MG
25 TABLET ORAL
Status: DISCONTINUED | OUTPATIENT
Start: 2019-11-15 | End: 2019-12-02 | Stop reason: HOSPADM

## 2019-11-15 RX ORDER — BENZTROPINE MESYLATE 1 MG/1
1 TABLET ORAL 2 TIMES DAILY
Status: DISCONTINUED | OUTPATIENT
Start: 2019-11-15 | End: 2019-11-19

## 2019-11-15 RX ORDER — LANOLIN ALCOHOL/MO/W.PET/CERES
3 CREAM (GRAM) TOPICAL
Status: DISCONTINUED | OUTPATIENT
Start: 2019-11-15 | End: 2019-12-02 | Stop reason: HOSPADM

## 2019-11-15 RX ORDER — PALIPERIDONE 6 MG/1
6 TABLET, EXTENDED RELEASE ORAL DAILY
Status: DISCONTINUED | OUTPATIENT
Start: 2019-11-15 | End: 2019-12-02

## 2019-11-15 RX ORDER — HALOPERIDOL 2 MG/ML
5 SOLUTION ORAL 2 TIMES DAILY
Status: DISCONTINUED | OUTPATIENT
Start: 2019-11-15 | End: 2019-11-18

## 2019-11-15 RX ADMIN — OXCARBAZEPINE 450 MG: 300 TABLET, FILM COATED ORAL at 21:42

## 2019-11-15 RX ADMIN — DIPHENHYDRAMINE HCL 25 MG: 25 TABLET ORAL at 21:42

## 2019-11-15 RX ADMIN — MELATONIN TAB 3 MG 3 MG: 3 TAB at 21:42

## 2019-11-15 NOTE — PLAN OF CARE
Problem: Alteration in Thoughts and Perception  Goal: Treatment Goal: Gain control of psychotic behaviors/thinking, reduce/eliminate presenting symptoms and demonstrate improved reality functioning upon discharge  Outcome: Progressing     Problem: Risk for Self Injury/Neglect  Goal: Treatment Goal: Remain safe during length of stay, learn and adopt new coping skills, and be free of self-injurious ideation, impulses and acts at the time of discharge  Outcome: Progressing     Problem: Depression  Goal: Treatment Goal: Demonstrate behavioral control of depressive symptoms, verbalize feelings of improved mood/affect, and adopt new coping skills prior to discharge  Outcome: Progressing  Goal: Verbalize thoughts and feelings  Description  Interventions:  - Assess and re-assess patient's level of risk   - Engage patient in 1:1 interactions, daily, for a minimum of 15 minutes   - Encourage patient to express feelings, fears, frustrations, hopes   Outcome: Progressing     Problem: Anxiety  Goal: Anxiety is at manageable level  Description  Interventions:  - Assess and monitor patient's anxiety level  - Monitor for signs and symptoms (heart palpitations, chest pain, shortness of breath, headaches, nausea, feeling jumpy, restlessness, irritable, apprehensive)  - Collaborate with interdisciplinary team and initiate plan and interventions as ordered    - [place] patient to unit/surroundings  - Explain treatment plan  - Encourage participation in care  - Encourage verbalization of concerns/fears  - Identify coping mechanisms  - Assist in developing anxiety-reducing skills  - Administer/offer alternative therapies  - Limit or eliminate stimulants  Outcome: Progressing     Problem: Alteration in Orientation  Goal: Treatment Goal: Demonstrate a reduction of confusion and improved orientation to person, place, time and/or situation upon discharge, according to optimum baseline/ability  Outcome: Progressing

## 2019-11-15 NOTE — PROGRESS NOTES
11/15/19 0700   Team Meeting   Meeting Type Daily Rounds   Initial Conference Date 11/15/19   Team Members Present   Team Members Present Physician;Nurse;; Other (Discipline and Name)   Nursing Team Member [place] Work Team Member Nataly Fernandes   Other (Discipline and Name) Dr Vicki Greene (resident), Rich Newell  Patient/Family Present   Patient Present No   Patient's Family Present No     Long acting injectable  Monitor phone calls (Dial number for her)

## 2019-11-15 NOTE — CASE MANAGEMENT
Treatment team met with Pt to discuss goals and treatment planning  Pt was engaged and was in agreement, signed Tx Plan  SW offered copy of treatment plan, Pt preferred to keep Tx plan in personal unit folder  SW placed in Pt folder on unit

## 2019-11-15 NOTE — PROGRESS NOTES
RN went to check on patient and found her laying on her bed talking to herself  Patient said "I'm just talking to myself" and laughed  Asked RN if she wanted to hear a funny story and told RN to "sit down cause we're on strike " Patient proceeded to tell a series of disorganized stories, some with sexual content and some with paranoid content, about her past life  Laughed spontaneously and inappropriately at times  Mentioned being a doctor  Told the nurse she's waiting to eat until Thanksgiving dinner

## 2019-11-15 NOTE — DISCHARGE INSTR - OTHER ORDERS
[facility] Crisis Phone Number : [phone]    [facility] Crisis Phone Number : [phone]    National Suicide Hotline : 1 451.814.2917    Crisis Text Line : [address] is a toll-free telephone number for people in [facility] who are seeking a listening ear for additional support in their recovery from mental illness  The PEER LINE is peer-run and peer-friendly  Callers to the [address] will speak directly to other individuals who have experienced the mental health recovery process  The PEER LINE staff possess these three core values to assist and support the PEER LINE caller:  Acceptance   [facility]  Promote individual recovery and strengthen communities  Funding Information  This project is funded, in part, under contract with [facility], through funds provided by the [facility]  Recovery Partnership has always promoted, used, and remains faithful to procedure and language that reflects recovery-based and person-first principles  [facility]'s [employer]   You can call the Peer Line 24 hours a day  Phone: 7-628-JB-PEERS  (7-602.184.5258)   The [employer]IN Family-to-Family Education Program is a free 12-week (2 1/2 hours/week) course for families of individuals with severe brain disorders (mental illnesses)  The classes are taught by trained family members  All course materials are furnished at no cost to you  Below are some details  To register, e-mail [email or web address] or call [phone]  The curriculum focuses on schizophrenia, bipolar disorder (manic depression), clinical depression, panic disorders and obsessive-compulsive disorder (OCD)   The course discusses the clinical treatment of these illnesses and teaches the knowledge and skills that family members need to cope more effectively  Topics Include:  Learning about feelings, learning about facts   Schizophrenia, major depression and xochitl: diagnosis and dealing with critical periods   Subtypes of depression and bipolar disorder, panic disorder and OCD; diagnosis and causes; sharing our stories   The biology of the brain/new research   Problem solving workshops   Medication review   Empathy workshop  what its like to have a brain disorder   Communication skills workshop   Self-care and relative groups   Rehabilitation, services available   Advocacy: fighting stigma   Review and certification ceremony    Ujoq-yp-Ktim Education Course  The [employer] Education class is a ten week  two hours per week  experiential education course on the topic of recovery for any person with serious mental illness who is interested in establishing and maintaining wellness  The course uses a combination of lecture, interactive exercises, and structural group processes  The diversity of experience among participants affords for a lively dynamic that moves the course along  JUSTINs Vreg-zj-Nhjr Education class is offered free of charge to people who experience mental illness  You do not need to be a member of JUSTIN to take the course  Courses are taught by teams of trained mentors/peer-teachers who are themselves experienced at living well with mental illness  Below are some details  To register, call [phone] or e-mail [email or web address]  Sign up today! [facility] group is for family members, caregivers, and loved ones of individuals living with the everyday challenges of mental illness  The leaders are family members in the same situation  Sessions take place in an intimate, confidential setting to allow families to share openly with each other    These support groups allow participants to learn from the experiences of other group members, share coping strategies, and offer each other encouragement and understanding  [facility][note 3] know that you are not alone  Drop inno registration is necessary  Here are the times and locations  ALOK  Monthly: 3rd Monday, 7:00-8:30 pm  [place]  [address], [place]  Monthly: 4th Tuesday, 7:00-8:30 pm  [facility]  Monthly: 1st Monday, 7:00-8:30 pm  [facility]  [address], [place] [facility] [address] [place] [address]         Monthly Support for Persons with Mental Illness  The Peer Support Group is a monthly meeting for individuals facing the challenges of recovering from severe and persistent mental illness  Depression, manic depression, schizophrenia, and general anxiety disorder are only a few of the diagnoses of individuals who have found a supportive place at our meetings  Our [place]  We are a fellowship of individuals who share a common goal of recovery and the ability to maintain mental and emotional stability  We help others and ourselves through sharing our experiences, strength and hope with each other  No matter how traumatic our past or how despairing our present may be, there is hope for a new day  Sessions take place in an intimate, confidential setting to allow individuals to share openly with each other  [facility][note 3] know that you are not alone  Drop inno registration is necessary  Here are the times and locations    PRASHANT  Monthly: 1st Monday, 7:00-8:30 pm  [facility]  [address] [place], [place]   [place]  Monthly: 3rd Monday, 7:00-8:30 pm  [address]  [facility]

## 2019-11-15 NOTE — TREATMENT PLAN
TREATMENT PLAN REVIEW - [address] 51 y o  1970 female MRN: [id number]    [address] [address] Room / Bed: [place] [address]/[facility] Encounter: [id number]          Admit Date/Time:  11/14/2019 11:29 PM    Treatment Team: Attending Provider: Matt Miles MD; Consulting Physician: Rhett Kim MD; Patient Care Assistant: Marylene Berg; Registered Nurse: Chaitanya Ya RN; Patient Care Technician: Raad Rhodes;  Patient Care Technician: Micah Gil; : MARY Duarte; : Zoey West RN; Resident: Marilynn Lai MD    Diagnosis: Schizoaffective disorder    Patient Strengths: average or above intelligence     Patient Barriers: chronic mental illness    Short Term Goals: decrease in psychotic symptoms    Long Term Goals: stabilization of mood, resolution of psychotic symptoms    Progress Towards Goals: starting psychitric medications as prescribed, continue psychiatric medications as prescribed    Recommended Treatment: medication management, patient medication education, group therapy, milieu therapy, continued Behavioral Health psychiatric evaluation/assessment process     Treatment Frequency: daily medication monitoring, group and milieu therapy daily, monitoring through interdisciplinary rounds, monitoring through weekly patient care conferences    Expected Discharge Date:  21 days    Discharge Plan: referral to Extended Acute Care unit for a long term psychiatric treatment    Treatment Plan Created/Updated By: Matt Miles MD

## 2019-11-15 NOTE — PROGRESS NOTES
SALEH GROUP NOTE  Able to focus on a hands on, concrete task when in conjunction with another peer  Discussion with peer was tangential/bizarre  Unable to focus on topic during group discussion        11/15/19 1000   Activity/Group Checklist   Group Personal control  (mindfulness)   Attendance Attended   Attendance Duration (min) 16-30   Interactions Disorganized interaction  (Tangential, Bizarre)   Affect/Mood Other (Comment)  (Required frequent redirection (overtalking facilitator/peers)

## 2019-11-15 NOTE — CASE MANAGEMENT
Patient admitted 11/14/2019 on a 201 from [facility] ER due to psychotic behavior   met with Mireille Kennedy and she presents disorganized, confused, appears to be responding to internal stimuli, mumbling and hesitant in her responses and asking "what" a number of times after a question is asked to her  Per documentation, Mireille Kennedy was at the [facility] and was confused  She was taken to the ER and admitted occasional voices  Patient states she sees Dr Maureen Sinha at [facility] and she did sign a DENNIS  Patient denies drug and alcohol  AUDIT 0/40  UDS was negative  Patient denies access to firearms  Hollandalec Kennedy states she is currently on short term disability then said ,"I'm actually unemployed now  I'm on strike,my insurance was cut off"  Mireille Brent denies legal  States her pharmacy is the pharmacy at [facility]  Alayna refused to sign a DENNIS for family or friend  Mireille Kennedy stated she has been hospitalized in the past and said she has been inpatient "a lot and I don't want to go through it" regarding facility names and dates   called [facility] and spoke to RN and updated regarding admission  phone [phone]

## 2019-11-15 NOTE — PROGRESS NOTES
Patient asked RN to try and track down her bra because she said she took it off in the emergency room   Said she was confused because she had "just graduated" and was "singing with the boys "

## 2019-11-15 NOTE — PROGRESS NOTES
Pt was found in bed naked  Reminded to wear clothes while she is in the hospital but she refused to do so  Staff got her extra blankets due to her complaints of being cold  Pt was reminded to not come out of her room unless dresses

## 2019-11-15 NOTE — ASSESSMENT & PLAN NOTE
Metabolic profile reviewed    Patient is medically cleared for inpatient behavioral health treatment

## 2019-11-15 NOTE — ASSESSMENT & PLAN NOTE
Her previous triglyceride level a month ago 69  I feel that this is an abnormal result due to maybe not being fasting  No intervention at this time    Recheck in 4 weeks

## 2019-11-15 NOTE — PLAN OF CARE
Problem: Alteration in Thoughts and Perception  Goal: Treatment Goal: Gain control of psychotic behaviors/thinking, reduce/eliminate presenting symptoms and demonstrate improved reality functioning upon discharge  Outcome: Not Progressing     Problem: Risk for Self Injury/Neglect  Goal: Treatment Goal: Remain safe during length of stay, learn and adopt new coping skills, and be free of self-injurious ideation, impulses and acts at the time of discharge  Outcome: Not Progressing     Problem: Depression  Goal: Treatment Goal: Demonstrate behavioral control of depressive symptoms, verbalize feelings of improved mood/affect, and adopt new coping skills prior to discharge  Outcome: Not Progressing  Goal: Verbalize thoughts and feelings  Description  Interventions:  - Assess and re-assess patient's level of risk   - Engage patient in 1:1 interactions, daily, for a minimum of 15 minutes   - Encourage patient to express feelings, fears, frustrations, hopes   Outcome: Not Progressing     Problem: Anxiety  Goal: Anxiety is at manageable level  Description  Interventions:  - Assess and monitor patient's anxiety level  - Monitor for signs and symptoms (heart palpitations, chest pain, shortness of breath, headaches, nausea, feeling jumpy, restlessness, irritable, apprehensive)  - Collaborate with interdisciplinary team and initiate plan and interventions as ordered    - [place] patient to unit/surroundings  - Explain treatment plan  - Encourage participation in care  - Encourage verbalization of concerns/fears  - Identify coping mechanisms  - Assist in developing anxiety-reducing skills  - Administer/offer alternative therapies  - Limit or eliminate stimulants  Outcome: Not Progressing     Problem: Alteration in Orientation  Goal: Treatment Goal: Demonstrate a reduction of confusion and improved orientation to person, place, time and/or situation upon discharge, according to optimum baseline/ability  Outcome: Not Progressing

## 2019-11-15 NOTE — PROGRESS NOTES
Pt was talking to MHTs, stating she "was out celebrating because I just graduated from psychiatric school " She then went on stating she got into a fight with her brother about him not inhaling marijuana properly and that he thinks he is a big tough radha who likes to go out to the bar and show everyone "how big his cock is"  She went on to state at school her daughter is called a "nigger" but she is   She then went on to talk about different doctors and nurses sleeping together   Information was reported to Pt's Nurse

## 2019-11-15 NOTE — CASE MANAGEMENT
Treatment team met with Pt to discuss goals and treatment planning  Pt signed Tx Plan  SW offered copy of treatment plan, Pt preferred to keep Tx plan in personal unit folder  SW placed in Pt folder on unit  Pt rambled, laughed inappropriately, delusional, grandiose and tangential thinking

## 2019-11-15 NOTE — ED NOTES
Call received from [facility], spoke with Swetha Asif, regarding authorization for transportation       Transportation auth #: [id number]    DAHLIA Ambriz  11/14/19 2022

## 2019-11-15 NOTE — PROGRESS NOTES
11/15/19 1100   Activity/Group Checklist   Group   (recovery group)   Attendance Attended   Attendance Duration (min) 46-60   Interactions Disorganized interaction   Affect/Mood Blunted/flat   Goals Achieved   (patient was delusional and unable to follow the group topic)

## 2019-11-15 NOTE — CONSULTS
Consult- Neli Martinez 1970, 51 y o  female MRN: [id number]    Unit/Bed#: [facility] 350-01 Encounter: [id number]    Primary Care Provider: No primary care provider on file  Date and time admitted to hospital: 11/14/2019 11:29 PM      Inpatient consult for Medical Clearance for [facility] patient  Consult performed by: Tyrell Estes MD  Consult ordered by: PAULINA Mahan          * Schizoaffective disorder [facility])  Assessment & Plan  As per psych    Medical clearance for psychiatric admission  Assessment & Plan  Metabolic profile reviewed  Patient is medically cleared for inpatient behavioral health treatment    Hypertriglyceridemia  Assessment & Plan  Her previous triglyceride level a month ago 69  I feel that this is an abnormal result due to maybe not being fasting  No intervention at this time  Recheck in 4 weeks    Obstructive sleep apnea  Assessment & Plan  Stable at this time  No CPAP machine use    Tobacco abuse  Assessment & Plan  place on p r n  Nicotine gum as needed  VTE Prophylaxis: Reason for no pharmacologic prophylaxis Low risk  / reason for no mechanical VTE prophylaxis Low risk     Recommendations for Discharge:  · None    Counseling / Coordination of Care Time: 45 minutes  Greater than 50% of total time spent on patient counseling and coordination of care  Collaboration of Care: Were Recommendations Directly Discussed with Primary Treatment Team? - Yes     History of Present Illness:    Neli Martinez is a 51 y o  female who is originally admitted to the psychiatric service due to psychiatric reasons  We are consulted for medical management  Patient denies any chest pain or shortness of breath  She is clearly very delusional and paranoid at this time  Denies any chest pain or shortness of breath or abdominal pain    Review of Systems:    Review of Systems   Constitutional: Negative for appetite change, chills, fatigue and fever     HENT: Negative for hearing loss, sore throat and trouble swallowing  Eyes: Negative for photophobia, discharge and visual disturbance  Respiratory: Negative for chest tightness and shortness of breath  Cardiovascular: Negative for chest pain and palpitations  Gastrointestinal: Negative for abdominal pain, blood in stool and vomiting  Endocrine: Negative for polydipsia and polyuria  Genitourinary: Negative for difficulty urinating, dysuria, flank pain and hematuria  Musculoskeletal: Negative for back pain and gait problem  Skin: Negative for rash  Allergic/Immunologic: Negative for environmental allergies and food allergies  Neurological: Negative for dizziness, seizures, syncope and headaches  Hematological: Does not bruise/bleed easily  Psychiatric/Behavioral: Positive for behavioral problems  The patient is nervous/anxious  All other systems reviewed and are negative  Past Medical and Surgical History:     Past Medical History:   Diagnosis Date    Bipolar 1 disorder, manic, moderate ([facility] ) 2/3/2016    Head injury     Psychiatric disorder     Psychiatric illness     Psychosis ([facility] )     Sleep apnea     [facility])        Past Surgical History:   Procedure Laterality Date    ADENOIDECTOMY      TONSILLECTOMY         Meds/Allergies:    all medications and allergies reviewed    Allergies:    Allergies   Allergen Reactions    Lithium     Sulfa Antibiotics Other (See Comments)     unknown       Social History:     Marital Status:     Substance Use History:   Social History     Substance and Sexual Activity   Alcohol Use Yes    Frequency: 2-4 times a month    Drinks per session: 1 or 2    Binge frequency: Less than monthly     Social History     Tobacco Use   Smoking Status Never Smoker   Smokeless Tobacco Never Used     Social History     Substance and Sexual Activity   Drug Use Not Currently    Comment: Pt refusing to answer question directly"  States "nothing you'll see on urine or maybe some alcohol, maybe other stuff you cant detect'       Family History:    Family History   Problem Relation Age of Onset    Depression Father     Lung cancer Father        Physical Exam:     Vitals:   Blood Pressure: 127/65 (11/15/19 0731)  Pulse: 89 (11/15/19 0731)  Temperature: 97 8 °F (36 6 °C) (11/15/19 0731)  Temp Source: Temporal (11/15/19 0731)  Respirations: 16 (11/15/19 0731)  Height: 5' 5" (165 1 cm) (11/14/19 2338)  Weight - Scale: 82 6 kg (182 lb) (11/14/19 2338)  SpO2: 100 % (11/14/19 2338)    Physical Exam   Constitutional: She appears well-developed and well-nourished  HENT:   Head: Normocephalic and atraumatic  Right Ear: External ear normal    Left Ear: External ear normal    Mouth/Throat: Oropharynx is clear and moist    Eyes: Pupils are equal, round, and reactive to light  Conjunctivae and EOM are normal    Neck: Normal range of motion  Neck supple  Cardiovascular: Normal rate, regular rhythm, normal heart sounds and intact distal pulses  Pulmonary/Chest: Effort normal and breath sounds normal    Abdominal: Soft  Bowel sounds are normal  She exhibits no mass  There is no tenderness  There is no rebound and no guarding  Genitourinary:   Genitourinary Comments: deferred   Musculoskeletal: Normal range of motion  Neurological: She is alert  She has normal reflexes  Cranial nerves 2-12 are normal    Patient is delusional   Skin: Skin is warm and dry  No rash noted  Nursing note and vitals reviewed  Additional Data:     Lab Results: I have personally reviewed pertinent reports        Results from last 7 days   Lab Units 11/15/19  1036   WBC Thousand/uL 6 70   HEMOGLOBIN g/dL 13 2   HEMATOCRIT % 39 4   PLATELETS Thousands/uL 213   NEUTROS PCT % 69*   LYMPHS PCT % 21*   MONOS PCT % 8   EOS PCT % 2     Results from last 7 days   Lab Units 11/15/19  1036   SODIUM mmol/L 139   POTASSIUM mmol/L 4 0   CHLORIDE mmol/L 101   CO2 mmol/L 29   BUN mg/dL 11   CREATININE mg/dL 0 56*   ANION GAP mmol/L 9 CALCIUM mg/dL 9 3   ALBUMIN g/dL 4 5   TOTAL BILIRUBIN mg/dL 0 30   ALK PHOS U/L 77   ALT U/L 29   AST U/L 25   GLUCOSE RANDOM mg/dL 105*             Lab Results   Component Value Date/Time    HGBA1C 5 7 08/13/2019 12:52 PM    HGBA1C 6 3 08/28/2018 07:47 AM               Imaging: I have personally reviewed pertinent reports  No orders to display       EKG, Pathology, and Other Studies Reviewed on Admission:   · EKG:  Normal sinus rhythm    ** Please Note: This note has been constructed using a voice recognition system   **

## 2019-11-15 NOTE — H&P
Psychiatric Evaluation - [address] [place] [address] 51 y o  female MRN: [id number]  Unit/Bed#: [facility] 350-01 Encounter: [id number]    Assessment/Plan   Principal Problem:    Schizoaffective disorder ([address] )  Active Problems:    Medical clearance for psychiatric admission    Tobacco abuse    Obstructive sleep apnea    Hypertriglyceridemia    Plan:   Check admission labs  Collaborate with family for baseline assessment and disposition planning  Start Invega 6 mg qd, Trileptal 450 mg bid, Haldol 5 mg bid for psychosis and xochitl (Plan to transition to long acting injection)  Cogentin 1 mg bid  Start Melatonin 3 mg at 8 pm and Benadryl 25 mg at 10 pm for sleep    Treatment options and alternatives were reviewed with the patient, who concurs with the above plan  Risks, benefits, and possible side effects of medications were explained to the patient, and she verbalizes understanding       -----------------------------------    Chief Complaint: "I don't like how I feel ok depakote, my goal is to get off all my medications"    History of Present Illness     Hank Payton is a 51 y o  female with a medical history of Bipolar I Disorder who presents on a voluntarily basis for worsening psychosis due to medication non compliance  The patient was brought to the ED via EMS from her partial program (Dr Cheyenne Wells, psychiatrist) for erratic behavior  The patient is known to us an she was recently treated at [facility] for similar complaints (10/23-11/11)  Prior to that, she was treated at [facility] (9/13-10/11)  She has a known pattern of medication non-compliance upon discharge  She again admits to non-compliance and states "I have a plan to get off all of my medications "     The patient is very disorganized and bizarre with delusions of grandiosity  She states "I just performed with Petey Links and I have him and Greg Florian fighting over me " She continues to laugh hysterically and inappropriately throughout our interview  She admits to recent alcohol use  The patient denies any current suicidal or homicidal ideations, intent or plan  She denies any current hallucinations, although she is noted to pre-occupied by internal stimuli  The patient is consenting for safety on the unit  Medical Review Of Systems:  Complete review of systems is negative except as noted above  Psychiatric Review Of Systems:  Problems with sleep: yes, decreased  Appetite changes: no  Weight changes: no  Low energy/anergy: no  Low interest/pleasure/anhedonia: no  Somatic symptoms: no  Anxiety/panic: yes  Courtney: yes  Guilt/hopeless: no  Self injurious behavior/risky behavior: no    Historical Information     Psychiatric History:   Psychiatric medication trial: seroquel, depakote, depakote, lamictal, prolixin, adderall, zyprexa  Inpatient hospitalizations: [facility] (302, 02/02/16 - 02/23/16), [place] (09/13/19- 10/11/19), [place] (10/23/19 - 11/11/19)  Suicide attempts: Seroquel OD in 2016 (2 weeks prior to her [address] [place] [address] admission)  Violent behavior: past agitation  Outpatient treatment: Dr Justus Tariq and his NP Marco Daughters, most recently [facility] [employer]  Substance Abuse History:  Social History     Tobacco Use    Smoking status: Never Smoker    Smokeless tobacco: Never Used   Substance Use Topics    Alcohol use: Yes     Frequency: 2-4 times a month     Drinks per session: 1 or 2     Binge frequency: Less than monthly    Drug use: Not Currently     Comment: Pt refusing to answer question directly"  States "nothing you'll see on urine or maybe some alcohol, maybe other stuff you cant detect'      Patient reports recent alcohol use   I have assessed this patient for substance use within the past 12 months  I spent time with Andres Jimenez in counseling and education on risk of substance abuse  I assessed motivation and encouraged her for treatment as appropriate       Family Psychiatric History:   Brother: Bipolar Disorder    Social History:  Education: college graduate  Learning Disabilities: denies  Marital history:   Living arrangement: Lives alone  Occupational History: Psychiatric unit nurse at [employer]  Functioning Relationships: gets along well with co-workers and good relationship with children    Other Pertinent History: None      Traumatic History:   Abuse: denies  Other Traumatic Events: denies    Past Medical History:   Past Medical History:   Diagnosis Date    Bipolar 1 disorder, manic, moderate ([place] [address] ) 2/3/2016    Head injury     Psychiatric disorder     Psychiatric illness     Psychosis ([place] [address] )     Sleep apnea     Suicide attempt ([place] [address] )         -----------------------------------  Objective    Temp:  [97 °F (36 1 °C)-97 8 °F (36 6 °C)] 97 8 °F (36 6 °C)  HR:  [89-99] 89  Resp:  [16-18] 16  BP: (109-131)/(55-80) 127/65    Mental Status Evaluation:  Appearance:  alert, casually dressed, appears stated age and appropriate grooming and hygiene   Behavior:  pleasant, cooperative, sitting comfortably, good eye contact, no abnormal movements, normal gait and balance and bizarre with inappropriate laughter   Speech:  spontaneous, clear, normal rate and normal volume   Mood:  "happy"   Affect:  mood-congruent, euphoric and bizarre   Thought Process:  disorganized, illogical, incoherent, tangential, loose associations, increased rate of thoughts, flight of ideas   Thought Content: delusions of grandeosity ( to a celebrity, working on the unit as a psychiatrist, will buy [place] and [place]), obsessive thoughts, intrusive thoughts   Perceptual disturbances: auditory hallucinations (voice of god) and appears to be responding to internal stimuli   Risk Potential: No active or passive suicidal or homicidal ideation   Cognition: oriented to person, place, time, and situation, memory grossly intact, appears to be of average intelligence, impaired abstract reasoning and age-appropriate attention span and concentration   Insight:  Poor   Judgment: Poor     Meds/Allergies   Allergies   Allergen Reactions    Lithium     Sulfa Antibiotics Other (See Comments)     unknown     all current active meds have been reviewed and current meds:   Current Facility-Administered Medications   Medication Dose Route Frequency    acetaminophen (TYLENOL) tablet 650 mg  650 mg Oral Q6H PRN    acetaminophen (TYLENOL) tablet 650 mg  650 mg Oral Q4H PRN    acetaminophen (TYLENOL) tablet 975 mg  975 mg Oral Q6H PRN    aluminum-magnesium hydroxide-simethicone (MYLANTA) 200-200-20 mg/5 mL oral suspension 30 mL  30 mL Oral Q4H PRN    benztropine (COGENTIN) injection 1 mg  1 mg Intramuscular Q6H PRN    benztropine (COGENTIN) tablet 1 mg  1 mg Oral Q6H PRN    benztropine (COGENTIN) tablet 1 mg  1 mg Oral BID    diphenhydrAMINE (BENADRYL) tablet 25 mg  25 mg Oral HS    haloperidol (HALDOL) oral concentrated solution 5 mg  5 mg Oral BID    haloperidol (HALDOL) tablet 5 mg  5 mg Oral Q6H PRN    haloperidol lactate (HALDOL) injection 5 mg  5 mg Intramuscular Q6H PRN    hydrOXYzine HCL (ATARAX) tablet 25 mg  25 mg Oral Q6H PRN    LORazepam (ATIVAN) 2 mg/mL injection 1 mg  1 mg Intramuscular Q6H PRN    LORazepam (ATIVAN) tablet 1 mg  1 mg Oral Q6H PRN    magnesium hydroxide (MILK OF MAGNESIA) 400 mg/5 mL oral suspension 30 mL  30 mL Oral Daily PRN    melatonin tablet 3 mg  3 mg Oral HS    OLANZapine (ZyPREXA) IM injection 5 mg  5 mg Intramuscular Q6H PRN    OLANZapine (ZyPREXA) tablet 5 mg  5 mg Oral Q6H PRN    OXcarbazepine (TRILEPTAL) tablet 450 mg  450 mg Oral Q12H SHE[place]    paliperidone (INVEGA) 24 hr tablet 6 mg  6 mg Oral Daily    risperiDONE (RisperDAL M-TABS) dispersible tablet 1 mg  1 mg Oral Q3H PRN    traZODone (DESYREL) tablet 50 mg  50 mg Oral HS PRN       Behavioral Health Medications: all current active meds have been reviewed  Changes as above      Laboratory results:  I have personally reviewed all pertinent laboratory/tests results    Recent Results (from the past 48 hour(s))   POCT alcohol breath test    Collection Time: 11/14/19 11:12 AM   Result Value Ref Range    EXTBreath Alcohol 0 000    Rapid drug screen, urine    Collection Time: 11/14/19 11:17 AM   Result Value Ref Range    Amph/Meth UR Negative Negative    Barbiturate Ur Negative Negative    Benzodiazepine Urine Negative Negative    Cocaine Urine Negative Negative    Methadone Urine Negative Negative    Opiate Urine Negative Negative    PCP Ur Negative Negative    THC Urine Negative Negative   POCT pregnancy, urine    Collection Time: 11/14/19  2:11 PM   Result Value Ref Range    EXT PREG TEST UR (Ref: Negative) negative     Control valid    ECG 12 lead    Collection Time: 11/15/19  1:12 AM   Result Value Ref Range    Ventricular Rate 89 BPM    Atrial Rate 89 BPM    RI Interval 124 ms    QRSD Interval 78 ms    QT Interval 378 ms    QTC Interval 459 ms    P [place] 13 degrees    QRS Axis 74 degrees    T Wave Axis 64 degrees   Comprehensive metabolic panel    Collection Time: 11/15/19 10:36 AM   Result Value Ref Range    Sodium 139 137 - 147 mmol/L    Potassium 4 0 3 6 - 5 0 mmol/L    Chloride 101 97 - 108 mmol/L    CO2 29 22 - 30 mmol/L    ANION GAP 9 5 - 14 mmol/L    BUN 11 5 - 25 mg/dL    Creatinine 0 56 (L) 0 60 - 1 20 mg/dL    Glucose 105 (H) 70 - 99 mg/dL    Calcium 9 3 8 4 - 10 2 mg/dL    AST 25 14 - 36 U/L    ALT 29 9 - 52 U/L    Alkaline Phosphatase 77 43 - 122 U/L    Total Protein 8 1 5 9 - 8 4 g/dL    Albumin 4 5 3 0 - 5 2 g/dL    Total Bilirubin 0 30 <1 30 mg/dL    eGFR 111 >60 ml/min/1 73sq m   hCG, serum, qualitative    Collection Time: 11/15/19 10:36 AM   Result Value Ref Range    Preg, Serum Negative Negative   Lipid panel    Collection Time: 11/15/19 10:36 AM   Result Value Ref Range    Cholesterol 164 <200 mg/dL    Triglycerides 259 (H) <150 mg/dL    HDL, Direct 50 >=40 mg/dL    LDL Calculated 62 <130 mg/dL    Non-HDL-Chol (CHOL-HDL) 114 mg/dl Imaging Studies:   No orders to display            -----------------------------------    Risks / Benefits of Treatment:     Risks, benefits, and possible side effects of medications explained to patient  The patient verbalizes understanding and agreement for treatment  Counseling / Coordination of Care:     Patient's presentation on admission and proposed treatment plan were discussed with the treatment team   Diagnosis, medication changes and treatment plan were reviewed with the patient  Recent stressors were discussed with the patient  Events leading to admission were reviewed with the patient  Importance of medication and treatment compliance was reviewed with the patient  Discussed with patient plan for alcohol detoxification protocol and gradual taper of medications to prevent withdrawal symptoms  Inpatient Psychiatric Certification:     Certification: Based upon physical, mental and social evaluations, I certify that inpatient psychiatric services are medically necessary for this patient for a duration of more than 5 midnights for the treatment of Schizoaffective disorder [facility])    Available alternative community resources do not meet the patient's mental health care needs  I further attest that an established written individualized plan of care has been implemented and is outlined in the patient's medical records  This note has been constructed using a voice recognition system  There may be translation, syntax, or grammatical errors  If you have any questions, please contact the dictating provider

## 2019-11-15 NOTE — PROGRESS NOTES
Patient is a 13 from [facility] due to psychotic behavior  She was brought to the ER from partial program where they were going to do a 302  On admission patient denies SI, HI,and VH  Pt admits to occasional voices but denies hearing voices currently  Patient stated that she drank 4 wine cooler last night to celebrate her graduation  She would not tell this writer what she graduated from  She admits that she took 400 mg of Seroquel, a half a Vicodin, and a Percocet because she wanted to sleep and her back was bothering her  The ER notes reflect that she did not know what year it was  On admission to this unit she did know the year, date and time  She does not understand why she is in the hospital on admission to the unit  She admits to only taking the Seroquel on her medication list because she feels she does not need the other medications

## 2019-11-16 LAB — RPR SER QL: NORMAL

## 2019-11-16 PROCEDURE — 99232 SBSQ HOSP IP/OBS MODERATE 35: CPT | Performed by: PSYCHIATRY & NEUROLOGY

## 2019-11-16 RX ADMIN — LORAZEPAM 1 MG: 1 TABLET ORAL at 12:00

## 2019-11-16 RX ADMIN — MELATONIN TAB 3 MG 3 MG: 3 TAB at 21:32

## 2019-11-16 RX ADMIN — LORAZEPAM 1 MG: 1 TABLET ORAL at 02:30

## 2019-11-16 NOTE — PROGRESS NOTES
Pt tearful, sobbing off and on and unable to state what is bothering her  Pt appears preoccupied, distracted, unable to concentrate  She received PRN ativan which was effective, pt is calm, lying in bed, no further bouts of crying

## 2019-11-16 NOTE — PROGRESS NOTES
Pt is uncooperative pt refused to take evening medications stating "I'm not a patient on this unit I'm a psychiatrist " Pt then began laughing to herself and talking to herself  Pt has been refusing vitals, and exhibiting bizarre behavior  Pt was asked if she felt like hurting her self or other pt stated "I'm fasting for thanksgiving but maybe I'll eat dinner so I can leave here " Will monitor

## 2019-11-16 NOTE — PROGRESS NOTES
Pt is non-compliant with medications, states she is not prescribed them  Pt educated, counseled on importance of taking scheduled medications, education not effective  Pt delusional, lacking insight, thinks she is a psychiatrist and cannot state why she is hospitalized  She denies SI/HI but changing subject when asked about other symptoms, unable to assess but appears internally stimulated, talking to self and appearing preoccupied  Pt is tearful on and off but cannot coherently state what is bothering her    She remains hypersexual

## 2019-11-16 NOTE — PROGRESS NOTES
Pt has been awake the entire shift  Refused offer of Trazodone  She has laughing for no reason and frequently in and out of her room  Appears to be responding to internal stimuli  Refused Haldol and Zyprexa  Speech is pressured and disorganized

## 2019-11-16 NOTE — PROGRESS NOTES
Pt took off top and bra in dayroom, pt directed to dress in private  [facility] staff assisted with covering and dressing patient and directed back to room  Pt re-oriented, counseled regarding appropriate behavior  No evidence of learning

## 2019-11-16 NOTE — PROGRESS NOTES
Pt was lying in bed for about one hour  She came to the nurses station complaining that the doors were locked and "how is she going to get home "  Explained to the patient that she just recently got admitted and that she will be staying at the hospital for awhile  She went to her room

## 2019-11-16 NOTE — PROGRESS NOTES
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 feeding me trash  We killed you before, we kill you again"  She became agitated during the session and walked out of the room  Not able to participate in milieu  Refused all medications today and most of her medications last night  Sleep: slept off and on  Appetite: decreased  Medication side effects: No   ROS: no complaints, no shortness of breath, chest pain or abdominal pain    Mental Status Evaluation:    Appearance:  disheveled   Behavior:  bizarre, uncooperative, psychomotor agitation   Speech:  pressured, tangential   Mood:  dysphoric, anxious, irritable   Affect:  labile   Thought Process:  disorganized, illogical   Associations: loose associations   Thought Content:  grandiose and persecutory delusions   Perceptual Disturbances: denies auditory or visual hallucinations when asked, but appears responding to internal stimuli   Risk Potential: Suicidal ideation - None  Homicidal ideation - None  Potential for aggression - No   Sensorium:  oriented to person, place and time/date   Memory:  recent and remote memory: unable to assess due to lack of cooperation   Consciousness:  alert and awake   Attention: poor concentration and poor attention span   Insight:  poor   Judgment: poor   Gait/Station: normal gait/station, normal balance   Motor Activity: no abnormal movements     Vital signs in last 24 hours:    Temp:  [97 8 °F (36 6 °C)] 97 8 °F (36 6 °C)  HR:  [88] 88  Resp:  [16] 16  BP: (120)/(62) 120/62    Laboratory results: I have personally reviewed all pertinent laboratory/tests results      Most Recent Labs:   Lab Results   Component Value Date    WBC 6 70 11/15/2019    RBC 4 91 11/15/2019    HGB 13 2 11/15/2019    HCT 39 4 11/15/2019     11/15/2019    RDW 14 5 11/15/2019    NEUTROABS 4 60 11/15/2019    SODIUM 139 11/15/2019    K 4 0 11/15/2019     11/15/2019    CO2 29 11/15/2019    BUN 11 11/15/2019    CREATININE 0 56 (L) 11/15/2019    GLUC 105 (H) 11/15/2019    GLUF 109 (H) 10/26/2019    CALCIUM 9 3 11/15/2019    AST 25 11/15/2019    ALT 29 11/15/2019    ALKPHOS 77 11/15/2019    TP 8 1 11/15/2019    ALB 4 5 11/15/2019    TBILI 0 30 11/15/2019    CHOLESTEROL 164 11/15/2019    HDL 50 11/15/2019    TRIG 259 (H) 11/15/2019    LDLCALC 62 11/15/2019    NONHDLC 114 11/15/2019    VALPROICTOT 97 9 11/11/2019    AMMONIA <9 (L) 10/26/2019    KRO8NHFTMHVI 2 500 11/15/2019    FREET4 0 93 08/13/2019    T3FREE 2 27 (L) 08/13/2019    PREGSERUM Negative 11/15/2019    RPR Non-Reactive 10/26/2019    HGBA1C 5 7 08/13/2019     08/13/2019   Drug Screen:   Lab Results   Component Value Date    AMPMETHUR Negative 11/14/2019    BARBTUR Negative 11/14/2019    BDZUR Negative 11/14/2019    THCUR Negative 11/14/2019    COCAINEUR Negative 11/14/2019    METHADONEUR Negative 11/14/2019    OPIATEUR Negative 11/14/2019    PCPUR Negative 11/14/2019   EKG   Lab Results   Component Value Date    VENTRATE 89 11/15/2019    ATRIALRATE 89 11/15/2019    PRINT 124 11/15/2019    QRSDINT 78 11/15/2019    QTINT 378 11/15/2019    QTCINT 459 11/15/2019    PAXIS 13 11/15/2019    QRSAXIS 74 11/15/2019    TWAVEAXIS 64 11/15/2019   Urinalysis   Lab Results   Component Value Date    COLORU Straw 11/15/2019    CLARITYU Clear 11/15/2019    SPECGRAV 1 015 11/15/2019    PHUR 7 0 11/15/2019    LEUKOCYTESUR Negative 11/15/2019    NITRITE Negative 11/15/2019    PROTEIN UA Negative 11/15/2019    GLUCOSEU Negative 11/15/2019    KETONESU Negative 11/15/2019    UROBILINOGEN Negative 11/15/2019    BILIRUBINUR Negative 11/15/2019    BLOODU 25 0 (A) 11/15/2019    RBCUA 0-1 (A) 11/15/2019    WBCUA None Seen 11/15/2019    EPIS Occasional 11/15/2019    BACTERIA Occasional 11/15/2019       Suicide/Homicide Risk Assessment:    Risk of Harm to Self:   Current Specific Risk Factors include: current unstable mood, presence of delusions, poor reasoning  Protective Factors: no current suicidal ideation, being a parent, connection to own children  Based on today's assessment, Jf Aguirre presents the following risk of harm to self: moderate    Risk of Harm to Others:  Based on today's assessment, Jf Aguirre presents the following risk of harm to others: none    The following interventions are recommended: behavioral checks every 7 minutes, continued hospitalization on locked unit    Progress Toward Goals: no progress, still disorganized, remains irritable, still very labile, remains paranoid, still grandiose, poor insight    Assessment/Plan   Principal Problem:    Schizoaffective disorder, bipolar type ([facility] [address] )  Active Problems:    Medical clearance for psychiatric admission    Tobacco abuse    Obstructive sleep apnea    Hypertriglyceridemia    Recommended Treatment:     Planned medication and treatment changes:     All current active medications have been reviewed  Encourage group therapy, milieu therapy and occupational therapy  Behavioral Health checks every 7 minutes     Continue current medications:    Current Facility-Administered Medications:  acetaminophen 650 mg Oral Q6H PRN Devora L Char, CRNP   acetaminophen 650 mg Oral Q4H PRN Devora L Char, CRNP   acetaminophen 975 mg Oral Q6H PRN Devora L Char, CRNP   aluminum-magnesium hydroxide-simethicone 30 mL Oral Q4H PRN Devora L Char, CRNP   benztropine 1 mg Intramuscular Q6H PRN Devora L Char, CRNP   benztropine 1 mg Oral Q6H PRN Devora L Char, CRNP   benztropine 1 mg Oral BID Devora L Char, CRNP   diphenhydrAMINE 25 mg Oral HS Devora L Char, CRNP   haloperidol 5 mg Oral BID Devora L Char, CRNP   haloperidol 5 mg Oral Q6H PRN Devora L Char, CRNP   haloperidol lactate 5 mg Intramuscular Q6H PRN Devora L Char, CRNP   hydrOXYzine HCL 25 mg Oral Q6H PRN Devora L Char, CRNP   LORazepam 1 mg Intramuscular Q6H PRN Devora L Char, CRNP   LORazepam 1 mg Oral Q6H PRN Devora L Char, CRNP   magnesium hydroxide 30 mL Oral Daily PRN Devora L Char, CRNP   melatonin 3 mg Oral HS Devora L Char, CRNP   OLANZapine 5 mg Intramuscular Q6H PRN Devora L Char, CRNP   OLANZapine 5 mg Oral Q6H PRN Devora L Char, CRNP   OXcarbazepine 450 mg Oral Q12H [address] Devora L Char, CRNP   paliperidone 6 mg Oral Daily Devora L Char, CRNP   risperiDONE 1 mg Oral Q3H PRN Devora L Cahr, CRNP   traZODone 50 mg Oral HS PRN Devora L Char, CRNP       Risks / Benefits of Treatment:    Risks, benefits, and possible side effects of medications explained to patient  Patient refuses treatment and does not have understanding of risks of treatment refusal or benefits of treatment at this time  Patient is not likely to improve without medications and will require administration of injectable medications against her will for refusal of oral medications to assure safety of self and others    Counseling / Coordination of Care:    Patient's progress reviewed with nursing staff  At this time patient has limited understanding of diagnosis, medication changes and treatment plan and will require further explanation      Suzan Moses MD 11/16/19

## 2019-11-16 NOTE — PROGRESS NOTES
11/15/19 1415   Activity/Group Checklist   Group Other (Comment)  (Art Therapy Process Group/Open Choice, Discussion)   Attendance Attended   Attendance Duration (min) Greater than 60   Interactions Disorganized interaction  (Responding to internal stimuli)   Affect/Mood Constricted; Appropriate   Goals Achieved Able to listen to others; Able to engage in interactions; Able to recieve feedback  (Able to engage materials; fragmented/agitated)

## 2019-11-16 NOTE — PLAN OF CARE
Problem: Risk for Self Injury/Neglect  Goal: Treatment Goal: Remain safe during length of stay, learn and adopt new coping skills, and be free of self-injurious ideation, impulses and acts at the time of discharge  Outcome: Progressing     Problem: Anxiety  Goal: Anxiety is at manageable level  Description  Interventions:  - Assess and monitor patient's anxiety level  - Monitor for signs and symptoms (heart palpitations, chest pain, shortness of breath, headaches, nausea, feeling jumpy, restlessness, irritable, apprehensive)  - Collaborate with interdisciplinary team and initiate plan and interventions as ordered    - [place] patient to unit/surroundings  - Explain treatment plan  - Encourage participation in care  - Encourage verbalization of concerns/fears  - Identify coping mechanisms  - Assist in developing anxiety-reducing skills  - Administer/offer alternative therapies  - Limit or eliminate stimulants  Outcome: Progressing     Problem: Alteration in Orientation  Goal: Treatment Goal: Demonstrate a reduction of confusion and improved orientation to person, place, time and/or situation upon discharge, according to optimum baseline/ability  Outcome: Progressing     Problem: Alteration in Thoughts and Perception  Goal: Treatment Goal: Gain control of psychotic behaviors/thinking, reduce/eliminate presenting symptoms and demonstrate improved reality functioning upon discharge  Outcome: Not Progressing     Problem: Depression  Goal: Treatment Goal: Demonstrate behavioral control of depressive symptoms, verbalize feelings of improved mood/affect, and adopt new coping skills prior to discharge  Outcome: Not Progressing  Goal: Verbalize thoughts and feelings  Description  Interventions:  - Assess and re-assess patient's level of risk   - Engage patient in 1:1 interactions, daily, for a minimum of 15 minutes   - Encourage patient to express feelings, fears, frustrations, hopes   Outcome: Not Progressing Problem: BEHAVIOR  Goal: Pt/Family maintain appropriate behavior and adhere to behavioral management agreement, if implemented  Description  INTERVENTIONS:  - Assess the family dynamic   - Encourage verbalization of thoughts and concerns in a socially appropriate manner  - Assess patient/family's coping skills and non-compliant behavior (including use of illegal substances)  - Utilize positive, consistent limit setting strategies supporting safety of patient, staff and others  - Initiate consult with Case Management, Spiritual Care or other ancillary services as appropriate  - If a patient's/visitor's behavior jeopardizes the safety of the patient, staff, or others, refer to organization procedure     - Notify Security of behavior or suspected illegal substances which indicate the need for search of the patient and/or belongings  - Encourage participation in the decision making process about a behavioral management agreement; implement if patient meets criteria  Outcome: Not Progressing

## 2019-11-16 NOTE — PROGRESS NOTES
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

## 2019-11-17 PROCEDURE — 99232 SBSQ HOSP IP/OBS MODERATE 35: CPT | Performed by: PSYCHIATRY & NEUROLOGY

## 2019-11-17 RX ADMIN — BENZTROPINE MESYLATE 1 MG: 1 TABLET ORAL at 18:13

## 2019-11-17 RX ADMIN — DIPHENHYDRAMINE HCL 25 MG: 25 TABLET ORAL at 21:03

## 2019-11-17 RX ADMIN — LORAZEPAM 1 MG: 1 TABLET ORAL at 20:36

## 2019-11-17 RX ADMIN — HALOPERIDOL 5 MG: 2 SOLUTION ORAL at 18:12

## 2019-11-17 RX ADMIN — BENZTROPINE MESYLATE 1 MG: 1 INJECTION INTRAMUSCULAR; INTRAVENOUS at 09:45

## 2019-11-17 RX ADMIN — OXCARBAZEPINE 450 MG: 300 TABLET, FILM COATED ORAL at 21:03

## 2019-11-17 RX ADMIN — MELATONIN TAB 3 MG 3 MG: 3 TAB at 21:04

## 2019-11-17 RX ADMIN — HALOPERIDOL LACTATE 5 MG: 5 INJECTION, SOLUTION INTRAMUSCULAR at 09:47

## 2019-11-17 NOTE — PROGRESS NOTES
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

## 2019-11-17 NOTE — PROGRESS NOTES
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

## 2019-11-17 NOTE — PROGRESS NOTES
Pt was overheard laughing and talking to herself very loudly in her room  Staff went in to check patient, and observed patient naked in bed with a sheet covering her  The sheet was covered with blood  Patient currently has her menses and was not wearing a pad or any underwear and was actively bleeding all over herself and her bed  There was also blood on the floor  Pt stated "I am waiting for Yoselin to bring me up my tampons and then it will be fine and nobody will get fired  They have been doing this to women and men since the beginning of time and it needs to stop " Pt is disorganized, delusional, and not making any sense  She appears to comprehend very little redirection or information from staff  Pt was asked to shower and given new supplies; her bed was stripped and cleaned  Will continue to closely monitor
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
## 2019-11-17 NOTE — PROGRESS NOTES
During rounding it was observed that the patient once again was completely naked in her bed  Pt was asked by RN to please put her clothes back on as she has a roommate  The pt refused  RN explained that pt at least needs to put her underwear on due to her having her menses  The pt got up, put clothes back on and stated "You know, this is ridiculous  Get out of my room " It was also observed that pt again had some small bloodstains on her sheets  Staff attempted to try to change the sheets but the patient refused this

## 2019-11-17 NOTE — PROGRESS NOTES
Patient immediately recognized this nurse by name and greeted this nurse warmly when she saw her on the unit  Upon greeting this writer patient asked, [facility] [place] [facility] Patient stated she is getting  to Moshe, someone she met in high school and there will be fire jumpers that were "sexy firefighters" as she laughed, bungee jumping and other activities  She then stopped and said, "well I have to wait for him to ask me to  him first  Yvette Yusuf just engaged right now " Patient went on to ask this writer if she would work for Dr Savannah Jo with her daughter because he was opening up another office in [facility]  Patient was lucid for a moment and asked if her friend was taking care of her cats  She requested the clothes she washed that were in the dryer, they were given to patient  During med pass, patient retreated to her room  Meds were taken to her room, which she chose to only take the Melatonin  Patient was educated on the importance of taking all of her meds amd the importance of taking them  She became upset stating  "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

## 2019-11-17 NOTE — PROGRESS NOTES
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  Per staff report she has been taking off her clothes, has difficulty following directions and refuses medications  Not able to participate in milieu  Sleep: slept off and on  Appetite: decreased  Medication side effects: No   ROS: no complaints, no shortness of breath, chest pain or abdominal pain, all other systems are negative    Mental Status Evaluation:    Appearance:  disheveled, dressed in hospital attire   Behavior:  bizarre, uncooperative, restless and fidgety   Speech:  pressured, disorganized   Mood:  dysphoric, irritable   Affect:  labile   Thought Process:  disorganized, illogical   Associations: loose associations   Thought Content:  grandiose and persecutory delusions   Perceptual Disturbances: denies auditory or visual hallucinations when asked, but appears responding to internal stimuli   Risk Potential: Suicidal ideation - None  Homicidal ideation - None  Potential for aggression - Not at present   Sensorium:  oriented to person and place, disoriented to time/date and situation   Memory:  recent and remote memory: unable to assess due to lack of cooperation   Consciousness:  alert and awake   Attention: poor concentration and poor attention span   Insight:  poor   Judgment: poor   Gait/Station: normal gait/station, normal balance   Motor Activity: no abnormal movements     Vital signs in last 24 hours:    HR:  [83-88] 88  Resp:  [16] 16  BP: (132-139)/(62-69) 139/62    Laboratory results: I have personally reviewed all pertinent laboratory/tests results      RPR:   Lab Results   Component Value Date    RPR Non-Reactive 11/15/2019 Suicide/Homicide Risk Assessment:    Risk of Harm to Self:   Protective Factors: no current suicidal ideation  Based on today's assessment, Akilah Hilton presents the following risk of harm to self: moderate    Risk of Harm to Others:  Based on today's assessment, Akilah Hilton presents the following risk of harm to others: none    The following interventions are recommended: behavioral checks every 7 minutes, continued hospitalization on locked unit    Progress Toward Goals: no improvement, still very disorganized, still irritable, remains labile, still psychotic, insight remains poor    Assessment/Plan   Principal Problem:    Schizoaffective disorder, bipolar type ([facility] [address] )  Active Problems:    Medical clearance for psychiatric admission    Tobacco abuse    Obstructive sleep apnea    Hypertriglyceridemia    Recommended Treatment:     Planned medication and treatment changes:     All current active medications have been reviewed  Encourage group therapy, milieu therapy and occupational therapy  Behavioral Health checks every 7 minutes     Continue current medications:    Current Facility-Administered Medications:  acetaminophen 650 mg Oral Q6H PRN Devora L Char, CRNP   acetaminophen 650 mg Oral Q4H PRN Devora L Char, CRNP   acetaminophen 975 mg Oral Q6H PRN Devora L Char, CRNP   aluminum-magnesium hydroxide-simethicone 30 mL Oral Q4H PRN Devora L Char, CRNP   benztropine 1 mg Intramuscular Q6H PRN Devora L Char, CRNP   benztropine 1 mg Oral Q6H PRN Devora L Char, CRNP   benztropine 1 mg Oral BID Devora L Char, CRNP   diphenhydrAMINE 25 mg Oral HS Devora L Char, CRNP   haloperidol 5 mg Oral BID Devora L Char, CRNP   haloperidol 5 mg Oral Q6H PRN Devora L Char, CRNP   haloperidol lactate 5 mg Intramuscular Q6H PRN Devora L Char, CRNP   hydrOXYzine HCL 25 mg Oral Q6H PRN Devora L Char, CRNP   LORazepam 1 mg Intramuscular Q6H PRN Devora L Char, CRNP   LORazepam 1 mg Oral Q6H PRN Devora L Char, CRNP   magnesium hydroxide 30 mL Oral Daily PRN Devora L Char, CRNP   melatonin 3 mg Oral HS Devora L Char, CRNP   OLANZapine 5 mg Intramuscular Q6H PRN Devora L Char, CRNP   OLANZapine 5 mg Oral Q6H PRN Devora L Char, CRNP   OXcarbazepine 450 mg Oral Q12H [facility] Devora L Char, CRNP   paliperidone 6 mg Oral Daily Devora L Char, CRNP   risperiDONE 1 mg Oral Q3H PRN Devora L Char, CRNP   traZODone 50 mg Oral HS PRN Devora L Char, CRNP       Risks / Benefits of Treatment:    Risks, benefits, and possible side effects of medications explained to patient  Patient refuses treatment and does not have understanding of risks of treatment refusal or benefits of treatment at this time  Patient is not likely to improve without medications and will require administration of injectable medications against her will for refusal of oral medications to assure safety of self and others    Counseling / Coordination of Care:    Patient's progress reviewed with nursing staff  At this time patient has limited understanding of diagnosis, medication changes and treatment plan and will require further explanation      Ever Ruffin MD 11/17/19

## 2019-11-17 NOTE — PLAN OF CARE
Problem: Risk for Self Injury/Neglect  Goal: Treatment Goal: Remain safe during length of stay, learn and adopt new coping skills, and be free of self-injurious ideation, impulses and acts at the time of discharge  Outcome: Progressing     Problem: Anxiety  Goal: Anxiety is at manageable level  Description  Interventions:  - Assess and monitor patient's anxiety level  - Monitor for signs and symptoms (heart palpitations, chest pain, shortness of breath, headaches, nausea, feeling jumpy, restlessness, irritable, apprehensive)  - Collaborate with interdisciplinary team and initiate plan and interventions as ordered    - [place] patient to unit/surroundings  - Explain treatment plan  - Encourage participation in care  - Encourage verbalization of concerns/fears  - Identify coping mechanisms  - Assist in developing anxiety-reducing skills  - Administer/offer alternative therapies  - Limit or eliminate stimulants  Outcome: Progressing     Problem: Alteration in Thoughts and Perception  Goal: Treatment Goal: Gain control of psychotic behaviors/thinking, reduce/eliminate presenting symptoms and demonstrate improved reality functioning upon discharge  Outcome: Not Progressing     Problem: Depression  Goal: Treatment Goal: Demonstrate behavioral control of depressive symptoms, verbalize feelings of improved mood/affect, and adopt new coping skills prior to discharge  Outcome: Not Progressing  Goal: Verbalize thoughts and feelings  Description  Interventions:  - Assess and re-assess patient's level of risk   - Engage patient in 1:1 interactions, daily, for a minimum of 15 minutes   - Encourage patient to express feelings, fears, frustrations, hopes   Outcome: Not Progressing     Problem: Alteration in Orientation  Goal: Treatment Goal: Demonstrate a reduction of confusion and improved orientation to person, place, time and/or situation upon discharge, according to optimum baseline/ability  Outcome: Not Progressing Problem: BEHAVIOR  Goal: Pt/Family maintain appropriate behavior and adhere to behavioral management agreement, if implemented  Description  INTERVENTIONS:  - Assess the family dynamic   - Encourage verbalization of thoughts and concerns in a socially appropriate manner  - Assess patient/family's coping skills and non-compliant behavior (including use of illegal substances)  - Utilize positive, consistent limit setting strategies supporting safety of patient, staff and others  - Initiate consult with Case Management, Spiritual Care or other ancillary services as appropriate  - If a patient's/visitor's behavior jeopardizes the safety of the patient, staff, or others, refer to organization procedure     - Notify Security of behavior or suspected illegal substances which indicate the need for search of the patient and/or belongings  - Encourage participation in the decision making process about a behavioral management agreement; implement if patient meets criteria  Outcome: Not Progressing

## 2019-11-17 NOTE — PROGRESS NOTES
Pt out of her room standing in front of the nurses station with no shirt underneath and a blanket wrap around her body  Asked pt to please go back to her room and put a shirt on  Pt walked away to the supply door calling out some dr's names  Asked pt again to please go back to her room a second time  Pt responded "let the children walk around and make sure they don't go into other ppl's room" and walked back to her room  Will monitor remaining of shift

## 2019-11-18 LAB — 1,25(OH)2D3 SERPL-MCNC: 39 PG/ML (ref 19.9–79.3)

## 2019-11-18 PROCEDURE — 99232 SBSQ HOSP IP/OBS MODERATE 35: CPT | Performed by: PSYCHIATRY & NEUROLOGY

## 2019-11-18 RX ORDER — DIVALPROEX SODIUM 250 MG/1
250 TABLET, DELAYED RELEASE ORAL EVERY 8 HOURS SCHEDULED
Status: DISCONTINUED | OUTPATIENT
Start: 2019-11-18 | End: 2019-11-22

## 2019-11-18 RX ADMIN — DIPHENHYDRAMINE HCL 25 MG: 25 TABLET ORAL at 21:41

## 2019-11-18 RX ADMIN — MELATONIN TAB 3 MG 3 MG: 3 TAB at 21:40

## 2019-11-18 RX ADMIN — PALIPERIDONE 6 MG: 6 TABLET, FILM COATED, EXTENDED RELEASE ORAL at 08:22

## 2019-11-18 RX ADMIN — HALOPERIDOL 5 MG: 2 SOLUTION ORAL at 08:22

## 2019-11-18 RX ADMIN — DIVALPROEX SODIUM 250 MG: 250 TABLET, DELAYED RELEASE ORAL at 21:41

## 2019-11-18 RX ADMIN — OXCARBAZEPINE 450 MG: 300 TABLET, FILM COATED ORAL at 08:22

## 2019-11-18 RX ADMIN — BENZTROPINE MESYLATE 1 MG: 1 TABLET ORAL at 08:22

## 2019-11-18 RX ADMIN — DIVALPROEX SODIUM 250 MG: 250 TABLET, DELAYED RELEASE ORAL at 14:01

## 2019-11-18 NOTE — PROGRESS NOTES
PRN Ativan appears to have been effective for pt; she was less tearful and remained clothed  She did not verbalize any further anxiety  Will monitor

## 2019-11-18 NOTE — PLAN OF CARE
Problem: Risk for Self Injury/Neglect  Goal: Treatment Goal: Remain safe during length of stay, learn and adopt new coping skills, and be free of self-injurious ideation, impulses and acts at the time of discharge  Outcome: Progressing     Problem: Anxiety  Goal: Anxiety is at manageable level  Description  Interventions:  - Assess and monitor patient's anxiety level  - Monitor for signs and symptoms (heart palpitations, chest pain, shortness of breath, headaches, nausea, feeling jumpy, restlessness, irritable, apprehensive)  - Collaborate with interdisciplinary team and initiate plan and interventions as ordered  - [place] patient to unit/surroundings  - Explain treatment plan  - Encourage participation in care  - Encourage verbalization of concerns/fears  - Identify coping mechanisms  - Assist in developing anxiety-reducing skills  - Administer/offer alternative therapies  - Limit or eliminate stimulants  Outcome: Progressing     Problem: BEHAVIOR  Goal: Pt/Family maintain appropriate behavior and adhere to behavioral management agreement, if implemented  Description  INTERVENTIONS:  - Assess the family dynamic   - Encourage verbalization of thoughts and concerns in a socially appropriate manner  - Assess patient/family's coping skills and non-compliant behavior (including use of illegal substances)  - Utilize positive, consistent limit setting strategies supporting safety of patient, staff and others  - Initiate consult with Case Management, Spiritual Care or other ancillary services as appropriate  - If a patient's/visitor's behavior jeopardizes the safety of the patient, staff, or others, refer to organization procedure     - Notify Security of behavior or suspected illegal substances which indicate the need for search of the patient and/or belongings  - Encourage participation in the decision making process about a behavioral management agreement; implement if patient meets criteria  Outcome: Progressing     Problem: Alteration in Thoughts and Perception  Goal: Treatment Goal: Gain control of psychotic behaviors/thinking, reduce/eliminate presenting symptoms and demonstrate improved reality functioning upon discharge  Outcome: Not Progressing     Problem: Depression  Goal: Treatment Goal: Demonstrate behavioral control of depressive symptoms, verbalize feelings of improved mood/affect, and adopt new coping skills prior to discharge  Outcome: Not Progressing  Goal: Verbalize thoughts and feelings  Description  Interventions:  - Assess and re-assess patient's level of risk   - Engage patient in 1:1 interactions, daily, for a minimum of 15 minutes   - Encourage patient to express feelings, fears, frustrations, hopes   Outcome: Not Progressing     Problem: Alteration in Orientation  Goal: Treatment Goal: Demonstrate a reduction of confusion and improved orientation to person, place, time and/or situation upon discharge, according to optimum baseline/ability  Outcome: Not Progressing

## 2019-11-18 NOTE — QUICK NOTE
Nursing states that patient is in the process of attempting to bring in her home CPAP machine  She may be able to have someone bring in  In the meantime requesting nasal cannula at nighttime  Order placed      Please re-contact if any questions or concerns

## 2019-11-18 NOTE — PROGRESS NOTES
11/18/19 1200   Service   Service   [facility])   Provider Name [facility]   Multi-disciplinary Rounds   Diagnosis  Reviewed   Oxygen Needs Reviewed     Pt uses CPAP at home, requesting order- if no CPAP pt will receive o2 at night

## 2019-11-18 NOTE — PROGRESS NOTES
Patient is out in the milieu and social with peers  She was complaint with her morning medication, but refused to take her cogentin at 1800  She stated that the medication should have been discontinued since she is no longer on the Haldol  Patient was [facility] and that could be the reason why the cogentin was not discontinued  Patient then stated that she was not going to [facility] the [facility] and was going to talk to the doctor in the morning  She was then informed that [facility] had [facility], but stated that she did not remember taking that medication  Patient was then instructed to talk to the doctor in the morning  Patient was compliant with her bedtime medication  No other issues noted

## 2019-11-18 NOTE — PROGRESS NOTES
Pt is cooperative with medications, appears depressed  She denies all S/S  Pt remains disorganized, tangential, delusional but appears to be improving  Pt remains disorganized but behavior is less bizarre today

## 2019-11-18 NOTE — PROGRESS NOTES
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   It appears that her history dates back to age 24 and her first episode of psychosis might have been precipitated by using LSD  She has been hospitalized multiple times in both [facility] and also in [place]  She has been told that she suffers from bipolar disorder and also borderline personality disorder  Mental Status Evaluation:  Appearance:  Adequate hygiene and grooming and Good eye contact   Behavior:  calm and cooperative   Mood:  dysphoric   Affect: labile   Speech: Normal volume and Pressured   Thought Process:  disorganized   Thought Content:  Grandiose delusions   Perceptual Disturbances: The patient complains of hearing some background noises but is not clear about the nature of these experiences   Risk Potential: No suicidal or homicidal ideation   Orientation:        Progress Toward Goals:  No significant changes in clinical status      Recommended Treatment:  The patient is agreeable to taking Depakote  She is already on [place]  Continue with pharmacotherapy, group therapy, milieu therapy and occupational therapy    The patient will be maintained on the following medications:    Current Facility-Administered Medications:  acetaminophen 650 mg Oral Q6H PRN Devora L Char, CRNP   acetaminophen 650 mg Oral Q4H PRN Devora L Char, CRNP   acetaminophen 975 mg Oral Q6H PRN Devora L Char, CRNP   aluminum-magnesium hydroxide-simethicone 30 mL Oral Q4H PRN Devora L Char, CRNP   benztropine 1 mg Intramuscular Q6H PRN Devora L Char, CRNP   benztropine 1 mg Oral Q6H PRN Devora L Char, CRNP   benztropine 1 mg Oral BID Devora L Char, CRNP   diphenhydrAMINE 25 mg Oral HS Devora L Char, CRNP   divalproex sodium 250 mg Oral Q8H Adrienne Worley MD   haloperidol 5 mg Oral Q6H PRN Devora L Char, CRNP   haloperidol lactate 5 mg Intramuscular Q6H PRN Devora L Char, CRNP   hydrOXYzine HCL 25 mg Oral Q6H PRN Devora L Char, CRNP   LORazepam 1 mg Intramuscular Q6H PRN Devora L Char, CRNP   LORazepam 1 mg Oral Q6H PRN Devora L Char, CRNP   magnesium hydroxide 30 mL Oral Daily PRN Devora L Char, CRNP   melatonin 3 mg Oral HS Devora L Char, CRNP   OLANZapine 5 mg Intramuscular Q6H PRN Devora L Char, CRNP   OLANZapine 5 mg Oral Q6H PRN Devora L Char, CRNP   paliperidone 6 mg Oral Daily Devora L Char, CRNP   risperiDONE 1 mg Oral Q3H PRN Devora L Char, CRNP   traZODone 50 mg Oral HS PRN Devora L Char, CRNP       Schizoaffective disorder, bipolar type ([facility] )

## 2019-11-18 NOTE — PROGRESS NOTES
Assumed care of patient at 1900  Patient wandered into dayroom and took off her top; pt was advised she cannot walk around the unit without clothing  She said "I always do this at home " Pt reminded she is not at home, she is in the hospital  Pt then attempted to remove top again  Pt was escorted to her room and asked to please remain in room for the time being  Will monitor

## 2019-11-18 NOTE — PROGRESS NOTES
Pt observed becoming increasingly tearful, crying in her room, standing in the corners of her room, appears paranoid, scared  She has a scared/fearful look on her face  RN approached pt and she did not want to talk but continued to cry; RN offered pt an Ativan and stated it could help her feel less tense  Pt agreed  Pt took PO PRN Ativan at this time  Will continue to monitor

## 2019-11-18 NOTE — PROGRESS NOTES
Pt is unable to answer any assessment questions; she is extremely disorganized, rambling incoherently, appears suspicious and paranoid  She stared at RN suspiciously while questions were being asked  Pt then lay on her bed and stated "Just don't tell him I am here, he is going to kill me " Pt then stated "She (gesturing to roommate) needs to leave immediately " Pt made aware that room changes cannot be made and she is going to continue to have a roommate tonight  Pt got up from bed and paced around room before going back out into dayroom  She made disorganized statements about "pieces of drugs"  Will continue to closely monitor

## 2019-11-18 NOTE — PROGRESS NOTES
11/18/19 0900   Team Meeting   Meeting Type Daily Rounds   Team Members Present   Team Members Present Physician;Nurse;;; Other (Discipline and Name)   Physician Team Member [facility]- NP   Nursing Team Member [facility]   Social Work Team Member Jeffery Stewart   Patient/Family Present   Patient Present No   Patient's Family Present No     Dr to consider [phone] patient, continue treatment

## 2019-11-19 PROCEDURE — 99232 SBSQ HOSP IP/OBS MODERATE 35: CPT | Performed by: PSYCHIATRY & NEUROLOGY

## 2019-11-19 RX ADMIN — TRAZODONE HYDROCHLORIDE 50 MG: 50 TABLET ORAL at 23:50

## 2019-11-19 RX ADMIN — DIVALPROEX SODIUM 250 MG: 250 TABLET, DELAYED RELEASE ORAL at 21:30

## 2019-11-19 RX ADMIN — MELATONIN TAB 3 MG 3 MG: 3 TAB at 21:30

## 2019-11-19 RX ADMIN — ACETAMINOPHEN 650 MG: 325 TABLET ORAL at 08:34

## 2019-11-19 RX ADMIN — DIPHENHYDRAMINE HCL 25 MG: 25 TABLET ORAL at 21:30

## 2019-11-19 RX ADMIN — DIVALPROEX SODIUM 250 MG: 250 TABLET, DELAYED RELEASE ORAL at 14:18

## 2019-11-19 RX ADMIN — DIVALPROEX SODIUM 250 MG: 250 TABLET, DELAYED RELEASE ORAL at 06:18

## 2019-11-19 NOTE — PROGRESS NOTES
11/19/19 0700   Team Meeting   Meeting Type Daily Rounds   Team Members Present   Team Members Present Physician;Nurse;; Other (Discipline and Name)   Nursing Team Member Zi   Social Work Team Member Shawna Hoyt   Patient/Family Present   Patient Present No   Patient's Family Present No   will continue to adjust and monitor medications

## 2019-11-19 NOTE — CASE MANAGEMENT
Case Management Readmission Assessment    Current Admission Date:  11/14/2019 Previous Admission - Discharge Date:  11/11/19 Number of Days Between Admissions:     Current Admission Diagnosis:  Patient Active Problem List   Diagnosis    Medical clearance for psychiatric admission    Schizoaffective disorder, bipolar type ([facility] )    Tobacco abuse    Obstructive sleep apnea    Hypertriglyceridemia    Previous Discharge Diagnosis:  Schizoaffective disorder, bipolar type Has the patient had more than one readmission in the past 90 days? yes      Assess for all Readmissions:    1  Where were you prior to coming to the hospital for this admission? Home/Self-Care   2  What do you feel caused you to come back into the hospital again? xochitl   3  Did you contact your physician prior to coming to the hospital?  Yes        [facility]   4  Did the physician direct you to come to the Emergency Department? Yes   5  Were all of your needs addressed before you left the hospital? No           Assess for Readmissions from Home, Home with Home Health or AL/PCF/Group Home:    6  Did you have an appointment with your doctor after you were discharged? Yes   7  Were you able to go to the appointment as scheduled?            a   If no, why not? Yes  Ride was provided to partial program     8  Were your Discharge Instructions easy to understand? Yes   9  Were you able to get all of your medications?            a   If no, why not? Yes but did not take them               b   Did you take your medications as they were prescribed?            c,  If no, why not? No  Other doesn't feel they are needed             d   Did you understand the purpose for taking each of your medications? Yes   10  Did you understand how to care for yourself and who to call if you had a                      problem?    Yes     MARY Frausto         November 19, 2019

## 2019-11-19 NOTE — PLAN OF CARE
Problem: Alteration in Thoughts and Perception  Goal: Treatment Goal: Gain control of psychotic behaviors/thinking, reduce/eliminate presenting symptoms and demonstrate improved reality functioning upon discharge  Outcome: Progressing     Problem: Risk for Self Injury/Neglect  Goal: Treatment Goal: Remain safe during length of stay, learn and adopt new coping skills, and be free of self-injurious ideation, impulses and acts at the time of discharge  Outcome: Progressing     Problem: Depression  Goal: Treatment Goal: Demonstrate behavioral control of depressive symptoms, verbalize feelings of improved mood/affect, and adopt new coping skills prior to discharge  Outcome: Progressing  Goal: Verbalize thoughts and feelings  Description  Interventions:  - Assess and re-assess patient's level of risk   - Engage patient in 1:1 interactions, daily, for a minimum of 15 minutes   - Encourage patient to express feelings, fears, frustrations, hopes   Outcome: Progressing     Problem: Anxiety  Goal: Anxiety is at manageable level  Description  Interventions:  - Assess and monitor patient's anxiety level  - Monitor for signs and symptoms (heart palpitations, chest pain, shortness of breath, headaches, nausea, feeling jumpy, restlessness, irritable, apprehensive)  - Collaborate with interdisciplinary team and initiate plan and interventions as ordered    - [place] patient to unit/surroundings  - Explain treatment plan  - Encourage participation in care  - Encourage verbalization of concerns/fears  - Identify coping mechanisms  - Assist in developing anxiety-reducing skills  - Administer/offer alternative therapies  - Limit or eliminate stimulants  Outcome: Progressing     Problem: Alteration in Orientation  Goal: Treatment Goal: Demonstrate a reduction of confusion and improved orientation to person, place, time and/or situation upon discharge, according to optimum baseline/ability  Outcome: Progressing     Problem: Ineffective Coping  Goal: Participates in unit activities  Description  Interventions:  - Provide therapeutic environment   - Provide required programming   - Redirect inappropriate behaviors   Outcome: Progressing

## 2019-11-19 NOTE — PROGRESS NOTES
Patient is calm and pleasant today but a bit disorganized and confused   Pt declined AM scheduled Po medications paliperidone and benztropine, wants to wait until she speaks to the Dr  before continuing  Later 2:15pm I asked her if she spoke to the Dr  yet regarding her medications, she was lost and confused and unable to answer the question, almost as if she couldn't find the words as she tried and then gave up and walked away  I asked her if theres anything else I can get her and she said No   Pt stated she has no SI/HI and A/V hallucinations

## 2019-11-19 NOTE — PROGRESS NOTES
Progress Note - 707 [facility] 51 y o  female MRN: [id number]  Unit/Bed#: [facility] 350-01 Encounter: [id number]    The patient was seen for continuing care and reviewed with treatment team   The patient continues to be restless but to a lesser extent  She had better eye contact  She is concerned about side effects of Cogentin and now that she is off of Haldol, she does not want to be on it  She has agreed to take the combination of Invega and Depakote  Mental Status Evaluation:  Appearance:  Good eye contact and disheveled   Behavior:  calm and cooperative   Mood:  dysphoric   Affect: constricted   Speech: Normal rate and Normal volume   Thought Process:  Goal directed and coherent   Thought Content:  Does not verbalize delusional material   Perceptual Disturbances: Denies hallucinations and does not appear to be responding to internal stimuli   Risk Potential: No suicidal or homicidal ideation   Orientation:        Progress Toward Goals:  Slight improvement      Recommended Treatment: Continue with pharmacotherapy, group therapy, milieu therapy and occupational therapy    The patient will be maintained on the following medications:    Current Facility-Administered Medications:  acetaminophen 650 mg Oral Q6H PRN Devora L Char, CRNP   acetaminophen 650 mg Oral Q4H PRN Devora L Char, CRNP   acetaminophen 975 mg Oral Q6H PRN Devora L Char, CRNP   aluminum-magnesium hydroxide-simethicone 30 mL Oral Q4H PRN Devora L Char, CRNP   benztropine 1 mg Intramuscular Q6H PRN Devora L Char, CRNP   benztropine 1 mg Oral Q6H PRN Devora L Char, CRNP   diphenhydrAMINE 25 mg Oral HS Devora L Char, CRNP   divalproex sodium 250 mg Oral Q8H [address] China Byrd MD   haloperidol 5 mg Oral Q6H PRN Devora L Char, CRNP   haloperidol lactate 5 mg Intramuscular Q6H PRN Devora L Char, CRNP   hydrOXYzine HCL 25 mg Oral Q6H PRN Devora L Char, CRNP   LORazepam 1 mg Intramuscular Q6H PRN Devora L Char, CRNP   LORazepam 1 mg Oral Q6H PRN Devora L Char, CRNP   magnesium hydroxide 30 mL Oral Daily PRN Devora L Char, CRNP   melatonin 3 mg Oral HS Devora L Char, CRNP   OLANZapine 5 mg Intramuscular Q6H PRN Devora L Char, CRNP   OLANZapine 5 mg Oral Q6H PRN Devora L Char, CRNP   paliperidone 6 mg Oral Daily Devora L Char, CRNP   risperiDONE 1 mg Oral Q3H PRN Devora L Char, CRNP   traZODone 50 mg Oral HS PRN Devora L Char, CRNP       Schizoaffective disorder, bipolar type ([facility] )

## 2019-11-20 PROCEDURE — 99232 SBSQ HOSP IP/OBS MODERATE 35: CPT | Performed by: PSYCHIATRY & NEUROLOGY

## 2019-11-20 RX ADMIN — DIVALPROEX SODIUM 250 MG: 250 TABLET, DELAYED RELEASE ORAL at 21:14

## 2019-11-20 RX ADMIN — DIVALPROEX SODIUM 250 MG: 250 TABLET, DELAYED RELEASE ORAL at 06:19

## 2019-11-20 RX ADMIN — DIVALPROEX SODIUM 250 MG: 250 TABLET, DELAYED RELEASE ORAL at 14:08

## 2019-11-20 RX ADMIN — PALIPERIDONE 6 MG: 6 TABLET, FILM COATED, EXTENDED RELEASE ORAL at 09:14

## 2019-11-20 RX ADMIN — MELATONIN TAB 3 MG 3 MG: 3 TAB at 21:14

## 2019-11-20 RX ADMIN — DIPHENHYDRAMINE HCL 25 MG: 25 TABLET ORAL at 21:14

## 2019-11-20 NOTE — PROGRESS NOTES
[facility] GROUP NOTE     11/20/19 1000   Activity/Group Checklist   Group Life Skills  (Letting Go)   Attendance Attended   Attendance Duration (min) 31-45   Interactions Interacted appropriately   Affect/Mood Appropriate  (Increased organization in thoughts and speech  )   Goals Achieved Able to listen to others; Able to engage in interactions  (Sits among peers, socializing minimally  )

## 2019-11-20 NOTE — PROGRESS NOTES
Patient is pleasant today but still has a confused look about her  When I asked if there was anything I could do for her or get her she stated No but hesitates a bit while answering  Pt stated her appetite has been good and she slept ok but was a little cold  Pt was cooperative today and took her medications  Pt stated she is having no SI/HI or A/V hallucinations

## 2019-11-20 NOTE — PROGRESS NOTES
Patient approached the nursing station around [phone] and requested a PRN to assist with insomnia  Patient was given a PRN of Trazodone around 2350, and PRN appeared to be effective around 0050 for patient was noted to be lying in bed with eyes closed

## 2019-11-20 NOTE — PLAN OF CARE
Problem: Alteration in Thoughts and Perception  Goal: Treatment Goal: Gain control of psychotic behaviors/thinking, reduce/eliminate presenting symptoms and demonstrate improved reality functioning upon discharge  Outcome: Progressing     Problem: Risk for Self Injury/Neglect  Goal: Treatment Goal: Remain safe during length of stay, learn and adopt new coping skills, and be free of self-injurious ideation, impulses and acts at the time of discharge  Outcome: Progressing     Problem: Depression  Goal: Treatment Goal: Demonstrate behavioral control of depressive symptoms, verbalize feelings of improved mood/affect, and adopt new coping skills prior to discharge  Outcome: Progressing  Goal: Verbalize thoughts and feelings  Description  Interventions:  - Assess and re-assess patient's level of risk   - Engage patient in 1:1 interactions, daily, for a minimum of 15 minutes   - Encourage patient to express feelings, fears, frustrations, hopes   Outcome: Progressing     Problem: Anxiety  Goal: Anxiety is at manageable level  Description  Interventions:  - Assess and monitor patient's anxiety level  - Monitor for signs and symptoms (heart palpitations, chest pain, shortness of breath, headaches, nausea, feeling jumpy, restlessness, irritable, apprehensive)  - Collaborate with interdisciplinary team and initiate plan and interventions as ordered    - [place] patient to unit/surroundings  - Explain treatment plan  - Encourage participation in care  - Encourage verbalization of concerns/fears  - Identify coping mechanisms  - Assist in developing anxiety-reducing skills  - Administer/offer alternative therapies  - Limit or eliminate stimulants  Outcome: Progressing     Problem: Alteration in Orientation  Goal: Treatment Goal: Demonstrate a reduction of confusion and improved orientation to person, place, time and/or situation upon discharge, according to optimum baseline/ability  Outcome: Progressing     Problem: Ineffective Coping  Goal: Participates in unit activities  Description  Interventions:  - Provide therapeutic environment   - Provide required programming   - Redirect inappropriate behaviors   Outcome: Progressing

## 2019-11-20 NOTE — PROGRESS NOTES
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

## 2019-11-20 NOTE — CASE MANAGEMENT
SW gave Doctor  authorization form for [facility] for insurance to be completed and returned to

## 2019-11-20 NOTE — PROGRESS NOTES
Progress Note - 707 [facility] 51 y o  female MRN: [id number]  Unit/Bed#: [facility] 350-01 Encounter: [id number]    The patient was seen for continuing care and reviewed with treatment team   Her thought process is more organized  Reports that she did not sleep too well last night because she was cold  Nursing staff reports that the patient was noted to be sleeping naked  She has been inconsistent taking Invega  I discussed with her long-acting Jn Sour and she is agreeable to take that  Current Mental Status Evaluation:  Appearance:  Good eye contact and disheveled   Behavior:  calm and cooperative   Mood:  anxious and depressed   Affect: blunted and constricted   Speech: Normal rate and Normal volume   Thought Process:  Goal directed and coherent   Thought Content:  Does not verbalize delusional material   Perceptual Disturbances: Denies hallucinations and does not appear to be responding to internal stimuli   Risk Potential: No suicidal or homicidal ideation   Orientation:        Progress Toward Goals:  Slight improvement noted      Recommended Treatment:  As soon as we obtain authorization for [place]ub[place] Sour, she will be started on that  Continue with pharmacotherapy, group therapy, milieu therapy and occupational therapy    The patient will be maintained on the following medications:    Current Facility-Administered Medications:  acetaminophen 650 mg Oral Q6H PRN Devora L Char, CRNP   acetaminophen 650 mg Oral Q4H PRN Devora L Char, CRNP   acetaminophen 975 mg Oral Q6H PRN Devora L Char, CRNP   aluminum-magnesium hydroxide-simethicone 30 mL Oral Q4H PRN Devora L Char, CRNP   benztropine 1 mg Intramuscular Q6H PRN Devora L Char, CRNP   benztropine 1 mg Oral Q6H PRN Devora L Char, CRNP   diphenhydrAMINE 25 mg Oral HS Devora L Char, CRNP   divalproex sodium 250 mg Oral Q8H [facility], MD   haloperidol 5 mg Oral Q6H PRN Devora L Char, CRNP haloperidol lactate 5 mg Intramuscular Q6H PRN Devora L Char, CRNP   hydrOXYzine HCL 25 mg Oral Q6H PRN Devora L Char, CRNP   LORazepam 1 mg Intramuscular Q6H PRN Devora L Char, CRNP   LORazepam 1 mg Oral Q6H PRN Devora L Char, CRNP   magnesium hydroxide 30 mL Oral Daily PRN Devora L Char, CRNP   melatonin 3 mg Oral HS Devora L Char, CRNP   OLANZapine 5 mg Intramuscular Q6H PRN Devora L Char, CRNP   OLANZapine 5 mg Oral Q6H PRN Devora L Char, CRNP   paliperidone 6 mg Oral Daily Devora L Char, CRNP   risperiDONE 1 mg Oral Q3H PRN Devora L Char, CRNP   traZODone 50 mg Oral HS PRN Devora L Char, CRNP       Schizoaffective disorder, bipolar type ([facility]

## 2019-11-20 NOTE — PROGRESS NOTES
Pt denies all symptoms  Pt stated "I want to call my daughter or sister I just feel really disconnected from my family " Pt appears less disorganized this evening  Pt is about the unit and social with peers  Pt is calm and coooprative  Will monitor

## 2019-11-20 NOTE — PROGRESS NOTES
11/20/19 0800   Team Meeting   Meeting Type Daily Rounds   Team Members Present   Team Members Present Physician;Nurse;; Other (Discipline and Name)   Physician Team Member [facility]    Nursing Team Member [place] Work Team Member Jesse Majano   Other (Discipline and Name) PAULINA Gamboa   Patient/Family Present   Patient Present No   Patient's Family Present No      to look into [place] Q Monthly IM  Will continue to monitor

## 2019-11-20 NOTE — PROGRESS NOTES
11/20/19 1100   Activity/Group Checklist   Group   (recovery group)   Attendance Attended   Attendance Duration (min) 46-60   Interactions Interacted appropriately   Affect/Mood Appropriate   Goals Achieved Able to listen to others; Able to engage in interactions; Able to recieve feedback; Able to self-disclose   Risk taking and empathy

## 2019-11-21 PROCEDURE — 99232 SBSQ HOSP IP/OBS MODERATE 35: CPT | Performed by: PSYCHIATRY & NEUROLOGY

## 2019-11-21 RX ADMIN — DIVALPROEX SODIUM 250 MG: 250 TABLET, DELAYED RELEASE ORAL at 14:02

## 2019-11-21 RX ADMIN — MELATONIN TAB 3 MG 3 MG: 3 TAB at 21:09

## 2019-11-21 RX ADMIN — PALIPERIDONE 6 MG: 6 TABLET, FILM COATED, EXTENDED RELEASE ORAL at 08:02

## 2019-11-21 RX ADMIN — DIPHENHYDRAMINE HCL 25 MG: 25 TABLET ORAL at 21:09

## 2019-11-21 RX ADMIN — DIVALPROEX SODIUM 250 MG: 250 TABLET, DELAYED RELEASE ORAL at 06:30

## 2019-11-21 RX ADMIN — PALIPERIDONE PALMITATE 234 MG: 234 INJECTION INTRAMUSCULAR at 14:02

## 2019-11-21 RX ADMIN — DIVALPROEX SODIUM 250 MG: 250 TABLET, DELAYED RELEASE ORAL at 21:09

## 2019-11-21 NOTE — PROGRESS NOTES
[facility] GROUP NOTE     11/21/19 1000   Activity/Group Checklist   Group Community meeting   Attendance Attended   Attendance Duration (min) 16-30   Interactions Disorganized interaction   Affect/Mood Other (Comment)  (Tangential, Hyperverbal  Redirectable )   Objectives/Key Points:  Living intentionally  Goal Setting  Thought for the Day  Self Check In

## 2019-11-21 NOTE — PROGRESS NOTES
When this writer approached the patient with the AM Depakote she initially refused it  Pt did take the medication but wants to discuss stopping it with the doctor

## 2019-11-21 NOTE — PROGRESS NOTES
Patient was given Paliperidone Palmitate ER (INVEGA) IM injection 234 mg in right deltoid  Pt compliant with medication

## 2019-11-21 NOTE — PROGRESS NOTES
11/20/19 1415   Activity/Group Checklist   Group Other (Comment)  (Art Therapy Process Group/Visual Introduction, Discussion)   Attendance Attended   Attendance Duration (min) Greater than 60   Interactions Disorganized interaction  (Fragmentation present in artwork)   Affect/Mood Appropriate   Goals Achieved Able to listen to others; Able to engage in interactions; Able to recieve feedback; Able to give feedback to another  (Able to engage materials; full participation)

## 2019-11-21 NOTE — CASE MANAGEMENT
Spoke with Ric Villalta (sister) and updated her on Pt progress  [phone]   Sister would like her to be discharged to her home where Pt attended day program previously [facility]  MARY explained Pt must agree to being discharged to that location  Sister is stating Pt is in agreement  Sister has assisted Pt in getting better many times in the past and appears very supportive

## 2019-11-21 NOTE — PROGRESS NOTES
11/21/19 0800   Team Meeting   Meeting Type Daily Rounds   Team Members Present   Team Members Present Physician;Nurse;; Other (Discipline and Name)   Physician Team Member Ailyn Barrientos   Nursing Team Member Minnie Ma   Social Work Team Member Rolan Alfaro   Other (Discipline and Name) SPEEDY Pardo and Nelsy Bolton; 3 nursing students   Patient/Family Present   Patient Present No   Patient's Family Present No     Long-term goal is to get patient on CHAWLA

## 2019-11-21 NOTE — CASE MANAGEMENT
Spoke with Pt daughter Eusebio  Daughter continues to state she was not informed of Pt last discharge and was upset her mother was sent home in "that condition"  SHERIE did inform Pt via phone call the Friday before discharge due to finding out insurance was not covering  Pt was referred to partial program through [facility]  Daughter is under the assumption that due to Pt mental health Pt is not able to make her own decisions  Sherie explained this is not the case and without legal documentation Pt is in charge of her own services, whom hospital can release information to etc    Sherie informed daughter that Pt is progressing, taking Invega IM and hopefully Pt will begin to be less delusional in the next days  Daughter stated she does not want her mother released into her own care and that her mother needs to go to her sister's home in [place] upon discharge  SHERIE again explained that Pt would have to be in agreement to being discharged to partial in [place]  SHERIE explained a family meeting could be held closer to that time  Daughter stated she lives in [place] and has a full-time job  SHERIE encouraged her to visit and make time for discharge planning meeting if she was unhappy with the results of last discharges  SHERIE offered her schedule and welcomed daughter to let nursing staff know when she does visit so parties could meet face to face  SHERIE gave daughter Pt doctors information

## 2019-11-21 NOTE — PROGRESS NOTES
Patient has been seclusive to room today, I asked pt if she would like to come out to eat lunch to which she responded yes  I asked pt how she is and she stated "very confused today"  Also stated she's cold and took 2 showers and her hair is wet  She stated she would like to speak to her friend Rona Hui who is her Marcine Mylar but he may be dying of cancer and doesn't want to bother him if he's dying  Also if he's gonna die she doesn't want his mother bothering him to go to work  She was also very thankful I was there to help her  She did state she hears no voices and has no plans to hurt herself or anyone else  Pt was compliant with medication this morning

## 2019-11-21 NOTE — PLAN OF CARE
Problem: Alteration in Thoughts and Perception  Goal: Treatment Goal: Gain control of psychotic behaviors/thinking, reduce/eliminate presenting symptoms and demonstrate improved reality functioning upon discharge  Outcome: Progressing     Problem: Risk for Self Injury/Neglect  Goal: Treatment Goal: Remain safe during length of stay, learn and adopt new coping skills, and be free of self-injurious ideation, impulses and acts at the time of discharge  Outcome: Progressing     Problem: Depression  Goal: Treatment Goal: Demonstrate behavioral control of depressive symptoms, verbalize feelings of improved mood/affect, and adopt new coping skills prior to discharge  Outcome: Progressing  Goal: Verbalize thoughts and feelings  Description  Interventions:  - Assess and re-assess patient's level of risk   - Engage patient in 1:1 interactions, daily, for a minimum of 15 minutes   - Encourage patient to express feelings, fears, frustrations, hopes   Outcome: Progressing     Problem: Anxiety  Goal: Anxiety is at manageable level  Description  Interventions:  - Assess and monitor patient's anxiety level  - Monitor for signs and symptoms (heart palpitations, chest pain, shortness of breath, headaches, nausea, feeling jumpy, restlessness, irritable, apprehensive)  - Collaborate with interdisciplinary team and initiate plan and interventions as ordered    - [place] patient to unit/surroundings  - Explain treatment plan  - Encourage participation in care  - Encourage verbalization of concerns/fears  - Identify coping mechanisms  - Assist in developing anxiety-reducing skills  - Administer/offer alternative therapies  - Limit or eliminate stimulants  Outcome: Progressing     Problem: Alteration in Orientation  Goal: Treatment Goal: Demonstrate a reduction of confusion and improved orientation to person, place, time and/or situation upon discharge, according to optimum baseline/ability  Outcome: Progressing     Problem: Ineffective Coping  Goal: Participates in unit activities  Description  Interventions:  - Provide therapeutic environment   - Provide required programming   - Redirect inappropriate behaviors   Outcome: Progressing

## 2019-11-21 NOTE — PROGRESS NOTES
Progress Note - [address] [place] [address] 51 y o  female MRN: [id number]  Unit/Bed#: [facility] 350-01 Encounter: [id number]    The patient was seen for continuing care and reviewed with treatment team She appears to be anxious and disorganized in thought process  She did sleep last night  Oral intake has been adequate  Although she denies hallucinations, she appeared to be responding to internal stimuli  She was also scanning the environment occasionally  Current Mental Status Evaluation:  Appearance:  Poor eye contact and disheveled   Behavior:  evasive, guarded and restless and fidgety   Mood:  depressed and dysphoric   Affect: blunted, constricted and mood-congruent   Speech: Normal volume and Monotonous   Thought Process:  disorganized/tangential   Thought Content:  Paranoid and mistrustful   Perceptual Disturbances: Appears to be responding to internal stimuli   Risk Potential: No suicidal or homicidal ideation   Orientation:        Progress Toward Goals:   Principal Problem:    Schizoaffective disorder, bipolar type ([facility] [address] )  Active Problems:    Tobacco abuse      Recommended Treatment: Will start [facility]  Continue with pharmacotherapy, group therapy, milieu therapy and occupational therapy    The patient will be maintained on the following medications:    Current Facility-Administered Medications:  acetaminophen 650 mg Oral Q6H PRN Devora L Char, CRNP   acetaminophen 650 mg Oral Q4H PRN Devora L Char, CRNP   acetaminophen 975 mg Oral Q6H PRN Devora L Char, CRNP   aluminum-magnesium hydroxide-simethicone 30 mL Oral Q4H PRN Devora L Char, CRNP   benztropine 1 mg Intramuscular Q6H PRN Devora L Char, CRNP   benztropine 1 mg Oral Q6H PRN Devora L Char, CRNP   diphenhydrAMINE 25 mg Oral HS Devora L Char, CRNP   divalproex sodium 250 mg Oral Q8H Viktor Castañeda MD   haloperidol 5 mg Oral Q6H PRN Devora L Char, CRNP   haloperidol lactate 5 mg Intramuscular Q6H PRN Devora L Char, CRNP   hydrOXYzine HCL 25 mg Oral Q6H PRN Devora L Char, CRNP   LORazepam 1 mg Intramuscular Q6H PRN Devora L Char, CRNP   LORazepam 1 mg Oral Q6H PRN Devora L Char, CRNP   magnesium hydroxide 30 mL Oral Daily PRN Devora L Char, CRNP   melatonin 3 mg Oral HS Devora L Char, CRNP   OLANZapine 5 mg Intramuscular Q6H PRN Devora L Char, CRNP   OLANZapine 5 mg Oral Q6H PRN Devora L Char, CRNP   paliperidone 6 mg Oral Daily Devora L Char, CRNP   risperiDONE 1 mg Oral Q3H PRN Devora L Char, CRNP   traZODone 50 mg Oral HS PRN Devora L Char, CRNP

## 2019-11-21 NOTE — PROGRESS NOTES
Pt is calm and cooperative  Pt is about the unit and social with peers  Pt exhibits bizarre behavior at time  Pt was asked if she had any AH/VH pt stated "Not to bad right now, thank you for asking " Pt denies all other symptoms  Will monitor

## 2019-11-22 PROCEDURE — 99233 SBSQ HOSP IP/OBS HIGH 50: CPT | Performed by: PSYCHIATRY & NEUROLOGY

## 2019-11-22 RX ORDER — DIVALPROEX SODIUM 500 MG/1
500 TABLET, DELAYED RELEASE ORAL
Status: DISCONTINUED | OUTPATIENT
Start: 2019-11-22 | End: 2019-12-02 | Stop reason: HOSPADM

## 2019-11-22 RX ORDER — DIVALPROEX SODIUM 250 MG/1
250 TABLET, DELAYED RELEASE ORAL 2 TIMES DAILY
Status: DISCONTINUED | OUTPATIENT
Start: 2019-11-22 | End: 2019-12-02 | Stop reason: HOSPADM

## 2019-11-22 RX ADMIN — DIVALPROEX SODIUM 250 MG: 250 TABLET, DELAYED RELEASE ORAL at 06:01

## 2019-11-22 RX ADMIN — PALIPERIDONE 6 MG: 6 TABLET, FILM COATED, EXTENDED RELEASE ORAL at 08:17

## 2019-11-22 RX ADMIN — DIVALPROEX SODIUM 250 MG: 250 TABLET, DELAYED RELEASE ORAL at 18:11

## 2019-11-22 RX ADMIN — MELATONIN TAB 3 MG 3 MG: 3 TAB at 21:27

## 2019-11-22 RX ADMIN — DIPHENHYDRAMINE HCL 25 MG: 25 TABLET ORAL at 21:27

## 2019-11-22 RX ADMIN — DIVALPROEX SODIUM 500 MG: 500 TABLET, DELAYED RELEASE ORAL at 21:27

## 2019-11-22 NOTE — PROGRESS NOTES
11/22/19 0800   Team Meeting   Meeting Type Daily Rounds   Team Members Present   Team Members Present Physician;Nurse;; Other (Discipline and Name)   Physician Team Member [facility]   Nursing Team Member [facility] Management Team Member Lakesha   Social Work Team Member Ozzy Huerta   Other (Discipline and Name) SPEEDY Guy   Patient/Family Present   Patient Present No   Patient's Family Present No   Medication managment

## 2019-11-22 NOTE — PROGRESS NOTES
Patient was compliant with Invega this morning  Pressured  Remains delusional  Began telling RN that she knew one day they would "be friends" and would remember all the "fun stories she told in the cafeteria"  About the unit  Patient is currently denying AH/VH  Will monitor

## 2019-11-22 NOTE — PROGRESS NOTES
Patient approached staff saying she wanted to sign a 72 hour notice  When asked why she was thinking about doing that she said she really missed her family and she wanted to be with them at [facility]  She said she didn't want to go to a shelter and she didn't want to be homeless  She was tearful and she was reminded that if psychiatrist did not agree with her and she would not rescind the 72hour letter when she met with psychiatrist then there was a possibility she could still become an involuntary patient  She said she was  thinking about the 72 hour letter because she was confused and then she asked if her daughter could come and take her out for [facility]  She was informed that we don"t have people go home for a day and then come back  She said "so I'll be discharged when the psychiatrist thinks Im ready "  She was made aware that if her daughter was available to visit on Thursday that we will also have afternoon visiting between 2-4pm   Patient sobbed and was distressed  She did not want to continue speaking at that point

## 2019-11-22 NOTE — PROGRESS NOTES
11/21/19 1415   Activity/Group Checklist   Group Other (Comment)  (Art Therapy Process Group/Behind the Door, Discussion)   Attendance Attended   Attendance Duration (min) Greater than 60   Interactions Disorganized interaction  (Required frequent redirection for inappropriate comments)   Affect/Mood Constricted; Appropriate   Goals Achieved Able to listen to others; Able to engage in interactions; Able to recieve feedback  (Able to engage materials; remains disorganized)

## 2019-11-22 NOTE — PROGRESS NOTES
11/22/19 1100   Activity/Group Checklist   Group   (recovery group)   Attendance Attended   Attendance Duration (min) 46-60   Interactions Disorganized interaction   Affect/Mood Blunted/flat   Goals Achieved Able to self-disclose; Able to recieve feedback; Able to listen to others; Able to engage in interactions

## 2019-11-22 NOTE — PROGRESS NOTES
Progress Note - [address] [facility] 51 y o  female MRN: [id number]  Unit/Bed#: -01 Encounter: [id number]    Assessment/Plan   Principal Problem:    Schizoaffective disorder, bipolar type ([facility] [address] )  Active Problems:    Tobacco abuse    Recommended Treatment:   - Continue on Depakote 250 mg Q8H  - Received Invega 234 mg IM yesterday  Will receive Invega 156 mg IM next week  - Continue with group therapy, milieu therapy and occupational therapy  Progress Toward Goals: Slow improvement    ------------------------------------------------------------    Subjective:   Alayna was seen for continuing care and her case was discussed in treatment team  Patient received Invega 234mg IM yesterday  Today, she states that she is "feeling better " She is eating well and her sleep has improved using the CPAP machine  She told writer that she had an X-Ray at [facility] ~ 1 month ago after she swallowed a ring and asked if any cancer was found  She reports that she has a devil and shameka talking to her today  The devil, who she identifies to be a co-worker named Shawn Cisse was telling her that she had cancer, while Cody Hernandez, the shameka, was telling her to keep calm  She states that the voices are better today compared to yesterday  Patient denies suicidal ideation/intent/plan  She denies homicidal ideation and visual hallucinations  Matt Harding appears to be confused and disorganized today  Behavior over the last 24 hours:  Unchanged   Sleep: Better with CPAP  Appetite: Good  Medication side effects: No  ROS: No complaints     Mental Status Evaluation:  Appearance:  Age-appropriate, lying in bed, dressed casually   Behavior:  Pleasant, co-operative, good eye contact   Speech:  Clear, spontaneous, normal volume, pitch and tone    Mood:  "Feeling better"   Affect:  Blunted, mood incongruent    Thought Process:  Disorganized, flight of ideas    Thought Content:  Preoccupied on whether she has cancer   Distrustful Perceptual Disturbances: Positive for auditory hallucinations  Denies visual hallucinations  Appears to be internally preoccupied    Risk Potential: Denies active or passive suicidal and homicidal ideations    Sensorium:  Oriented to person and place    Cognition:  Grossly intact    Consciousness:  Awake, drowsy    Attention: attention span appeared shorter than expected for age   Insight:  Poor    Judgment: Poor    Gait/Station: Normal balance, normal gait/station   Motor Activity: No abnormal movements observed      Risks, benefits and possible side effects of Medications:   Risks, benefits, and possible side effects of medications explained to patient and patient verbalizes understanding  Medications: all current active meds have been reviewed and continue current psychiatric medications      Labs: No new labs

## 2019-11-22 NOTE — PROGRESS NOTES
Pt requesting to use her Cpap machine tonight, nurse assisted pt with Cpap machine, pt resting comfortably in bed  Will monitor

## 2019-11-22 NOTE — CASE MANAGEMENT
Pt was pleasant in demeanor but was disorganized and appeared tired  She was frequently on the phone and states she was speaking to her various friends and also left messages with her sister  Pt was somatically preoccupied and spoke of multiple medical issues she feels she may have and states medical professionals are checking on her for all of these various medical problems   Pt becomes overwhelmed during conversations and then likes to end the conversation and do things alone such as listening to the radio while drawing, etc

## 2019-11-22 NOTE — PLAN OF CARE
Problem: Alteration in Thoughts and Perception  Goal: Treatment Goal: Gain control of psychotic behaviors/thinking, reduce/eliminate presenting symptoms and demonstrate improved reality functioning upon discharge  Outcome: Progressing     Problem: Risk for Self Injury/Neglect  Goal: Treatment Goal: Remain safe during length of stay, learn and adopt new coping skills, and be free of self-injurious ideation, impulses and acts at the time of discharge  Outcome: Progressing     Problem: Depression  Goal: Treatment Goal: Demonstrate behavioral control of depressive symptoms, verbalize feelings of improved mood/affect, and adopt new coping skills prior to discharge  Outcome: Progressing  Goal: Verbalize thoughts and feelings  Description  Interventions:  - Assess and re-assess patient's level of risk   - Engage patient in 1:1 interactions, daily, for a minimum of 15 minutes   - Encourage patient to express feelings, fears, frustrations, hopes   Outcome: Progressing     Problem: Anxiety  Goal: Anxiety is at manageable level  Description  Interventions:  - Assess and monitor patient's anxiety level  - Monitor for signs and symptoms (heart palpitations, chest pain, shortness of breath, headaches, nausea, feeling jumpy, restlessness, irritable, apprehensive)  - Collaborate with interdisciplinary team and initiate plan and interventions as ordered    - [place] patient to unit/surroundings  - Explain treatment plan  - Encourage participation in care  - Encourage verbalization of concerns/fears  - Identify coping mechanisms  - Assist in developing anxiety-reducing skills  - Administer/offer alternative therapies  - Limit or eliminate stimulants  Outcome: Progressing     Problem: Alteration in Orientation  Goal: Treatment Goal: Demonstrate a reduction of confusion and improved orientation to person, place, time and/or situation upon discharge, according to optimum baseline/ability  Outcome: Progressing     Problem: DISCHARGE PLANNING  Goal: Discharge to home or other facility with appropriate resources  Description  INTERVENTIONS:  - Identify barriers to discharge w/patient and caregiver  - Arrange for needed discharge resources and transportation as appropriate  - Identify discharge learning needs (meds, wound care, etc )  - Arrange for interpretive services to assist at discharge as needed  - Refer to Case Management Department for coordinating discharge planning if the patient needs post-hospital services based on physician/advanced practitioner order or complex needs related to functional status, cognitive ability, or social support system  Outcome: Progressing     Problem: Ineffective Coping  Goal: Participates in unit activities  Description  Interventions:  - Provide therapeutic environment   - Provide required programming   - Redirect inappropriate behaviors   Outcome: Progressing     Problem: BEHAVIOR  Goal: Pt/Family maintain appropriate behavior and adhere to behavioral management agreement, if implemented  Description  INTERVENTIONS:  - Assess the family dynamic   - Encourage verbalization of thoughts and concerns in a socially appropriate manner  - Assess patient/family's coping skills and non-compliant behavior (including use of illegal substances)  - Utilize positive, consistent limit setting strategies supporting safety of patient, staff and others  - Initiate consult with Case Management, Spiritual Care or other ancillary services as appropriate  - If a patient's/visitor's behavior jeopardizes the safety of the patient, staff, or others, refer to organization procedure     - Notify Security of behavior or suspected illegal substances which indicate the need for search of the patient and/or belongings  - Encourage participation in the decision making process about a behavioral management agreement; implement if patient meets criteria  Outcome: Progressing

## 2019-11-23 PROCEDURE — 99232 SBSQ HOSP IP/OBS MODERATE 35: CPT | Performed by: NURSE PRACTITIONER

## 2019-11-23 RX ADMIN — MELATONIN TAB 3 MG 3 MG: 3 TAB at 21:09

## 2019-11-23 RX ADMIN — DIVALPROEX SODIUM 500 MG: 500 TABLET, DELAYED RELEASE ORAL at 21:09

## 2019-11-23 RX ADMIN — LORAZEPAM 1 MG: 1 TABLET ORAL at 13:50

## 2019-11-23 RX ADMIN — DIVALPROEX SODIUM 250 MG: 250 TABLET, DELAYED RELEASE ORAL at 08:36

## 2019-11-23 RX ADMIN — DIPHENHYDRAMINE HCL 25 MG: 25 TABLET ORAL at 21:09

## 2019-11-23 RX ADMIN — DIVALPROEX SODIUM 250 MG: 250 TABLET, DELAYED RELEASE ORAL at 18:10

## 2019-11-23 RX ADMIN — PALIPERIDONE 6 MG: 6 TABLET, FILM COATED, EXTENDED RELEASE ORAL at 08:35

## 2019-11-23 NOTE — PROGRESS NOTES
Client out in the milieu and approached with feelings of anxiety  Client was given 1mg ativan and refused the haldol despite pt education of combined medication effect benefits  Will monitor and reassess client in an hour for changes

## 2019-11-23 NOTE — PROGRESS NOTES
When staff were doing room checks she told them she has a [employer] boyfriend which is why she sleeps naked   She went on to say that "if you sleep without your underpants you get orgasms you don't really want from dead people " The [employer] boyfriend is going to  her in hospital  "The m[employer]ons are going to take 12 patients and sell them into slavery "  She then began to talk about the 3 people she is "in love with "

## 2019-11-23 NOTE — PROGRESS NOTES
11/23/19 1030   Activity/Group Checklist   Group Other (Comment)  (Surviving Trauma Group/Education, Discussion)   Attendance Attended   Attendance Duration (min) Greater than 60   Interactions Interacted appropriately  (Required mild redirecting)   Affect/Mood Appropriate   Goals Achieved Able to engage in interactions; Able to listen to others; Able to recieve feedback; Able to give feedback to another

## 2019-11-23 NOTE — PROGRESS NOTES
Client observed out in the milieu pleasant and interacting with other clients  Client sts that she does hear voices kind of like a devil and an shameka  Client sts tht she is clearer minded and feels better  Client denies any other symptoms  Client expresses that she wants to sign an DENNIS for 2 additional people   Client should continue to be monitored when dialing out on the phone

## 2019-11-23 NOTE — PROGRESS NOTES
Re-assessed client after administration of Ativan given for anxiety  Client is resting in bed and states that she feels "much better"  Will continue to monitor client

## 2019-11-23 NOTE — PROGRESS NOTES
Progress Note - [address] [facility] 51 y o  female MRN: [id number]  Unit/Bed#: [facility] 350-01 Encounter: [id number]    The patient was seen for continuing care and reviewed with treatment team   Staff reports no significant change in the past 24 hours  Patient was observed sitting in the dining room watching TV visible and social with peers  She is pleasant and cooperative upon approach, displays good eye contact with constricted affect  She is currently rating her depression as a 5/10 with moderate anxiety  Patient relates her increased depression to her boyfriend of 14 years having stage IV cancer  She is currently denying any suicidal/homicidal ideation or auditory/visual hallucinations and does not appear to be responding to internal stimuli at this time  She is reporting better sleep when she wears her CPAP with adequate appetite  She also stated her anxiety is high due to her relationship with a gentleman who works in this hospital   Patient stated he continuously tells her that she needs to get over her boyfriend of 14 years which she is unable to do as she states I love him and he has not gone yet"  She is compliant with medications and no side effects noted at this time  Patient states Ativan is very effective with controlling her anxiety  Current Depakote level 97 9 (11/11/19) and at a therapeutic level      Mental Status Evaluation:  Appearance:  Adequate hygiene and grooming   Behavior:  calm, cooperative and friendly   Mood:  anxious and depressed   Affect: constricted   Speech: Normal rate and Normal volume   Thought Process:  Goal directed and coherent   Thought Content:  Does not verbalize delusional material   Perceptual Disturbances: Denies hallucinations and does not appear to be responding to internal stimuli   Risk Potential: No suicidal or homicidal ideation   Attention/Concentration attention span and concentration were age appropriate   Orientation:   Alert and awake Gait/Station: normal gait/station and normal balance   Motor Activity: No abnormal movement noted     Progress Toward Goals: Improving    Assessment/Plan    Principal Problem:    Schizoaffective disorder, bipolar type ([facility])  Active Problems:    Tobacco abuse      Recommended Treatment:   1  Continue with pharmacotherapy, group therapy, milieu therapy and occupational therapy  2  Continue with safety checks per unit protocol  3  Continue with phone restrictions at this time  4  Continue with Depakote level monitoring  5  No medication changes at this time   The patient will be maintained on the following medications:    Current Facility-Administered Medications:  acetaminophen 650 mg Oral Q6H PRN Devora L Char, CRNP   acetaminophen 650 mg Oral Q4H PRN Devora L Char, CRNP   acetaminophen 975 mg Oral Q6H PRN Devora L Char, CRNP   aluminum-magnesium hydroxide-simethicone 30 mL Oral Q4H PRN Devora L Char, CRNP   benztropine 1 mg Intramuscular Q6H PRN Devora L Char, CRNP   benztropine 1 mg Oral Q6H PRN Dveora L Char, CRNP   diphenhydrAMINE 25 mg Oral HS Devora L Char, CRNP   divalproex sodium 250 mg Oral BID Chris Jones MD   divalproex sodium 500 mg Oral HS Chris Jones MD   haloperidol 5 mg Oral Q6H PRN Devora L Char, CRNP   haloperidol lactate 5 mg Intramuscular Q6H PRN Devora L Char, CRNP   hydrOXYzine HCL 25 mg Oral Q6H PRN Devora L Char, CRNP   LORazepam 1 mg Intramuscular Q6H PRN Devora L Char, CRNP   LORazepam 1 mg Oral Q6H PRN Devora L Char, CRNP   magnesium hydroxide 30 mL Oral Daily PRN Devora L Char, CRNP   melatonin 3 mg Oral HS Devora L Char, CRNP   OLANZapine 5 mg Intramuscular Q6H PRN Devora L Char, CRNP   OLANZapine 5 mg Oral Q6H PRN Devora L Char, CRNP   paliperidone 6 mg Oral Daily Devora L Char, CRNP   paliperidone palmitate  mg Intramuscular Q30 Days Chris Jones MD   risperiDONE 1 mg Oral Q3H PRN PAULINA Richards   traZODone 50 mg Oral HS PRN PAULINA Richards       Risks, benefits and possible side effects of Medications:   Risks, benefits, and possible side effects of medications explained to patient and patient verbalizes understanding  PAULINA Trejo      Portions of the record may have been created with voice recognition software  Occasional wrong word or "sound a like" substitutions may have occurred due to the inherent limitations of voice recognition software  Read the chart carefully and recognize, using context, where substitutions have occurred

## 2019-11-24 PROCEDURE — 99232 SBSQ HOSP IP/OBS MODERATE 35: CPT | Performed by: NURSE PRACTITIONER

## 2019-11-24 RX ADMIN — MELATONIN TAB 3 MG 3 MG: 3 TAB at 21:09

## 2019-11-24 RX ADMIN — DIPHENHYDRAMINE HCL 25 MG: 25 TABLET ORAL at 21:09

## 2019-11-24 RX ADMIN — DIVALPROEX SODIUM 500 MG: 500 TABLET, DELAYED RELEASE ORAL at 21:09

## 2019-11-24 RX ADMIN — DIVALPROEX SODIUM 250 MG: 250 TABLET, DELAYED RELEASE ORAL at 08:39

## 2019-11-24 RX ADMIN — PALIPERIDONE 6 MG: 6 TABLET, FILM COATED, EXTENDED RELEASE ORAL at 08:39

## 2019-11-24 RX ADMIN — DIVALPROEX SODIUM 250 MG: 250 TABLET, DELAYED RELEASE ORAL at 18:07

## 2019-11-24 NOTE — PROGRESS NOTES
Client observed out in the milieu socializing with staff and other clients  Client is positively speaking about feeling cognitively "clear"  Client sts that she feels like she can think better and is noticing an improvement in her thought process  Client denies any and all symptoms at this time  Client sts that she will complete puzzles to keep her mind clear  Client sts that she will rest today and utilize music for a distraction if she feels "some way"  Client reassured and encouraged

## 2019-11-24 NOTE — PROGRESS NOTES
Progress Note - 707 [facility] 51 y o  female MRN: [id number]  Unit/Bed#: [facility] 350-01 Encounter: [id number]    The patient was seen for continuing care and reviewed with treatment team   Staff reports no significant change in the past 24 hours  Patient was observed isolative to herself in her room lying in bed  She is pleasant and cooperative upon approach, displays good eye contact with a constricted affect noted  She is currently rating her depression as a 7/10 with mild to moderate anxiety  She is denying any suicidal/homicidal ideation or auditory/visual hallucinations and does not appear to be responding to internal stimuli at time of encounter  She is reporting adequate sleep and appetite and energy level  She is compliant with medications and no side effects noted at this time  Mental Status Evaluation:  Appearance:  Adequate hygiene and grooming   Behavior:  calm, cooperative and friendly   Mood:  anxious and depressed   Affect: constricted   Speech: Normal rate and Normal volume   Thought Process:  Goal directed and coherent   Thought Content:  Does not verbalize delusional material   Perceptual Disturbances: Denies hallucinations and does not appear to be responding to internal stimuli   Risk Potential: No suicidal or homicidal ideation   Attention/Concentration attention span and concentration were age appropriate   Orientation:   Alert and awake   Gait/Station: normal gait/station and normal balance   Motor Activity: No abnormal movement noted     Progress Toward Goals: Continues to improve    Assessment/Plan    Principal Problem:    Schizoaffective disorder, bipolar type ([facility] [address] )  Active Problems:    Tobacco abuse      Recommended Treatment:   1  Continue with pharmacotherapy, group therapy, milieu therapy and occupational therapy  2  Continue with safety checks per unit protocol  3  Continue with phone restrictions at this time    4  Continue with Depakote level monitoring  5  No medication changes at this time  The patient will be maintained on the following medications:    Current Facility-Administered Medications:  acetaminophen 650 mg Oral Q6H PRN Devora L Char, CRNP   acetaminophen 650 mg Oral Q4H PRN Devora L Char, CRNP   acetaminophen 975 mg Oral Q6H PRN Devora L Char, CRNP   aluminum-magnesium hydroxide-simethicone 30 mL Oral Q4H PRN Devora L Char, CRNP   benztropine 1 mg Intramuscular Q6H PRN Devora L Char, CRNP   benztropine 1 mg Oral Q6H PRN Devora L Char, CRNP   diphenhydrAMINE 25 mg Oral HS Devora L Char, CRNP   divalproex sodium 250 mg Oral BID Yared Mccormick MD   divalproex sodium 500 mg Oral HS Yared Mccormick MD   haloperidol 5 mg Oral Q6H PRN Devora L Char, CRNP   haloperidol lactate 5 mg Intramuscular Q6H PRN Devora L Char, CRNP   hydrOXYzine HCL 25 mg Oral Q6H PRN Devora L Char, CRNP   LORazepam 1 mg Intramuscular Q6H PRN Devora L Char, CRNP   LORazepam 1 mg Oral Q6H PRN Devora L Char, CRNP   magnesium hydroxide 30 mL Oral Daily PRN Devora L Char, CRNP   melatonin 3 mg Oral HS Devora L Char, CRNP   OLANZapine 5 mg Intramuscular Q6H PRN Devora L Char, CRNP   OLANZapine 5 mg Oral Q6H PRN Devora L Char, CRNP   paliperidone 6 mg Oral Daily Devora L Char, CRNP   paliperidone palmitate  mg Intramuscular Q30 Days Yared Mccormick MD   risperiDONE 1 mg Oral Q3H PRN Devora L Char, CRNP   traZODone 50 mg Oral HS PRN Devora L Char, CRNP       Risks, benefits and possible side effects of Medications:   Risks, benefits, and possible side effects of medications explained to patient and patient verbalizes understanding  PAULINA King      Portions of the record may have been created with voice recognition software  Occasional wrong word or "sound a like" substitutions may have occurred due to the inherent limitations of voice recognition software   Read the chart carefully and recognize, using context, where substitutions have occurred

## 2019-11-24 NOTE — PLAN OF CARE
Problem: Alteration in Thoughts and Perception  Goal: Treatment Goal: Gain control of psychotic behaviors/thinking, reduce/eliminate presenting symptoms and demonstrate improved reality functioning upon discharge  Outcome: Progressing     Problem: Risk for Self Injury/Neglect  Goal: Treatment Goal: Remain safe during length of stay, learn and adopt new coping skills, and be free of self-injurious ideation, impulses and acts at the time of discharge  Outcome: Progressing     Problem: Depression  Goal: Treatment Goal: Demonstrate behavioral control of depressive symptoms, verbalize feelings of improved mood/affect, and adopt new coping skills prior to discharge  Outcome: Progressing  Goal: Verbalize thoughts and feelings  Description  Interventions:  - Assess and re-assess patient's level of risk   - Engage patient in 1:1 interactions, daily, for a minimum of 15 minutes   - Encourage patient to express feelings, fears, frustrations, hopes   Outcome: Progressing     Problem: Anxiety  Goal: Anxiety is at manageable level  Description  Interventions:  - Assess and monitor patient's anxiety level  - Monitor for signs and symptoms (heart palpitations, chest pain, shortness of breath, headaches, nausea, feeling jumpy, restlessness, irritable, apprehensive)  - Collaborate with interdisciplinary team and initiate plan and interventions as ordered    - [place] patient to unit/surroundings  - Explain treatment plan  - Encourage participation in care  - Encourage verbalization of concerns/fears  - Identify coping mechanisms  - Assist in developing anxiety-reducing skills  - Administer/offer alternative therapies  - Limit or eliminate stimulants  Outcome: Progressing     Problem: Alteration in Orientation  Goal: Treatment Goal: Demonstrate a reduction of confusion and improved orientation to person, place, time and/or situation upon discharge, according to optimum baseline/ability  Outcome: Progressing     Problem: DISCHARGE PLANNING  Goal: Discharge to home or other facility with appropriate resources  Description  INTERVENTIONS:  - Identify barriers to discharge w/patient and caregiver  - Arrange for needed discharge resources and transportation as appropriate  - Identify discharge learning needs (meds, wound care, etc )  - Arrange for interpretive services to assist at discharge as needed  - Refer to Case Management Department for coordinating discharge planning if the patient needs post-hospital services based on physician/advanced practitioner order or complex needs related to functional status, cognitive ability, or social support system  Outcome: Progressing     Problem: Ineffective Coping  Goal: Participates in unit activities  Description  Interventions:  - Provide therapeutic environment   - Provide required programming   - Redirect inappropriate behaviors   Outcome: Progressing     Problem: BEHAVIOR  Goal: Pt/Family maintain appropriate behavior and adhere to behavioral management agreement, if implemented  Description  INTERVENTIONS:  - Assess the family dynamic   - Encourage verbalization of thoughts and concerns in a socially appropriate manner  - Assess patient/family's coping skills and non-compliant behavior (including use of illegal substances)  - Utilize positive, consistent limit setting strategies supporting safety of patient, staff and others  - Initiate consult with Case Management, Spiritual Care or other ancillary services as appropriate  - If a patient's/visitor's behavior jeopardizes the safety of the patient, staff, or others, refer to organization procedure     - Notify Security of behavior or suspected illegal substances which indicate the need for search of the patient and/or belongings  - Encourage participation in the decision making process about a behavioral management agreement; implement if patient meets criteria  Outcome: Progressing

## 2019-11-24 NOTE — PROGRESS NOTES
Patient is denying all symptoms  She has been social and appropriate in the milieu tonight  She was cooperative with HS medications  She is comfortable and has been conversing with peers without any issues  No delusions verbalized/overheard currently  She was polite when receiving her medications

## 2019-11-25 PROCEDURE — 99232 SBSQ HOSP IP/OBS MODERATE 35: CPT | Performed by: STUDENT IN AN ORGANIZED HEALTH CARE EDUCATION/TRAINING PROGRAM

## 2019-11-25 RX ADMIN — DIVALPROEX SODIUM 250 MG: 250 TABLET, DELAYED RELEASE ORAL at 17:13

## 2019-11-25 RX ADMIN — MELATONIN TAB 3 MG 3 MG: 3 TAB at 21:02

## 2019-11-25 RX ADMIN — DIVALPROEX SODIUM 500 MG: 500 TABLET, DELAYED RELEASE ORAL at 21:02

## 2019-11-25 RX ADMIN — DIPHENHYDRAMINE HCL 25 MG: 25 TABLET ORAL at 21:02

## 2019-11-25 RX ADMIN — DIVALPROEX SODIUM 250 MG: 250 TABLET, DELAYED RELEASE ORAL at 08:39

## 2019-11-25 RX ADMIN — PALIPERIDONE 6 MG: 6 TABLET, FILM COATED, EXTENDED RELEASE ORAL at 08:39

## 2019-11-25 NOTE — PLAN OF CARE
Pt spoke to SW about her plans to go to her sister's home and partial in [place] upon discharge  Pt stated she did attend partial in [place] for three days but then went home and drank  Pt stated she knows she shouldn't do such things but she wasn't well enough yet to make a better choice  Pt stated she is feeling much better but she still has some xochitl  Pt discussed her new medication being injectable and is hopeful this will be better for her since she often forgets when/what medications to take when she is home  Pt was pleasant, showed some insight, and was able to focus on topic of conversation without needing to be redirected  Problem: Depression  Goal: Verbalize thoughts and feelings  Description  Interventions:  - Assess and re-assess patient's level of risk   - Engage patient in 1:1 interactions, daily, for a minimum of 15 minutes   - Encourage patient to express feelings, fears, frustrations, hopes   Outcome: Progressing     Problem: Anxiety  Goal: Anxiety is at manageable level  Description  Interventions:  - Assess and monitor patient's anxiety level  - Monitor for signs and symptoms (heart palpitations, chest pain, shortness of breath, headaches, nausea, feeling jumpy, restlessness, irritable, apprehensive)  - Collaborate with interdisciplinary team and initiate plan and interventions as ordered    - [place] patient to unit/surroundings  - Explain treatment plan  - Encourage participation in care  - Encourage verbalization of concerns/fears  - Identify coping mechanisms  - Assist in developing anxiety-reducing skills  - Administer/offer alternative therapies  - Limit or eliminate stimulants  Outcome: Progressing     Problem: Ineffective Coping  Goal: Participates in unit activities  Description  Interventions:  - Provide therapeutic environment   - Provide required programming   - Redirect inappropriate behaviors   Outcome: Progressing     Problem: BEHAVIOR  Goal: Pt/Family maintain appropriate behavior and adhere to behavioral management agreement, if implemented  Description  INTERVENTIONS:  - Assess the family dynamic   - Encourage verbalization of thoughts and concerns in a socially appropriate manner  - Assess patient/family's coping skills and non-compliant behavior (including use of illegal substances)  - Utilize positive, consistent limit setting strategies supporting safety of patient, staff and others  - Initiate consult with Case Management, Spiritual Care or other ancillary services as appropriate  - If a patient's/visitor's behavior jeopardizes the safety of the patient, staff, or others, refer to organization procedure     - Notify Security of behavior or suspected illegal substances which indicate the need for search of the patient and/or belongings  - Encourage participation in the decision making process about a behavioral management agreement; implement if patient meets criteria  Outcome: Progressing

## 2019-11-25 NOTE — PROGRESS NOTES
Pt is compliant with medications  She is denying all S/S, states her memory of admission events is improving  Pt identified stress, followed by alcohol use and initial instability as triggers for her hospitalization  Pt appears to be gaining insight, talking about stressors from relationship with boyfriend  Pt plans to involve family and limit exposure to triggers when she is discharged  She is calm and pleasant

## 2019-11-25 NOTE — PROGRESS NOTES
11/25/19 1100   Activity/Group Checklist   Group   (recovery group)   Attendance Attended   Attendance Duration (min) 46-60   Interactions Interacted appropriately   Affect/Mood Appropriate   Goals Achieved Discussed coping strategies; Discussed self-esteem issues; Able to listen to others; Able to engage in interactions; Able to self-disclose; Able to give feedback to another   Coping with the holidays

## 2019-11-25 NOTE — PROGRESS NOTES
11/25/19 0800   Team Meeting   Meeting Type Daily Rounds   Team Members Present   Team Members Present Physician;Nurse;; Other (Discipline and Name)   [facility] Team [facility]   Nursing Team Member [place] Work Team Member Nataly Fernandes   Other (Discipline and Name) PAULINA Taylor   Patient/Family Present   Patient Present No   Patient's Family Present No     Will continue to monitor

## 2019-11-25 NOTE — PLAN OF CARE
Problem: Alteration in Thoughts and Perception  Goal: Treatment Goal: Gain control of psychotic behaviors/thinking, reduce/eliminate presenting symptoms and demonstrate improved reality functioning upon discharge  Outcome: Progressing     Problem: Risk for Self Injury/Neglect  Goal: Treatment Goal: Remain safe during length of stay, learn and adopt new coping skills, and be free of self-injurious ideation, impulses and acts at the time of discharge  Outcome: Progressing     Problem: Depression  Goal: Treatment Goal: Demonstrate behavioral control of depressive symptoms, verbalize feelings of improved mood/affect, and adopt new coping skills prior to discharge  Outcome: Progressing  Goal: Verbalize thoughts and feelings  Description  Interventions:  - Assess and re-assess patient's level of risk   - Engage patient in 1:1 interactions, daily, for a minimum of 15 minutes   - Encourage patient to express feelings, fears, frustrations, hopes   Outcome: Progressing     Problem: Anxiety  Goal: Anxiety is at manageable level  Description  Interventions:  - Assess and monitor patient's anxiety level  - Monitor for signs and symptoms (heart palpitations, chest pain, shortness of breath, headaches, nausea, feeling jumpy, restlessness, irritable, apprehensive)  - Collaborate with interdisciplinary team and initiate plan and interventions as ordered    - [place] patient to unit/surroundings  - Explain treatment plan  - Encourage participation in care  - Encourage verbalization of concerns/fears  - Identify coping mechanisms  - Assist in developing anxiety-reducing skills  - Administer/offer alternative therapies  - Limit or eliminate stimulants  Outcome: Progressing     Problem: Alteration in Orientation  Goal: Treatment Goal: Demonstrate a reduction of confusion and improved orientation to person, place, time and/or situation upon discharge, according to optimum baseline/ability  Outcome: Progressing     Problem: BEHAVIOR  Goal: Pt/Family maintain appropriate behavior and adhere to behavioral management agreement, if implemented  Description  INTERVENTIONS:  - Assess the family dynamic   - Encourage verbalization of thoughts and concerns in a socially appropriate manner  - Assess patient/family's coping skills and non-compliant behavior (including use of illegal substances)  - Utilize positive, consistent limit setting strategies supporting safety of patient, staff and others  - Initiate consult with Case Management, Spiritual Care or other ancillary services as appropriate  - If a patient's/visitor's behavior jeopardizes the safety of the patient, staff, or others, refer to organization procedure     - Notify Security of behavior or suspected illegal substances which indicate the need for search of the patient and/or belongings  - Encourage participation in the decision making process about a behavioral management agreement; implement if patient meets criteria  Outcome: Progressing

## 2019-11-25 NOTE — PROGRESS NOTES
Progress Note - 707 [facility] 51 y o  female MRN: [id number]  Unit/Bed#: [facility] 350-01 Encounter: [id number]    Assessment/Plan   Principal Problem:    Schizoaffective disorder, bipolar type ([facility] [address] )  Active Problems:    Tobacco abuse    Recommended Treatment:   Continue current medication therapy  Continue with group therapy, milieu therapy and occupational therapy  Case discussed with treatment team     Risks, benefits and possible side effects of Medications: Risks, benefits, and possible side effects of medications have been explained to the patient, who verbalizes understanding  Progress Toward Goals: slow improvement    ------------------------------------------------------------    Subjective: Halina Soulier has been compliant with medications without acute side effects  she reports improvement in her mood and delusional thoughts, stating she feels her mood is "almost normal " She reports improved focus and less racing thoughts to the point that she was able to complete a Sudoku puzzle  She reports having greater insight into the events leading up to her hospitalization, stating that she doesn't feel she was ready for discharge after her last admission and stopped taking her depakote  She states she felt overwhelmed after the first day of artial hospitalization and decided to have two spiked seltzers the morning prior to the second day  She states she left her purse in the [place], causing her daughter to think she had not attended the program     Patient states she plans to live with her sister in [facility] following discharge and states she feels she needs a better crisis plan prior to discharge  She reports some financial stressors which her mother is helping with, as well as social stress from her ex-boyfriend  Patient is consenting for safety on the unit      Psychiatric Review of Systems:  Behavior over the last 24 hours: improved  Sleep: normal  Appetite: good  Medication side effects: none  ROS: Complete review of systems is negative except as noted above      Vital signs in last 24 hours:  Temp:  [97 8 °F (36 6 °C)-98 1 °F (36 7 °C)] 97 8 °F (36 6 °C)  HR:  [] 104  Resp:  [16] 16  BP: (140-157)/(62-72) 157/62    Mental Status Evaluation:  Appearance:  alert, casually dressed, appears stated age, appropriate grooming and hygiene and underweight   Behavior:  pleasant, cooperative, good eye contact, restless and fidgety, no abnormal movements and normal gait and balance   Speech:  spontaneous, clear, normal rate, normal volume and coherent   Mood:  dysphoric and anxious   Affect:  mood-congruent and constricted   Thought Process:  organized, logical, coherent, circumstantial, increased rate of thoughts   Thought Content: no verbalized delusions, no overt paranoia, ruminating thoughts   Perceptual disturbances: no reported auditory hallucinations, no reported visual hallucinations and does not appear to be responding to internal stimuli at this time   Risk Potential: No active or passive suicidal or homicidal ideation   Cognition: oriented to person, place, time, and situation, appears to be of average intelligence, normal abstract reasoning, age-appropriate attention span and concentration and cognition not formally tested   Insight:  Fair   Judgment: Limited       Current Medications:    Current Facility-Administered Medications:  acetaminophen 650 mg Oral Q6H PRN Devora L Char, CRNP   acetaminophen 650 mg Oral Q4H PRN Devora L Char, CRNP   acetaminophen 975 mg Oral Q6H PRN Devora L Char, CRNP   aluminum-magnesium hydroxide-simethicone 30 mL Oral Q4H PRN Devora L Char, CRNP   benztropine 1 mg Intramuscular Q6H PRN Devora L Char, CRNP   benztropine 1 mg Oral Q6H PRN Devora L Char, CRNP   diphenhydrAMINE 25 mg Oral HS Devora L Char, CRNP   divalproex sodium 250 mg Oral BID Heather Patel MD   divalproex sodium 500 mg Oral HS Heather Patel MD   haloperidol 5 mg Oral Q6H PRN Devora L Char, CRNP   haloperidol lactate 5 mg Intramuscular Q6H PRN Devora L Char, CRNP   hydrOXYzine HCL 25 mg Oral Q6H PRN Devora L Char, CRNP   LORazepam 1 mg Intramuscular Q6H PRN Devora L Char, CRNP   LORazepam 1 mg Oral Q6H PRN Devora L Char, CRNP   magnesium hydroxide 30 mL Oral Daily PRN Devora L Char, CRNP   melatonin 3 mg Oral HS Devora L Char, CRNP   OLANZapine 5 mg Intramuscular Q6H PRN Devora L Char, CRNP   OLANZapine 5 mg Oral Q6H PRN Devora L Char, CRNP   paliperidone 6 mg Oral Daily Devora L Char, CRNP   paliperidone palmitate  mg Intramuscular Q30 Days Brian Godfrey MD   risperiDONE 1 mg Oral Q3H PRN Devora L Char, CRNP   traZODone 50 mg Oral HS PRN Devora L Char, CRNP       Behavioral Health Medications: all current active meds have been reviewed  Changes as above  Laboratory results:  I have personally reviewed all pertinent laboratory/tests results  No results found for this or any previous visit (from the past 48 hour(s))  This note has been constructed using a voice recognition system  There may be translation, syntax, or grammatical errors  If you have any questions, please contact the dictating provider

## 2019-11-25 NOTE — PROGRESS NOTES
Patient is currently denying symptoms  She states to RN "I am coming out of my xochitl and my mind is starting to make more sense again  I feel more clear " Pt sounds/appears more organized in conversation  No delusions verbalized  She is pleasant and cooperative  Will continue to monitor

## 2019-11-25 NOTE — PROGRESS NOTES
Pt denies all symptoms  Pt stated "I feel like I am getting better, I feel more clear but my attention span is still in the toilet " Pt is calm and cooperative  Pt is out in the milieu and social with peers  Compliant with medications, mouth checks completed  Benjamin german

## 2019-11-26 PROCEDURE — 99232 SBSQ HOSP IP/OBS MODERATE 35: CPT | Performed by: STUDENT IN AN ORGANIZED HEALTH CARE EDUCATION/TRAINING PROGRAM

## 2019-11-26 RX ADMIN — DIPHENHYDRAMINE HCL 25 MG: 25 TABLET ORAL at 21:10

## 2019-11-26 RX ADMIN — DIVALPROEX SODIUM 500 MG: 500 TABLET, DELAYED RELEASE ORAL at 21:09

## 2019-11-26 RX ADMIN — DIVALPROEX SODIUM 250 MG: 250 TABLET, DELAYED RELEASE ORAL at 17:09

## 2019-11-26 RX ADMIN — PALIPERIDONE 6 MG: 6 TABLET, FILM COATED, EXTENDED RELEASE ORAL at 08:56

## 2019-11-26 RX ADMIN — MELATONIN TAB 3 MG 3 MG: 3 TAB at 21:09

## 2019-11-26 RX ADMIN — DIVALPROEX SODIUM 250 MG: 250 TABLET, DELAYED RELEASE ORAL at 08:56

## 2019-11-26 NOTE — PROGRESS NOTES
Pt is calm, compliant with medications, pleasant  Pt feels she is getting much clearer but continues to report some "fog" surrounding admission events  She is denying all S/S, visible in the milieu and attending groups

## 2019-11-26 NOTE — CASE MANAGEMENT
SW called to inform daughter and sister that [facility] is not an in-network provider for partial program  Also, discussed peer review and possibility of earlier DC      In-network providers:  [facility] [facility] [place]    SW called all partial programs above and none provide transportation to Pt sister address: [address], [place] left message for Darcy, Pt sister, who returned call and stated she would provide transportation to [facility] Partial program

## 2019-11-26 NOTE — PROGRESS NOTES
Progress Note - [facility] 51 y o  female MRN: [id number]  Unit/Bed#: U 350-01 Encounter: [id number]    Assessment/Plan   Principal Problem:    Schizoaffective disorder, bipolar type ([address] )  Active Problems:    Tobacco abuse    Recommended Treatment:   Continue current medication therapy  Plan for possible discharge on Monday pending continued improvement over the weekend; patient plans to go to [facility] with her sister for partial hospitalization  Continue with group therapy, milieu therapy and occupational therapy  Case discussed with treatment team     Risks, benefits and possible side effects of Medications: Risks, benefits, and possible side effects of medications have been explained to the patient, who verbalizes understanding  Progress Toward Goals: slow improvement    ------------------------------------------------------------    Subjective: Bladimir Liz has been compliant with medications without acute side effects  she reports improvement in her recent memory and insight  She states she is having a lot of anxiety because she is remembering more things that happened prior to her hospitalization, such as throwing things off her balcony  She is showing improved insight, stating she is trying to limit the number of calls she makes to her family, as they know that getting many calls from her is a sign that she is not doing well  She states she is sad she is unable to spend Thanksgiving with her daughter but understands the need for continued hospitalization at this time  She is thinking of moving to [facility] with her daughter, as they have discussed finding a home with an in-law suite  She denies any side effects from her medications, including any rashes  Patient is consenting for safety on the unit      Psychiatric Review of Systems:  Behavior over the last 24 hours: improved  Sleep: normal  Appetite: good  Medication side effects: none  ROS: Complete review of systems is negative except as noted above      Vital signs in last 24 hours:  Temp:  [97 9 °F (36 6 °C)-98 7 °F (37 1 °C)] 98 7 °F (37 1 °C)  HR:  [] 102  Resp:  [16] 16  BP: (124-140)/(72-77) 140/77    Mental Status Evaluation:  Appearance:  alert, casually dressed, appears stated age, appropriate grooming and hygiene and overweight   Behavior:  pleasant, cooperative, sitting comfortably, good eye contact, no abnormal movements and normal gait and balance   Speech:  spontaneous, clear, normal rate, normal volume, coherent and Less pressured than prior days   Mood:  anxious   Affect:  mood-congruent and constricted   Thought Process:  organized, logical, coherent, increased rate of thoughts, Less circumstantial than prior days   Thought Content: no verbalized delusions, no overt paranoia, intrusive thoughts   Perceptual disturbances: no reported auditory hallucinations, no reported visual hallucinations and does not appear to be responding to internal stimuli at this time   Risk Potential: No active or passive suicidal or homicidal ideation   Cognition: oriented to person, place, time, and situation, appears to be of average intelligence, normal abstract reasoning, age-appropriate attention span and concentration and cognition not formally tested   Insight:  Good   Judgment: Limited       Current Medications:    Current Facility-Administered Medications:  acetaminophen 650 mg Oral Q6H PRN Devora L Char, CRNP   acetaminophen 650 mg Oral Q4H PRN Devora L Char, CRNP   acetaminophen 975 mg Oral Q6H PRN Devora L Char, CRNP   aluminum-magnesium hydroxide-simethicone 30 mL Oral Q4H PRN Devora L Char, CRNP   benztropine 1 mg Intramuscular Q6H PRN Devora L Char, CRNP   benztropine 1 mg Oral Q6H PRN Devora L Char, CRNP   diphenhydrAMINE 25 mg Oral HS Devora L Char, CRNP   divalproex sodium 250 mg Oral BID Chris Jones MD   divalproex sodium 500 mg Oral HS Chris Jones MD   haloperidol 5 mg Oral Q6H PRN Devora L Char, CRNP   haloperidol lactate 5 mg Intramuscular Q6H PRN Devora L Char, CRNP   hydrOXYzine HCL 25 mg Oral Q6H PRN Devora L Char, CRNP   LORazepam 1 mg Intramuscular Q6H PRN Devora L Char, CRNP   LORazepam 1 mg Oral Q6H PRN Devora L Char, CRNP   magnesium hydroxide 30 mL Oral Daily PRN Devora L Char, CRNP   melatonin 3 mg Oral HS Devora L Char, CRNP   OLANZapine 5 mg Intramuscular Q6H PRN Devora L Char, CRNP   OLANZapine 5 mg Oral Q6H PRN Devora L Char, CRNP   paliperidone 6 mg Oral Daily Devora L Char, CRNP   paliperidone palmitate  mg Intramuscular Q30 Days Grace Malone MD   risperiDONE 1 mg Oral Q3H PRN Devora L Char, CRNP   traZODone 50 mg Oral HS PRN Devora L Char, CRNP       Behavioral Health Medications: all current active meds have been reviewed  Changes as above  Laboratory results:  I have personally reviewed all pertinent laboratory/tests results  No results found for this or any previous visit (from the past 48 hour(s))  This note has been constructed using a voice recognition system  There may be translation, syntax, or grammatical errors  If you have any questions, please contact the dictating provider

## 2019-11-26 NOTE — PLAN OF CARE
Pt is cooperative, showing insight into her mental health and how to maintain when she is DC  Pt is accepting assistance from family members and is acknowledging she will need to remain in treatment until symptoms are lessened and she is able to maintain mental health through medication management  Problem: Depression  Goal: Verbalize thoughts and feelings  Description  Interventions:  - Assess and re-assess patient's level of risk   - Engage patient in 1:1 interactions, daily, for a minimum of 15 minutes   - Encourage patient to express feelings, fears, frustrations, hopes   Outcome: Progressing     Problem: Anxiety  Goal: Anxiety is at manageable level  Description  Interventions:  - Assess and monitor patient's anxiety level  - Monitor for signs and symptoms (heart palpitations, chest pain, shortness of breath, headaches, nausea, feeling jumpy, restlessness, irritable, apprehensive)  - Collaborate with interdisciplinary team and initiate plan and interventions as ordered    - [place] patient to unit/surroundings  - Explain treatment plan  - Encourage participation in care  - Encourage verbalization of concerns/fears  - Identify coping mechanisms  - Assist in developing anxiety-reducing skills  - Administer/offer alternative therapies  - Limit or eliminate stimulants  Outcome: Progressing     Problem: Ineffective Coping  Goal: Participates in unit activities  Description  Interventions:  - Provide therapeutic environment   - Provide required programming   - Redirect inappropriate behaviors   Outcome: Progressing     Problem: DISCHARGE PLANNING  Goal: Discharge to home or other facility with appropriate resources  Description  INTERVENTIONS:  - Identify barriers to discharge w/patient and caregiver  - Arrange for needed discharge resources and transportation as appropriate  - Identify discharge learning needs (meds, wound care, etc )  - Arrange for interpretive services to assist at discharge as needed  - Refer to Case Management Department for coordinating discharge planning if the patient needs post-hospital services based on physician/advanced practitioner order or complex needs related to functional status, cognitive ability, or social support system  Outcome: Progressing     Problem: BEHAVIOR  Goal: Pt/Family maintain appropriate behavior and adhere to behavioral management agreement, if implemented  Description  INTERVENTIONS:  - Assess the family dynamic   - Encourage verbalization of thoughts and concerns in a socially appropriate manner  - Assess patient/family's coping skills and non-compliant behavior (including use of illegal substances)  - Utilize positive, consistent limit setting strategies supporting safety of patient, staff and others  - Initiate consult with Case Management, Spiritual Care or other ancillary services as appropriate  - If a patient's/visitor's behavior jeopardizes the safety of the patient, staff, or others, refer to organization procedure     - Notify Security of behavior or suspected illegal substances which indicate the need for search of the patient and/or belongings  - Encourage participation in the decision making process about a behavioral management agreement; implement if patient meets criteria  Outcome: Progressing

## 2019-11-26 NOTE — PROGRESS NOTES
[facility] GROUP NOTE     11/25/19 1430   Activity/Group Checklist   Group Personal control  (Yoga Stretch and Breathe)   Attendance Attended   Attendance Duration (min) 16-30   Interactions Interacted appropriately   Affect/Mood Appropriate   Primary Objectives/Concepts Covered  Mind/Body Connection  Body awareness  Importance of utilizing stillness  Solitude vs Isolation  Intentional focus through breath, movement  Use of relaxation as a preventative and   Restorative practice

## 2019-11-26 NOTE — CASE MANAGEMENT
Pt will have IM Invega 156mg on Friday  Doctor is saying D/C Monday  MARY called and left message with Eneida Ribeiro for Pt to be admitted to day program   Eneida Ribeiro [id number]  [address] [facility], [address] called and informed Pt daughter Kristi Boucher that Pt is tentative D/C Monday  MARY called to make referral and LM for Eneida Ribeiro  Informed daughter of Pt progress and IM Invega for Friday

## 2019-11-26 NOTE — PROGRESS NOTES
11/26/19 0800   Team Meeting   Meeting Type Daily Rounds   Team Members Present   Team Members Present Physician;Nurse;;; Other (Discipline and Name)   Physician Team Member Dr Fariba Love Team Member Carroll Ponce Work Team Member Gabe Thibodeaux   Other (Discipline and Name) SPEEDY Guy   Patient/Family Present   Patient Present No   Patient's Family Present No   Possible discharge on Monday  Patient will be going to stay with her sister   Invega 156 IM due on Friday
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
## 2019-11-26 NOTE — PLAN OF CARE
Problem: Alteration in Thoughts and Perception  Goal: Treatment Goal: Gain control of psychotic behaviors/thinking, reduce/eliminate presenting symptoms and demonstrate improved reality functioning upon discharge  Outcome: Progressing     Problem: Risk for Self Injury/Neglect  Goal: Treatment Goal: Remain safe during length of stay, learn and adopt new coping skills, and be free of self-injurious ideation, impulses and acts at the time of discharge  Outcome: Progressing     Problem: Depression  Goal: Treatment Goal: Demonstrate behavioral control of depressive symptoms, verbalize feelings of improved mood/affect, and adopt new coping skills prior to discharge  Outcome: Progressing  Goal: Verbalize thoughts and feelings  Description  Interventions:  - Assess and re-assess patient's level of risk   - Engage patient in 1:1 interactions, daily, for a minimum of 15 minutes   - Encourage patient to express feelings, fears, frustrations, hopes   Outcome: Progressing     Problem: Anxiety  Goal: Anxiety is at manageable level  Description  Interventions:  - Assess and monitor patient's anxiety level  - Monitor for signs and symptoms (heart palpitations, chest pain, shortness of breath, headaches, nausea, feeling jumpy, restlessness, irritable, apprehensive)  - Collaborate with interdisciplinary team and initiate plan and interventions as ordered    - [place] patient to unit/surroundings  - Explain treatment plan  - Encourage participation in care  - Encourage verbalization of concerns/fears  - Identify coping mechanisms  - Assist in developing anxiety-reducing skills  - Administer/offer alternative therapies  - Limit or eliminate stimulants  Outcome: Progressing     Problem: Alteration in Orientation  Goal: Treatment Goal: Demonstrate a reduction of confusion and improved orientation to person, place, time and/or situation upon discharge, according to optimum baseline/ability  Outcome: Progressing     Problem: BEHAVIOR  Goal: Pt/Family maintain appropriate behavior and adhere to behavioral management agreement, if implemented  Description  INTERVENTIONS:  - Assess the family dynamic   - Encourage verbalization of thoughts and concerns in a socially appropriate manner  - Assess patient/family's coping skills and non-compliant behavior (including use of illegal substances)  - Utilize positive, consistent limit setting strategies supporting safety of patient, staff and others  - Initiate consult with Case Management, Spiritual Care or other ancillary services as appropriate  - If a patient's/visitor's behavior jeopardizes the safety of the patient, staff, or others, refer to organization procedure     - Notify Security of behavior or suspected illegal substances which indicate the need for search of the patient and/or belongings  - Encourage participation in the decision making process about a behavioral management agreement; implement if patient meets criteria  Outcome: Progressing

## 2019-11-26 NOTE — PROGRESS NOTES
Pt denies all symptoms  Pt is out in the milieu and social with peers  Pt appears less disorganized and bizarre this evening  Pt is calm, cooperative and pleasant  Will monitor

## 2019-11-27 PROCEDURE — 99232 SBSQ HOSP IP/OBS MODERATE 35: CPT | Performed by: INTERNAL MEDICINE

## 2019-11-27 RX ADMIN — DIVALPROEX SODIUM 250 MG: 250 TABLET, DELAYED RELEASE ORAL at 17:08

## 2019-11-27 RX ADMIN — DIVALPROEX SODIUM 250 MG: 250 TABLET, DELAYED RELEASE ORAL at 08:26

## 2019-11-27 RX ADMIN — MELATONIN TAB 3 MG 3 MG: 3 TAB at 21:16

## 2019-11-27 RX ADMIN — PALIPERIDONE 6 MG: 6 TABLET, FILM COATED, EXTENDED RELEASE ORAL at 08:25

## 2019-11-27 RX ADMIN — DIPHENHYDRAMINE HCL 25 MG: 25 TABLET ORAL at 21:16

## 2019-11-27 RX ADMIN — DIVALPROEX SODIUM 500 MG: 500 TABLET, DELAYED RELEASE ORAL at 21:16

## 2019-11-27 NOTE — CASE MANAGEMENT
Pt was authorized for 10 days [facility] [address] [id number]   [facility] [phone]

## 2019-11-27 NOTE — PROGRESS NOTES
Pt denies all symptoms  Pt is calm, cooperative and pleasant  Pt is out in the milieu and social with peers  Pt appears bright and less disorganized  Compliant with medications, mouth checks completed  Will monitor

## 2019-11-27 NOTE — PROGRESS NOTES
Patient told RN she feels like she needs to "pace a bit " Said she's restless more than anxious and that she thinks some exercise will help  During this conversation lunch arrived and she said she thought eating would be enough of a distraction for her

## 2019-11-27 NOTE — PROGRESS NOTES
Patient was cooperative with medications and coherent and pleasant in conversation this morning  Said she had one of her best nights of sleep last night and denied other symptoms and needs  Has been out in the milieu and attending groups today  Said yesterday was an emotional one for her as she talked to a lot of different family members by phone  Said her daughter is going to get her short-term disability paperwork started for her

## 2019-11-27 NOTE — PROGRESS NOTES
[facility] GROUP NOTE     11/27/19 0930   Activity/Group Checklist   Group [facility]  (Increased organization of thoughts and speech )   Attendance Attended   Attendance Duration (min) 16-30   Interactions Interacted appropriately   Affect/Mood Appropriate   Objectives/Key Points:  Living intentionally  Goal Setting  Thought for the Day  Self Check In (0) independent

## 2019-11-27 NOTE — PROGRESS NOTES
Progress Note - [facility] 51 y o  female MRN: [id number]  Unit/Bed#: [facility] 350-01 Encounter: [id number]    The patient was seen for continuing care and reviewed with treatment team   Per staff notes she has been compliant and cooperative with medications and therapies  She is looking forward to being discharged and living with her sister and [place] for a short period of time  She feels improved since the time of her admission  She plans on returning to the [facility] in 1 month for her next injection therapy  She reports receiving support from her family with her mother helping to pay her rent and her daughter has helped her submit paperwork for her short-term disability  Current Mental Status Evaluation:  Appearance:  Adequate hygiene and grooming and Good eye contact   Behavior:  calm and cooperative   Mood:  euthymic   Affect: constricted and appropriate   Speech: Normal rate and Normal volume   Thought Process:  Goal directed and coherent   Thought Content:  Does not verbalize delusional material   Perceptual Disturbances: No reported hallucinations or delusions   Risk Potential: No reported suicidal or homicidal ideation  Orientation:        Progress Toward Goals:  Improving  Principal Problem:    Schizoaffective disorder, bipolar type (HCC)  Active Problems:    Tobacco abuse      Recommended Treatment: To receive 2nd Invega injection  Continue with oral Invega and Depakote  Follow up with outpatient treatment center near her sister's home near [place], [place]  Continue with pharmacotherapy, group therapy, milieu therapy and occupational therapy    The patient will be maintained on the following medications:    Current Facility-Administered Medications:  acetaminophen 650 mg Oral Q6H PRN Deovra L Char, CRNP   acetaminophen 650 mg Oral Q4H PRN Devora L Char, CRNP   acetaminophen 975 mg Oral Q6H PRN Devora L Char, CRNP   aluminum-magnesium hydroxide-simethicone 30 mL Oral Q4H PRN Devora L Char, CRNP   benztropine 1 mg Intramuscular Q6H PRN Devora L Char, CRNP   benztropine 1 mg Oral Q6H PRN Devora L Char, CRNP   diphenhydrAMINE 25 mg Oral HS Devora L Char, CRNP   divalproex sodium 250 mg Oral BID Leesa Elmore MD   divalproex sodium 500 mg Oral HS Leesa Elmore MD   haloperidol 5 mg Oral Q6H PRN Devora L Char, CRNP   haloperidol lactate 5 mg Intramuscular Q6H PRN Devora L Char, CRNP   hydrOXYzine HCL 25 mg Oral Q6H PRN Devora L Char, CRNP   LORazepam 1 mg Intramuscular Q6H PRN Devora L Char, CRNP   LORazepam 1 mg Oral Q6H PRN Devora L Char, CRNP   magnesium hydroxide 30 mL Oral Daily PRN Devora L Char, CRNP   melatonin 3 mg Oral HS Devora L Char, CRNP   OLANZapine 5 mg Intramuscular Q6H PRN Devora L Char, CRNP   OLANZapine 5 mg Oral Q6H PRN Devora L Char, CRNP   paliperidone 6 mg Oral Daily Devora L Char, CRNP   paliperidone palmitate  mg Intramuscular Q30 Days Leesa Elmore MD   risperiDONE 1 mg Oral Q3H PRN Devora L Char, CRNP   traZODone 50 mg Oral HS PRN Devora L Char, CRNP

## 2019-11-27 NOTE — PROGRESS NOTES
11/27/19 1100   Activity/Group Checklist   Group   (recovery group)   Attendance Attended   Attendance Duration (min) 46-60   Interactions Interacted appropriately   Affect/Mood Appropriate   Goals Achieved Identified feelings; Discussed coping strategies; Discussed self-esteem issues; Displayed empathy;Able to listen to others; Able to engage in interactions; Able to reflect/comment on own behavior;Able to self-disclose; Able to recieve feedback; Able to give feedback to another   Gratitude letter to self

## 2019-11-27 NOTE — PROGRESS NOTES
11/27/19 6173   Team Meeting   Meeting Type Daily Rounds   Team Members Present   Team Members Present Physician;Nurse;; Other (Discipline and Name)   Physician Team Member Dr Marjan Barrera Work Team Member Abraham Bustamante   Other (Discipline and Name) Alli Campos and Sameer Davis; NPs   Patient/Family Present   Patient Present No   Patient's Family Present No     Able to leave Monday-- Sister will be coming from [place] to get her

## 2019-11-27 NOTE — PROGRESS NOTES
[facility] GROUP NOTE  Contributed meaningfully to group discussion  Sat among but did not socialize with peers  11/27/19 1000   Activity/Group Checklist   Group Life Skills  (Parts of a House Self Exploration Activity)   Attendance Attended   Attendance Duration (min) 31-45   Interactions Interacted appropriately   Affect/Mood Appropriate   Goals Achieved Able to listen to others; Able to engage in interactions

## 2019-11-28 LAB — VALPROATE SERPL-MCNC: 71.3 UG/ML (ref 50–120)

## 2019-11-28 PROCEDURE — 80164 ASSAY DIPROPYLACETIC ACD TOT: CPT | Performed by: PSYCHIATRY & NEUROLOGY

## 2019-11-28 PROCEDURE — 99232 SBSQ HOSP IP/OBS MODERATE 35: CPT | Performed by: NURSE PRACTITIONER

## 2019-11-28 RX ADMIN — DIVALPROEX SODIUM 500 MG: 500 TABLET, DELAYED RELEASE ORAL at 21:16

## 2019-11-28 RX ADMIN — DIVALPROEX SODIUM 250 MG: 250 TABLET, DELAYED RELEASE ORAL at 08:08

## 2019-11-28 RX ADMIN — DIPHENHYDRAMINE HCL 25 MG: 25 TABLET ORAL at 21:16

## 2019-11-28 RX ADMIN — PALIPERIDONE 6 MG: 6 TABLET, FILM COATED, EXTENDED RELEASE ORAL at 08:08

## 2019-11-28 RX ADMIN — MELATONIN TAB 3 MG 3 MG: 3 TAB at 21:16

## 2019-11-28 RX ADMIN — DIVALPROEX SODIUM 250 MG: 250 TABLET, DELAYED RELEASE ORAL at 17:22

## 2019-11-28 NOTE — PROGRESS NOTES
Patient is denying all symptoms  She is clear and organized in conversation  She states to RN she is feeling so much better and thinking much clearer  She is excited but also nervous for her discharge on Monday; she states she is looking forward to the future  Will continue to monitor

## 2019-11-28 NOTE — PROGRESS NOTES
Pt visible in milieu  She denies all s/s and has been compliant and cooperative with medications and treatment  She is hopeful for d/c by Monday and she plans to go and live with her sister in [place]

## 2019-11-28 NOTE — PROGRESS NOTES
Progress Note - [address] [facility] 51 y o  female MRN: [id number]  Unit/Bed#: [facility] 350-01 Encounter: [id number]    The patient was seen for continuing care  Per nursing patient has been visible, medication compliant and cooperative  She has been denying all symptoms  She is anticipating discharge on Monday and going to live with her sister in [place]  VPA today is 71 3  The patient is pleasant on approach  She says she feels much better about her discharge this time because last time she feels she was discharged too early  She does have a lot of anxiety concerning her boyfriend who is struggling with cancer as well as concerns about her coworkers knowing what is going on with her personally  She plans to go to [place] for partial hospitalization for several weeks and she thinks that will be good for her  States she slept very well 2 nights ago but last night not so well  Appetite has been too good  No suicidal thoughts  She is worried about taking Depakote and experiencing hair loss  We discussed supplementation with selenium and biotin  Current Mental Status Evaluation:  Appearance:  Adequate hygiene and grooming and Good eye contact   Behavior:  calm and cooperative   Mood:  anxious   Affect: constricted   Speech: Normal rate and Normal volume   Thought Process:  Goal directed and coherent   Thought Content:  Does not verbalize delusional material   Perceptual Disturbances: Denies hallucinations and does not appear to be responding to internal stimuli   Risk Potential: No suicidal or homicidal ideation   Orientation:   Oriented x3     Progress Toward Goals:  Slowly improving  Principal Problem:    Schizoaffective disorder, bipolar type ([facility] [address] )  Active Problems:    Tobacco abuse      Recommended Treatment:  Continue with pharmacotherapy, group therapy, milieu therapy and occupational therapy    The patient will be maintained on the following medications:    Current Facility-Administered Medications:  acetaminophen 650 mg Oral Q6H PRN Devora L Char, CRNP   acetaminophen 650 mg Oral Q4H PRN Devora L Char, CRNP   acetaminophen 975 mg Oral Q6H PRN Devora L Char, CRNP   aluminum-magnesium hydroxide-simethicone 30 mL Oral Q4H PRN Devora L Char, CRNP   benztropine 1 mg Intramuscular Q6H PRN Devora L Char, CRNP   benztropine 1 mg Oral Q6H PRN Devora L Char, CRNP   diphenhydrAMINE 25 mg Oral HS Devora L Char, CRNP   divalproex sodium 250 mg Oral BID Connie Villalobos MD   divalproex sodium 500 mg Oral HS Connie Villalobos MD   haloperidol 5 mg Oral Q6H PRN Devora L Char, CRNP   haloperidol lactate 5 mg Intramuscular Q6H PRN Devora L Char, CRNP   hydrOXYzine HCL 25 mg Oral Q6H PRN Devora L Char, CRNP   LORazepam 1 mg Intramuscular Q6H PRN Devora L Char, CRNP   LORazepam 1 mg Oral Q6H PRN Devora L Char, CRNP   magnesium hydroxide 30 mL Oral Daily PRN Devora L Char, CRNP   melatonin 3 mg Oral HS Devora L Char, CRNP   OLANZapine 5 mg Intramuscular Q6H PRN Devora L Char, CRNP   OLANZapine 5 mg Oral Q6H PRN Devora L Char, CRNP   paliperidone 6 mg Oral Daily Devora L Char, CRNP   paliperidone palmitate  mg Intramuscular Q30 Days Connie Villalobos MD   risperiDONE 1 mg Oral Q3H PRN Devora L Char, CRNP   traZODone 50 mg Oral HS PRN Devora L Char, CRNP

## 2019-11-29 PROCEDURE — 99232 SBSQ HOSP IP/OBS MODERATE 35: CPT | Performed by: PSYCHIATRY & NEUROLOGY

## 2019-11-29 RX ORDER — DIVALPROEX SODIUM 500 MG/1
500 TABLET, DELAYED RELEASE ORAL
Qty: 30 TABLET | Refills: 0 | Status: SHIPPED | OUTPATIENT
Start: 2019-11-29 | End: 2019-12-29

## 2019-11-29 RX ORDER — LANOLIN ALCOHOL/MO/W.PET/CERES
3 CREAM (GRAM) TOPICAL
Qty: 30 TABLET | Refills: 0 | Status: SHIPPED | OUTPATIENT
Start: 2019-11-29

## 2019-11-29 RX ORDER — DIPHENHYDRAMINE HCL 25 MG
25 TABLET ORAL
Qty: 30 TABLET | Refills: 0 | Status: SHIPPED | OUTPATIENT
Start: 2019-11-29

## 2019-11-29 RX ORDER — DIVALPROEX SODIUM 250 MG/1
250 TABLET, DELAYED RELEASE ORAL 2 TIMES DAILY
Qty: 60 TABLET | Refills: 0 | Status: SHIPPED | OUTPATIENT
Start: 2019-11-29 | End: 2019-12-29

## 2019-11-29 RX ADMIN — PALIPERIDONE 6 MG: 6 TABLET, FILM COATED, EXTENDED RELEASE ORAL at 08:20

## 2019-11-29 RX ADMIN — MELATONIN TAB 3 MG 3 MG: 3 TAB at 21:28

## 2019-11-29 RX ADMIN — DIVALPROEX SODIUM 500 MG: 500 TABLET, DELAYED RELEASE ORAL at 21:28

## 2019-11-29 RX ADMIN — DIPHENHYDRAMINE HCL 25 MG: 25 TABLET ORAL at 21:28

## 2019-11-29 RX ADMIN — DIVALPROEX SODIUM 250 MG: 250 TABLET, DELAYED RELEASE ORAL at 17:14

## 2019-11-29 RX ADMIN — LORAZEPAM 1 MG: 1 TABLET ORAL at 13:59

## 2019-11-29 RX ADMIN — DIVALPROEX SODIUM 250 MG: 250 TABLET, DELAYED RELEASE ORAL at 08:20

## 2019-11-29 RX ADMIN — PALIPERIDONE PALMITATE 156 MG: 156 INJECTION INTRAMUSCULAR at 14:13

## 2019-11-29 NOTE — BH TRANSITION RECORD
Contact Information: If you have any questions, concerns, pended studies, tests and/or procedures, or emergencies regarding your inpatient behavioral health visit  Please contact [address] [place] [address] behavioral health unit 3B [phone] and ask to speak to a , nurse or physician  A contact is available 24 hours/ 7 days a week at this number  Summary of Procedures Performed During your Stay:  Below is a list of major procedures performed during your hospital stay and a summary of results:  - No major procedures performed  If studies are pending at discharge, follow up with your PCP and/or referring provider

## 2019-11-29 NOTE — PROGRESS NOTES
Pt presents as visible in public areas as well as bedroom  Currently denies SI, HI and A/V hallucinations  Compliant with meds  Observed as calm and cooperative  Does not appear delusional at this time

## 2019-11-29 NOTE — PROGRESS NOTES
[facility] GROUP NOTE     11/29/19 1100   Activity/Group Checklist   Group Personal control  (Rupert Chi)   Attendance Attended   Attendance Duration (min) 31-45   Interactions Interacted appropriately   Affect/Mood Calm   Goals Achieved Able to listen to others; Able to engage in interactions; Able to experience relief/decrease in symptoms   Learning Objectives  Mind/Body Connection  Relaxation as part of daily Routine  Relaxation as coping strategy for emotional distress  Benefits of Proactive Relaxation  Modified Rupert Chi Technique (movement and breath)

## 2019-11-29 NOTE — CASE MANAGEMENT
MARY called informed daughter of discharge planning and D/C on Monday  Pt sister is to transport PT to her home

## 2019-11-29 NOTE — PROGRESS NOTES
Progress Note - 707 [facility] 51 y o  female MRN: [id number]  Unit/Bed#: [facility] 350-01 Encounter: [id number]    Assessment/Plan   Principal Problem:    Schizoaffective disorder, bipolar type ([address] )  Active Problems:    Tobacco abuse    Recommended Treatment:   Continue current medications  Patient is due for Invega  mg today  Continue with group therapy, milieu therapy and occupational therapy  Case discussed with treatment team     Risks, benefits and possible side effects of Medications: Risks, benefits, and possible side effects of medications have been explained to the patient, who verbalizes understanding  Progress Toward Goals: slow improvement    ------------------------------------------------------------    Subjective: Oliver Maya has been compliant with medications without acute side effects  she reports stable mood today, as well as improving focus and concentration  She states she is able to sit through to groups today  She spoke about her plans to live with her sister at [facility]  She states she plans to go to a support group on Monday evenings there and will do so this Monday as well  She states she does not want to stay in [facility] for 2 or 3 months because of her sick boyfriend who has cancer  She states she thinks he has approximately 6 months to 1 year to live and does not want to be apart from too long  She states she is eager to get back to work and misses her coworkers  Patient is consenting for safety on the unit  Psychiatric Review of Systems:  Behavior over the last 24 hours: improved  Sleep: normal  Appetite: good  Medication side effects: none  ROS: Complete review of systems is negative except as noted above      Vital signs in last 24 hours:  Temp:  [97 3 °F (36 3 °C)-98 5 °F (36 9 °C)] 98 5 °F (36 9 °C)  HR:  [86-97] 97  Resp:  [16] 16  BP: (141-145)/(58-60) 145/58    Mental Status Evaluation:  Appearance:  alert, casually dressed, appears stated age and appropriate grooming and hygiene   Behavior:  pleasant, cooperative, sitting comfortably, good eye contact, no abnormal movements and normal gait and balance   Speech:  spontaneous, clear, normal rate, normal volume and coherent   Mood:  euthymic   Affect:  mood-congruent and constricted   Thought Process:  organized, logical, coherent, circumstantial, normal rate of thoughts   Thought Content: no verbalized delusions, no overt paranoia, no obsessive thinking   Perceptual disturbances: no reported auditory hallucinations, no reported visual hallucinations and does not appear to be responding to internal stimuli at this time   Risk Potential: No active or passive suicidal or homicidal ideation   Cognition: oriented to person, place, time, and situation, appears to be of average intelligence, age-appropriate attention span and concentration and cognition not formally tested   Insight:  Fair   Judgment: Fair       Current Medications:    Current Facility-Administered Medications:  acetaminophen 650 mg Oral Q6H PRN Devora L Char, CRNP   acetaminophen 650 mg Oral Q4H PRN Devora L Char, CRNP   acetaminophen 975 mg Oral Q6H PRN Devora L Char, CRNP   aluminum-magnesium hydroxide-simethicone 30 mL Oral Q4H PRN Devora L Char, CRNP   benztropine 1 mg Intramuscular Q6H PRN Devora L Char, CRNP   benztropine 1 mg Oral Q6H PRN Devora L Char, CRNP   diphenhydrAMINE 25 mg Oral HS Devora L Char, CRNP   divalproex sodium 250 mg Oral BID Yared Mccormick MD   divalproex sodium 500 mg Oral HS Yared Mccormick MD   haloperidol 5 mg Oral Q6H PRN Devora L Char, CRNP   haloperidol lactate 5 mg Intramuscular Q6H PRN Devora L Char, CRNP   hydrOXYzine HCL 25 mg Oral Q6H PRN Devora L Char, CRNP   LORazepam 1 mg Intramuscular Q6H PRN Devora L Char, CRNP   LORazepam 1 mg Oral Q6H PRN Devora L Char, CRNP   magnesium hydroxide 30 mL Oral Daily PRN Devora L Char, CRNP   melatonin 3 mg Oral HS Devora L Char, CRNP   OLANZapine 5 mg Intramuscular Q6H PRN Devora L Char, CRNP   OLANZapine 5 mg Oral Q6H PRN Devora L Char, CRNP   paliperidone 6 mg Oral Daily Devora SPEEDY Char, CRNP   paliperidone palmitate  mg Intramuscular Once Tamela Weathers MD   paliperidone palmitate  mg Intramuscular Q30 Days Libertad Agustin MD   risperiDONE 1 mg Oral Q3H PRN Devora L Char, CRNP   traZODone 50 mg Oral HS PRN Devora L Char, CRNP       Behavioral Health Medications: all current active meds have been reviewed  Changes as above  Laboratory results:  I have personally reviewed all pertinent laboratory/tests results  Recent Results (from the past 48 hour(s))   Valproic acid level, total    Collection Time: 11/28/19  5:30 AM   Result Value Ref Range    Valproic Acid, Total 71 3 50 - 120 ug/mL        This note has been constructed using a voice recognition system  There may be translation, syntax, or grammatical errors  If you have any questions, please contact the dictating provider

## 2019-11-29 NOTE — PLAN OF CARE
Problem: DISCHARGE PLANNING  Goal: Discharge to home or other facility with appropriate resources  Description  INTERVENTIONS:  - Identify barriers to discharge w/patient and caregiver  - Arrange for needed discharge resources and transportation as appropriate  - Identify discharge learning needs (meds, wound care, etc )  - Arrange for interpretive services to assist at discharge as needed  - Refer to Case Management Department for coordinating discharge planning if the patient needs post-hospital services based on physician/advanced practitioner order or complex needs related to functional status, cognitive ability, or social support system  Outcome: Progressing     Problem: Ineffective Coping  Goal: Participates in unit activities  Description  Interventions:  - Provide therapeutic environment   - Provide required programming   - Redirect inappropriate behaviors   Outcome: Progressing

## 2019-11-29 NOTE — PROGRESS NOTES
11/27/19 1415   Activity/Group Checklist   Group Other (Comment)  (Art Therapy Process Group/Open Choice, Discussion)   Attendance Attended   Attendance Duration (min) Greater than 60   Interactions Interacted appropriately  (Improved organization)   Affect/Mood Appropriate   Goals Achieved Able to listen to others; Able to engage in interactions; Able to recieve feedback; Able to give feedback to another  (Able to engage materials; full participation/more focused)

## 2019-11-29 NOTE — PLAN OF CARE
Pt sister Bushra Nicolas will be picking Pt up by 9AM Monday for Discharge  Pt will go to live with sister and attend [facility] partial program (authorized 10 days starting 12/3/19)  Pt family and partial program were made aware of Pt IM  Family is in agreement with discharge plan and so is treatment team  Pt is alert, pleasant, and in agreement with plan  Abran address: [address] [place] [address]  , Gabrielle Dear MD    Problem: DISCHARGE PLANNING  Goal: Discharge to home or other facility with appropriate resources  Description  INTERVENTIONS:  - Identify barriers to discharge w/patient and caregiver  - Arrange for needed discharge resources and transportation as appropriate  - Identify discharge learning needs (meds, wound care, etc )  - Arrange for interpretive services to assist at discharge as needed  - Refer to Case Management Department for coordinating discharge planning if the patient needs post-hospital services based on physician/advanced practitioner order or complex needs related to functional status, cognitive ability, or social support system  Outcome: Adequate for Discharge     Problem: Ineffective Coping  Goal: Participates in unit activities  Description  Interventions:  - Provide therapeutic environment   - Provide required programming   - Redirect inappropriate behaviors   Outcome: Adequate for Discharge     Problem: BEHAVIOR  Goal: Pt/Family maintain appropriate behavior and adhere to behavioral management agreement, if implemented  Description  INTERVENTIONS:  - Assess the family dynamic   - Encourage verbalization of thoughts and concerns in a socially appropriate manner  - Assess patient/family's coping skills and non-compliant behavior (including use of illegal substances)    - Utilize positive, consistent limit setting strategies supporting safety of patient, staff and others  - Initiate consult with Case Management, Spiritual Care or other ancillary services as appropriate  - If a patient's/visitor's behavior jeopardizes the safety of the patient, staff, or others, refer to organization procedure     - Notify Security of behavior or suspected illegal substances which indicate the need for search of the patient and/or belongings  - Encourage participation in the decision making process about a behavioral management agreement; implement if patient meets criteria  Outcome: Adequate for Discharge

## 2019-11-29 NOTE — PROGRESS NOTES
11/29/19 0900   Team Meeting   Meeting Type Daily Rounds   Initial Conference Date 11/29/19   Team Members Present   Team Members Present Physician;Nurse;;; Other (Discipline and Name); Occupational Therapist   Physician Team Member [facility]   Nursing Team Member Zohra   Social Work Team Member Rice   Other (Discipline and Name) Emelia Alexander NP   Patient/Family Present   Patient Present No   Patient's Family Present No   Discharge on Monday, early AM   Sister to  at [facility]

## 2019-11-29 NOTE — PROGRESS NOTES
Pt visible in milieu  She denies al s/s and has been compliant and cooperative with medications and treatment  She stated she slept good last night and she is ready for d/c Monday

## 2019-11-30 PROCEDURE — 99232 SBSQ HOSP IP/OBS MODERATE 35: CPT | Performed by: PSYCHIATRY & NEUROLOGY

## 2019-11-30 RX ADMIN — PALIPERIDONE 6 MG: 6 TABLET, FILM COATED, EXTENDED RELEASE ORAL at 08:07

## 2019-11-30 RX ADMIN — DIVALPROEX SODIUM 500 MG: 500 TABLET, DELAYED RELEASE ORAL at 21:10

## 2019-11-30 RX ADMIN — MELATONIN TAB 3 MG 3 MG: 3 TAB at 21:10

## 2019-11-30 RX ADMIN — DIVALPROEX SODIUM 250 MG: 250 TABLET, DELAYED RELEASE ORAL at 17:14

## 2019-11-30 RX ADMIN — DIPHENHYDRAMINE HCL 25 MG: 25 TABLET ORAL at 21:10

## 2019-11-30 RX ADMIN — DIVALPROEX SODIUM 250 MG: 250 TABLET, DELAYED RELEASE ORAL at 08:07

## 2019-11-30 NOTE — PROGRESS NOTES
11/30/19 1015   Activity/Group Checklist   Group Other (Comment)  (Surviving Trauma Group/Education, Discussion)   Attendance Attended   Attendance Duration (min) 31-45  (Left group early)   Interactions Interacted appropriately   Affect/Mood Appropriate   Goals Achieved Able to listen to others; Able to engage in interactions; Able to recieve feedback

## 2019-11-30 NOTE — PROGRESS NOTES
Progress Note - 707 [facility] 51 y o  female MRN: [id number]  Unit/Bed#: [facility] 350-01 Encounter: [id number]    Assessment/Plan   Principal Problem:    Schizoaffective disorder, bipolar type ([address] )  Active Problems:    Tobacco abuse      Subjective:  Patient more reality based and appears back to baseline compared to previous encounters  Is denying psychotic symptoms and does not appear to be manic  Denied delusional material   ADLs appear to be appropriate and is focused on paying bills today  Appears insight and judgement are improved  Reports depression symptoms are low denying SI  Anxiety appears manageable at this time  Sleep is improved  Medication compliant  Does complain of some mild bilateral hand tremors and restlessness  Will consider decreasing PO Invega tomorrow or continue current dosing until regular treatment team evaluates on Monday  Is to be discharged on Monday  Patient not interested in medication changes today      Current Medications:  Current Facility-Administered Medications   Medication Dose Route Frequency    acetaminophen (TYLENOL) tablet 650 mg  650 mg Oral Q6H PRN    acetaminophen (TYLENOL) tablet 650 mg  650 mg Oral Q4H PRN    acetaminophen (TYLENOL) tablet 975 mg  975 mg Oral Q6H PRN    aluminum-magnesium hydroxide-simethicone (MYLANTA) 200-200-20 mg/5 mL oral suspension 30 mL  30 mL Oral Q4H PRN    benztropine (COGENTIN) injection 1 mg  1 mg Intramuscular Q6H PRN    benztropine (COGENTIN) tablet 1 mg  1 mg Oral Q6H PRN    diphenhydrAMINE (BENADRYL) tablet 25 mg  25 mg Oral HS    divalproex sodium (DEPAKOTE) EC tablet 250 mg  250 mg Oral BID    divalproex sodium (DEPAKOTE) EC tablet 500 mg  500 mg Oral HS    haloperidol (HALDOL) tablet 5 mg  5 mg Oral Q6H PRN    haloperidol lactate (HALDOL) injection 5 mg  5 mg Intramuscular Q6H PRN    hydrOXYzine HCL (ATARAX) tablet 25 mg  25 mg Oral Q6H PRN    LORazepam (ATIVAN) 2 mg/mL injection 1 mg  1 mg Intramuscular Q6H PRN    LORazepam (ATIVAN) tablet 1 mg  1 mg Oral Q6H PRN    magnesium hydroxide (MILK OF MAGNESIA) 400 mg/5 mL oral suspension 30 mL  30 mL Oral Daily PRN    melatonin tablet 3 mg  3 mg Oral HS    OLANZapine (ZyPREXA) IM injection 5 mg  5 mg Intramuscular Q6H PRN    OLANZapine (ZyPREXA) tablet 5 mg  5 mg Oral Q6H PRN    paliperidone (INVEGA) 24 hr tablet 6 mg  6 mg Oral Daily    paliperidone palmitate ER (INVEGA) IM injection 234 mg  234 mg Intramuscular Q30 Days    risperiDONE (RisperDAL M-TABS) dispersible tablet 1 mg  1 mg Oral Q3H PRN    traZODone (DESYREL) tablet 50 mg  50 mg Oral HS PRN       Behavioral Health Medications: all current active meds have been reviewed and continue current psychiatric medications  Vitals:  Vitals:    11/30/19 0800   BP: 142/59   Pulse: 59   Resp: 16   Temp: 98 1 °F (36 7 °C)   SpO2:        Laboratory results:    I have personally reviewed all pertinent laboratory/tests results    Most Recent Labs:   Lab Results   Component Value Date    WBC 6 70 11/15/2019    RBC 4 91 11/15/2019    HGB 13 2 11/15/2019    HCT 39 4 11/15/2019     11/15/2019    RDW 14 5 11/15/2019    NEUTROABS 4 60 11/15/2019    SODIUM 139 11/15/2019    K 4 0 11/15/2019     11/15/2019    CO2 29 11/15/2019    BUN 11 11/15/2019    CREATININE 0 56 (L) 11/15/2019    GLUC 105 (H) 11/15/2019    GLUF 109 (H) 10/26/2019    CALCIUM 9 3 11/15/2019    AST 25 11/15/2019    ALT 29 11/15/2019    ALKPHOS 77 11/15/2019    TP 8 1 11/15/2019    ALB 4 5 11/15/2019    TBILI 0 30 11/15/2019    CHOLESTEROL 164 11/15/2019    HDL 50 11/15/2019    TRIG 259 (H) 11/15/2019    LDLCALC 62 11/15/2019    NONHDLC 114 11/15/2019    VALPROICTOT 71 3 11/28/2019    AMMONIA <9 (L) 10/26/2019    YAE3SVMMVSFV 2 500 11/15/2019    FREET4 0 93 08/13/2019    T3FREE 2 27 (L) 08/13/2019    PREGSERUM Negative 11/15/2019    RPR Non-Reactive 11/15/2019    HGBA1C 5 7 08/13/2019     08/13/2019       Psychiatric Review of Systems:  Behavior over the last 24 hours:  improved  Sleep: normal  Appetite: normal  Medication side effects: Yes, restlessness and mild tremors  ROS: no complaints, all others negative    Mental Status Evaluation:  Appearance:  casually dressed   Behavior:  cooperative   Speech:  normal pitch and normal volume   Mood:  euthymic   Affect:  mood-congruent and brighter   Language appropriate   Thought Process:  normal   Thought Content:  normal   Perceptual Disturbances: None   Risk Potential: Denied SI/HI  Potential for aggression: No   Sensorium:  person, place and time/date   Cognition:  grossly intact   Consciousness:  alert and awake    Recent and Remote Memory fair   Attention: attention span and concentration were age appropriate   Insight:  fair   Judgment: fair   Gait/Station: normal gait/station and normal balance   Motor Activity: no abnormal movements     Progress Toward Goals: progressing     Recommended Treatment: Continue with group therapy, milieu therapy and occupational therapy  1   Continue current medications  2  Unclear as to when she will be stopping PO Invega due to getting IM [place]   Defer to regular treatment team or consider decreasing dosing to 3mg tomorrow  3  Upcoming discharge on Monday    Risks, benefits and possible side effects of Medications:   Risks, benefits, and possible side effects of medications explained to patient and patient verbalizes understanding        France North PA-C

## 2019-11-30 NOTE — PROGRESS NOTES
11/29/19 1415   Activity/Group Checklist   Group Other (Comment)  (Art Therapy Process Group/Perception, Discussion)   Attendance Attended   Attendance Duration (min) Greater than 60   Interactions Interacted appropriately   Affect/Mood Appropriate;Calm   Goals Achieved Discussed coping strategies; Able to listen to others; Able to engage in interactions; Able to reflect/comment on own behavior;Able to recieve feedback; Able to give feedback to another  (Able to engage materials; full participation)

## 2019-11-30 NOTE — PROGRESS NOTES
Pt denies all s/s  She is excited for d/c on Monday with her sister  She plans to attend a partial program in [place] for 10 days and then she stated she wants to return here so she can resume work  She has been compliant with medication and treatment and has been attending groups  Her conversations have been clear and coherent

## 2019-12-01 PROCEDURE — 99232 SBSQ HOSP IP/OBS MODERATE 35: CPT | Performed by: NURSE PRACTITIONER

## 2019-12-01 RX ADMIN — MELATONIN TAB 3 MG 3 MG: 3 TAB at 21:18

## 2019-12-01 RX ADMIN — DIVALPROEX SODIUM 500 MG: 500 TABLET, DELAYED RELEASE ORAL at 21:18

## 2019-12-01 RX ADMIN — DIVALPROEX SODIUM 250 MG: 250 TABLET, DELAYED RELEASE ORAL at 17:27

## 2019-12-01 RX ADMIN — PALIPERIDONE 6 MG: 6 TABLET, FILM COATED, EXTENDED RELEASE ORAL at 08:23

## 2019-12-01 RX ADMIN — DIVALPROEX SODIUM 250 MG: 250 TABLET, DELAYED RELEASE ORAL at 08:23

## 2019-12-01 RX ADMIN — DIPHENHYDRAMINE HCL 25 MG: 25 TABLET ORAL at 21:18

## 2019-12-01 RX ADMIN — HYDROXYZINE HYDROCHLORIDE 25 MG: 25 TABLET ORAL at 17:29

## 2019-12-01 NOTE — PROGRESS NOTES
Progress Note - [facility] 51 y o  female MRN: [id number]  Unit/Bed#: U 350-01 Encounter: [id number]    Assessment/Plan   Principal Problem:    Schizoaffective disorder, bipolar type ([address] )  Active Problems:    Tobacco abuse      Subjective:Patient was seen today for continuation of care, records reviewed and  patient was discussed with the morning case review team  Dawson Harmon reported that she sleeps better with melatonin dose given last night  She looks forward to be discharged with her sister tomorrow morning and attending the [facility] program in [place]  She states her sister is very supportive and will help her pay bills  She reports that she has been overdue for over 600 dollars given that she has been in the hospital for like 3 months  She reports that she would like to return to work at least as a part-time and 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  She reports that she needs to be in a better state of mind as she does not want to hurt any patients  He shared how manic episode have make her relapsed plus some risky behaviors  She feels her mood has improved but expressed being anxious about what the future might hold for her as her boyfriend is battling cancer and her current situation  Patient denies endorsing any suicidal or homicidal ideation  Does not seem to be experiencing any manic or psychotic symptoms during our encounter  Remains medication compliance  Denies any side effects from medications      Current Medications:  Current Facility-Administered Medications   Medication Dose Route Frequency    acetaminophen (TYLENOL) tablet 650 mg  650 mg Oral Q6H PRN    acetaminophen (TYLENOL) tablet 650 mg  650 mg Oral Q4H PRN    acetaminophen (TYLENOL) tablet 975 mg  975 mg Oral Q6H PRN    aluminum-magnesium hydroxide-simethicone (MYLANTA) 200-200-20 mg/5 mL oral suspension 30 mL  30 mL Oral Q4H PRN    benztropine (COGENTIN) injection 1 mg  1 mg Intramuscular Q6H PRN    benztropine (COGENTIN) tablet 1 mg  1 mg Oral Q6H PRN    diphenhydrAMINE (BENADRYL) tablet 25 mg  25 mg Oral HS    divalproex sodium (DEPAKOTE) EC tablet 250 mg  250 mg Oral BID    divalproex sodium (DEPAKOTE) EC tablet 500 mg  500 mg Oral HS    haloperidol (HALDOL) tablet 5 mg  5 mg Oral Q6H PRN    haloperidol lactate (HALDOL) injection 5 mg  5 mg Intramuscular Q6H PRN    hydrOXYzine HCL (ATARAX) tablet 25 mg  25 mg Oral Q6H PRN    LORazepam (ATIVAN) 2 mg/mL injection 1 mg  1 mg Intramuscular Q6H PRN    LORazepam (ATIVAN) tablet 1 mg  1 mg Oral Q6H PRN    magnesium hydroxide (MILK OF MAGNESIA) 400 mg/5 mL oral suspension 30 mL  30 mL Oral Daily PRN    melatonin tablet 3 mg  3 mg Oral HS    OLANZapine (ZyPREXA) IM injection 5 mg  5 mg Intramuscular Q6H PRN    OLANZapine (ZyPREXA) tablet 5 mg  5 mg Oral Q6H PRN    paliperidone (INVEGA) 24 hr tablet 6 mg  6 mg Oral Daily    paliperidone palmitate ER (INVEGA) IM injection 234 mg  234 mg Intramuscular Q30 Days    risperiDONE (RisperDAL M-TABS) dispersible tablet 1 mg  1 mg Oral Q3H PRN    traZODone (DESYREL) tablet 50 mg  50 mg Oral HS PRN       Behavioral Health Medications: all current active meds have been reviewed and continue current psychiatric medications  Vitals:  Vitals:    12/01/19 1539   BP: 137/58   Pulse: 101   Resp: 16   Temp: 98 3 °F (36 8 °C)   SpO2:        Laboratory results:    I have personally reviewed all pertinent laboratory/tests results    Most Recent Labs:   Lab Results   Component Value Date    WBC 6 70 11/15/2019    RBC 4 91 11/15/2019    HGB 13 2 11/15/2019    HCT 39 4 11/15/2019     11/15/2019    RDW 14 5 11/15/2019    NEUTROABS 4 60 11/15/2019    SODIUM 139 11/15/2019    K 4 0 11/15/2019     11/15/2019    CO2 29 11/15/2019    BUN 11 11/15/2019    CREATININE 0 56 (L) 11/15/2019    GLUC 105 (H) 11/15/2019    GLUF 109 (H) 10/26/2019    CALCIUM 9 3 11/15/2019    AST 25 11/15/2019    ALT 29 11/15/2019    ALKPHOS 77 11/15/2019 TP 8 1 11/15/2019    ALB 4 5 11/15/2019    TBILI 0 30 11/15/2019    CHOLESTEROL 164 11/15/2019    HDL 50 11/15/2019    TRIG 259 (H) 11/15/2019    LDLCALC 62 11/15/2019    NONHDLC 114 11/15/2019    VALPROICTOT 71 3 11/28/2019    AMMONIA <9 (L) 10/26/2019    QWB7MCSIJANX 2 500 11/15/2019    FREET4 0 93 08/13/2019    T3FREE 2 27 (L) 08/13/2019    PREGSERUM Negative 11/15/2019    RPR Non-Reactive 11/15/2019    HGBA1C 5 7 08/13/2019     08/13/2019       Psychiatric Review of Systems:    Behavior over the last 24 hours:  improved  Sleep: normal  Appetite: normal  Medication side effects: No  ROS: no complaints    Mental Status Evaluation:    Appearance:  casually dressed, dressed appropriately, adequate grooming   Behavior:  pleasant, cooperative, calm   Speech:  normal volume, fluent, clear, decreased rate, soft   Mood:  euthymic   Affect:  brighter, reactive, mood-congruent   Thought Process:  goal directed, linear   Associations: intact associations   Thought Content:  no overt delusions, preoccupied with going back to work   Perceptual Disturbances: no auditory hallucinations, no visual hallucinations   Risk Potential: Suicidal ideation - None  Homicidal ideation - None  Potential for aggression - No   Sensorium:  oriented to person, place, time/date and situation   Memory:  recent and remote memory grossly intact   Consciousness:  alert and awake   Attention: attention span and concentration are age appropriate   Insight:  fair   Judgment: improved and fair   Gait/Station: normal gait/station, normal balance   Motor Activity: no abnormal movements       Progress Toward Goals:     Improving     Assessment/Plan   Principal Problem:    Schizoaffective disorder, bipolar type (HCC)  Active Problems:    Tobacco abuse      Recommended Treatment: Continue with group therapy, milieu therapy and occupational therapy   Treatment plan and medication changes discussed and per the attending physician the plan is:    1  Behavioral Health checks every 7 minutes  2  Continue with current medication regimen:  Depakote 250 b i d  daily and 500 mg at bedtime, melatonin 3 mg daily and benadryl 25 mg daily at bedtime, [place] given on 11/21, Invega 6 mg daily as to be decided to be continued as per attending  3  Disposition Planning pended for Monday at 9 am       Risks / Benefits of Treatment:     Risks, benefits, and possible side effects of medications explained to patient and patient verbalizes understanding and agreement for treatment  Counseling / Coordination of Care:     Patient's progress reviewed with nursing staff  Medications, treatment progress and treatment plan reviewed with patient  Supportive counseling provided to the patient            Jhon Ferrell

## 2019-12-01 NOTE — PROGRESS NOTES
Patient denies SI/HI and denies A/V hallucinations  Patient out on unit, calm, cooperative, and medication compliant  Patient looking forward to discharge tomorrow

## 2019-12-01 NOTE — PROGRESS NOTES
Pt denies all symptoms  Pt is looking forward to d/c monday  Pt is calm, cooperative and pleasant  Pt is bright, social and out in the milieu  Compliant with medications, mouth checks completed  Will monitor

## 2019-12-02 VITALS
HEIGHT: 65 IN | TEMPERATURE: 98.2 F | WEIGHT: 182 LBS | OXYGEN SATURATION: 100 % | SYSTOLIC BLOOD PRESSURE: 112 MMHG | BODY MASS INDEX: 30.32 KG/M2 | HEART RATE: 89 BPM | DIASTOLIC BLOOD PRESSURE: 53 MMHG | RESPIRATION RATE: 16 BRPM

## 2019-12-02 PROCEDURE — 99238 HOSP IP/OBS DSCHRG MGMT 30/<: CPT | Performed by: PSYCHIATRY & NEUROLOGY

## 2019-12-02 RX ADMIN — DIVALPROEX SODIUM 250 MG: 250 TABLET, DELAYED RELEASE ORAL at 08:02

## 2019-12-02 RX ADMIN — PALIPERIDONE 6 MG: 6 TABLET, FILM COATED, EXTENDED RELEASE ORAL at 08:02

## 2019-12-02 RX ADMIN — ACETAMINOPHEN 650 MG: 325 TABLET ORAL at 06:40

## 2019-12-02 NOTE — PLAN OF CARE
Problem: DISCHARGE PLANNING  Goal: Discharge to home or other facility with appropriate resources  Description  INTERVENTIONS:  - Identify barriers to discharge w/patient and caregiver  - Arrange for needed discharge resources and transportation as appropriate  - Identify discharge learning needs (meds, wound care, etc )  - Arrange for interpretive services to assist at discharge as needed  - Refer to Case Management Department for coordinating discharge planning if the patient needs post-hospital services based on physician/advanced practitioner order or complex needs related to functional status, cognitive ability, or social support system  Outcome: Completed     Problem: Ineffective Coping  Goal: Participates in unit activities  Description  Interventions:  - Provide therapeutic environment   - Provide required programming   - Redirect inappropriate behaviors   Outcome: Completed

## 2019-12-02 NOTE — PLAN OF CARE
Problem: DISCHARGE PLANNING  Goal: Discharge to home or other facility with appropriate resources  Description  INTERVENTIONS:  - Identify barriers to discharge w/patient and caregiver  - Arrange for needed discharge resources and transportation as appropriate  - Identify discharge learning needs (meds, wound care, etc )  - Arrange for interpretive services to assist at discharge as needed  - Refer to Case Management Department for coordinating discharge planning if the patient needs post-hospital services based on physician/advanced practitioner order or complex needs related to functional status, cognitive ability, or social support system  Outcome: Adequate for Discharge     Problem: Ineffective Coping  Goal: Participates in unit activities  Description  Interventions:  - Provide therapeutic environment   - Provide required programming   - Redirect inappropriate behaviors   Outcome: Adequate for Discharge

## 2019-12-02 NOTE — PROGRESS NOTES
Pt denies all symptoms  Pt is worried about the impending potential snow in the morning and how this could impact her discharge  Pt is requesting "can [address] still discharge me to my house, even if my sister can't make it from [place]? I would really like to go home and get some clothes and say goodbye to my boyfriend and my cats  I really miss my cats " RN told pt that transportation/plans will be discussed in the morning if there are any changes but as of right now there are not  Pt accepted this  She is visible and appropriate in the milieu

## 2019-12-02 NOTE — PROGRESS NOTES
Pt denies all s/s   Pt is excited fro d/c today  She has been compliant with medications and treatment

## 2019-12-02 NOTE — DISCHARGE SUMMARY
Discharge Summary - [address] [place] [address] 51 y o  female MRN: [id number]  Unit/Bed#: -01 Encounter: [id number]     Admission Date:   Admission Orders (From admission, onward)     Ordered        11/14/19 4216  DISCHARGE READMIT Admit Patient to [address] [place] [address] (use with Discharge Readmit Navigator in [facility] Discharge Readmit scenario including from any IP unit or different campus ED to [facility])  Nurse to release order when pt  arrives to [facility] Unit  Once                         Discharge Date: 12/2/2019  9:30 AM    Attending Psychiatrist:  Dr Lucrecia Jaime    Reason for Admission:   Hank Payton is a 51 y o  female who presents with Signs of acute psychosis  Hank Payton initially presented with mixed symptoms of bipolar disorder, acute psychosis, grandiose delusions, and bizarre and disorganized behaviors  she was admitted to the psychiatric unit on a voluntarily 201 commitment basis  Please see initial H&P for full details  Hospital Course: On admission, Hank Payton was started on Invega 6 mg daily, Trileptal 450 mg b i d , melatonin, Haldol 5 mg b i d , Cogentin 1 mg b i d , and Benadryl 25 mg HS  Cogentin, Haldol, and Trileptal were discontinued as the patient was not compliant with these medications  She was continued on Invega 6 mg and eventually started on Invega Sustenna  She was also started on Depakote 250 t i d , which was increased to 250 in the morning and 750 at night  She experienced good symptomatic improvement on the Invega and Depakote with improved mood stability, increased organization of thought and behavior, cessation of delusions, and improved insight  she tolerated these medications with no acute side effects  The patient's mood brightened over the course of treatment, and she was seen in [facility] interacting appropriately with peers  On the day of discharge, patient reported some mild anxiety regarding moving to [facility] with her sister but had overall good mood and insight  Aicha Prescott denied suicidal or homicidal ideations at the time of her discharge  She verbalized understanding that she should continue her Depakote as prescribed, stop the Invega 6 mg p o , and take her next [place] injection on December 27th  Labs/Imaging:   I have personally reviewed all pertinent laboratory/tests results  Mental Status Evaluation:  Appearance:  alert, casually dressed, appears stated age, appropriate grooming and hygiene and overweight   Behavior:  pleasant, cooperative, sitting comfortably, good eye contact, no abnormal movements and normal gait and balance   Speech:  spontaneous, clear, normal rate, normal volume and coherent   Mood:  anxious   Affect:  mood-congruent and constricted   Thought Process:  organized, logical, coherent, linear, goal directed, normal rate of thoughts   Thought Content: no verbalized delusions, no overt paranoia, no obsessive thinking   Perceptual disturbances: no reported auditory hallucinations, no reported visual hallucinations and does not appear to be responding to internal stimuli at this time   Risk Potential: No active or passive suicidal or homicidal ideation   Cognition: oriented to person, place, time, and situation, appears to be of average intelligence, age-appropriate attention span and concentration and cognition not formally tested   Insight:  Good   Judgment: Fair     Discharge Diagnosis:   Principal Problem:    Schizoaffective disorder, bipolar type ([facility] [address] )  Active Problems:    Tobacco abuse      Discharge Medications:  See list above, as well as the after visit summary containing reconciled discharge medications provided to patient and family  Discharge instructions/Information to patient and family:   See after visit summary for information provided to patient and family  Provisions for Follow-Up Care:  See after visit summary for information related to follow-up care and any pertinent home health orders            This note has been constructed using a voice recognition system  There may be translation, syntax, or grammatical errors  If you have any questions, please contact the dictating provider

## 2020-03-10 NOTE — CASE MANAGEMENT
Discharge Summary - [address] [facility]    Patient Information: Freddy Dietz  45 y o  adult MRN: [id number]  Unit/Bed#: S -01 Encounter: [id number]    Discharging Physician / Practitioner: Duane Agee MD  PCP: Guzman Chua MD  Admission Date: 3/6/2020  Discharge Date: 03/10/20    Disposition:     Home    Reason for Admission:  Left-sided numbness and weakness    Discharge Diagnoses:     Principal Problem:    Left-sided weakness and sensory deficits  Active Problems:    Cervical disc disease    Essential hypertension    GERD (gastroesophageal reflux disease)    History of sleeve gastrectomy    Male-to-female transgender person  Resolved Problems:    * No resolved hospital problems  *      Consultations During Hospital Stay:  · Neurosurgery  · Neurology    Procedures Performed:     · MRI brain:  No evidence of any acute ischemic event  · MRI cervical spine degenerative changes noted  · MRI lumbar spine L3-L4,-L4-L5 disc prolapse to left noted    Significant Findings / Test Results:     · As above    Incidental Findings:   · As above      Hospital Course: Freddy Dietz  is a 45 y o  adult patient who originally presented to the hospital on 3/6/2020 due to left-sided numbness and weakness  Patient was subsequently admitted and was evaluated by neurology and neurosurgery  She had MRI of the brain, cervical spine and lumbar spine  The results of which are noted above  As per neurosurgery no surgical intervention required at this time  She will need to follow with pain management for epidural steroid injections  Patient was evaluated by Neurology as well and was prescribed baclofen, that caused patient to have urinary incontinence  Patient declined to take it anymore  However her symptoms have improved muscle stiffness improved  She is being discharged home in stable condition    Her gabapentin dose was increased to 300 mg 3 times daily    Condition at Discharge: good SW met briefly with Pt who continues to be delusional, undressing on evening shift, and disorganized  Pt has begun taking medications which is progress  SW will make appointment with [facility] when discharge is decided  Discharge Day Visit / Exam:     Subjective:  Feeling better today  Wishes not to take baclofen as it causing incontinence  Vitals: Blood Pressure: 140/89 (03/10/20 0700)  Pulse: 72 (03/10/20 0700)  Temperature: 97 9 °F (36 6 °C) (03/10/20 0700)  Temp Source: Oral (03/10/20 0700)  Respirations: 18 (03/10/20 0700)  Height: 5' 9" (175 3 cm) (03/08/20 1020)  Weight - Scale: 124 kg (273 lb) (03/06/20 2045)  SpO2: 97 % (03/10/20 0700)  Exam:   Physical Exam     Gen -Patient comfortable   Neck- Supple  No thyromegaly or lymphadenopathy  Lungs-Clear bilaterally without any wheeze or rales   Heart S1-S2, regular rate and rhythm, no murmurs  Abdomen-soft nontender, no organomegaly  Bowel sounds present  Extremities-no cyanosi,  clubbing or edema  Skin- no rash  Neuro-awake alert and oriented       Discussion with Family:     Discharge instructions/Information to patient and family:   See after visit summary for information provided to patient and family  Provisions for Follow-Up Care:  See after visit summary for information related to follow-up care and any pertinent home health orders  Planned Readmission:  No     Discharge Statement:  I spent 35 minutes discharging the patient  This time was spent on the day of discharge  I had direct contact with the patient on the day of discharge  Greater than 50% of the total time was spent examining patient, answering all patient questions, arranging and discussing plan of care with patient as well as directly providing post-discharge instructions  Additional time then spent on discharge activities  Discharge Medications:  See after visit summary for reconciled discharge medications provided to patient and family        ** Please Note: This note has been constructed using a voice recognition system **

## 2020-05-21 NOTE — PLAN OF CARE
Patient left the department by ambulating, mother had concerns patient became agitated stating\" they want take the speakers out my ears\" Patient departed with his father. Problem: Alteration in Thoughts and Perception  Goal: Treatment Goal: Gain control of psychotic behaviors/thinking, reduce/eliminate presenting symptoms and demonstrate improved reality functioning upon discharge  Outcome: Progressing     Problem: Risk for Self Injury/Neglect  Goal: Treatment Goal: Remain safe during length of stay, learn and adopt new coping skills, and be free of self-injurious ideation, impulses and acts at the time of discharge  Outcome: Progressing     Problem: Depression  Goal: Treatment Goal: Demonstrate behavioral control of depressive symptoms, verbalize feelings of improved mood/affect, and adopt new coping skills prior to discharge  Outcome: Progressing  Goal: Verbalize thoughts and feelings  Description  Interventions:  - Assess and re-assess patient's level of risk   - Engage patient in 1:1 interactions, daily, for a minimum of 15 minutes   - Encourage patient to express feelings, fears, frustrations, hopes   Outcome: Progressing     Problem: Anxiety  Goal: Anxiety is at manageable level  Description  Interventions:  - Assess and monitor patient's anxiety level  - Monitor for signs and symptoms (heart palpitations, chest pain, shortness of breath, headaches, nausea, feeling jumpy, restlessness, irritable, apprehensive)  - Collaborate with interdisciplinary team and initiate plan and interventions as ordered    - [place] patient to unit/surroundings  - Explain treatment plan  - Encourage participation in care  - Encourage verbalization of concerns/fears  - Identify coping mechanisms  - Assist in developing anxiety-reducing skills  - Administer/offer alternative therapies  - Limit or eliminate stimulants  Outcome: Progressing     Problem: Alteration in Orientation  Goal: Treatment Goal: Demonstrate a reduction of confusion and improved orientation to person, place, time and/or situation upon discharge, according to optimum baseline/ability  Outcome: Progressing     Problem: BEHAVIOR  Goal: Pt/Family maintain appropriate behavior and adhere to behavioral management agreement, if implemented  Description  INTERVENTIONS:  - Assess the family dynamic   - Encourage verbalization of thoughts and concerns in a socially appropriate manner  - Assess patient/family's coping skills and non-compliant behavior (including use of illegal substances)  - Utilize positive, consistent limit setting strategies supporting safety of patient, staff and others  - Initiate consult with Case Management, Spiritual Care or other ancillary services as appropriate  - If a patient's/visitor's behavior jeopardizes the safety of the patient, staff, or others, refer to organization procedure     - Notify Security of behavior or suspected illegal substances which indicate the need for search of the patient and/or belongings  - Encourage participation in the decision making process about a behavioral management agreement; implement if patient meets criteria  Outcome: Progressing

## 2020-06-09 NOTE — PROGRESS NOTES
Pt slept majority of the night however did wake for brief periods of time  Unsteady on feet at times and appears drowsy however cooperative with staff  unable to determine

## 2020-12-21 ENCOUNTER — IMMUNIZATIONS (OUTPATIENT)
Dept: FAMILY MEDICINE CLINIC | Facility: HOSPITAL | Age: 50
End: 2020-12-21
Payer: COMMERCIAL

## 2020-12-21 DIAGNOSIS — Z23 ENCOUNTER FOR IMMUNIZATION: ICD-10-CM

## 2020-12-21 PROCEDURE — 0001A SARS-COV-2 / COVID-19 MRNA VACCINE (PFIZER-BIONTECH) 30 MCG: CPT

## 2020-12-21 PROCEDURE — 91300 SARS-COV-2 / COVID-19 MRNA VACCINE (PFIZER-BIONTECH) 30 MCG: CPT

## 2021-01-13 ENCOUNTER — IMMUNIZATIONS (OUTPATIENT)
Dept: FAMILY MEDICINE CLINIC | Facility: HOSPITAL | Age: 51
End: 2021-01-13

## 2021-01-13 DIAGNOSIS — Z23 ENCOUNTER FOR IMMUNIZATION: ICD-10-CM

## 2021-01-13 PROCEDURE — 0002A SARS-COV-2 / COVID-19 MRNA VACCINE (PFIZER-BIONTECH) 30 MCG: CPT

## 2021-01-13 PROCEDURE — 91300 SARS-COV-2 / COVID-19 MRNA VACCINE (PFIZER-BIONTECH) 30 MCG: CPT

## 2021-08-30 ENCOUNTER — APPOINTMENT (OUTPATIENT)
Dept: LAB | Facility: HOSPITAL | Age: 51
End: 2021-08-30

## 2021-08-30 DIAGNOSIS — Z00.8 HEALTH EXAMINATION IN POPULATION SURVEY: ICD-10-CM

## 2021-08-30 LAB
CHOLEST SERPL-MCNC: 155 MG/DL
EST. AVERAGE GLUCOSE BLD GHB EST-MCNC: 111 MG/DL
HBA1C MFR BLD: 5.5 %
HDLC SERPL-MCNC: 45 MG/DL
LDLC SERPL CALC-MCNC: 75 MG/DL
NONHDLC SERPL-MCNC: 110 MG/DL
TRIGL SERPL-MCNC: 173 MG/DL

## 2021-08-30 PROCEDURE — 83036 HEMOGLOBIN GLYCOSYLATED A1C: CPT

## 2021-08-30 PROCEDURE — 80061 LIPID PANEL: CPT

## 2021-08-30 PROCEDURE — 36415 COLL VENOUS BLD VENIPUNCTURE: CPT

## 2021-09-21 NOTE — PROGRESS NOTES
10/25/19 0700   Team Meeting   Meeting Type Daily Rounds   Team Members Present   Team Members Present Physician;Nurse;;; Other (Discipline and Name)   Physician Team Member [address] Team Member KARL KLINE [facility] Management Team Member Lakesha   Social Work Team Member Miguel Augustine   Other (Discipline and Name) SPEEDY Menendez [employer] student   Patient/Family Present   Patient Present No   Patient's Family Present No      Medication adjustments to be made  Siliq Counseling:  I discussed with the patient the risks of Siliq including but not limited to new or worsening depression, suicidal thoughts and behavior, immunosuppression, malignancy, posterior leukoencephalopathy syndrome, and serious infections.  The patient understands that monitoring is required including a PPD at baseline and must alert us or the primary physician if symptoms of infection or other concerning signs are noted. There is also a special program designed to monitor depression which is required with Siliq.

## 2021-12-21 ENCOUNTER — IMMUNIZATIONS (OUTPATIENT)
Dept: FAMILY MEDICINE CLINIC | Facility: HOSPITAL | Age: 51
End: 2021-12-21

## 2021-12-21 DIAGNOSIS — Z23 ENCOUNTER FOR IMMUNIZATION: Primary | ICD-10-CM

## 2021-12-21 PROCEDURE — 0001A COVID-19 PFIZER VACC 0.3 ML: CPT

## 2021-12-21 PROCEDURE — 91300 COVID-19 PFIZER VACC 0.3 ML: CPT

## 2022-08-10 NOTE — SOCIAL WORK
Worker spoke with patient's daughter to inform her of discharge for 10/8  Patient's daughter reports that she will get back to worker as patient does not have the key to get into her apartment  She reports that she has the key to get into the main building but not her apartment as the person who was watching her pets has it  Worker informed her that transport is arranged between 11-11:30am for tomorrow but it can be changed  Worker will await to hear back from patient's daughter  Azithromycin Counseling:  I discussed with the patient the risks of azithromycin including but not limited to GI upset, allergic reaction, drug rash, diarrhea, and yeast infections.

## 2023-01-25 NOTE — PROGRESS NOTES
Status: Pt was RIS all day yesterday  She was paranoid about her medications, insisting to see the packaging    Pt was awake until about 12:30 AM & slept until 3:45 AM; napping off & on since  Medication: Seroquel increased / PRN Atarax, Ativan, Zyprexa, & Seroquel  D/C: TBD - 303 hearing at 10 AM today     09/17/19 0742   Team Meeting   Meeting Type Daily Rounds   Team Members Present   Team Members Present Physician;Nurse;;Occupational Therapist   Physician Team Member Dr Dahlia Kohli / Dr Jaclyn Chacon / Ivonne Juarez Team Member [phone]  [facility] Team Member Annabella Degroot / Hugo Nagy   OT Team Member Luci   Patient/Family Present   Patient Present No   Patient's Family Present No Abbe Flap (Upper To Lower Lip) Text: The defect of the lower lip was assessed and measured.  Given the location and size of the defect, an Abbe flap was deemed most appropriate.  Using a sterile surgical marker, an appropriate Abbe flap was measured and drawn on the upper lip. Local anesthesia was then infiltrated.  A scalpel was then used to incise the upper lip through and through the skin, vermilion, muscle and mucosa, leaving the flap pedicled on the opposite side.  The flap was then rotated and transferred to the lower lip defect.  The flap was then sutured into place with a three layer technique, closing the orbicularis oris muscle layer with subcutaneous buried sutures, followed by a mucosal layer and an epidermal layer.

## 2023-03-14 NOTE — PROGRESS NOTES
Pt denies all symptoms at this time  Pt is about the unit and social with select peers  Pt is calm, cooperative but guarded  Pt scant and quick to respond to nurses questions  Compliant with medications, mouth checks completed  Will monitor  <-- Click to add NO significant Past Surgical History

## 2023-10-03 ENCOUNTER — TELEPHONE (OUTPATIENT)
Dept: ENDOCRINOLOGY | Facility: CLINIC | Age: 53
End: 2023-10-03

## 2023-10-03 NOTE — TELEPHONE ENCOUNTER
Pt left v/m that she wanted to establish care with Dr. Monster Gill. I returned her call and set her up with an appointment in early November. Also added her new insurance to the chart.

## 2023-11-02 ENCOUNTER — OFFICE VISIT (OUTPATIENT)
Dept: ENDOCRINOLOGY | Facility: CLINIC | Age: 53
End: 2023-11-02
Payer: COMMERCIAL

## 2023-11-02 VITALS
TEMPERATURE: 97.7 F | BODY MASS INDEX: 29.54 KG/M2 | DIASTOLIC BLOOD PRESSURE: 80 MMHG | SYSTOLIC BLOOD PRESSURE: 106 MMHG | HEIGHT: 66 IN | WEIGHT: 183.8 LBS | HEART RATE: 119 BPM | OXYGEN SATURATION: 96 %

## 2023-11-02 DIAGNOSIS — E55.9 VITAMIN D DEFICIENCY: ICD-10-CM

## 2023-11-02 DIAGNOSIS — E04.1 THYROID NODULE: ICD-10-CM

## 2023-11-02 DIAGNOSIS — G47.33 OSA (OBSTRUCTIVE SLEEP APNEA): ICD-10-CM

## 2023-11-02 DIAGNOSIS — E11.65 TYPE 2 DIABETES MELLITUS WITH HYPERGLYCEMIA, WITHOUT LONG-TERM CURRENT USE OF INSULIN (HCC): Primary | ICD-10-CM

## 2023-11-02 DIAGNOSIS — E78.1 HYPERTRIGLYCERIDEMIA: ICD-10-CM

## 2023-11-02 DIAGNOSIS — R63.5 WEIGHT GAIN: ICD-10-CM

## 2023-11-02 DIAGNOSIS — E66.9 CLASS 1 OBESITY WITHOUT SERIOUS COMORBIDITY WITH BODY MASS INDEX (BMI) OF 34.0 TO 34.9 IN ADULT, UNSPECIFIED OBESITY TYPE: ICD-10-CM

## 2023-11-02 PROCEDURE — 99205 OFFICE O/P NEW HI 60 MIN: CPT | Performed by: INTERNAL MEDICINE

## 2023-11-02 RX ORDER — ATOMOXETINE 60 MG/1
60 CAPSULE ORAL DAILY
COMMUNITY

## 2023-11-02 RX ORDER — PHENOL 1.4 %
10 AEROSOL, SPRAY (ML) MUCOUS MEMBRANE
COMMUNITY

## 2023-11-02 RX ORDER — METFORMIN HYDROCHLORIDE 500 MG/1
500 TABLET, EXTENDED RELEASE ORAL 2 TIMES DAILY WITH MEALS
Qty: 180 TABLET | Refills: 1 | Status: SHIPPED | OUTPATIENT
Start: 2023-11-02

## 2023-11-02 RX ORDER — QUETIAPINE FUMARATE 400 MG/1
400 TABLET, FILM COATED ORAL
COMMUNITY

## 2023-11-02 RX ORDER — GABAPENTIN 100 MG/1
CAPSULE ORAL
COMMUNITY

## 2023-11-02 NOTE — PATIENT INSTRUCTIONS
Instructions    Diet:   Take 1500 cyn/day of balanced diet with 1/3rd carbs, 1/3rd protein and rest fiber and small amount fat. Try to include fasting for 12-16hrs -early dinner and late breakfast.  Drink at least 64 oz fluids daily  Lifestyle:   30 min brisk activity most days. Join [facility] medical fitness training to build muscles and burn fat. Medical fitness: follow these exercise links  Full Version - [email or web address]     Warmup - [email or web address]     Lower Body - [email or web address]     Upper Body - [email or web address]     Core -  [email or web address]    Medications: look up Wegovy, Trulicity, Victoza - weight loss meds. Consider switching from medications that cause weight gain to weight neutral medications. Other:   Make sure you are treated appropriately if you have sleep apnea. We may consider bariatric surgery if we dont see benefit over 6-12 months.

## 2023-11-02 NOTE — PROGRESS NOTES
New patient Note      CC: Type 2 diabetes,    History of Present Illness:   47 yr female with Type 2 diabetes diagnosed 2016, GINETTE, bipolar d/o , chronic psychotropic agent use, obesity BMI 34 and vitamin D deficiency. She is post menopausal.    SAGM: checks 1-2/day usually <200mg/dL.     Current meds:None    Opthamology:   Podiatry:   vaccination:   Dental:  Pancreatitis:     Ace/ARB:   Statin:  Thyroid issues:    Patient Active Problem List   Diagnosis    Medical clearance for psychiatric admission    Schizoaffective disorder, bipolar type ([address])    Tobacco abuse    Obstructive sleep apnea    Hypertriglyceridemia     Past Medical History:   Diagnosis Date    Bipolar 1 disorder, manic, moderate ([address]) 2/3/2016    Head injury     Psychiatric disorder     Psychiatric illness     Psychosis ([facility])     Schizoaffective disorder ([address] [facility]) 11/15/2019    Sleep apnea     Suicide attempt [facility])       Past Surgical History:   Procedure Laterality Date    ADENOIDECTOMY      TONSILLECTOMY        Family History   Problem Relation Age of Onset    Hypothyroidism Mother     Diabetes Mother     Depression Father     Lung cancer Father     Diabetes Father      Social History     Tobacco Use    Smoking status: Never    Smokeless tobacco: Never   Substance Use Topics    Alcohol use: Not Currently     No Known Allergies      Current Outpatient Medications:     atoMOXetine (STRATTERA) 60 mg capsule, Take 60 mg by mouth daily, Disp: , Rfl:     gabapentin (NEURONTIN) 100 mg capsule, TAKE 2 CAPSULES BY MOUTH UP TO THREE TIMES DAILY, Disp: , Rfl:     Melatonin 10 MG TABS, Take 10 mg by mouth, Disp: , Rfl:     paliperidone palmitate ER (INVEGA) 234 mg/1.5 mL IM injection, Inject 1.5 mL (234 mg total) into a muscle every 28 days Dose Due 12/27, Disp: 1 Syringe, Rfl: 0    QUEtiapine (SEROquel) 400 MG tablet, Take 400 mg by mouth daily at bedtime, Disp: , Rfl:     diphenhydrAMINE (BENADRYL) 25 mg tablet, Take 1 tablet (25 mg total) by mouth daily at bedtime, Disp: 30 tablet, Rfl: 0    divalproex sodium (DEPAKOTE) 500 mg EC tablet, Take 1 tablet (500 mg total) by mouth daily at bedtime, Disp: 30 tablet, Rfl: 0    docusate sodium (COLACE) 100 mg capsule, Take 1 capsule (100 mg total) by mouth 2 (two) times a day At 9am and 6pm (Patient not taking: Reported on 10/23/2019), Disp: 10 capsule, Rfl: 0    melatonin 3 mg, Take 1 tablet (3 mg total) by mouth daily at bedtime, Disp: 30 tablet, Rfl: 0    Review of Systems   HENT: Negative. Eyes: Negative. Respiratory: Negative. Cardiovascular: Negative. Gastrointestinal: Negative. Endocrine: Negative. Musculoskeletal: Negative. Skin: Negative. Allergic/Immunologic: Negative. Neurological: Negative. Hematological: Negative. Psychiatric/Behavioral: Negative. Physical Exam:  Body mass index is 30.59 kg/m². /80 (BP Location: Left arm, Patient Position: Sitting, Cuff Size: Standard)   Pulse (!) 119   Temp 97.7 °F (36.5 °C) (Tympanic)   Wt 83.4 kg (183 lb 12.8 oz)   SpO2 96%   BMI 30.59 kg/m²    Vitals:    11/02/23 0924   Weight: 83.4 kg (183 lb 12.8 oz)        Physical Exam  Constitutional:       General: She is not in acute distress. Appearance: She is well-developed. She is not ill-appearing. HENT:      Head: Normocephalic and atraumatic. Nose: Nose normal.      Mouth/Throat:      Pharynx: Oropharynx is clear. Eyes:      Extraocular Movements: Extraocular movements intact. Conjunctiva/sclera: Conjunctivae normal.   Neck:      Thyroid: No thyromegaly. Cardiovascular:      Rate and Rhythm: Normal rate. Pulmonary:      Effort: Pulmonary effort is normal.   Musculoskeletal:         General: No deformity. Cervical back: Normal range of motion. Skin:     Capillary Refill: Capillary refill takes less than 2 seconds. Coloration: Skin is not pale. Findings: No rash.    Neurological:      Mental Status: She is alert and oriented to person, place, and time. Psychiatric:         Behavior: Behavior normal.         Labs:   Lab Results   Component Value Date    HGBA1C 6.5 (H) 08/18/2023       Lab Results   Component Value Date    IRF7OBCUWNEY 2.500 11/15/2019       Lab Results   Component Value Date    CREATININE 0.56 (L) 11/15/2019    CREATININE 0.62 10/26/2019    CREATININE 0.61 10/04/2019    BUN 11 11/15/2019    K 4.0 11/15/2019     11/15/2019    CO2 29 11/15/2019     eGFR   Date Value Ref Range Status   11/15/2019 111 >60 ml/min/1.73sq m Final       Lab Results   Component Value Date    ALT 29 11/15/2019    AST 25 11/15/2019    ALKPHOS 77 11/15/2019       Lab Results   Component Value Date    CHOLESTEROL 155 08/30/2021    CHOLESTEROL 164 11/15/2019    CHOLESTEROL 144 10/26/2019     Lab Results   Component Value Date    HDL 45 08/30/2021    HDL 50 11/15/2019    HDL 46 10/26/2019     Lab Results   Component Value Date    TRIG 173 (H) 08/30/2021    TRIG 259 (H) 11/15/2019    TRIG 69 10/26/2019     Lab Results   Component Value Date    NONHDLC 110 08/30/2021    [address] 114 11/15/2019    [address] 98 10/26/2019         Impression:  1. Type 2 diabetes mellitus with hyperglycemia, without long-term current use of insulin ([address])    2. Hypertriglyceridemia    3. Class 1 obesity without serious comorbidity with body mass index (BMI) of 34.0 to 34.9 in adult, unspecified obesity type    4. Vitamin D deficiency    5. GINETTE (obstructive sleep apnea)    6. Weight gain         Plan:    Verónica Jha was seen today for new patient visit. Diagnoses and all orders for this visit:    Type 2 diabetes mellitus with hyperglycemia, without long-term current use of insulin ([address]). She is uncontrolled with A1c 6.5%. Goal is <6%. And prevention of progression to further hyperglycemia. Diabetes is complicated by depression and psychotropic medication use.     Today we discussed all aspects of diabetes including pathophysiology, risk factors, complications, SAGM, CGM, diet, lifestyle modifications, medical fitness training, diabetes education, goals of therapy, follow up needs and medications including insulin, metformin, Jardiance and GLP1 agonists. Advised to maintain goal blood sugars 70-180mg/dL. We agreed to start metformin and GLP1 agonist. Pros and cons were reviewed in detail. Suggested a personal cgm if affordable. Follow up in 4-6 weeks. -     Hemoglobin A1C; Future  -     metFORMIN (GLUCOPHAGE-XR) 500 mg 24 hr tablet; Take 1 tablet (500 mg total) by mouth 2 (two) times a day with meals  -     tirzepatide (Mounjaro) 2.5 MG/0.5ML; Inject 0.5 mL (2.5 mg total) under the skin once a week    Hypertriglyceridemia. Part of metabolic syndrome and medications side effects. Class 1 obesity without serious comorbidity with body mass index (BMI) of 34.0 to 34.9 in adult, unspecified obesity type. Today we discussed all aspects of obesity and metabolism including pathophysiology, risk factors, complications, goal of sustaining weight loss long term, usual propensity to regain weight with short term approaches, follow up needs and medications - efficacy and limitations. Discussed role of endocrinopathies, inflammatory conditions, sleep disorders, mental health disorders, lifestyle issues and polypharmacy and weight gain. Briefly discussed bariatric surgery. Diet: carb controlled diet <1500cal/day/ VLCD, 64oz fluids/day   lifestyle modifications: intermittent fasting and 10,000 steps/day  medical fitness training: muscle building  education: nutrition input    -     Ambulatory Referral to Medical Fitness Exercise Specialist; Future  -     DHEA-sulfate; Future    Vitamin D deficiency    GINETTE (obstructive sleep apnea)  -     Vitamin D 25 hydroxy; Future  -     Ambulatory referral to Sleep Medicine; Future    Weight gain  -     T4, free; Future  -     TSH, 3rd generation; Future  -     Thyroid Antibodies Panel;  Future    Thyroid nodule        I have spent 45 minutes with patient today in which greater than 50% of this time was spent in counseling/coordination of care. Discussed with the patient and all questioned fully answered. She will call me if any problems arise. Educated/ Counseled patient on diagnostic test results, prognosis, risk vs benefit of treatment options, importance of treatment compliance, healthy life and lifestyle choices.       Chacha

## 2023-11-10 ENCOUNTER — TELEPHONE (OUTPATIENT)
Dept: ENDOCRINOLOGY | Facility: CLINIC | Age: 53
End: 2023-11-10

## 2023-11-10 NOTE — TELEPHONE ENCOUNTER
PA started for [employer] on cover my meds key #[id number]. Will await determination from insurance.

## 2023-12-01 ENCOUNTER — TELEPHONE (OUTPATIENT)
Dept: ENDOCRINOLOGY | Facility: CLINIC | Age: 53
End: 2023-12-01

## 2023-12-06 DIAGNOSIS — E11.65 TYPE 2 DIABETES MELLITUS WITH HYPERGLYCEMIA, WITHOUT LONG-TERM CURRENT USE OF INSULIN (HCC): Primary | ICD-10-CM

## 2023-12-08 NOTE — TELEPHONE ENCOUNTER
Pt called LVM stating Mounjaro refill was still not received. Asking if we could resend to the [facility] on file.

## 2023-12-13 DIAGNOSIS — E11.65 TYPE 2 DIABETES MELLITUS WITH HYPERGLYCEMIA, WITHOUT LONG-TERM CURRENT USE OF INSULIN (HCC): ICD-10-CM

## 2024-01-05 RX ORDER — METHYLPHENIDATE HYDROCHLORIDE 36 MG/1
TABLET, EXTENDED RELEASE ORAL
COMMUNITY

## 2024-01-09 ENCOUNTER — CONSULT (OUTPATIENT)
Dept: PULMONOLOGY | Facility: CLINIC | Age: 54
End: 2024-01-09
Payer: COMMERCIAL

## 2024-01-09 VITALS
TEMPERATURE: 97 F | HEART RATE: 106 BPM | HEIGHT: 66 IN | WEIGHT: 175.2 LBS | DIASTOLIC BLOOD PRESSURE: 76 MMHG | SYSTOLIC BLOOD PRESSURE: 112 MMHG | OXYGEN SATURATION: 97 % | BODY MASS INDEX: 28.16 KG/M2

## 2024-01-09 DIAGNOSIS — G47.19 EXCESSIVE DAYTIME SLEEPINESS: ICD-10-CM

## 2024-01-09 DIAGNOSIS — G47.33 OBSTRUCTIVE SLEEP APNEA: Primary | ICD-10-CM

## 2024-01-09 PROCEDURE — 99204 OFFICE O/P NEW MOD 45 MIN: CPT | Performed by: INTERNAL MEDICINE

## 2024-01-09 RX ORDER — QUETIAPINE FUMARATE 300 MG
TABLET ORAL
COMMUNITY

## 2024-01-09 NOTE — PROGRESS NOTES
Assessment/Plan:    Obstructive sleep apnea  Ms.Sonja Lawler was diagnosed with obstructive sleep apnea about 10 years back and was advised CPAP therapy.  She was initially was using oral appliance which she used for about 5 years till she developed dental problems. She changed to CPAP which she used for about a year.  Currently she is not using any CPAP.  She has heavy snoring and reported apneic events by her daughter.  She feels sleepy and tired during the day.  No waking up episodes gasping for air.  She denied any headache.  She gets occasional migraine.  On clinical examination, she had oropharyngeal crowding.  I have ordered repeat sleep study, home sleep study to reconfirm her sleep apnea so that she can be started on treatment.  I advised her regarding driving and use of sedating medications and alcohol.  I had a long discussion with her and answered all questions.    Excessive daytime sleepiness  She has excessive daytime sleepiness and tiredness.  This is most likely secondary to her sleep apnea.  She is also on Seroquel and gabapentin.  Her [place] sleepiness score was 10 out of 24.  I advised her regarding driving and use of sedating medications or alcohol.       Diagnoses and all orders for this visit:    Obstructive sleep apnea  -     Home Study; Future  -     Ambulatory Referral to Internal Medicine; Future    Excessive daytime sleepiness    Other orders  -     Concerta 36 MG ER tablet  -     SEROquel 300 MG tablet          Subjective:      Patient ID: Alayna Lawler is a 53 y.o. female.    Ms.Sonja Lawler is a 53-year-old has with past medical history of obstructive sleep apnea, bipolar disorder and diabetes who has been referred for reevaluation and treatment for obstructive sleep apnea.  She stated that she was diagnosed with obstructive sleep apnea about 10 years back and was advised CPAP therapy.  She was initially on oral appliance which she used for about 5 years and after she had dental problems  she stopped using it and changed to CPAP which she used for about a year.  Currently she is not on any CPAP.  She has heavy snoring and reported apneic events by her daughter.  She feels sleepy and tired during the day.  No waking up episodes gasping for air.  She denied any headache.  She gets occasional migraine.  Recently she has been started on weight reducing medications equivalent to Mounjaro she has lost some weight.  She denied any shortness of breath or cough or phlegm or wheeze or chest pain.  No swelling of feet or palpitations.  Her appetite has been low.  She has no fever or chills.  She has no problem driving.  Previous sleep study was done at [facility].        The following portions of the patient's history were reviewed and updated as appropriate: allergies, current medications, past family history, past medical history, past social history, past surgical history, and problem list.    Review of Systems   Constitutional:  Positive for fatigue. Negative for appetite change, chills, fever and unexpected weight change.   HENT:  Negative for hearing loss, rhinorrhea, sneezing, sore throat and trouble swallowing.    Eyes:  Negative for visual disturbance.   Respiratory:  Positive for shortness of breath. Negative for cough, chest tightness and wheezing.    Cardiovascular:  Negative for chest pain, palpitations and leg swelling.   Gastrointestinal:  Negative for abdominal pain, constipation, diarrhea, nausea and vomiting.        Heartburn   Genitourinary:  Negative for dysuria, frequency and urgency.   Musculoskeletal:  Positive for arthralgias. Negative for back pain and gait problem.   Skin:  Negative for rash.   Allergic/Immunologic: Positive for environmental allergies.   Neurological:  Positive for headaches (migraine). Negative for dizziness, seizures, syncope and light-headedness.   Psychiatric/Behavioral:  Positive for sleep disturbance. Negative for agitation and confusion. The patient  "is nervous/anxious.          Objective:      /76 (BP Location: Left arm, Patient Position: Sitting, Cuff Size: Standard)   Pulse (!) 106   Temp (!) 97 °F (36.1 °C) (Tympanic)   Ht 5' 6\" (1.676 m)   Wt 79.5 kg (175 lb 3.2 oz)   SpO2 97%   BMI 28.28 kg/m²          Physical Exam  Vitals reviewed.   Constitutional:       General: She is not in acute distress.     Appearance: She is not ill-appearing, toxic-appearing or diaphoretic.   HENT:      Head: Normocephalic.      Nose: Nose normal.      Mouth/Throat:      Mouth: Mucous membranes are moist.      Pharynx: Oropharynx is clear.   Eyes:      General: No scleral icterus.     Conjunctiva/sclera: Conjunctivae normal.   Cardiovascular:      Rate and Rhythm: Normal rate and regular rhythm.      Heart sounds: Normal heart sounds. No murmur heard.  Pulmonary:      Effort: Pulmonary effort is normal. No respiratory distress.      Breath sounds: Normal breath sounds. No stridor. No wheezing, rhonchi or rales.   Chest:      Chest wall: No tenderness.   Abdominal:      General: Bowel sounds are normal.      Palpations: Abdomen is soft.      Tenderness: There is no abdominal tenderness. There is no guarding.   Musculoskeletal:      Cervical back: Neck supple. No rigidity.      Right lower leg: No edema.      Left lower leg: No edema.   Lymphadenopathy:      Cervical: No cervical adenopathy.   Skin:     Coloration: Skin is not jaundiced or pale.      Findings: No rash.   Neurological:      Mental Status: She is alert and oriented to person, place, and time.      Gait: Gait normal.   Psychiatric:         Mood and Affect: Mood normal.         Behavior: Behavior normal.         Thought Content: Thought content normal.         Judgment: Judgment normal.           "

## 2024-01-10 NOTE — ASSESSMENT & PLAN NOTE
She has excessive daytime sleepiness and tiredness.  This is most likely secondary to her sleep apnea.  She is also on Seroquel and gabapentin.  Her [place] sleepiness score was 10 out of 24.  I advised her regarding driving and use of sedating medications or alcohol.

## 2024-01-10 NOTE — ASSESSMENT & PLAN NOTE
MsEkta Lawler was diagnosed with obstructive sleep apnea about 10 years back and was advised CPAP therapy.  She was initially was using oral appliance which she used for about 5 years till she developed dental problems. She changed to CPAP which she used for about a year.  Currently she is not using any CPAP.  She has heavy snoring and reported apneic events by her daughter.  She feels sleepy and tired during the day.  No waking up episodes gasping for air.  She denied any headache.  She gets occasional migraine.  On clinical examination, she had oropharyngeal crowding.  I have ordered repeat sleep study, home sleep study to reconfirm her sleep apnea so that she can be started on treatment.  I advised her regarding driving and use of sedating medications and alcohol.  I had a long discussion with her and answered all questions.

## 2024-01-26 DIAGNOSIS — E11.65 TYPE 2 DIABETES MELLITUS WITH HYPERGLYCEMIA, WITHOUT LONG-TERM CURRENT USE OF INSULIN (HCC): ICD-10-CM

## 2024-03-02 ENCOUNTER — APPOINTMENT (OUTPATIENT)
Dept: LAB | Facility: HOSPITAL | Age: 54
End: 2024-03-02
Attending: INTERNAL MEDICINE
Payer: COMMERCIAL

## 2024-03-02 DIAGNOSIS — E11.65 TYPE 2 DIABETES MELLITUS WITH HYPERGLYCEMIA, WITHOUT LONG-TERM CURRENT USE OF INSULIN (HCC): ICD-10-CM

## 2024-03-02 DIAGNOSIS — R63.5 WEIGHT GAIN: ICD-10-CM

## 2024-03-02 DIAGNOSIS — E66.9 CLASS 1 OBESITY WITHOUT SERIOUS COMORBIDITY WITH BODY MASS INDEX (BMI) OF 34.0 TO 34.9 IN ADULT, UNSPECIFIED OBESITY TYPE: ICD-10-CM

## 2024-03-02 DIAGNOSIS — G47.33 OSA (OBSTRUCTIVE SLEEP APNEA): ICD-10-CM

## 2024-03-02 LAB
25(OH)D3 SERPL-MCNC: 27.7 NG/ML (ref 30–100)
EST. AVERAGE GLUCOSE BLD GHB EST-MCNC: 103 MG/DL
HBA1C MFR BLD: 5.2 %
T4 FREE SERPL-MCNC: 0.82 NG/DL (ref 0.61–1.12)
TSH SERPL DL<=0.05 MIU/L-ACNC: 2.64 UIU/ML (ref 0.45–4.5)

## 2024-03-02 PROCEDURE — 83036 HEMOGLOBIN GLYCOSYLATED A1C: CPT

## 2024-03-02 PROCEDURE — 82306 VITAMIN D 25 HYDROXY: CPT

## 2024-03-02 PROCEDURE — 86376 MICROSOMAL ANTIBODY EACH: CPT

## 2024-03-02 PROCEDURE — 84439 ASSAY OF FREE THYROXINE: CPT

## 2024-03-02 PROCEDURE — 86800 THYROGLOBULIN ANTIBODY: CPT

## 2024-03-02 PROCEDURE — 84443 ASSAY THYROID STIM HORMONE: CPT

## 2024-03-02 PROCEDURE — 82627 DEHYDROEPIANDROSTERONE: CPT

## 2024-03-02 PROCEDURE — 36415 COLL VENOUS BLD VENIPUNCTURE: CPT

## 2024-03-03 LAB
DHEA-S SERPL-MCNC: 116 UG/DL (ref 41.2–243.7)
THYROPEROXIDASE AB SERPL-ACNC: 10 IU/ML (ref 0–34)

## 2024-03-05 LAB — THYROGLOB AB SERPL-ACNC: <1 IU/ML (ref 0–0.9)

## 2024-03-06 ENCOUNTER — OFFICE VISIT (OUTPATIENT)
Dept: ENDOCRINOLOGY | Facility: CLINIC | Age: 54
End: 2024-03-06
Payer: COMMERCIAL

## 2024-03-06 VITALS
WEIGHT: 167.8 LBS | TEMPERATURE: 97.3 F | SYSTOLIC BLOOD PRESSURE: 106 MMHG | DIASTOLIC BLOOD PRESSURE: 72 MMHG | BODY MASS INDEX: 26.97 KG/M2 | HEART RATE: 106 BPM | HEIGHT: 66 IN | OXYGEN SATURATION: 99 %

## 2024-03-06 DIAGNOSIS — E78.1 HYPERTRIGLYCERIDEMIA: ICD-10-CM

## 2024-03-06 DIAGNOSIS — Z72.0 TOBACCO ABUSE: ICD-10-CM

## 2024-03-06 DIAGNOSIS — E11.65 TYPE 2 DIABETES MELLITUS WITH HYPERGLYCEMIA, WITHOUT LONG-TERM CURRENT USE OF INSULIN (HCC): Primary | ICD-10-CM

## 2024-03-06 PROCEDURE — 99214 OFFICE O/P EST MOD 30 MIN: CPT | Performed by: INTERNAL MEDICINE

## 2024-03-06 RX ORDER — ATOMOXETINE 80 MG/1
CAPSULE ORAL
COMMUNITY
Start: 2024-01-10

## 2024-03-06 NOTE — PROGRESS NOTES
"  Follow-up Patient Progress Note      CC: Type 2 diabetes,     History of Present Illness:   53 yr female with Type 2 diabetes diagnosed 2016, GINETTE, bipolar d/o , chronic psychotropic agent use, obesity BMI 34 and vitamin D deficiency. She is post menopausal.    Since last visit, she lost 16 lbs. Feels better.    SAGM: checks 1-2/day usually <200mg/dL.     Current meds:  Mounjaro 7.5mg weekly  Metformin 500mg po bid     Opthamology:   Podiatry:   vaccination:   Dental:  Pancreatitis:      Ace/ARB: no  Statin:no  Thyroid issues:      Physical Exam:  Body mass index is 27.08 kg/m².  /72 (BP Location: Left arm, Patient Position: Sitting, Cuff Size: Standard)   Pulse (!) 106   Temp (!) 97.3 °F (36.3 °C) (Tympanic)   Ht 5' 6\" (1.676 m)   Wt 76.1 kg (167 lb 12.8 oz)   SpO2 99%   BMI 27.08 kg/m²    Vitals:    03/06/24 1733   Weight: 76.1 kg (167 lb 12.8 oz)        Physical Exam  Constitutional:       General: She is not in acute distress.     Appearance: She is well-developed.   HENT:      Head: Normocephalic and atraumatic.      Nose: Nose normal.   Eyes:      Conjunctiva/sclera: Conjunctivae normal.   Pulmonary:      Effort: Pulmonary effort is normal.   Abdominal:      General: There is no distension.   Musculoskeletal:      Cervical back: Normal range of motion and neck supple.   Skin:     Findings: No rash.      Comments: No icterus   Neurological:      Mental Status: She is alert and oriented to person, place, and time.       Labs:   Lab Results   Component Value Date    HGBA1C 5.2 03/02/2024       Lab Results   Component Value Date    WSG8WCKKAEUE 2.641 03/02/2024    TSH 2.13 08/17/2023       Lab Results   Component Value Date    CREATININE 0.52 08/23/2023    CREATININE 0.63 08/17/2023    CREATININE 0.56 (L) 11/15/2019    BUN 9 08/23/2023    K 4.0 08/23/2023     08/23/2023    CO2 28 08/23/2023     eGFRcr   Date Value Ref Range Status   08/23/2023 112 >59 Final       Lab Results   Component Value " Date    ALT 20 08/23/2023    AST 16 08/23/2023    ALKPHOS 69 08/23/2023       Lab Results   Component Value Date    CHOLESTEROL 155 08/30/2021    CHOLESTEROL 164 11/15/2019    CHOLESTEROL 144 10/26/2019     Lab Results   Component Value Date    HDL 45 08/30/2021    HDL 50 11/15/2019    HDL 46 10/26/2019     Lab Results   Component Value Date    TRIG 173 (H) 08/30/2021    TRIG 259 (H) 11/15/2019    TRIG 69 10/26/2019     Lab Results   Component Value Date    NONHDLC 110 08/30/2021    NONHDLC 114 11/15/2019    NONHDLC 98 10/26/2019         Assessment/Plan:    Problem List Items Addressed This Visit          Endocrine    Type 2 diabetes mellitus with hyperglycemia, without long-term current use of insulin (HCC) - Primary     She is improved with weight loss but still reports postprandial hyperglycemia. Goal is to improve metabolic health to prevent future diabetes as much as possible. She has psychotropic medication related hyperglycemia as well.    Today we agreed to continue metformin and increase mounjaro. Advised daily exercise.  Suggested review with psychiatrist wether medications can be reduced from their perspective.    Follow up in 3-6 months.    Lab Results   Component Value Date    HGBA1C 5.2 03/02/2024            Relevant Medications    tirzepatide 10 MG/0.5ML       Other    Tobacco abuse    Hypertriglyceridemia     Should improve with improved glycemia. Overal ASCVD risk is low. No need for statin yet.              I have spent a total time of 35 minutes on 03/06/24 in caring for this patient including greater than 50% of this time was spent in counseling/coordination of care as listed above.       Discussed with the patient and all questioned fully answered. She will contact me with concerns.    Stephany Jc

## 2024-03-07 NOTE — ASSESSMENT & PLAN NOTE
She is improved with weight loss but still reports postprandial hyperglycemia. Goal is to improve metabolic health to prevent future diabetes as much as possible. She has psychotropic medication related hyperglycemia as well.    Today we agreed to continue metformin and increase mounjaro. Advised daily exercise.  Suggested review with psychiatrist wether medications can be reduced from their perspective.    Follow up in 3-6 months.    Lab Results   Component Value Date    HGBA1C 5.2 03/02/2024

## 2024-03-22 ENCOUNTER — TELEPHONE (OUTPATIENT)
Age: 54
End: 2024-03-22

## 2024-03-22 DIAGNOSIS — E11.65 TYPE 2 DIABETES MELLITUS WITH HYPERGLYCEMIA, WITHOUT LONG-TERM CURRENT USE OF INSULIN (HCC): Primary | ICD-10-CM

## 2024-03-22 NOTE — TELEPHONE ENCOUNTER
Patient called with concerns about her Mounjaro. Her pharmacy does not know when the 10mg dose will be available. Patient wanted to know if she can be moved back down to the 7.5 mg. She has been without it for 3 weeks and wants to know if she is going to have to start from the beginning since she has been off the mounjaro for so long. Please update patient. Thank you.

## 2024-03-27 DIAGNOSIS — E11.65 TYPE 2 DIABETES MELLITUS WITH HYPERGLYCEMIA, WITHOUT LONG-TERM CURRENT USE OF INSULIN (HCC): Primary | ICD-10-CM

## 2024-03-28 ENCOUNTER — TELEPHONE (OUTPATIENT)
Dept: ENDOCRINOLOGY | Facility: CLINIC | Age: 54
End: 2024-03-28

## 2024-03-29 ENCOUNTER — TELEPHONE (OUTPATIENT)
Dept: ENDOCRINOLOGY | Facility: CLINIC | Age: 54
End: 2024-03-29

## 2024-03-29 NOTE — PROGRESS NOTES
Friendly during interaction  Expressed calmer thoughts today  No delusions expressed  Attended groups  Working on [employer]  Report called to [facility] at this time to Shyam HUDDLESTON

## 2024-04-30 DIAGNOSIS — E11.65 TYPE 2 DIABETES MELLITUS WITH HYPERGLYCEMIA, WITHOUT LONG-TERM CURRENT USE OF INSULIN (HCC): ICD-10-CM

## 2024-05-01 ENCOUNTER — TELEPHONE (OUTPATIENT)
Dept: ENDOCRINOLOGY | Facility: CLINIC | Age: 54
End: 2024-05-01

## 2024-05-01 RX ORDER — METFORMIN HYDROCHLORIDE 500 MG/1
500 TABLET, EXTENDED RELEASE ORAL 2 TIMES DAILY WITH MEALS
Qty: 180 TABLET | Refills: 1 | Status: SHIPPED | OUTPATIENT
Start: 2024-05-01

## 2024-05-20 DIAGNOSIS — E11.65 TYPE 2 DIABETES MELLITUS WITH HYPERGLYCEMIA, WITHOUT LONG-TERM CURRENT USE OF INSULIN (HCC): ICD-10-CM

## 2024-05-21 RX ORDER — METFORMIN HYDROCHLORIDE 500 MG/1
500 TABLET, EXTENDED RELEASE ORAL 2 TIMES DAILY WITH MEALS
Qty: 180 TABLET | Refills: 1 | Status: SHIPPED | OUTPATIENT
Start: 2024-05-21

## 2024-08-16 DIAGNOSIS — E11.65 TYPE 2 DIABETES MELLITUS WITH HYPERGLYCEMIA, WITHOUT LONG-TERM CURRENT USE OF INSULIN (HCC): ICD-10-CM

## 2024-08-16 RX ORDER — METFORMIN HCL 500 MG
500 TABLET, EXTENDED RELEASE 24 HR ORAL 2 TIMES DAILY WITH MEALS
Qty: 180 TABLET | Refills: 1 | Status: SHIPPED | OUTPATIENT
Start: 2024-08-16

## 2024-10-16 PROCEDURE — 87205 SMEAR GRAM STAIN: CPT | Performed by: STUDENT IN AN ORGANIZED HEALTH CARE EDUCATION/TRAINING PROGRAM

## 2024-10-16 PROCEDURE — 87070 CULTURE OTHR SPECIMN AEROBIC: CPT | Performed by: STUDENT IN AN ORGANIZED HEALTH CARE EDUCATION/TRAINING PROGRAM

## 2024-12-17 DIAGNOSIS — E11.65 TYPE 2 DIABETES MELLITUS WITH HYPERGLYCEMIA, WITHOUT LONG-TERM CURRENT USE OF INSULIN (HCC): ICD-10-CM
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
## 2025-02-25 ENCOUNTER — OFFICE VISIT (OUTPATIENT)
Dept: FAMILY MEDICINE CLINIC | Facility: CLINIC | Age: 55
End: 2025-02-25
Payer: COMMERCIAL

## 2025-02-25 VITALS
SYSTOLIC BLOOD PRESSURE: 120 MMHG | DIASTOLIC BLOOD PRESSURE: 78 MMHG | RESPIRATION RATE: 14 BRPM | TEMPERATURE: 97.7 F | HEIGHT: 66 IN | HEART RATE: 106 BPM | WEIGHT: 161 LBS | OXYGEN SATURATION: 99 % | BODY MASS INDEX: 25.88 KG/M2

## 2025-02-25 DIAGNOSIS — Z12.11 COLON CANCER SCREENING: ICD-10-CM

## 2025-02-25 DIAGNOSIS — E11.65 TYPE 2 DIABETES MELLITUS WITH HYPERGLYCEMIA, WITHOUT LONG-TERM CURRENT USE OF INSULIN (HCC): Primary | ICD-10-CM

## 2025-02-25 DIAGNOSIS — K21.9 GASTROESOPHAGEAL REFLUX DISEASE WITHOUT ESOPHAGITIS: ICD-10-CM

## 2025-02-25 DIAGNOSIS — F25.0 SCHIZOAFFECTIVE DISORDER, BIPOLAR TYPE (HCC): ICD-10-CM

## 2025-02-25 DIAGNOSIS — G47.33 OBSTRUCTIVE SLEEP APNEA: ICD-10-CM

## 2025-02-25 DIAGNOSIS — Z12.31 ENCOUNTER FOR SCREENING MAMMOGRAM FOR BREAST CANCER: ICD-10-CM

## 2025-02-25 DIAGNOSIS — Z00.00 ENCOUNTER FOR PREVENTIVE CARE: ICD-10-CM

## 2025-02-25 DIAGNOSIS — E78.1 HYPERTRIGLYCERIDEMIA: ICD-10-CM

## 2025-02-25 DIAGNOSIS — E04.2 MULTIPLE THYROID NODULES: ICD-10-CM

## 2025-02-25 PROBLEM — Z00.8 MEDICAL CLEARANCE FOR PSYCHIATRIC ADMISSION: Status: RESOLVED | Noted: 2019-10-23 | Resolved: 2025-02-25

## 2025-02-25 PROBLEM — Z87.19 HISTORY OF GALLSTONES: Status: ACTIVE | Noted: 2025-02-25

## 2025-02-25 PROCEDURE — 99204 OFFICE O/P NEW MOD 45 MIN: CPT | Performed by: FAMILY MEDICINE

## 2025-02-25 RX ORDER — PALIPERIDONE PALMITATE 819 MG/2.625ML
819 INJECTION, SUSPENSION, EXTENDED RELEASE INTRAMUSCULAR
COMMUNITY

## 2025-02-25 RX ORDER — METHYLPHENIDATE HYDROCHLORIDE 54 MG/1
54 TABLET, EXTENDED RELEASE ORAL DAILY
COMMUNITY

## 2025-02-25 RX ORDER — METFORMIN HYDROCHLORIDE 500 MG/1
500 TABLET, EXTENDED RELEASE ORAL 2 TIMES DAILY WITH MEALS
Qty: 180 TABLET | Refills: 1 | Status: SHIPPED | OUTPATIENT
Start: 2025-02-25

## 2025-02-25 RX ORDER — TIRZEPATIDE 5 MG/.5ML
5 INJECTION, SOLUTION SUBCUTANEOUS WEEKLY
Qty: 2 ML | Refills: 2 | Status: SHIPPED | OUTPATIENT
Start: 2025-02-25

## 2025-02-25 RX ORDER — ZOLPIDEM TARTRATE 5 MG/1
2.5-5 TABLET ORAL
COMMUNITY
Start: 2025-02-13

## 2025-02-25 NOTE — PROGRESS NOTES
Assessment & Plan  Type 2 diabetes mellitus with hyperglycemia, without long-term current use of insulin (HCC)  Diabetes Mellitus  - A1c levels are good.  - Transition to a lower dose of Mounjaro (5 mg) post-surgery to help decrease side effects  - Renewed metformin prescription  - Ordered lab tests  Lab Results   Component Value Date    HGBA1C 5.2 03/02/2024       Orders:  •  Comprehensive metabolic panel; Future  •  Hemoglobin A1C; Future  •  Albumin / creatinine urine ratio; Future  •  Lipid Panel with Direct LDL reflex; Future  •  Tirzepatide (Mounjaro) 5 MG/0.5ML SOAJ; Inject 5 mg under the skin once a week  •  metFORMIN (GLUCOPHAGE-XR) 500 mg 24 hr tablet; Take 1 tablet (500 mg total) by mouth 2 (two) times a day with meals    Schizoaffective disorder, bipolar type (HCC)  Care is managed rosemarie Douglas in [place]         Hypertriglyceridemia         Colon cancer screening    Orders:  •  Cologuard    Multiple thyroid nodules  Check labs and repeat u/s  Orders:  •  TSH, 3rd generation with Free T4 reflex; Future  •  US thyroid; Future    Gastroesophageal reflux disease without esophagitis  Has known hx of gallstones but no cholecystitis  GERD  - Managing with Pepcid before bed  - Continue as needed  - Discussed safe use with gabapentin    Orders:  •  CBC and differential; Future    Encounter for screening mammogram for breast cancer    Orders:  •  Mammo screening bilateral w 3d and cad; Future    Encounter for preventive care  Health Maintenance  - Opted for Cologuard test instead of colonoscopy  - Referred to Dr. Oakes for Pap smear at pt request  - Due for second Shingrix and Prevnar vaccines  - Advised to get vaccines at pharmacy or next visit  Orders:  •  Ambulatory referral to Obstetrics / Gynecology; Future    Obstructive sleep apnea  Sleep Apnea  - Referral for a sleep study to reassess condition  Orders:  •  Ambulatory Referral to Sleep Medicine; Future             Subjective:     Alayna is a  54 y.o. female here today and has the below chronic conditions:    Patient Active Problem List   Diagnosis   • Schizoaffective disorder, bipolar type (HCC)   • Tobacco abuse   • Obstructive sleep apnea   • Hypertriglyceridemia   • Excessive daytime sleepiness   • Type 2 diabetes mellitus with hyperglycemia, without long-term current use of insulin (HCC)   • Gastroesophageal reflux disease without esophagitis   • History of gallstones     Current Outpatient Medications   Medication Sig Dispense Refill   • atomoxetine (STRATTERA) 80 MG capsule      • Concerta 54 MG ER tablet Take 54 mg by mouth daily     • gabapentin (NEURONTIN) 100 mg capsule TAKE 2 CAPSULES BY MOUTH UP TO THREE TIMES DAILY     • metFORMIN (GLUCOPHAGE-XR) 500 mg 24 hr tablet Take 1 tablet (500 mg total) by mouth 2 (two) times a day with meals 180 tablet 1   • paliperidone palmitate ER (Invega Trinza) 819 mg/2.63 mL GISELL Inject 819 mg into a muscle every 3 (three) months     • SEROquel 300 MG tablet Take 600 mg by mouth daily at bedtime     • Tirzepatide (Mounjaro) 5 MG/0.5ML SOAJ Inject 5 mg under the skin once a week 2 mL 2   • zolpidem (AMBIEN) 5 mg tablet Take 2.5-5 mg by mouth daily at bedtime as needed       No current facility-administered medications for this visit.          Chief Complaint   Patient presents with   • New Patient Visit     Here to establish care and address concerns regarding thyroid nodules and asymptomatic gallstones. On Mounjaro, but currently on hold for a dental procedure.    • HM     Last pap was several years ago. No prev PCP. DM eye done at [email or web address] on [address].      HPI:  - CC above per clinical staff and reviewed.    History of Present Illness  The patient presents to establish care in our office and for  evaluation of diabetes, GERD, thyroid nodules, sleep apnea, and psychiatric issues.    Diabetes  - She has been on Mounjaro for about a year, experiencing constipation, nausea, and occasional vomiting.  - She manages  these symptoms by avoiding late-night meals and taking Pepcid before bed.  - She discontinued Mounjaro two weeks ago for a dental procedure and is considering resuming it at a lower dose.  - She also takes metformin twice daily, which can cause dizziness if she skips breakfast.  - Mounjaro and metformin have helped control her cravings and reduce alcohol consumption.    Thyroid Nodules  - She has a history of thyroid nodules, previously monitored by Dr. Skiffedi, but has not had a recent check-up.    Sleep Apnea  - She has sleep apnea but does not use a CPAP machine.  - A sleep study 10 years ago showed severe snoring.  - She was referred for another sleep study but did not follow through.  - She reports improved daytime alertness due to longer daylight hours.    Psychiatric Issues  - She is on Strattera 80 mg, Concerta 54 mg, gabapentin as needed for anxiety, Invega every 3 months, Seroquel 600 mg at night, and Ambien 5 mg.  - She has been stable for about a year and a half under psychiatric care.  - She reports hospitalizations every 3 to 4 years due to manic episodes, preceded by sleep disturbances.  - She uses Ambien to prevent these episodes and has a supportive relationship with her 27-year-old daughter.    Gallstones  - She has a history of asymptomatic gallstones.    Supplemental Information  - She is due for a colonoscopy and a Pap smear.  - She had a normal mammogram and ultrasound despite cystic breasts.  - She is not exercising regularly.  - She received one shingles vaccine and is due for the second dose.  - She has not received the influenza vaccine this year.    SOCIAL HISTORY  - Used to drink a drink or two a week but currently does not feel like drinking    FAMILY HISTORY  - Mother had cystic breasts but no breast cancer  - No family history of colon cancer or breast cancer      IMMUNIZATIONS  - Received one dose of the shingles vaccine, due for the second dose  - Due for the pneumonia vaccine  -  "Did not receive the influenza vaccine this year    The following portions of the patient's history were reviewed and updated as appropriate: allergies, current medications, past family history, past medical history, past social history, past surgical history and problem list.    ROS:  Review of Systems   Constitutional:  Positive for fatigue.   Cardiovascular:  Negative for chest pain.   Endocrine: Positive for polyphagia and polyuria. Negative for polydipsia.   Skin:  Positive for pallor.   Neurological:  Positive for dizziness, weakness and headaches. Negative for tremors, seizures and speech difficulty.   Psychiatric/Behavioral:  Negative for confusion. The patient is nervous/anxious.       As noted above.    Objective:      /78   Pulse (!) 106   Temp 97.7 °F (36.5 °C) (Temporal)   Resp 14   Ht 5' 6\" (1.676 m)   Wt 73 kg (161 lb)   SpO2 99%   BMI 25.99 kg/m²   BP Readings from Last 3 Encounters:   02/25/25 120/78   03/06/24 106/72   01/09/24 112/76     Wt Readings from Last 3 Encounters:   02/25/25 73 kg (161 lb)   03/06/24 76.1 kg (167 lb 12.8 oz)   01/09/24 79.5 kg (175 lb 3.2 oz)               Physical Exam:   Physical Exam  Vitals and nursing note reviewed.   Constitutional:       General: She is not in acute distress.     Appearance: Normal appearance. She is well-developed. She is not ill-appearing.   HENT:      Head: Normocephalic and atraumatic.   Eyes:      Conjunctiva/sclera: Conjunctivae normal.   Neck:      Vascular: No carotid bruit.   Cardiovascular:      Rate and Rhythm: Normal rate and regular rhythm.      Heart sounds: Normal heart sounds. No murmur heard.  Pulmonary:      Effort: Pulmonary effort is normal. No respiratory distress.      Breath sounds: Normal breath sounds. No wheezing.   Abdominal:      Palpations: Abdomen is soft.      Tenderness: There is no abdominal tenderness. There is no guarding or rebound.   Musculoskeletal:      Cervical back: Neck supple.      Right lower " leg: No edema.      Left lower leg: No edema.   Lymphadenopathy:      Cervical: No cervical adenopathy.   Skin:     General: Skin is warm and dry.   Neurological:      Mental Status: She is alert and oriented to person, place, and time.   Psychiatric:         Mood and Affect: Mood normal.         Behavior: Behavior normal.                  This office visit note was generated in part with the use of [employer] and/or voice recognition dictation.     Answers submitted by the patient for this visit:  Diabetes Questionnaire (Submitted on 2/24/2025)  Chief Complaint: Diabetes problem  Diabetes type: type 2  MedicAlert ID: No  Disease duration: 3 Years  blurred vision: No  chest pain: No  fatigue: Yes  foot paresthesias: Yes  foot ulcerations: No  polydipsia: No  polyphagia: Yes  polyuria: Yes  visual change: No  weakness: Yes  weight loss: No  Symptom course: stable  confusion: No  speech difficulty: No  dizziness: Yes  nervous/anxious: Yes  headaches: Yes  hunger: Yes  mood changes: Yes  pallor: Yes  seizures: No  tremors: No  sleepiness: Yes  sweats: No  blackouts: No  hospitalization: No  nocturnal hypoglycemia: No  required assistance: No  required glucagon: No  CVA: No  heart disease: No  impotence: No  nephropathy: No  peripheral neuropathy: No  PVD: Yes  retinopathy: No  CAD risks: dyslipidemia, family history, obesity, post-menopausal, sedentary lifestyle, stress  Current treatments: diet, oral agent (dual therapy)  Treatment compliance: most of the time  breakfast time: 9-10 am  lunch time: after 3 pm  dinner time: 7-8 pm  Bedtime: 9-10 pm  Weight trend: increasing rapidly  Current diet: generally unhealthy  Meal planning: none  Exercise: rarely  Dietitian visit: No  Eye exam current: Yes  Sees podiatrist: No

## 2025-02-25 NOTE — PATIENT INSTRUCTIONS
You are due for the Shingrix #2 and [id number].  You can get these done at the pharmacy or our office.

## 2025-02-28 ENCOUNTER — TRANSCRIBE ORDERS (OUTPATIENT)
Dept: SLEEP CENTER | Facility: CLINIC | Age: 55
End: 2025-02-28

## 2025-02-28 DIAGNOSIS — G47.33 OSA (OBSTRUCTIVE SLEEP APNEA): Primary | ICD-10-CM

## 2025-02-28 NOTE — ASSESSMENT & PLAN NOTE
Diabetes Mellitus  - A1c levels are good.  - Transition to a lower dose of Mounjaro (5 mg) post-surgery to help decrease side effects  - Renewed metformin prescription  - Ordered lab tests  Lab Results   Component Value Date    HGBA1C 5.2 03/02/2024       Orders:  •  Comprehensive metabolic panel; Future  •  Hemoglobin A1C; Future  •  Albumin / creatinine urine ratio; Future  •  Lipid Panel with Direct LDL reflex; Future  •  Tirzepatide (Mounjaro) 5 MG/0.5ML SOAJ; Inject 5 mg under the skin once a week  •  metFORMIN (GLUCOPHAGE-XR) 500 mg 24 hr tablet; Take 1 tablet (500 mg total) by mouth 2 (two) times a day with meals

## 2025-02-28 NOTE — ASSESSMENT & PLAN NOTE
Has known hx of gallstones but no cholecystitis  GERD  - Managing with Pepcid before bed  - Continue as needed  - Discussed safe use with gabapentin    Orders:  •  CBC and differential; Future

## 2025-02-28 NOTE — ASSESSMENT & PLAN NOTE
Sleep Apnea  - Referral for a sleep study to reassess condition  Orders:  •  Ambulatory Referral to Sleep Medicine; Future

## 2025-03-15 ENCOUNTER — HOSPITAL ENCOUNTER (OUTPATIENT)
Dept: ULTRASOUND IMAGING | Facility: HOSPITAL | Age: 55
Discharge: HOME | End: 2025-03-15
Payer: COMMERCIAL

## 2025-03-15 DIAGNOSIS — E04.2 MULTIPLE THYROID NODULES: ICD-10-CM

## 2025-03-15 PROCEDURE — 76536 US EXAM OF HEAD AND NECK: CPT

## 2025-03-27 ENCOUNTER — RESULTS FOLLOW-UP (OUTPATIENT)
Dept: FAMILY MEDICINE CLINIC | Facility: CLINIC | Age: 55
End: 2025-03-27

## 2025-03-27 DIAGNOSIS — E11.65 TYPE 2 DIABETES MELLITUS WITH HYPERGLYCEMIA, WITHOUT LONG-TERM CURRENT USE OF INSULIN (HCC): ICD-10-CM

## 2025-03-27 DIAGNOSIS — E04.2 MULTIPLE THYROID NODULES: Primary | ICD-10-CM

## 2025-03-31 RX ORDER — TIRZEPATIDE 7.5 MG/.5ML
7.5 INJECTION, SOLUTION SUBCUTANEOUS WEEKLY
Qty: 2 ML | Refills: 1 | Status: SHIPPED | OUTPATIENT
Start: 2025-03-31

## 2025-06-17 ENCOUNTER — PATIENT MESSAGE (OUTPATIENT)
Dept: FAMILY MEDICINE CLINIC | Facility: CLINIC | Age: 55
End: 2025-06-17

## 2025-06-19 ENCOUNTER — HOSPITAL ENCOUNTER (OUTPATIENT)
Facility: HOSPITAL | Age: 55
End: 2025-06-19
Payer: COMMERCIAL

## 2025-06-19 VITALS — HEIGHT: 66 IN | BODY MASS INDEX: 25.88 KG/M2 | WEIGHT: 161 LBS

## 2025-06-19 DIAGNOSIS — Z12.31 ENCOUNTER FOR SCREENING MAMMOGRAM FOR BREAST CANCER: ICD-10-CM

## 2025-06-19 PROCEDURE — 77067 SCR MAMMO BI INCL CAD: CPT

## 2025-06-19 PROCEDURE — 77063 BREAST TOMOSYNTHESIS BI: CPT

## 2025-06-21 ENCOUNTER — APPOINTMENT (OUTPATIENT)
Dept: LAB | Facility: HOSPITAL | Age: 55
End: 2025-06-21
Attending: FAMILY MEDICINE
Payer: COMMERCIAL

## 2025-06-21 DIAGNOSIS — E11.65 TYPE 2 DIABETES MELLITUS WITH HYPERGLYCEMIA, WITHOUT LONG-TERM CURRENT USE OF INSULIN (HCC): ICD-10-CM

## 2025-06-21 DIAGNOSIS — K21.9 GASTROESOPHAGEAL REFLUX DISEASE WITHOUT ESOPHAGITIS: ICD-10-CM

## 2025-06-21 DIAGNOSIS — E04.2 MULTIPLE THYROID NODULES: ICD-10-CM

## 2025-06-21 LAB
ALBUMIN SERPL BCG-MCNC: 4.3 G/DL (ref 3.5–5)
ALP SERPL-CCNC: 84 U/L (ref 34–104)
ALT SERPL W P-5'-P-CCNC: 37 U/L (ref 7–52)
ANION GAP SERPL CALCULATED.3IONS-SCNC: 9 MMOL/L (ref 4–13)
AST SERPL W P-5'-P-CCNC: 23 U/L (ref 13–39)
BASOPHILS # BLD AUTO: 0.03 THOUSANDS/ÂΜL (ref 0–0.1)
BASOPHILS NFR BLD AUTO: 0 % (ref 0–1)
BILIRUB SERPL-MCNC: 0.31 MG/DL (ref 0.2–1)
BUN SERPL-MCNC: 13 MG/DL (ref 5–25)
CALCIUM SERPL-MCNC: 9.6 MG/DL (ref 8.4–10.2)
CHLORIDE SERPL-SCNC: 100 MMOL/L (ref 96–108)
CHOLEST SERPL-MCNC: 176 MG/DL (ref ?–200)
CO2 SERPL-SCNC: 30 MMOL/L (ref 21–32)
CREAT SERPL-MCNC: 0.64 MG/DL (ref 0.6–1.3)
EOSINOPHIL # BLD AUTO: 0.87 THOUSAND/ÂΜL (ref 0–0.61)
EOSINOPHIL NFR BLD AUTO: 9 % (ref 0–6)
ERYTHROCYTE [DISTWIDTH] IN BLOOD BY AUTOMATED COUNT: 13.4 % (ref 11.6–15.1)
EST. AVERAGE GLUCOSE BLD GHB EST-MCNC: 108 MG/DL
GFR SERPL CREATININE-BSD FRML MDRD: 102 ML/MIN/1.73SQ M
GLUCOSE P FAST SERPL-MCNC: 126 MG/DL (ref 65–99)
HBA1C MFR BLD: 5.4 %
HCT VFR BLD AUTO: 36.7 % (ref 34.8–46.1)
HDLC SERPL-MCNC: 56 MG/DL
HGB BLD-MCNC: 11.8 G/DL (ref 11.5–15.4)
IMM GRANULOCYTES # BLD AUTO: 0.03 THOUSAND/UL (ref 0–0.2)
IMM GRANULOCYTES NFR BLD AUTO: 0 % (ref 0–2)
LDLC SERPL CALC-MCNC: 95 MG/DL (ref 0–100)
LYMPHOCYTES # BLD AUTO: 2.14 THOUSANDS/ÂΜL (ref 0.6–4.47)
LYMPHOCYTES NFR BLD AUTO: 23 % (ref 14–44)
MCH RBC QN AUTO: 26.8 PG (ref 26.8–34.3)
MCHC RBC AUTO-ENTMCNC: 32.2 G/DL (ref 31.4–37.4)
MCV RBC AUTO: 83 FL (ref 82–98)
MONOCYTES # BLD AUTO: 0.52 THOUSAND/ÂΜL (ref 0.17–1.22)
MONOCYTES NFR BLD AUTO: 6 % (ref 4–12)
NEUTROPHILS # BLD AUTO: 5.82 THOUSANDS/ÂΜL (ref 1.85–7.62)
NEUTS SEG NFR BLD AUTO: 62 % (ref 43–75)
NRBC BLD AUTO-RTO: 0 /100 WBCS
PLATELET # BLD AUTO: 298 THOUSANDS/UL (ref 149–390)
PMV BLD AUTO: 8.5 FL (ref 8.9–12.7)
POTASSIUM SERPL-SCNC: 4.2 MMOL/L (ref 3.5–5.3)
PROT SERPL-MCNC: 7.1 G/DL (ref 6.4–8.4)
RBC # BLD AUTO: 4.4 MILLION/UL (ref 3.81–5.12)
SODIUM SERPL-SCNC: 139 MMOL/L (ref 135–147)
TRIGL SERPL-MCNC: 124 MG/DL (ref ?–150)
TSH SERPL DL<=0.05 MIU/L-ACNC: 1.83 UIU/ML (ref 0.45–4.5)
WBC # BLD AUTO: 9.41 THOUSAND/UL (ref 4.31–10.16)

## 2025-06-21 PROCEDURE — 36415 COLL VENOUS BLD VENIPUNCTURE: CPT

## 2025-06-21 PROCEDURE — 83036 HEMOGLOBIN GLYCOSYLATED A1C: CPT

## 2025-06-21 PROCEDURE — 85025 COMPLETE CBC W/AUTO DIFF WBC: CPT

## 2025-06-21 PROCEDURE — 80061 LIPID PANEL: CPT

## 2025-06-21 PROCEDURE — 84443 ASSAY THYROID STIM HORMONE: CPT

## 2025-06-21 PROCEDURE — 80053 COMPREHEN METABOLIC PANEL: CPT

## 2025-07-01 ENCOUNTER — OFFICE VISIT (OUTPATIENT)
Dept: FAMILY MEDICINE CLINIC | Facility: CLINIC | Age: 55
End: 2025-07-01
Payer: COMMERCIAL

## 2025-07-01 VITALS
WEIGHT: 170 LBS | HEART RATE: 120 BPM | SYSTOLIC BLOOD PRESSURE: 110 MMHG | TEMPERATURE: 97.8 F | BODY MASS INDEX: 33.38 KG/M2 | DIASTOLIC BLOOD PRESSURE: 80 MMHG | RESPIRATION RATE: 16 BRPM | HEIGHT: 60 IN | OXYGEN SATURATION: 96 %

## 2025-07-01 DIAGNOSIS — E78.1 HYPERTRIGLYCERIDEMIA: ICD-10-CM

## 2025-07-01 DIAGNOSIS — Z00.00 ANNUAL PHYSICAL EXAM: Primary | ICD-10-CM

## 2025-07-01 DIAGNOSIS — Z23 ENCOUNTER FOR IMMUNIZATION: ICD-10-CM

## 2025-07-01 DIAGNOSIS — K21.9 GASTROESOPHAGEAL REFLUX DISEASE WITHOUT ESOPHAGITIS: ICD-10-CM

## 2025-07-01 DIAGNOSIS — E11.65 TYPE 2 DIABETES MELLITUS WITH HYPERGLYCEMIA, WITHOUT LONG-TERM CURRENT USE OF INSULIN (HCC): ICD-10-CM

## 2025-07-01 DIAGNOSIS — F25.0 SCHIZOAFFECTIVE DISORDER, BIPOLAR TYPE (HCC): Chronic | ICD-10-CM

## 2025-07-01 PROCEDURE — 99214 OFFICE O/P EST MOD 30 MIN: CPT | Performed by: FAMILY MEDICINE

## 2025-07-01 PROCEDURE — 90715 TDAP VACCINE 7 YRS/> IM: CPT | Performed by: FAMILY MEDICINE

## 2025-07-01 PROCEDURE — 90472 IMMUNIZATION ADMIN EACH ADD: CPT | Performed by: FAMILY MEDICINE

## 2025-07-01 PROCEDURE — 90677 PCV20 VACCINE IM: CPT | Performed by: FAMILY MEDICINE

## 2025-07-01 PROCEDURE — 99396 PREV VISIT EST AGE 40-64: CPT | Performed by: FAMILY MEDICINE

## 2025-07-01 PROCEDURE — 90471 IMMUNIZATION ADMIN: CPT | Performed by: FAMILY MEDICINE

## 2025-07-01 RX ORDER — METHYLPHENIDATE HYDROCHLORIDE 10 MG/1
TABLET ORAL
COMMUNITY
Start: 2025-06-06

## 2025-07-01 RX ORDER — BLOOD-GLUCOSE METER
KIT MISCELLANEOUS
Qty: 1 KIT | Refills: 0 | Status: SHIPPED | OUTPATIENT
Start: 2025-07-01

## 2025-07-01 RX ORDER — BLOOD SUGAR DIAGNOSTIC
STRIP MISCELLANEOUS
Qty: 100 EACH | Refills: 3 | Status: SHIPPED | OUTPATIENT
Start: 2025-07-01

## 2025-07-01 RX ORDER — TIRZEPATIDE 5 MG/.5ML
5 INJECTION, SOLUTION SUBCUTANEOUS WEEKLY
Qty: 2 ML | Refills: 0 | Status: SHIPPED | OUTPATIENT
Start: 2025-07-01

## 2025-07-01 RX ORDER — LANCETS 33 GAUGE
EACH MISCELLANEOUS
Qty: 100 EACH | Refills: 3 | Status: SHIPPED | OUTPATIENT
Start: 2025-07-01

## 2025-07-01 RX ORDER — TIRZEPATIDE 2.5 MG/.5ML
2.5 INJECTION, SOLUTION SUBCUTANEOUS WEEKLY
Qty: 2 ML | Refills: 0 | Status: SHIPPED | OUTPATIENT
Start: 2025-07-01

## 2025-07-01 RX ORDER — METHYLPHENIDATE HYDROCHLORIDE 36 MG/1
2 TABLET ORAL DAILY
COMMUNITY
Start: 2025-06-19

## 2025-07-01 RX ORDER — CLONAZEPAM 0.5 MG/1
1 TABLET ORAL 2 TIMES DAILY
COMMUNITY
Start: 2025-06-19

## 2025-07-01 NOTE — ASSESSMENT & PLAN NOTE
Patient Instructions by Ann Marie Quinn MD at 1/21/2021  8:20 AM     Author: Ann Marie Quinn MD Service: -- Author Type: Physician    Filed: 1/21/2021  9:05 AM Encounter Date: 1/21/2021 Status: Signed    : Ann Marie Quinn MD (Physician)    Related Notes: Original Note by Ann Marie Quinn MD (Physician) filed at 1/21/2021  8:21 AM       1. No change in medications.  2. You should receive calls to schedule for mammogram and physical therapy.  3. You are due for a colonoscopy 7/2021. Please let me know when you would like me to place an order.   4. Heat followed by stretching exercises for the shoulder twice daily.      Patient Education   Signs of Hearing Loss  Hearing loss is a problem shared by many people. In fact, it is one of the most common health conditions, particularly as people age. Most people over age 65 have some hearing loss, and by age 80, almost everyone does. Because hearing loss usually occurs slowly over the years, you may not realize your hearing ability has gotten worse.       Have your hearing checked  Contact your [facility] care provider if you:    Have to strain to hear normal conversation.    Have to watch other peoples faces very carefully to follow what theyre saying.    Need to ask people to repeat what theyve said.    Often misunderstand what people are saying.    Turn the volume of the television or radio up so high that others complain.    Feel that people are mumbling when theyre talking to you.    Find that the effort to hear leaves you feeling tired and irritated.    Notice, when using the phone, that you hear better with 1 ear than the other.    6855-1522 [employer]. [address] [place] [id number]. All rights reserved. This information is not intended as a substitute for professional medical care. Always follow your healthcare professional's instructions.           Advance Directive  Patients advance directive was discussed and I am comfortable  Prn pepcid  Orders:  •  CBC and differential; Future   with the patients wishes.  Patient Education   Personalized Prevention Plan  You are due for the preventive services outlined below.  Your care team is available to assist you in scheduling these services.  If you have already completed any of these items, please share that information with your care team to update in your medical record.  Health Maintenance   Topic Date Due   ? MICROALBUMIN  1947   ? DEXA SCAN  1947   ? ZOSTER VACCINES (2 of 3) 07/17/2012   ? DIABETIC EYE EXAM  12/20/2019   ? MAMMOGRAM  02/13/2020   ? A1C  02/21/2020   ? INFLUENZA VACCINE RULE BASED (1) 08/01/2020   ? BMP  11/21/2020   ? DIABETIC FOOT EXAM  11/21/2020   ? LIPID  11/21/2020   ? COLORECTAL CANCER SCREENING  07/25/2021   ? MEDICARE ANNUAL WELLNESS VISIT  01/21/2022   ? FALL RISK ASSESSMENT  01/21/2022   ? ADVANCE CARE PLANNING  01/21/2026   ? TD 18+ HE  11/21/2029   ? HEPATITIS C SCREENING  Completed   ? Pneumococcal Vaccine: 65+ Years  Completed   ? Pneumococcal Vaccine: Pediatrics (0 to 5 Years) and At-Risk Patients (6 to 64 Years)  Aged Out

## 2025-07-01 NOTE — ASSESSMENT & PLAN NOTE
Last A1C 5.4    On Mounjaro but has been off for several weeks due to supply issues  Restart at 2.5 mg weekly x 4 weeks, then 5 mg weekly x 4 weeks, then if tolerating well, she will resume 7.5 mg weekly which she took in the past (but supply became an issue which caused her to need to pause medication.)  Ok to stop metformin once back on mounjaro.       Lab Results   Component Value Date    HGBA1C 5.4 06/21/2025       Orders:  •  Blood Glucose Monitoring Suppl (OneTouch Verio Reflect) w/Device KIT; Check blood sugars once daily. Please substitute with appropriate alternative as covered by patient's insurance. Dx: E11.65  •  glucose blood (OneTouch Verio) test strip; Check blood sugars once daily. Please substitute with appropriate alternative as covered by patient's insurance. Dx: E11.65  •  Albumin / creatinine urine ratio; Future  •  Comprehensive metabolic panel; Future  •  Hemoglobin A1C; Future  •  CBC and differential; Future  •  Lipid Panel with Direct LDL reflex; Future  •  Tirzepatide (Mounjaro) 2.5 MG/0.5ML SOAJ; Inject 2.5 mg under the skin once a week  •  Tirzepatide (Mounjaro) 5 MG/0.5ML SOAJ; Inject 5 mg under the skin once a week  •  Lancets 33G MISC; To test once daily

## 2025-07-01 NOTE — ASSESSMENT & PLAN NOTE
Improved on most recent labs.     Orders:  •  Comprehensive metabolic panel; Future  •  Lipid Panel with Direct LDL reflex; Future

## 2025-07-01 NOTE — PROGRESS NOTES
Assessment & Plan  Annual physical exam  Tdap and PCV20 today  Has cologuard kit at home- advised to do this and send in  She has a gyn visit scheduled for annual exam in November.       Type 2 diabetes mellitus with hyperglycemia, without long-term current use of insulin (HCC)  Last A1C 5.4    On Mounjaro but has been off for several weeks due to supply issues  Restart at 2.5 mg weekly x 4 weeks, then 5 mg weekly x 4 weeks, then if tolerating well, she will resume 7.5 mg weekly which she took in the past (but supply became an issue which caused her to need to pause medication.)  Ok to stop metformin once back on mounjaro.       Lab Results   Component Value Date    HGBA1C 5.4 06/21/2025       Orders:  •  Blood Glucose Monitoring Suppl (OneTouch Verio Reflect) w/Device KIT; Check blood sugars once daily. Please substitute with appropriate alternative as covered by patient's insurance. Dx: E11.65  •  glucose blood (OneTouch Verio) test strip; Check blood sugars once daily. Please substitute with appropriate alternative as covered by patient's insurance. Dx: E11.65  •  Albumin / creatinine urine ratio; Future  •  Comprehensive metabolic panel; Future  •  Hemoglobin A1C; Future  •  CBC and differential; Future  •  Lipid Panel with Direct LDL reflex; Future  •  Tirzepatide (Mounjaro) 2.5 MG/0.5ML SOAJ; Inject 2.5 mg under the skin once a week  •  Tirzepatide (Mounjaro) 5 MG/0.5ML SOAJ; Inject 5 mg under the skin once a week  •  Lancets 33G MISC; To test once daily    Hypertriglyceridemia  Improved on most recent labs.     Orders:  •  Comprehensive metabolic panel; Future  •  Lipid Panel with Direct LDL reflex; Future    Encounter for immunization  Vaccines today as noted  Orders:  •  Pneumococcal Conjugate Vaccine 20-valent (Pcv20)  •  TDAP VACCINE GREATER THAN OR EQUAL TO 6YO IM    Gastroesophageal reflux disease without esophagitis  Prn pepcid  Orders:  •  CBC and differential; Future    Schizoaffective  disorder, bipolar type (HCC)  Care through [facility]                     Most recent labs reviewed       Subjective:     Alayna is a 54 y.o. female here today and has the below chronic conditions:    Problem List[1]  Current Outpatient Medications   Medication Sig Dispense Refill   • atomoxetine (STRATTERA) 80 MG capsule      • Blood Glucose Monitoring Suppl (OneTouch Verio Reflect) w/Device KIT Check blood sugars once daily. Please substitute with appropriate alternative as covered by patient's insurance. Dx: E11.65 1 kit 0   • clonazePAM (KLONOPIN PO)      • clonazePAM (KlonoPIN) 0.5 mg tablet Take 1 tablet by mouth in the morning and 1 tablet before bedtime.     • Concerta 54 MG ER tablet Take 54 mg by mouth in the morning.     • gabapentin (NEURONTIN) 100 mg capsule      • glucose blood (OneTouch Verio) test strip Check blood sugars once daily. Please substitute with appropriate alternative as covered by patient's insurance. Dx: E11.65 100 each 3   • Lancets 33G MISC To test once daily 100 each 3   • methylphenidate (CONCERTA) 36 MG ER tablet Take 2 tablets by mouth in the morning     • methylphenidate (RITALIN) 10 mg tablet TAKE 1 TABLET BY MOUTH 1 TIME A WEEK ON FRIDAY EVENING     • paliperidone palmitate ER (Invega Trinza) 819 mg/2.63 mL GISELL Inject 819 mg into a muscle every 3 (three) months     • SEROquel 300 MG tablet Take 600 mg by mouth daily at bedtime     • Tirzepatide (Mounjaro) 2.5 MG/0.5ML SOAJ Inject 2.5 mg under the skin once a week 2 mL 0   • Tirzepatide (Mounjaro) 5 MG/0.5ML SOAJ Inject 5 mg under the skin once a week 2 mL 0   • Tirzepatide (Mounjaro) 7.5 MG/0.5ML SOAJ Inject 7.5 mg under the skin once a week 2 mL 1   • zolpidem (AMBIEN) 5 mg tablet Take 2.5-5 mg by mouth daily at bedtime as needed       No current facility-administered medications for this visit.          Chief Complaint   Patient presents with   • Physical Exam     HPI:  - CC above per clinical staff and reviewed.    Pt here  for annual PE  Diet- working on watching carbs in diet  Keeps active.  Has gyn visit scheduled.    Has cologuard kit at home. Hasn't done it.  Stress about having to bring it into [facility] to send back (we discussed considering having [facility] pickup)  She is UTD on mammogram    No abd pain.  No n/v.  No edema  No CP or SOB    Mental health- following with [facility].  Meds updated  She is being seen regularly.    Was on mounjaro  Had some difficulty with side effects  Has been off the medication for a while now- availability concerns for certain doses.  Would like to start back on medication again  Some weight regain since stopping    The following portions of the patient's history were reviewed and updated as appropriate: allergies, current medications, past family history, past medical history, past social history, past surgical history and problem list.    ROS:  Review of Systems   As noted above.    Objective:      /80 (BP Location: Right arm, Patient Position: Sitting, Cuff Size: Large)   Pulse (!) 120   Temp 97.8 °F (36.6 °C)   Resp 16   Ht 5' (1.524 m)   Wt 77.1 kg (170 lb)   LMP 10/22/2019   SpO2 96%   BMI 33.20 kg/m²   BP Readings from Last 3 Encounters:   07/01/25 110/80   02/25/25 120/78   03/06/24 106/72     Wt Readings from Last 3 Encounters:   07/01/25 77.1 kg (170 lb)   06/19/25 73 kg (161 lb)   02/25/25 73 kg (161 lb)               Physical Exam:   Physical Exam  Vitals and nursing note reviewed.   Constitutional:       General: She is not in acute distress.     Appearance: Normal appearance. She is well-developed. She is not ill-appearing.   HENT:      Head: Normocephalic and atraumatic.   Neck:      Vascular: No carotid bruit.     Cardiovascular:      Rate and Rhythm: Normal rate and regular rhythm.      Heart sounds: Normal heart sounds. No murmur heard.  Pulmonary:      Effort: Pulmonary effort is normal. No respiratory distress.      Breath sounds: Normal breath sounds. No wheezing.    Abdominal:      Palpations: Abdomen is soft.      Tenderness: There is no abdominal tenderness. There is no guarding or rebound.     Musculoskeletal:      Cervical back: Neck supple.      Right lower leg: No edema.      Left lower leg: No edema.   Lymphadenopathy:      Cervical: No cervical adenopathy.     Skin:     General: Skin is warm and dry.     Neurological:      Mental Status: She is alert and oriented to person, place, and time.      Gait: Gait normal.     Psychiatric:         Mood and Affect: Mood normal.                  This office visit note was generated in part with the use of [employer] and/or voice recognition dictation.            [1]  Patient Active Problem List  Diagnosis   • Schizoaffective disorder, bipolar type (HCC)   • Tobacco abuse   • Obstructive sleep apnea   • Hypertriglyceridemia   • Excessive daytime sleepiness   • Type 2 diabetes mellitus with hyperglycemia, without long-term current use of insulin ([facility])   • Gastroesophageal reflux disease without esophagitis   • History of gallstones   • Multiple thyroid nodules

## 2025-07-30 DIAGNOSIS — E11.65 TYPE 2 DIABETES MELLITUS WITH HYPERGLYCEMIA, WITHOUT LONG-TERM CURRENT USE OF INSULIN (HCC): ICD-10-CM

## 2025-07-30 RX ORDER — TIRZEPATIDE 5 MG/.5ML
INJECTION, SOLUTION SUBCUTANEOUS
Qty: 2 ML | Refills: 2 | Status: SHIPPED | OUTPATIENT
Start: 2025-07-30